# Patient Record
Sex: FEMALE | Race: WHITE | NOT HISPANIC OR LATINO | Employment: OTHER
[De-identification: names, ages, dates, MRNs, and addresses within clinical notes are randomized per-mention and may not be internally consistent; named-entity substitution may affect disease eponyms.]

---

## 2021-04-21 ENCOUNTER — TRANSCRIBE ORDERS (OUTPATIENT)
Dept: OTHER | Age: 36
End: 2021-04-21

## 2021-04-21 DIAGNOSIS — D61.9: Primary | ICD-10-CM

## 2021-04-23 ENCOUNTER — PATIENT OUTREACH (OUTPATIENT)
Dept: ONCOLOGY | Facility: CLINIC | Age: 36
End: 2021-04-23

## 2021-04-23 NOTE — PROGRESS NOTES
OUTGOING CALL to pt, no answer.   LVM introducing my role as nurse navigator with [facility] and that we have recd the referral to hematology for 2nd opinion from Dr Banegas and explained to pt that the referral is under dept review and that she will receive a call from our scheduling intake team next week to schedule the appt, provided our call-back number below if needed.  Ricarda Monae, RN, BSN, OCN  Hematology/Oncology Nurse Navigator  [facility]  [phone]

## 2021-05-03 NOTE — TELEPHONE ENCOUNTER
RECORDS STATUS - ALL OTHER DIAGNOSIS      RECORDS RECEIVED FROM: [facility]   DATE RECEIVED:    NOTES STATUS DETAILS   OFFICE NOTE from referring provider HERNÁN Banegas   OFFICE NOTE from medical oncologist HERNÁN  Mohamud Banegas: 21   DISCHARGE SUMMARY from hospital     DISCHARGE REPORT from the ER [facility] - [facility] 21   OPERATIVE REPORT CE - [facility] 19, 19: Exploratormy Laparotomy   MEDICATION LIST CE [facility]   CLINICAL TRIAL TREATMENTS TO DATE     LABS     PATHOLOGY REPORTS [facility], Report in CE, Slides requested 5/3    [employer] 3/29/21   ANYTHING RELATED TO DIAGNOSIS Epic/CE 5/3/21   GENONOMIC TESTING     TYPE:     IMAGING (NEED IMAGES & REPORT)     XR PACS [facility]   XR Swallow Study PACS [facility]   CT SCANS PACS [facility]   MRI PACS [facility]   MAMMO     ULTRASOUND     PET

## 2021-05-07 PROCEDURE — 88321 CONSLTJ&REPRT SLD PREP ELSWR: CPT | Mod: 26 | Performed by: PATHOLOGY

## 2021-05-07 PROCEDURE — 999N001031 HC STATISTIC REV BONE MARROW OUTSIDE SLIDES TC 88321: Performed by: INTERNAL MEDICINE

## 2021-05-11 LAB — COPATH REPORT: NORMAL

## 2021-05-29 ENCOUNTER — HEALTH MAINTENANCE LETTER (OUTPATIENT)
Age: 36
End: 2021-05-29

## 2021-08-24 ENCOUNTER — PRE VISIT (OUTPATIENT)
Dept: ONCOLOGY | Facility: CLINIC | Age: 36
End: 2021-08-24

## 2021-08-24 ENCOUNTER — VIRTUAL VISIT (OUTPATIENT)
Dept: ONCOLOGY | Facility: CLINIC | Age: 36
End: 2021-08-24
Attending: INTERNAL MEDICINE
Payer: COMMERCIAL

## 2021-08-24 DIAGNOSIS — D61.9 APLASTIC ANEMIA (H): Primary | ICD-10-CM

## 2021-08-24 PROCEDURE — 99205 OFFICE O/P NEW HI 60 MIN: CPT | Mod: 95 | Performed by: INTERNAL MEDICINE

## 2021-08-24 RX ORDER — PREGABALIN 100 MG/1
150 CAPSULE ORAL 2 TIMES DAILY
COMMUNITY

## 2021-08-24 RX ORDER — CLONAZEPAM 0.5 MG/1
0.25 TABLET ORAL EVERY MORNING
COMMUNITY

## 2021-08-24 RX ORDER — HYDROCODONE BITARTRATE AND ACETAMINOPHEN 10; 325 MG/1; MG/1
1 TABLET ORAL EVERY 4 HOURS PRN
COMMUNITY
End: 2024-03-25

## 2021-08-24 RX ORDER — CYCLOBENZAPRINE HCL 10 MG
10 TABLET ORAL 3 TIMES DAILY PRN
COMMUNITY
End: 2024-04-29

## 2021-08-24 RX ORDER — MULTIPLE VITAMINS W/ MINERALS TAB 9MG-400MCG
1 TAB ORAL EVERY MORNING
COMMUNITY

## 2021-08-24 RX ORDER — SUCRALFATE 1 G/1
1 TABLET ORAL 2 TIMES DAILY PRN
COMMUNITY
Start: 2019-12-18

## 2021-08-24 RX ORDER — CYANOCOBALAMIN 1000 UG/ML
INJECTION, SOLUTION INTRAMUSCULAR; SUBCUTANEOUS
COMMUNITY
Start: 2020-08-31 | End: 2024-03-25

## 2021-08-24 RX ORDER — SENNOSIDES A AND B 8.6 MG/1
1 TABLET, FILM COATED ORAL EVERY EVENING
Status: ON HOLD | COMMUNITY
End: 2024-05-08

## 2021-08-24 RX ORDER — PANTOPRAZOLE SODIUM 40 MG/1
TABLET, DELAYED RELEASE ORAL
COMMUNITY

## 2021-08-24 NOTE — LETTER
"    8/24/2021         RE: Mimi Su  [address] [id number]        Dear Colleague,    Thank you for referring your patient, Mimi Su, to the [facility]. Please see a copy of my visit note below.    Mimi is a 36 year old who is being evaluated via a billable video visit.      How would you like to obtain your AVS? MyChart     If the video visit is dropped, the invitation should be resent by: Text to cell phone: [phone]     Will anyone else be joining your video visit? No          Vitals - Patient Reported  Weight (Patient Reported): 64.4 kg (142 lb)  Height (Patient Reported): 170.2 cm (5' 7\")  BMI (Based on Pt Reported Ht/Wt): 22.24  Pain Score: Moderate Pain (5)  Pain Loc:  (EVERYWHERE)    Nilesh Law LPN    CHIEF COMPLAINT: Macrocytic anemia. 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  The patient reports generalized symptoms of fatigue, brain fog, dizziness in the setting of chronic pain syndrome.  Independent interpretation of recent bone marrow biopsy reveals hypocellular bone marrow with 20% cellularity, as well as diminished trilineage hematopoiesis.  No evidence of dysplasia or increase in blasts.  The patient does not have any clear risk factors or history of viral illness, hepatitis, family history of hematologic disease, or genetic predisposition.  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    Differential diagnosis for etiology of aplastic anemia includes viral illness (hepatitis B/C, HIV, parvovirus), toxins such as ethanol use, autoimmune " diseases, paroxysmal nocturnal hemoglobinuria, and genetic conditions such as dyskeratosis congenita. Patient does not appear to have strong family history of conditions associated with discontinue and partial lab workup does not suggest hemolytic anemia. However given uncertainty in etiology of her AA, we recommend additional laboratory workup.    - Recommend checking Hepatitis B serologies, anti-Hep C Ab, HIV  - Recommend additional workup for hemolytic anemia with haptoglobin, LDH, reticulocyte count, and direct Eligio test  - Recommend repeat flow cytometry/FLAER testing to rule out PNH  - Advised patient to abstain from EtOH for at least 3 months to assess if alcohol could be playing a role  - Further workup with telomere length analysis and molecular sequencing can be deferred given low likelihood of dyskeratosis congenita and overall mild presentation of disease  - At this time, therapy initiation is not indicated given lack of persistent cytopenias. Recommend continued close monitoring  - Patient requests to follow with her hematologist Dr. Banegas for monitoring and laboratory workup. May consider transfer of care to the [facility] if she plans to pursue treatment    The patient was seen and discussed with attending Dr. Asad Vela MD  PGY-5 Medicine-Dermatology  Pager [phone]    Physician Attestation   I saw this patient with the resident and agree with the resident s findings and plan of care as documented in the resident s note.      Briefly, Mimi is a 36 year old woman with a long history of mild but chronic macrocytic anemia refractory to vitamin B12 supplementation.  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  We explained to Mimi that toxins (namely alcohol in her case), viral infections, inherited marrow failure syndromes  such as Fanconi anemia or Dyskeratosis Congenita, or idiopathic (immune) non-severe AA.    At this point, I told her we need to exclude alcohol as the cause with absolute abstinence for the next 3 months.    Further AA workup includes excluded HCV, HBV, and HIV, checking for PNH (I don't suspect classical PNH but is associated with idiopathic AA), and excluding hemolysis with CBC and retic count, LDH, Haptoglobin, ANGELINA given that she is experiencing some cold-induced symptoms (acrocyanosis) more typical of cold agglutinin disease which also causes macrocytic anemia due to chronic hemolysis.    I'll have these tests done locally and then I can see her back here in 3 months    80 minutes spent on the date of the encounter doing chart review, review of outside records, review of test results, interpretation of tests, patient visit and documentation     Herson Kamara MD   of Medicine  [employer]       Video Start Time: 9:00 AM    Video-Visit Details    Type of service:  Video Visit    Video End Time:9:40AM    Originating Location (pt. Location): Home    Distant Location (provider location):  [facility]     Platform used for Video Visit: Falguni       ----------------------------------------------------------------------------------------    HPI: The patient is a 36 year old  female referred by her outside oncologist Dr. Banegas for further evaluation of hypocellular bone marrow of unclear etiology. She has an extensive past medical and surgical history including fibromyalgia, tobacco use, pelvic floor dysfunction, depression, cervicalgia, IBS, and multiple GI/GYN surgeries including partial colectomy with permanent colostomy.    She's had macrocytosis and anemia since her 20s. She was initially seen in 2014 for macrocytosis and was diagnosed with vitamin B12 deficiency. She continues to take B12 injections. She has had ongoing symptoms of  intermittent dizziness, lightheadedness, and chronic pain all over her body, diagnosed in her early 20s. She also has symptoms of brain fog often, has word finding difficulty and slurred speech. She has been seeing a hematologist since 2019 for this macrocytosis.     She denies any history of severe viral illness. 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 She has a history of active tobacco use, used to smoke 1/2 ppd until 2019, then switched to e-cigarettes with 3% Juul pods daily. Reports occasional alcohol use 1-2 times a week, usually margaritas. Denies daily use of alcohol. She has no history of autoimmune disease. No history of blood clots.    No family history of hematologic conditions or malignancy.    She needs neck surgery, but her surgeon wants to know how she would recover given her anemia/macrocytosis issues.         History taken from chart:  ONCOLOGIC HISTORY:   1.  2014: Patient was seen on account of macrocytosis which had been present for over 6 years. At that time she also had constitutional symptoms of tiredness, fatigue and night sweats and was also drinking a fair amount of alcoholic beverages. The etiology of the macrocytosis was felt to be nutritional as there was also associated vitamin B12 deficiency in the setting of malabsorption.   2. January 25, 2019:  Ms Su was on an initial visit in the presence of her partner. She was recently started on Strattera 10 mg daily.  3. January 29, 2019: CT chest, abdomen and pelvis with IV contrast showed no adenopathy, mass or acute findings in the chest, abdomen, or pelvis.  No definite cause for unexplained weight loss identified on CT.  4. March 29, 2021: Bone marrow examination indicated hypocellular bone marrow for age (30%) showing normal trilineage hematopoiesis, Adequate numbers of megakaryocytes, and no increase in blasts (1%). Mild macrocytosis. Storage iron present. Chromosome analysis with  no clonal abnormality apparent and macrocytosis present. Flow cytometry with normal immunophenotyping results.  No monotypic B-cell population or increase in blasts identified.       ROS: 14 point ROS neg other than the symptoms noted above in the HPI.    Social history as above in HPI and notable for prior history of heavier alcohol use but she currently reports intermittent alcohol use    17 drug allergies are reviewed in EPIC    Current Outpatient Medications:      clonazePAM (KLONOPIN) 0.5 MG tablet, Take 0.25 mg by mouth 2 times daily as needed for anxiety, Disp: , Rfl:      cyanocobalamin (CYANOCOBALAMIN) 1000 MCG/ML injection, INJECT AS DIRECTED ONCE MONTHLY., Disp: , Rfl:      cyclobenzaprine (FLEXERIL) 10 MG tablet, Take 10 mg by mouth 3 times daily as needed for muscle spasms, Disp: , Rfl:      FLUoxetine (PROZAC) 20 MG capsule, Take 20 mg by mouth daily, Disp: , Rfl:      HYDROcodone-acetaminophen (NORCO)  MG per tablet, Take 1 tablet by mouth every 4 hours as needed for severe pain, Disp: , Rfl:      linaclotide (LINZESS) 145 MCG capsule, Take by mouth 2 times daily, Disp: , Rfl:      melatonin 3 MG CAPS, , Disp: , Rfl:      multivitamin w/minerals (MULTI-VITAMIN) tablet, Take 1 tablet by mouth daily, Disp: , Rfl:      pantoprazole (PROTONIX) 20 MG EC tablet, Take 40 mg by mouth 2 times daily TWO TABS IN MORNING ONE AT NIGHT, Disp: , Rfl:      pregabalin (LYRICA) 100 MG capsule, Take 100 mg by mouth 2 times daily, Disp: , Rfl:      senna (SENOKOT) 8.6 MG tablet, Take 1 tablet by mouth daily, Disp: , Rfl:      sucralfate (CARAFATE) 1 GM tablet, TAKE 1 TABLET BY MOUTH FOUR TIMES DAILY, Disp: , Rfl:       Physical Examination:   Exam:  There is no height or weight on file to calculate BMI.   Constitutional: Appears essentially well, no distress.  No evidence of early graying.    Oral: no evidence of oral hairy leukoplakia  Eyes: no discharge, injection or icterus  Respiratory: no cough or labored  breathing  Skin: no rashes or petechiae  Neurological: no deficits appreciated, speech is fluent  Psych: affect is normal    LABORATORY AND IMAGING DATA     Bone Marrow Biopsy 5/7/21 reviewed by [facility]  Bone marrow, posterior iliac crest, decalcified trephine biopsy, aspirate   clot, touch imprints, aspirate   smears, and peripheral blood ([id number], 03/29/2021):        Hypocellular marrow (cellularity estimated at 20%) with diminished   trilineage hematopoiesis, no overt   dysplasia. No increase in blasts         No morphologic evidence for hematolymphoid neoplasm (see comment)        Peripheral blood showing slight mildly macrocytic anemia (HgB 11.9   g/dl, MCV 99.9 fl), , slight   leukopenia (WBC 3.5 K/ul), with a normal differential, slight   thrombocytopenia (Plt 148 K/ul)     COMMENT:   We agree with the original diagnosis, please see the report scanned in   EPIC. There is no evidence for   leukemia, lymphoma or other neoplasm. In the differential of the etiology   of marrow hypoplasia consider toxic   effects and potential medication effects.   By report flow cytometry was performed at a reference laboratory ([facility])   and showed no abnormal findings, the   specific flow cytometry report was not received.   By report cytogenetic studies were sent to [facility] laboratories, the   results s are not included in the   report.     Chromosome analysis negative    3/19/21 labs:  - Ig Free Light Chain: within normal limits   - SPEP within normal limits   - paraneoplastic ab panel negative    9/23/20 labs:  - TALA negative  - anti-CCP negative    Flow cytometry 3/29/21:    Normal immunophenotyping results.  No monotypic B-cell   population or increase in blasts identified.     Results:   Blasts:  Not increased by CD45/side scatter and CD34.     B-cells:  No monotypic; normal expression pattern of CD19,   CD10, surface kappa and lambda.     T-cells/NK-cells:  No aberrant phenotype by CD3 and CD16.      Quality assessment: Specimen received within validated   Guidelines.        Imaging Studies  None          I spent 60 minutes in consultation and over 50% of the time was spent on  counseling and coordinating care.      Again, thank you for allowing me to participate in the care of your patient.        Sincerely,        Herson Kamara MD

## 2021-08-24 NOTE — PROGRESS NOTES
"Mimi is a 36 year old who is being evaluated via a billable video visit.      How would you like to obtain your AVS? MyChart     If the video visit is dropped, the invitation should be resent by: Text to cell phone: [phone]     Will anyone else be joining your video visit? No          Vitals - Patient Reported  Weight (Patient Reported): 64.4 kg (142 lb)  Height (Patient Reported): 170.2 cm (5' 7\")  BMI (Based on Pt Reported Ht/Wt): 22.24  Pain Score: Moderate Pain (5)  Pain Loc:  (EVERYWHERE)    Nilesh Law LPN    CHIEF COMPLAINT: Macrocytic anemia. 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  The patient reports generalized symptoms of fatigue, brain fog, dizziness in the setting of chronic pain syndrome.  Independent interpretation of recent bone marrow biopsy reveals hypocellular bone marrow with 20% cellularity, as well as diminished trilineage hematopoiesis.  No evidence of dysplasia or increase in blasts.  The patient does not have any clear risk factors or history of viral illness, hepatitis, family history of hematologic disease, or genetic predisposition.  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    Differential diagnosis for etiology of aplastic anemia includes viral illness (hepatitis B/C, HIV, parvovirus), toxins such as ethanol use, autoimmune diseases, paroxysmal nocturnal hemoglobinuria, and genetic conditions such as dyskeratosis congenita. Patient does not appear to have strong family history of conditions associated with discontinue and partial lab workup does not suggest hemolytic anemia. However " given uncertainty in etiology of her AA, we recommend additional laboratory workup.    - Recommend checking Hepatitis B serologies, anti-Hep C Ab, HIV  - Recommend additional workup for hemolytic anemia with haptoglobin, LDH, reticulocyte count, and direct Eligio test  - Recommend repeat flow cytometry/FLAER testing to rule out PNH  - Advised patient to abstain from EtOH for at least 3 months to assess if alcohol could be playing a role  - Further workup with telomere length analysis and molecular sequencing can be deferred given low likelihood of dyskeratosis congenita and overall mild presentation of disease  - At this time, therapy initiation is not indicated given lack of persistent cytopenias. Recommend continued close monitoring  - Patient requests to follow with her hematologist Dr. Banegas for monitoring and laboratory workup. May consider transfer of care to the [facility] if she plans to pursue treatment    The patient was seen and discussed with attending Dr. Asad Vela MD  PGY-5 Medicine-Dermatology  Pager [phone]    Physician Attestation   I saw this patient with the resident and agree with the resident s findings and plan of care as documented in the resident s note.      Briefly, Mimi is a 36 year old woman with a long history of mild but chronic macrocytic anemia refractory to vitamin B12 supplementation.  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  We explained to Mimi that toxins (namely alcohol in her case), viral infections, inherited marrow failure syndromes such as Fanconi anemia or Dyskeratosis Congenita, or idiopathic (immune) non-severe AA.    At this point, I told her we need to exclude alcohol as the cause with absolute abstinence for the next 3 months.    Further AA workup includes excluded HCV, HBV, and HIV,  checking for PNH (I don't suspect classical PNH but is associated with idiopathic AA), and excluding hemolysis with CBC and retic count, LDH, Haptoglobin, ANGELINA given that she is experiencing some cold-induced symptoms (acrocyanosis) more typical of cold agglutinin disease which also causes macrocytic anemia due to chronic hemolysis.    I'll have these tests done locally and then I can see her back here in 3 months    80 minutes spent on the date of the encounter doing chart review, review of outside records, review of test results, interpretation of tests, patient visit and documentation     Herson Kamara MD   of Medicine  [employer]       Video Start Time: 9:00 AM    Video-Visit Details    Type of service:  Video Visit    Video End Time:9:40AM    Originating Location (pt. Location): Home    Distant Location (provider location):  [facility] CANCER [facility]     Platform used for Video Visit: AmWell       ----------------------------------------------------------------------------------------    HPI: The patient is a 36 year old  female referred by her outside oncologist Dr. Banegas for further evaluation of hypocellular bone marrow of unclear etiology. She has an extensive past medical and surgical history including fibromyalgia, tobacco use, pelvic floor dysfunction, depression, cervicalgia, IBS, and multiple GI/GYN surgeries including partial colectomy with permanent colostomy.    She's had macrocytosis and anemia since her 20s. She was initially seen in 2014 for macrocytosis and was diagnosed with vitamin B12 deficiency. She continues to take B12 injections. She has had ongoing symptoms of intermittent dizziness, lightheadedness, and chronic pain all over her body, diagnosed in her early 20s. She also has symptoms of brain fog often, has word finding difficulty and slurred speech. She has been seeing a hematologist since 2019 for this macrocytosis.      She denies any history of severe viral illness. 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 She has a history of active tobacco use, used to smoke 1/2 ppd until 2019, then switched to e-cigarettes with 3% Juul pods daily. Reports occasional alcohol use 1-2 times a week, usually margaritas. Denies daily use of alcohol. She has no history of autoimmune disease. No history of blood clots.    No family history of hematologic conditions or malignancy.    She needs neck surgery, but her surgeon wants to know how she would recover given her anemia/macrocytosis issues.         History taken from chart:  ONCOLOGIC HISTORY:   1.  2014: Patient was seen on account of macrocytosis which had been present for over 6 years. At that time she also had constitutional symptoms of tiredness, fatigue and night sweats and was also drinking a fair amount of alcoholic beverages. The etiology of the macrocytosis was felt to be nutritional as there was also associated vitamin B12 deficiency in the setting of malabsorption.   2. January 25, 2019:  Ms Su was on an initial visit in the presence of her partner. She was recently started on Strattera 10 mg daily.  3. January 29, 2019: CT chest, abdomen and pelvis with IV contrast showed no adenopathy, mass or acute findings in the chest, abdomen, or pelvis.  No definite cause for unexplained weight loss identified on CT.  4. March 29, 2021: Bone marrow examination indicated hypocellular bone marrow for age (30%) showing normal trilineage hematopoiesis, Adequate numbers of megakaryocytes, and no increase in blasts (1%). Mild macrocytosis. Storage iron present. Chromosome analysis with no clonal abnormality apparent and macrocytosis present. Flow cytometry with normal immunophenotyping results.  No monotypic B-cell population or increase in blasts identified.       ROS: 14 point ROS neg other than the symptoms noted above in the HPI.    Social  history as above in HPI and notable for prior history of heavier alcohol use but she currently reports intermittent alcohol use    17 drug allergies are reviewed in EPIC    Current Outpatient Medications:      clonazePAM (KLONOPIN) 0.5 MG tablet, Take 0.25 mg by mouth 2 times daily as needed for anxiety, Disp: , Rfl:      cyanocobalamin (CYANOCOBALAMIN) 1000 MCG/ML injection, INJECT AS DIRECTED ONCE MONTHLY., Disp: , Rfl:      cyclobenzaprine (FLEXERIL) 10 MG tablet, Take 10 mg by mouth 3 times daily as needed for muscle spasms, Disp: , Rfl:      FLUoxetine (PROZAC) 20 MG capsule, Take 20 mg by mouth daily, Disp: , Rfl:      HYDROcodone-acetaminophen (NORCO)  MG per tablet, Take 1 tablet by mouth every 4 hours as needed for severe pain, Disp: , Rfl:      linaclotide (LINZESS) 145 MCG capsule, Take by mouth 2 times daily, Disp: , Rfl:      melatonin 3 MG CAPS, , Disp: , Rfl:      multivitamin w/minerals (MULTI-VITAMIN) tablet, Take 1 tablet by mouth daily, Disp: , Rfl:      pantoprazole (PROTONIX) 20 MG EC tablet, Take 40 mg by mouth 2 times daily TWO TABS IN MORNING ONE AT NIGHT, Disp: , Rfl:      pregabalin (LYRICA) 100 MG capsule, Take 100 mg by mouth 2 times daily, Disp: , Rfl:      senna (SENOKOT) 8.6 MG tablet, Take 1 tablet by mouth daily, Disp: , Rfl:      sucralfate (CARAFATE) 1 GM tablet, TAKE 1 TABLET BY MOUTH FOUR TIMES DAILY, Disp: , Rfl:       Physical Examination:   Exam:  There is no height or weight on file to calculate BMI.   Constitutional: Appears essentially well, no distress.  No evidence of early graying.    Oral: no evidence of oral hairy leukoplakia  Eyes: no discharge, injection or icterus  Respiratory: no cough or labored breathing  Skin: no rashes or petechiae  Neurological: no deficits appreciated, speech is fluent  Psych: affect is normal    LABORATORY AND IMAGING DATA     Bone Marrow Biopsy 5/7/21 reviewed by [facility]emat[facility]  Bone marrow, posterior iliac crest, decalcified  trephine biopsy, aspirate   clot, touch imprints, aspirate   smears, and peripheral blood ([id number], 03/29/2021):        Hypocellular marrow (cellularity estimated at 20%) with diminished   trilineage hematopoiesis, no overt   dysplasia. No increase in blasts         No morphologic evidence for hematolymphoid neoplasm (see comment)        Peripheral blood showing slight mildly macrocytic anemia (HgB 11.9   g/dl, MCV 99.9 fl), , slight   leukopenia (WBC 3.5 K/ul), with a normal differential, slight   thrombocytopenia (Plt 148 K/ul)     COMMENT:   We agree with the original diagnosis, please see the report scanned in   EPIC. There is no evidence for   leukemia, lymphoma or other neoplasm. In the differential of the etiology   of marrow hypoplasia consider toxic   effects and potential medication effects.   By report flow cytometry was performed at a reference laboratory ([place])   and showed no abnormal findings, the   specific flow cytometry report was not received.   By report cytogenetic studies were sent to [facility] laboratories, the   results s are not included in the   report.     Chromosome analysis negative    3/19/21 labs:  - Ig Free Light Chain: within normal limits   - SPEP within normal limits   - paraneoplastic ab panel negative    9/23/20 labs:  - TALA negative  - anti-CCP negative    Flow cytometry 3/29/21:    Normal immunophenotyping results.  No monotypic B-cell   population or increase in blasts identified.     Results:   Blasts:  Not increased by CD45/side scatter and CD34.     B-cells:  No monotypic; normal expression pattern of CD19,   CD10, surface kappa and lambda.     T-cells/NK-cells:  No aberrant phenotype by CD3 and CD16.     Quality assessment: Specimen received within validated   Guidelines.        Imaging Studies  None          I spent 60 minutes in consultation and over 50% of the time was spent on  counseling and coordinating care.

## 2021-08-30 ENCOUNTER — DOCUMENTATION ONLY (OUTPATIENT)
Dept: ONCOLOGY | Facility: CLINIC | Age: 36
End: 2021-08-30

## 2021-08-30 NOTE — PROGRESS NOTES
"Message received from CC:    \"Please fax all lab orders by Dr. Kamara on 8/25 to pt's local [facility], I found this number in her last set of labs in Care Everywhere:     [facility]    [address] [place] [address]    Phone: [phone]    Fax: [phone] \"    Orders faxed.    Minnie Ervin CMA on 8/30/2021 at 10:04 AM    "

## 2021-09-16 ENCOUNTER — TELEPHONE (OUTPATIENT)
Dept: ONCOLOGY | Facility: CLINIC | Age: 36
End: 2021-09-16

## 2021-09-16 NOTE — TELEPHONE ENCOUNTER
"[facility] Telephone Triage Note    Assessment:   Mimi (pt) reporting the following symptoms:  Gotten worse since last Thursday 9/9, episodes/symptoms occurring daily now:  \"Light headed, weak, sometimes blacks out, difficulty formulating sentences, low grade fever, and pain all over body in every jt, muscle.   Then lays down and \"feels done for the day\".  Episodes lasting about  1 to 2hrs, sometimes feels better after a nap/rest, but still not all the time.     Does take Hydrocodone-Acet which reduces pain to 3/10 or less.     Plan was for pt to stop drinking alcohol before starting txt.   Pt reports has been 4weeks w/o alcohol consumption.     [facility] and [facility] test which was negative, did blood panels and shown continued anemic and that \"pt is dying\".   Given IV Fluids and IBUprofen and recommended that the follow up with Dr. Kamara.     Denies acute symptoms at the time of the call, wondering about txt plan and prefers.     Recommendations:  This writer discussed if episode occurs again option to go to Emergency Room with [facility] if safe. Closest one would be [facility] in [place]. Advised pt to have a caregiver/family member with her to assist in determining where to see more help.     Instructed patient to return to local ER if worsening symptoms, including: fever, chest pain, shortness of breath, uncontrolled bleeding.     Follow-Up:  Routed to Dr. Kamara.   "

## 2021-09-18 ENCOUNTER — HEALTH MAINTENANCE LETTER (OUTPATIENT)
Age: 36
End: 2021-09-18

## 2021-09-20 ENCOUNTER — NURSE TRIAGE (OUTPATIENT)
Dept: ONCOLOGY | Facility: CLINIC | Age: 36
End: 2021-09-20

## 2021-09-20 NOTE — TELEPHONE ENCOUNTER
[facility] Triage    Incoming Call:  Unsure of fever  Lightheaded - dizzy  Pain above the left eye  Headache  Pain all over  Weak and tired  Has not blacked out since before 9/16  Not confused, not having issues formulating sentences,      Copied from Dr. Kamara 8/24/21:  The patient reports generalized symptoms of fatigue, brain fog, dizziness in the setting of chronic pain syndrome    At this point, I told her we need to exclude alcohol as the cause with absolute abstinence for the next 3 months.  5 weeks since alcohol use.    Advised ED - closest ED - [place] - she  has decided to go to [facility] ED.  Her mom's driving her.    Routing to Dr. Kamara, Tiffany Obregon  Report to Leigh at [facility]

## 2021-11-23 ENCOUNTER — VIRTUAL VISIT (OUTPATIENT)
Dept: ONCOLOGY | Facility: CLINIC | Age: 36
End: 2021-11-23
Attending: INTERNAL MEDICINE
Payer: COMMERCIAL

## 2021-11-23 DIAGNOSIS — D61.9 APLASTIC ANEMIA (H): Primary | ICD-10-CM

## 2021-11-23 PROCEDURE — 999N001193 HC VIDEO/TELEPHONE VISIT; NO CHARGE

## 2021-11-23 PROCEDURE — G0463 HOSPITAL OUTPT CLINIC VISIT: HCPCS | Mod: PN,RTG | Performed by: INTERNAL MEDICINE

## 2021-11-23 PROCEDURE — 99214 OFFICE O/P EST MOD 30 MIN: CPT | Mod: 95 | Performed by: INTERNAL MEDICINE

## 2021-11-23 NOTE — LETTER
2021         RE: Mimi Su  [address] [address]        Dear Colleague,    Thank you for referring your patient, Mimi Su, to the [facility]. Please see a copy of my visit note below.        [facility] Hematology Follow Up Visit    Outpatient Visit Note:    Patient: Mimi Su  MRN: [id number]  : 1985  GENEVA: 2021    Mimi Su is a 36 year old woman with a history of hypoplastic bone marrow, intermittent pancytopenia and persistent macrocytosis with and without anemia times who returns for routine follow up.      Assessment:  In summary, Mimi Su is a 36 year old woman with mild pancytopenia and hypoplastic bone marrow, which appears currently resolved.  At this point will continue observation and turn attention to continued other complaints that are not related to the bone marrow.    Recommendations:  1. I counseled the patient about my assessment of her current marrow disorder, which appears in remission.  We discussed treatment of bone marrow disorders with immunosuppression today and that the risks clearly outweigh benefits at this time  2. Repeat CBC and diff about every 3 months.  I'll see her back in 6 months for observation.    35 minutes spent on the date of the encounter doing chart review, review of test results, interpretation of tests, patient visit and documentation       Herson Kamara MD   of Medicine, Division of Hematology, Oncology and Transplantation  [employer]     --------------------------------------------------------  Forward History:  She's had macrocytosis and anemia since her 20s. She was initially seen in 2014 for macrocytosis and was diagnosed with vitamin B12 deficiency. She continues to take B12 injections. She has had ongoing symptoms of intermittent dizziness, lightheadedness, and chronic pain all over her body,  diagnosed in her early 20s. She also has symptoms of brain fog often, has word finding difficulty and slurred speech. She has been seeing a hematologist since 2019 for this macrocytosis.     She denies any history of severe viral illness. 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 She has a history of active tobacco use, used to smoke 1/2 ppd until 2019, then switched to e-cigarettes with 3% Juul pods daily. Reports occasional alcohol use 1-2 times a week, usually margaritas. Denies daily use of alcohol. She has no history of autoimmune disease. No history of blood clots.    March 29, 2021: Bone marrow examination indicated hypocellular bone marrow for age (30%) showing normal trilineage hematopoiesis, Adequate numbers of megakaryocytes, and no increase in blasts (1%). Mild macrocytosis. Storage iron present. Chromosome analysis with no clonal abnormality apparent and macrocytosis present. Flow cytometry with normal immunophenotyping results.  No monotypic B-cell population or increase in blasts identified.     Initial consult Aug 2021: recommended viral hepatitis testing, hemolysis workup, exclude PNH, consider telomere testing.  HBV, HCV, HIV all negative 11/9/21  Haptoglobin normal, Retic 1% with normal Hb    History: Mimi Su is a 36 year old woman with a long list of somatic complaints, many of which have no clear cause at this point.  She was diagnosed with fibromyalgia and is treated with selective serotonin reuptake inhibitor therapy.  However, she reports she has pain all over her body, primary in bones and muscles more than chest/abdomen.  No infections or bleeding but she reports easy bruising which is unchanged.    Objective:  Exam:  There is no height or weight on file to calculate BMI.   Constitutional: Appears well, no distress  Eyes: no discharge, injection or icterus  Respiratory: no cough or labored breathing  Skin: no rashes or  petechiae  Neurological: no deficits appreciated, speech is fluent  Psych: affect is normal    Labs:  CBC 11/9/21 shows:  WBC 4.0, ANC2.3  Hb: 12.4, , 1% reticulocytes      Imaging:  none      Herson Kamara MD

## 2021-11-23 NOTE — PROGRESS NOTES
Mimi is a 36 year old who is being evaluated via a billable video visit.      How would you like to obtain your AVS? MyChart  If the video visit is dropped, the invitation should be resent by: Send to e-mail at: [email or web address]  Will anyone else be joining your video visit? No          [facility] Hematology Follow Up Visit    Outpatient Visit Note:    Patient: Mimi Su  MRN: [id number]  : 1985  GENEVA: 2021    Mimi Su is a 36 year old woman with a history of hypoplastic bone marrow, intermittent pancytopenia and persistent macrocytosis with and without anemia times who returns for routine follow up.      Assessment:  In summary, Mimi Su is a 36 year old woman with mild pancytopenia and hypoplastic bone marrow, which appears currently resolved.  At this point will continue observation and turn attention to continued other complaints that are not related to the bone marrow.    Recommendations:  1. I counseled the patient about my assessment of her current marrow disorder, which appears in remission.  We discussed treatment of bone marrow disorders with immunosuppression today and that the risks clearly outweigh benefits at this time  2. Repeat CBC and diff about every 3 months.  I'll see her back in 6 months for observation.    35 minutes spent on the date of the encounter doing chart review, review of test results, interpretation of tests, patient visit and documentation       Herson Kamara MD   of Medicine, Division of Hematology, Oncology and Transplantation  [employer]     --------------------------------------------------------  Forward History:  She's had macrocytosis and anemia since her 20s. She was initially seen in 2014 for macrocytosis and was diagnosed with vitamin B12 deficiency. She continues to take B12 injections. She has had ongoing symptoms of intermittent dizziness, lightheadedness, and chronic  pain all over her body, diagnosed in her early 20s. She also has symptoms of brain fog often, has word finding difficulty and slurred speech. She has been seeing a hematologist since 2019 for this macrocytosis.     She denies any history of severe viral illness. 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 She has a history of active tobacco use, used to smoke 1/2 ppd until 2019, then switched to e-cigarettes with 3% Juul pods daily. Reports occasional alcohol use 1-2 times a week, usually margaritas. Denies daily use of alcohol. She has no history of autoimmune disease. No history of blood clots.    March 29, 2021: Bone marrow examination indicated hypocellular bone marrow for age (30%) showing normal trilineage hematopoiesis, Adequate numbers of megakaryocytes, and no increase in blasts (1%). Mild macrocytosis. Storage iron present. Chromosome analysis with no clonal abnormality apparent and macrocytosis present. Flow cytometry with normal immunophenotyping results.  No monotypic B-cell population or increase in blasts identified.     Initial consult Aug 2021: recommended viral hepatitis testing, hemolysis workup, exclude PNH, consider telomere testing.  HBV, HCV, HIV all negative 11/9/21  Haptoglobin normal, Retic 1% with normal Hb    History: Mimi Su is a 36 year old woman with a long list of somatic complaints, many of which have no clear cause at this point.  She was diagnosed with fibromyalgia and is treated with selective serotonin reuptake inhibitor therapy.  However, she reports she has pain all over her body, primary in bones and muscles more than chest/abdomen.  No infections or bleeding but she reports easy bruising which is unchanged.    Objective:  Exam:  There is no height or weight on file to calculate BMI.   Constitutional: Appears well, no distress  Eyes: no discharge, injection or icterus  Respiratory: no cough or labored breathing  Skin: no  rashes or petechiae  Neurological: no deficits appreciated, speech is fluent  Psych: affect is normal    Labs:  CBC 11/9/21 shows:  WBC 4.0, ANC2.3  Hb: 12.4, , 1% reticulocytes      Imaging:  none      Video-Visit Details    Type of service:  Video Visit  Video Start Time: 852 AM  Video End Time:908 AM    Originating Location (pt. Location): Home    Distant Location (provider location):  [facility] CANCER [facility]     Platform used for Video Visit: [employer]Claims

## 2022-01-08 ENCOUNTER — HEALTH MAINTENANCE LETTER (OUTPATIENT)
Age: 37
End: 2022-01-08

## 2022-03-14 ENCOUNTER — MYC MEDICAL ADVICE (OUTPATIENT)
Dept: ONCOLOGY | Facility: CLINIC | Age: 37
End: 2022-03-14
Payer: COMMERCIAL

## 2022-03-23 ENCOUNTER — PATIENT OUTREACH (OUTPATIENT)
Dept: ONCOLOGY | Facility: CLINIC | Age: 37
End: 2022-03-23
Payer: COMMERCIAL
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
## 2022-03-30 NOTE — PROGRESS NOTES
[facility] Hematology Follow Up Visit    Outpatient Visit Note:    Patient: Mimi Su  MRN: [id number]  : 1985  GENEVA: 2022    Mimi Su is a 36 year old woman with a history of hypoplastic bone marrow, intermittent pancytopenia and persistent macrocytosis with and without anemia times who returns for routine follow up.      Assessment:  In summary, Mimi Su is a 36 year old woman with mild pancytopenia and hypoplastic bone marrow, which appears currently resolved.  We will continue with observation every 6 months.    Recommendations:  1. I assured Mimi that her marrow disorder appears to remain in remission.  I did not expect significant fluctuation in her CBC although she requested to check this again based on her worsening symptoms in the past 2 weeks. CBC showing stability and continued resolution.  2. Repeat CBC and diff about every 3 months.  She will see Dr. Kamara again in 6 months for observation.  3. Rheumatology referral placed for evaluation of possible Raynaud's. She has seen a rheumatologist in the past but this was about 5 years ago.  She does endorse a family history of RA in her father as well as lupus in a member of her extended family.    40 minutes spent on the date of the encounter doing chart review, review of outside records, review of test results, interpretation of tests, patient visit and documentation     Judy Nelson CNP  [facility]  [address] [place] [id number]  [phone]    --------------------------------------------------------  Forward History:  She's had macrocytosis and anemia since her 20s. She was initially seen in  for macrocytosis and was diagnosed with vitamin B12 deficiency. She continues to take B12 injections. She has had ongoing symptoms of intermittent dizziness, lightheadedness, and chronic pain all over her body, diagnosed in her early 20s. She also has symptoms of brain fog often, has  word finding difficulty and slurred speech. She has been seeing a hematologist since 2019 for this macrocytosis.     She denies any history of severe viral illness. 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 She has a history of active tobacco use, used to smoke 1/2 ppd until 2019, then switched to e-cigarettes with 3% Juul pods daily. Reports occasional alcohol use 1-2 times a week, usually margaritas. Denies daily use of alcohol. She has no history of autoimmune disease. No history of blood clots.    March 29, 2021: Bone marrow examination indicated hypocellular bone marrow for age (30%) showing normal trilineage hematopoiesis, Adequate numbers of megakaryocytes, and no increase in blasts (1%). Mild macrocytosis. Storage iron present. Chromosome analysis with no clonal abnormality apparent and macrocytosis present. Flow cytometry with normal immunophenotyping results.  No monotypic B-cell population or increase in blasts identified.     Initial consult Aug 2021: recommended viral hepatitis testing, hemolysis workup, exclude PNH, consider telomere testing.  HBV, HCV, HIV all negative 11/9/21  Haptoglobin normal, Retic 1% with normal Hb     History: Mimi Su is a 36 year old woman seen today in follow-up of hypoplastic bone marrow. She is concerned about fluctuations in her blood counts, with more notable pancytopenia in January 2022 noted during a hospital admission. Her recent labs in the past month have been stable with improvement in WBC, hgb and platelets. She notes an increase in overall somatic symptoms in the past 2 weeks including generalized pain, fatigue, dyspnea on exertion, intermittent fever, night sweats, appetite loss, fluid retention and decreased urination despite adequate fluid intake. She also endorses intermittent bruising and bleeding from her gums. She has fibromyalgia but states that her medications are no longer effective. She has been  "bothered by cold and numbness in her extremities, particularly her fingers. She denies recent infectious symptoms aside from the intermittent fevers.     Objective:  Exam:  /79 (BP Location: Right arm, Patient Position: Sitting, Cuff Size: Adult Regular)   Pulse 89   Temp 97.9  F (36.6  C) (Oral)   Ht 1.696 m (5' 6.77\")   Wt 65.1 kg (143 lb 8 oz)   LMP  (LMP Unknown)   SpO2 100%   BMI 22.63 kg/m      Body mass index is 22.63 kg/m .   Constitutional: Appears well, NAD, accompanied by spoue  Eyes: no discharge, injection or icterus  ENT: oral mucosa moist without lesions or thrush  Neck: No cervical, supraclavicular or axillary lymphadenopathy  Respiratory: CTA bilaterally  Skin: no rashes, ecchymoses or petechiae on exposed skin  Neurological: no deficits appreciated, speech is fluent  Psych: affect is normal, pleasant    Labs:  CBC 3/21/22 reviewed via Care Everywhere showing:  WBC 3.9  Hgb 11.6  .1  Plt 155    Results for NETTIE GALLEGOS (MRN [id number]) as of 4/1/2022 11:43   Ref. Range 4/1/2022 11:24   WBC Latest Ref Range: 4.0 - 11.0 10e3/uL 4.0   Hemoglobin Latest Ref Range: 11.7 - 15.7 g/dL 11.9   Hematocrit Latest Ref Range: 35.0 - 47.0 % 34.2 (L)   Platelet Count Latest Ref Range: 150 - 450 10e3/uL 152   RBC Count Latest Ref Range: 3.80 - 5.20 10e6/uL 3.43 (L)   MCV Latest Ref Range: 78 - 100 fL 100   MCH Latest Ref Range: 26.5 - 33.0 pg 34.7 (H)   MCHC Latest Ref Range: 31.5 - 36.5 g/dL 34.8   RDW Latest Ref Range: 10.0 - 15.0 % 12.5   % Neutrophils Latest Units: % 55   % Lymphocytes Latest Units: % 35   % Monocytes Latest Units: % 7   % Eosinophils Latest Units: % 1   % Basophils Latest Units: % 1   Absolute Basophils Latest Ref Range: 0.0 - 0.2 10e3/uL 0.0   Absolute Eosinophils Latest Ref Range: 0.0 - 0.7 10e3/uL 0.0   Absolute Immature Granulocytes Latest Ref Range: <=0.4 10e3/uL 0.0   Absolute Lymphocytes Latest Ref Range: 0.8 - 5.3 10e3/uL 1.4   Absolute Monocytes Latest Ref Range: " 0.0 - 1.3 10e3/uL 0.3   % Immature Granulocytes Latest Units: % 1   Absolute Neutrophils Latest Ref Range: 1.6 - 8.3 10e3/uL 2.3   Absolute NRBCs Latest Units: 10e3/uL 0.0   NRBCs per 100 WBC Latest Ref Range: <1 /100 0   Labs reviewed and interpreted by me today.    Imaging:  None

## 2022-04-01 ENCOUNTER — ONCOLOGY VISIT (OUTPATIENT)
Dept: ONCOLOGY | Facility: CLINIC | Age: 37
End: 2022-04-01
Attending: REGISTERED NURSE
Payer: COMMERCIAL

## 2022-04-01 VITALS
HEART RATE: 89 BPM | SYSTOLIC BLOOD PRESSURE: 124 MMHG | WEIGHT: 143.5 LBS | BODY MASS INDEX: 22.52 KG/M2 | HEIGHT: 67 IN | DIASTOLIC BLOOD PRESSURE: 79 MMHG | OXYGEN SATURATION: 100 % | TEMPERATURE: 97.9 F

## 2022-04-01 DIAGNOSIS — R20.9 COLD EXTREMITIES: ICD-10-CM

## 2022-04-01 DIAGNOSIS — R52 GENERALIZED PAIN: ICD-10-CM

## 2022-04-01 DIAGNOSIS — D61.9 APLASTIC ANEMIA (H): Primary | ICD-10-CM

## 2022-04-01 LAB
BASOPHILS # BLD AUTO: 0 10E3/UL (ref 0–0.2)
BASOPHILS NFR BLD AUTO: 1 %
EOSINOPHIL # BLD AUTO: 0 10E3/UL (ref 0–0.7)
EOSINOPHIL NFR BLD AUTO: 1 %
ERYTHROCYTE [DISTWIDTH] IN BLOOD BY AUTOMATED COUNT: 12.5 % (ref 10–15)
HCT VFR BLD AUTO: 34.2 % (ref 35–47)
HGB BLD-MCNC: 11.9 G/DL (ref 11.7–15.7)
IMM GRANULOCYTES # BLD: 0 10E3/UL
IMM GRANULOCYTES NFR BLD: 1 %
LYMPHOCYTES # BLD AUTO: 1.4 10E3/UL (ref 0.8–5.3)
LYMPHOCYTES NFR BLD AUTO: 35 %
MCH RBC QN AUTO: 34.7 PG (ref 26.5–33)
MCHC RBC AUTO-ENTMCNC: 34.8 G/DL (ref 31.5–36.5)
MCV RBC AUTO: 100 FL (ref 78–100)
MONOCYTES # BLD AUTO: 0.3 10E3/UL (ref 0–1.3)
MONOCYTES NFR BLD AUTO: 7 %
NEUTROPHILS # BLD AUTO: 2.3 10E3/UL (ref 1.6–8.3)
NEUTROPHILS NFR BLD AUTO: 55 %
NRBC # BLD AUTO: 0 10E3/UL
NRBC BLD AUTO-RTO: 0 /100
PLATELET # BLD AUTO: 152 10E3/UL (ref 150–450)
RBC # BLD AUTO: 3.43 10E6/UL (ref 3.8–5.2)
WBC # BLD AUTO: 4 10E3/UL (ref 4–11)

## 2022-04-01 PROCEDURE — 99215 OFFICE O/P EST HI 40 MIN: CPT | Performed by: REGISTERED NURSE

## 2022-04-01 PROCEDURE — G0463 HOSPITAL OUTPT CLINIC VISIT: HCPCS

## 2022-04-01 PROCEDURE — 36415 COLL VENOUS BLD VENIPUNCTURE: CPT | Performed by: REGISTERED NURSE

## 2022-04-01 PROCEDURE — 85025 COMPLETE CBC W/AUTO DIFF WBC: CPT | Performed by: REGISTERED NURSE

## 2022-04-01 ASSESSMENT — PAIN SCALES - GENERAL: PAINLEVEL: WORST PAIN (10)

## 2022-04-01 NOTE — NURSING NOTE
"Oncology Rooming Note    April 1, 2022 10:40 AM   Mimi Su is a 36 year old female who presents for:    Chief Complaint   Patient presents with     Oncology Clinic Visit     Hypoplasia of bone marrow     Initial Vitals: /79 (BP Location: Right arm, Patient Position: Sitting, Cuff Size: Adult Regular)   Pulse 89   Temp 97.9  F (36.6  C) (Oral)   Ht 1.696 m (5' 6.77\")   Wt 65.1 kg (143 lb 8 oz)   LMP  (LMP Unknown)   SpO2 100%   BMI 22.63 kg/m   Estimated body mass index is 22.63 kg/m  as calculated from the following:    Height as of this encounter: 1.696 m (5' 6.77\").    Weight as of this encounter: 65.1 kg (143 lb 8 oz). Body surface area is 1.75 meters squared.  Worst Pain (10) Comment: Data Unavailable   No LMP recorded (lmp unknown). Patient has had a hysterectomy.  Allergies reviewed: Yes  Medications reviewed: Yes    Medications: Medication refills not needed today.  Pharmacy name entered into [employer]: [id number] - [place] - [address] AT [facility] [address]    Clinical concerns: None     Carlin Chicas            "

## 2022-04-18 ENCOUNTER — OFFICE VISIT (OUTPATIENT)
Dept: RHEUMATOLOGY | Facility: CLINIC | Age: 37
End: 2022-04-18
Payer: COMMERCIAL

## 2022-04-18 VITALS
SYSTOLIC BLOOD PRESSURE: 102 MMHG | HEIGHT: 67 IN | DIASTOLIC BLOOD PRESSURE: 64 MMHG | WEIGHT: 143 LBS | BODY MASS INDEX: 22.44 KG/M2

## 2022-04-18 DIAGNOSIS — I73.00 RAYNAUD'S DISEASE WITHOUT GANGRENE: ICD-10-CM

## 2022-04-18 DIAGNOSIS — M25.50 POLYARTHRALGIA: Primary | ICD-10-CM

## 2022-04-18 DIAGNOSIS — R20.9 COLD EXTREMITIES: ICD-10-CM

## 2022-04-18 LAB
ALBUMIN UR-MCNC: NEGATIVE MG/DL
APPEARANCE UR: CLEAR
BACTERIA #/AREA URNS HPF: ABNORMAL /HPF
BILIRUB UR QL STRIP: NEGATIVE
COLOR UR AUTO: YELLOW
GLUCOSE UR STRIP-MCNC: NEGATIVE MG/DL
HGB UR QL STRIP: NEGATIVE
KETONES UR STRIP-MCNC: NEGATIVE MG/DL
LEUKOCYTE ESTERASE UR QL STRIP: NEGATIVE
MUCOUS THREADS #/AREA URNS LPF: PRESENT /LPF
NITRATE UR QL: NEGATIVE
PH UR STRIP: 6 [PH] (ref 5–7)
RBC URINE: 0 /HPF
SP GR UR STRIP: 1.01 (ref 1–1.03)
SQUAMOUS EPITHELIAL: 2 /HPF
UROBILINOGEN UR STRIP-MCNC: NORMAL MG/DL
WBC URINE: 0 /HPF

## 2022-04-18 PROCEDURE — 86225 DNA ANTIBODY NATIVE: CPT | Performed by: PHYSICIAN ASSISTANT

## 2022-04-18 PROCEDURE — 86235 NUCLEAR ANTIGEN ANTIBODY: CPT | Performed by: PHYSICIAN ASSISTANT

## 2022-04-18 PROCEDURE — 86038 ANTINUCLEAR ANTIBODIES: CPT | Performed by: PHYSICIAN ASSISTANT

## 2022-04-18 PROCEDURE — 36415 COLL VENOUS BLD VENIPUNCTURE: CPT | Performed by: PHYSICIAN ASSISTANT

## 2022-04-18 PROCEDURE — 86160 COMPLEMENT ANTIGEN: CPT | Performed by: PHYSICIAN ASSISTANT

## 2022-04-18 PROCEDURE — 81001 URINALYSIS AUTO W/SCOPE: CPT | Performed by: PHYSICIAN ASSISTANT

## 2022-04-18 PROCEDURE — 99204 OFFICE O/P NEW MOD 45 MIN: CPT | Performed by: PHYSICIAN ASSISTANT

## 2022-04-18 NOTE — PROGRESS NOTES
Rheumatology Clinic Visit  [facility]  CARLEE Jack     Mimi Su MRN# [id number]   YOB: 1985 Age: 36 year old   Date of Visit: 04/18/2022  Primary care provider: Mat Fernandez          Assessment and Plan:     1.  Polyarthralgia  2.  Raynaud's disease without gangrene  3.  Cold extremities    This is a pleasant 36-year-old female with a history of hypoplastic Bunger, intermittent pancytopenia and persistent macrocytosis with and without anemia, migraines, neck pain, back pain, and fibromyalgia.  She presents today with concerns of polyarthralgia, hair loss, occasional rashes, dizziness, and cold with her extremities.  Physical examination was remarkable for evidence of Raynaud's in her toes.  No synovitis, dactylitis, tenosynovitis, or enthesitis.  She had pain to palpation in the right second MCP and left third PIP.  Range of motion was normal.  Laboratory examination from 4/1/2022, 3/24/2022, 9/23/2022, and 6/9/2015 were reviewed.    There does not appear to be any evidence of inflammatory arthritis.  Her symptoms certainly do not correlate with a diagnosis of such.  Given the evidence of Raynaud's as well as some of her other symptoms including hair loss, dry eyes, and dry mouth with occasional rashes we will investigate further a connective tissue disorder such as lupus.  Patient does have evidence of central sensitization to pain/fibromyalgia.  She reports that she has been diagnosed with this in the past and is currently taking Lyrica for.  This is managed by her neurologist.  Discussed that if her lab results are unrevealing the likelihood of an autoimmune disorder causing her symptoms is remote.  Would encourage her to continue close follow-up with her primary care provider.  She also reports having an appointment with pain management.    Patient did request that I sign the travel documentation for her insurance.  This was signed, a copy was made and scanned into her  "chart.    Plan:     1. Schedule follow-up with Emily Fung PA-C as needed.   2. Labs: TALA, ELLIOT panel, complement levels, urine and dsDNA, Scl-70    Emily Fung, PAC  Rheumatology         History of Present Illness:   Mimi Su presents for evaluation of joint pain, possible Raynaud's    She is a 36-year-old woman with a history of hypoplastic bone marrow, intermittent pancytopenia and persistent macrocytosis with and without anemia, migraines, neck and back pain and fibromyalgia.    She has been experiencing \"extreme hair loss\" and night sweats. She reports having a low grade temp \"most of time\". She has a rash on her chest and occasionally on her face. She states she gets the \"butterfly rash\" as well as a current outbreak on her chin. She has exhaustion and states that she gets dizzy and blurred vision. She has pain in her \"entire body\". She states that every part of her fingers to her elbows and toes. She states that sometimes her pinky will feel like an ice cubes, or the tips of her fingers. The tip of her nose will be ice cold. She has had a runny nose that is \"constant\". She states that it will get white, bluish or purple. Sometimes it will be all of her fingers. No ulcers to the tips of her fingers. She reports swelling and water retention. She states that even her soft clothes will leave a francine. She feels she has swelling in her entire water. She drinks a lot of water but does not feel she is urinating that much. She will gain 3-4 pounds and will lose it quickly. Her significant other reports that her fingers will get swollen, as do her feet and ankles. She reports getting painful mouth sores. She states that her gums will occasionally bleed. She states that sometimes her chest will get tight and her heart will beat fast, this started about 2 months ago. She feels this is getting worse.  She does also report having problems with dry eyes and dry mouth.    No seizures or miscarriages. No history " of blood clots. No personal history of psoriasis, ulcerative colitis or crohn's. Her father has RA and she has an aunt with lupus.          Review of Systems:     Constitutional: As above  Skin: As above  Eyes: negative  Ears/Nose/Throat: negative  Respiratory: No shortness of breath, dyspnea on exertion, cough, or hemoptysis  Cardiovascular: negative  Gastrointestinal: negative  Genitourinary: negative  Musculoskeletal: As above  Neurologic: As above  Psychiatric: negative  Hematologic/Lymphatic/Immunologic: negative  Endocrine: negative         Active Problem List:   There are no problems to display for this patient.           Past Medical History:   No past medical history on file.  No past surgical history on file.         Social History:     Social History     Socioeconomic History     Marital status: Single     Spouse name: Not on file     Number of children: Not on file     Years of education: Not on file     Highest education level: Not on file   Occupational History     Not on file   Tobacco Use     Smoking status: Former Smoker     Quit date: 2020     Years since quittin.2     Smokeless tobacco: Never Used   Substance and Sexual Activity     Alcohol use: Not Currently     Drug use: Never     Sexual activity: Not on file   Other Topics Concern     Not on file   Social History Narrative     Not on file     Social Determinants of Health     Financial Resource Strain: Not on file   Food Insecurity: Not on file   Transportation Needs: Not on file   Physical Activity: Not on file   Stress: Not on file   Social Connections: Not on file   Intimate Partner Violence: Not At Risk     Fear of Current or Ex-Partner: No     Emotionally Abused: No     Physically Abused: No     Sexually Abused: No   Housing Stability: Not on file          Family History:   No family history on file.         Allergies:     Allergies   Allergen Reactions     Metoclopramide Anaphylaxis and Nausea and Vomiting     start of  anaphylaxis, was treated quickl       Mirtazapine Hives     Morphine Hives, Itching and Rash     Penicillins Hives     Prochlorperazine Anaphylaxis     Other reaction(s): Other (see comments)  sweating       Promethazine Anaphylaxis     Other reaction(s): Angioedema  angioedema       Sumatriptan Anaphylaxis     Tongue and throat sensation of swelling and chest tightness.        Desvenlafaxine      Other reaction(s): Intolerance     Lisinopril      Other reaction(s): Unknown Reaction     Milnacipran      Other reaction(s): Intolerance     Labetalol Rash     Other reaction(s): Tachycardia     Levofloxacin      Other reaction(s): Mental Status Change, Other (see comments)  Anxiety (severe), euphoria, facial numbness  Anxiety (severe), euphoria, facial numbness       Metronidazole Nausea and Vomiting     Quetiapine Rash     Sulfa Drugs Rash     Sulfamethoxazole W/Trimethoprim      Other reaction(s): Unknown Reaction     Trazodone Hives and Rash            Medications:     Current Outpatient Medications   Medication Sig Dispense Refill     clonazePAM (KLONOPIN) 0.5 MG tablet Take 0.25 mg by mouth 2 times daily as needed for anxiety       cyanocobalamin (CYANOCOBALAMIN) 1000 MCG/ML injection INJECT AS DIRECTED ONCE MONTHLY.       cyclobenzaprine (FLEXERIL) 10 MG tablet Take 10 mg by mouth 3 times daily as needed for muscle spasms       FLUoxetine (PROZAC) 20 MG capsule Take 20 mg by mouth daily       HYDROcodone-acetaminophen (NORCO)  MG per tablet Take 1 tablet by mouth every 4 hours as needed for severe pain       linaclotide (LINZESS) 145 MCG capsule Take by mouth 2 times daily       melatonin 3 MG CAPS        multivitamin w/minerals (MULTI-VITAMIN) tablet Take 1 tablet by mouth daily       pantoprazole (PROTONIX) 20 MG EC tablet Take 40 mg by mouth 2 times daily TWO TABS IN MORNING ONE AT NIGHT       pregabalin (LYRICA) 100 MG capsule Take 100 mg by mouth 2 times daily       senna (SENOKOT) 8.6 MG tablet Take 1  tablet by mouth daily       sucralfate (CARAFATE) 1 GM tablet TAKE 1 TABLET BY MOUTH FOUR TIMES DAILY              Physical Exam:   There were no vitals taken for this visit.  Wt Readings from Last 6 Encounters:   04/01/22 65.1 kg (143 lb 8 oz)     Constitutional: well-developed, appearing stated age; cooperative  Eyes: nl PERRLA, vision, conjunctiva, sclera  ENT: nl external ears, nose, hearing, lips, teeth, gums, throat. No mucositis.   No mucous membrane lesions, normal saliva pool  Neck: no mass or thyroid enlargement  Resp: lungs clear to auscultation, nl to palpation  CV: RRR, no murmurs, rubs or gallops, no edema  GI: no ABD mass, no HSM, diffuse tenderness throughout the abdomen to palpation  Lymph: no cervical, supraclavicular or epitrochlear nodes  MS: The shoulder, elbow, wrist, MCP/PIP/DIP, spine, hip, knee, ankle, and foot MTP/IP joints were examined and found normal. No active synovitis or altered joint anatomy. Full joint ROM. Normal  strength. No dactylitis,  tenosynovitis, enthesopathy.  She does have tenderness throughout the spine at the midline as well as along the paraspinal muscles.  Skin: no nail pitting, alopecia, rash, nodules or lesions.  On examination her toes did start to have a bluish tint  Neuro: nl cranial nerves.   Psych: nl judgement, orientation, memory, affect.           Data:   Imaging:  N/A    Laboratory:  4/1/2022  Blood cell count is 4.0, hemoglobin 11.9, hematocrit 34.2, platelet count 152,000, normal differential    3/24/2022  CRP < 0.04    9/23/2020  TALA negative  CCP 0.9    6/9/2015  TALA negative, SSA negative, SSB negative, Smith antibody negative, RNP antibodies negative SCL 70 negative

## 2022-04-18 NOTE — PATIENT INSTRUCTIONS
After Visit Instructions:     Thank you for coming to [facility] for your care. It is my goal to partner with you to help you reach your optimal state of health.       Plan:     Schedule follow-up with Emily Fung PA-C as needed.   Labs: TALA, ELLIOT panel, complement levels, urine and dsDNA      Emily Fung PA-C  [facility]  [facility]    Contact information: [facility]  Clinic Number:  [phone]  Please call or send a [employer] (Closed) message with any questions about your care

## 2022-04-19 LAB
C3 SERPL-MCNC: 95 MG/DL (ref 81–157)
C4 SERPL-MCNC: 22 MG/DL (ref 13–39)
DSDNA AB SER-ACNC: 8.4 IU/ML
ENA SCL70 IGG SER IA-ACNC: <0.6 U/ML
ENA SCL70 IGG SER IA-ACNC: NEGATIVE
ENA SM IGG SER IA-ACNC: 1.8 U/ML
ENA SM IGG SER IA-ACNC: NEGATIVE
ENA SS-A AB SER IA-ACNC: <0.5 U/ML
ENA SS-A AB SER IA-ACNC: NEGATIVE
ENA SS-B IGG SER IA-ACNC: <0.6 U/ML
ENA SS-B IGG SER IA-ACNC: NEGATIVE
U1 SNRNP IGG SER IA-ACNC: <1.1 U/ML
U1 SNRNP IGG SER IA-ACNC: NEGATIVE

## 2022-04-22 LAB — ANA SER QL IF: NEGATIVE

## 2022-06-25 ENCOUNTER — HEALTH MAINTENANCE LETTER (OUTPATIENT)
Age: 37
End: 2022-06-25

## 2022-09-29 DIAGNOSIS — D61.9 APLASTIC ANEMIA (H): Primary | ICD-10-CM

## 2022-10-04 ENCOUNTER — ONCOLOGY VISIT (OUTPATIENT)
Dept: ONCOLOGY | Facility: CLINIC | Age: 37
End: 2022-10-04
Attending: INTERNAL MEDICINE
Payer: COMMERCIAL

## 2022-10-04 ENCOUNTER — LAB (OUTPATIENT)
Dept: LAB | Facility: CLINIC | Age: 37
End: 2022-10-04
Attending: INTERNAL MEDICINE
Payer: COMMERCIAL

## 2022-10-04 VITALS
WEIGHT: 153.9 LBS | BODY MASS INDEX: 24.32 KG/M2 | DIASTOLIC BLOOD PRESSURE: 78 MMHG | OXYGEN SATURATION: 100 % | HEART RATE: 87 BPM | TEMPERATURE: 98.3 F | SYSTOLIC BLOOD PRESSURE: 112 MMHG

## 2022-10-04 DIAGNOSIS — I95.1 ORTHOSTATIC HYPOTENSION: ICD-10-CM

## 2022-10-04 DIAGNOSIS — D75.89 MACROCYTOSIS: Primary | ICD-10-CM

## 2022-10-04 DIAGNOSIS — D61.9 APLASTIC ANEMIA (H): ICD-10-CM

## 2022-10-04 LAB
BASOPHILS # BLD AUTO: 0 10E3/UL (ref 0–0.2)
BASOPHILS NFR BLD AUTO: 1 %
EOSINOPHIL # BLD AUTO: 0 10E3/UL (ref 0–0.7)
EOSINOPHIL NFR BLD AUTO: 1 %
ERYTHROCYTE [DISTWIDTH] IN BLOOD BY AUTOMATED COUNT: 12.8 % (ref 10–15)
HCT VFR BLD AUTO: 34.4 % (ref 35–47)
HGB BLD-MCNC: 12.1 G/DL (ref 11.7–15.7)
IMM GRANULOCYTES # BLD: 0 10E3/UL
IMM GRANULOCYTES NFR BLD: 1 %
LYMPHOCYTES # BLD AUTO: 1.1 10E3/UL (ref 0.8–5.3)
LYMPHOCYTES NFR BLD AUTO: 32 %
MCH RBC QN AUTO: 34.9 PG (ref 26.5–33)
MCHC RBC AUTO-ENTMCNC: 35.2 G/DL (ref 31.5–36.5)
MCV RBC AUTO: 99 FL (ref 78–100)
MONOCYTES # BLD AUTO: 0.3 10E3/UL (ref 0–1.3)
MONOCYTES NFR BLD AUTO: 9 %
NEUTROPHILS # BLD AUTO: 1.9 10E3/UL (ref 1.6–8.3)
NEUTROPHILS NFR BLD AUTO: 56 %
NRBC # BLD AUTO: 0 10E3/UL
NRBC BLD AUTO-RTO: 0 /100
PLATELET # BLD AUTO: 147 10E3/UL (ref 150–450)
RBC # BLD AUTO: 3.47 10E6/UL (ref 3.8–5.2)
WBC # BLD AUTO: 3.3 10E3/UL (ref 4–11)

## 2022-10-04 PROCEDURE — 85025 COMPLETE CBC W/AUTO DIFF WBC: CPT | Performed by: PATHOLOGY

## 2022-10-04 PROCEDURE — 99214 OFFICE O/P EST MOD 30 MIN: CPT | Performed by: INTERNAL MEDICINE

## 2022-10-04 PROCEDURE — G0463 HOSPITAL OUTPT CLINIC VISIT: HCPCS

## 2022-10-04 PROCEDURE — 36415 COLL VENOUS BLD VENIPUNCTURE: CPT | Performed by: PATHOLOGY

## 2022-10-04 ASSESSMENT — PAIN SCALES - GENERAL: PAINLEVEL: SEVERE PAIN (6)

## 2022-10-04 NOTE — PROGRESS NOTES
[facility] Hematology Follow Up Visit    Outpatient Visit Note:    Patient: Mimi Su  MRN: [id number]  : 1985  GENEVA: Oct 4, 2022     Mimi Su is a 37 year old woman with a history of hypoplastic bone marrow, intermittent pancytopenia and persistent macrocytosis with and without anemia times who returns for routine follow up.       Assessment:  In summary, Mimi Su is a 37 year old woman with mild pancytopenia and hypoplastic bone marrow, which appears currently resolved.  At this point will continue observation with twice yearly CBC/diff and will call with questions or concerns    Recommendations:  1. I counseled the patient about my assessment of her current marrow disorder, which appears in remission.  It is possible that this was primarily nutritional in nature.  If she has an immune component, it is mild and does not need treatment. We again discussed that treatment of bone marrow disorders with immunosuppression is very risky and that the risks clearly outweigh benefits at this time  2. Discussed new symptoms of orthostatic hypotension, encouraged follow up with PCP, could consider formal testing with Cardiology.  3. Repeat CBC and diff about every 6 months.  I'll see her back in 1 year    30 minutes spent on the date of the encounter doing chart review, review of test results, interpretation of tests, patient visit and documentation       Herson Kamara MD   of Medicine, Division of Hematology, Oncology and Transplantation  [employer]     --------------------------------------------------------  Forward History:  She's had macrocytosis and anemia since her 20s. She was initially seen in  for macrocytosis and was diagnosed with vitamin B12 deficiency. She continues to take B12 injections. She has had ongoing symptoms of intermittent dizziness, lightheadedness, and chronic pain all over her body, diagnosed in her early  20s. She also has symptoms of brain fog often, has word finding difficulty and slurred speech. She has been seeing a hematologist since 2019 for this macrocytosis.     She denies any history of severe viral illness. 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 She has a history of active tobacco use, used to smoke 1/2 ppd until 2019, then switched to e-cigarettes with 3% Juul pods daily. Reports occasional alcohol use 1-2 times a week, usually margaritas. Denies daily use of alcohol. She has no history of autoimmune disease. No history of blood clots.    March 29, 2021: Bone marrow examination indicated hypocellular bone marrow for age (30%) showing normal trilineage hematopoiesis, Adequate numbers of megakaryocytes, and no increase in blasts (1%). Mild macrocytosis. Storage iron present. Chromosome analysis with no clonal abnormality apparent and macrocytosis present. Flow cytometry with normal immunophenotyping results.  No monotypic B-cell population or increase in blasts identified.     Initial consult Aug 2021: recommended viral hepatitis testing, hemolysis workup, exclude PNH, consider telomere testing.  HBV, HCV, HIV all negative 11/9/21  Haptoglobin normal, Retic 1% with normal Hb    History: Mimi Su is a 37 year old woman with a history of pancytopenia and hypoplastic marrow, which has resolved over time (despite persistent macrocytosis).  Unfortunately she has struggled with intermittent but frequent bowel obstructions that ultimately required placement of a G-J tube for tube feeds and venting.  She has gained weight successfully and this has been reflected in improved blood counts.  She still struggles with daily pain (worsened when she stopped Lyrica) likely related to fibromyalgia.  Despite this she is very active.  New today are complaints of orthostasis.    Objective:  Exam:  Body mass index is 24.32 kg/m .   Constitutional: Appears well, no  distress  Eyes: no discharge, injection or icterus  Skin: no rashes or petechiae  Neurological: no deficits appreciated, speech is fluent  Psych: affect is normal    Labs:   Latest Reference Range & Units 04/01/22 11:24 10/04/22 10:10   WBC 4.0 - 11.0 10e3/uL 4.0 3.3 (L)   Hemoglobin 11.7 - 15.7 g/dL 11.9 12.1   Hematocrit 35.0 - 47.0 % 34.2 (L) 34.4 (L)   Platelet Count 150 - 450 10e3/uL 152 147 (L)   (L): Data is abnormally low    Differential is normal    Imaging:  none

## 2022-10-04 NOTE — LETTER
10/4/2022         RE: Mimi Su  [address] [facility] [address]        Dear Colleague,    Thank you for referring your patient, Mimi Su, to the [facility]. Please see a copy of my visit note below.        [facility] Hematology Follow Up Visit    Outpatient Visit Note:    Patient: Mimi uS  MRN: [id number]  : 1985  GENEVA: Oct 4, 2022     Mimi Su is a 37 year old woman with a history of hypoplastic bone marrow, intermittent pancytopenia and persistent macrocytosis with and without anemia times who returns for routine follow up.       Assessment:  In summary, Mimi Su is a 37 year old woman with mild pancytopenia and hypoplastic bone marrow, which appears currently resolved.  At this point will continue observation with twice yearly CBC/diff and will call with questions or concerns    Recommendations:  1. I counseled the patient about my assessment of her current marrow disorder, which appears in remission.  It is possible that this was primarily nutritional in nature.  If she has an immune component, it is mild and does not need treatment. We again discussed that treatment of bone marrow disorders with immunosuppression is very risky and that the risks clearly outweigh benefits at this time  2. Discussed new symptoms of orthostatic hypotension, encouraged follow up with PCP, could consider formal testing with Cardiology.  3. Repeat CBC and diff about every 6 months.  I'll see her back in 1 year    30 minutes spent on the date of the encounter doing chart review, review of test results, interpretation of tests, patient visit and documentation       Herson Kamara MD   of Medicine, Division of Hematology, Oncology and Transplantation  [employer]     --------------------------------------------------------  Forward History:  She's had macrocytosis and anemia since her 20s. She  was initially seen in 2014 for macrocytosis and was diagnosed with vitamin B12 deficiency. She continues to take B12 injections. She has had ongoing symptoms of intermittent dizziness, lightheadedness, and chronic pain all over her body, diagnosed in her early 20s. She also has symptoms of brain fog often, has word finding difficulty and slurred speech. She has been seeing a hematologist since 2019 for this macrocytosis.     She denies any history of severe viral illness. 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 She has a history of active tobacco use, used to smoke 1/2 ppd until 2019, then switched to e-cigarettes with 3% Juul pods daily. Reports occasional alcohol use 1-2 times a week, usually margaritas. Denies daily use of alcohol. She has no history of autoimmune disease. No history of blood clots.    March 29, 2021: Bone marrow examination indicated hypocellular bone marrow for age (30%) showing normal trilineage hematopoiesis, Adequate numbers of megakaryocytes, and no increase in blasts (1%). Mild macrocytosis. Storage iron present. Chromosome analysis with no clonal abnormality apparent and macrocytosis present. Flow cytometry with normal immunophenotyping results.  No monotypic B-cell population or increase in blasts identified.     Initial consult Aug 2021: recommended viral hepatitis testing, hemolysis workup, exclude PNH, consider telomere testing.  HBV, HCV, HIV all negative 11/9/21  Haptoglobin normal, Retic 1% with normal Hb    History: Mimi Su is a 37 year old woman with a history of pancytopenia and hypoplastic marrow, which has resolved over time (despite persistent macrocytosis).  Unfortunately she has struggled with intermittent but frequent bowel obstructions that ultimately required placement of a G-J tube for tube feeds and venting.  She has gained weight successfully and this has been reflected in improved blood counts.  She still  struggles with daily pain (worsened when she stopped Lyrica) likely related to fibromyalgia.  Despite this she is very active.  New today are complaints of orthostasis.    Objective:  Exam:  Body mass index is 24.32 kg/m .   Constitutional: Appears well, no distress  Eyes: no discharge, injection or icterus  Skin: no rashes or petechiae  Neurological: no deficits appreciated, speech is fluent  Psych: affect is normal    Labs:   Latest Reference Range & Units 04/01/22 11:24 10/04/22 10:10   WBC 4.0 - 11.0 10e3/uL 4.0 3.3 (L)   Hemoglobin 11.7 - 15.7 g/dL 11.9 12.1   Hematocrit 35.0 - 47.0 % 34.2 (L) 34.4 (L)   Platelet Count 150 - 450 10e3/uL 152 147 (L)   (L): Data is abnormally low    Differential is normal    Imaging:  none        Again, thank you for allowing me to participate in the care of your patient.      Sincerely,    Herson Kamara MD

## 2022-10-04 NOTE — NURSING NOTE
"Oncology Rooming Note    October 4, 2022 10:36 AM   Mimi Su is a 37 year old female who presents for:    Chief Complaint   Patient presents with     Oncology Clinic Visit     Aplastic anemia     Initial Vitals: /78   Pulse 87   Temp 98.3  F (36.8  C) (Oral)   Wt 69.8 kg (153 lb 14.4 oz)   LMP  (LMP Unknown)   SpO2 100%   BMI 24.32 kg/m   Estimated body mass index is 24.32 kg/m  as calculated from the following:    Height as of 4/18/22: 1.694 m (5' 6.7\").    Weight as of this encounter: 69.8 kg (153 lb 14.4 oz). Body surface area is 1.81 meters squared.  Severe Pain (6) Comment: Data Unavailable   No LMP recorded (lmp unknown). Patient has had a hysterectomy.  Allergies reviewed: Yes  Medications reviewed: Yes    Medications: Medication refills not needed today.  Pharmacy name entered into [employer]: [id number] - [place] -  [address] AT [facility] &     Clinical concerns: none       Elli Andres            "

## 2022-11-20 ENCOUNTER — HEALTH MAINTENANCE LETTER (OUTPATIENT)
Age: 37
End: 2022-11-20

## 2022-11-29 ENCOUNTER — VIRTUAL VISIT (OUTPATIENT)
Dept: ONCOLOGY | Facility: CLINIC | Age: 37
End: 2022-11-29
Attending: INTERNAL MEDICINE
Payer: COMMERCIAL

## 2022-11-29 DIAGNOSIS — D75.89 MACROCYTOSIS: Primary | ICD-10-CM

## 2022-11-29 PROCEDURE — G0463 HOSPITAL OUTPT CLINIC VISIT: HCPCS | Mod: PN,GT | Performed by: INTERNAL MEDICINE

## 2022-11-29 PROCEDURE — 99214 OFFICE O/P EST MOD 30 MIN: CPT | Mod: 95 | Performed by: INTERNAL MEDICINE

## 2022-11-29 NOTE — PROGRESS NOTES
Mimi is a 37 year old who is being evaluated via a billable video visit.      Pt states in [place] for visit.     How would you like to obtain your AVS? MyChart  If the video visit is dropped, the invitation should be resent by: Text to cell phone: [phone]  Will anyone else be joining your video visit?  Possibly pts SO, link sent    JUNIE Bell/AN        [facility] Hematology Follow Up Visit    Outpatient Visit Note:    Patient: Mimi Su  MRN: [id number]  : 1985  GENEVA:  2022     Mimi Su is a 37 year old woman with a history of hypoplastic bone marrow, intermittent pancytopenia and persistent macrocytosis with and without anemia times who returns for routine follow up.       Assessment:  In summary, Mimi Su is a 37 year old woman with mild pancytopenia and hypoplastic bone marrow, now with chronic macrocytosis, with an acute viral illness associated with Bell's palsy recurrence.  While her counts are mildly decreased, I suspect this is to be expected with an acute severe viral illness.  I recommended supportive care and closer monitoring until resolution of these suppressed counts.    Recommendations:  1. Suggested she touch base with PCP about workup for Bellls palsy (HHV1,2 in particular)  2. Recheck B12, folate, copper with above labs give persistent macrocytosis  3. Monitoring with monthly CBC/diff to ensure she has rebound from the acute marrow suppression from viral illness    25 minutes spent on the date of the encounter doing chart review, review of test results, interpretation of tests, patient visit and documentation       Herson Kamara MD   of Medicine, Division of Hematology, Oncology and Transplantation  [employer]     --------------------------------------------------------  Forward History:  She's had macrocytosis and anemia since her 20s. She was initially seen in  for macrocytosis and was  diagnosed with vitamin B12 deficiency. She continues to take B12 injections. She has had ongoing symptoms of intermittent dizziness, lightheadedness, and chronic pain all over her body, diagnosed in her early 20s. She also has symptoms of brain fog often, has word finding difficulty and slurred speech. She has been seeing a hematologist since 2019 for this macrocytosis.     She denies any history of severe viral illness. 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 She has a history of active tobacco use, used to smoke 1/2 ppd until 2019, then switched to e-cigarettes with 3% Juul pods daily. Reports occasional alcohol use 1-2 times a week, usually margaritas. Denies daily use of alcohol. She has no history of autoimmune disease. No history of blood clots.    March 29, 2021: Bone marrow examination indicated hypocellular bone marrow for age (30%) showing normal trilineage hematopoiesis, Adequate numbers of megakaryocytes, and no increase in blasts (1%). Mild macrocytosis. Storage iron present. Chromosome analysis with no clonal abnormality apparent and macrocytosis present. Flow cytometry with normal immunophenotyping results.  No monotypic B-cell population or increase in blasts identified.     Initial consult Aug 2021: recommended viral hepatitis testing, hemolysis workup, exclude PNH, consider telomere testing.  HBV, HCV, HIV all negative 11/9/21  Haptoglobin normal, Retic 1% with normal Hb    History: Mimi Su is a 37 year old woman with a history of pancytopenia and hypoplastic marrow, which has resolved over time (despite persistent macrocytosis).  Unfortunately she has struggled with intermittent but frequent bowel obstructions that ultimately required placement of a G-J tube for tube feeds and venting.  She has gained weight successfully and this has been reflected in improved blood counts.  She still struggles with daily pain (worsened when she stopped  Lyrica) likely related to fibromyalgia.  Despite this she is very active.      Today she presents subacutely for followup for recent ER visit.  She reports over the weekend she awoke with facial paralysis.  No other focal neuro symptoms.  She has chest pain, severe fatigue and diarrhea as well (typically has constipation).  Flu and COVID tested neg      Objective:  Exam:  There is no height or weight on file to calculate BMI.   Constitutional: Appears fatigued today but in no acute distress  Eyes: no discharge, injection or icterus  Skin: no rashes or petechiae  Neurological: no deficits appreciated, speech is fluent  Psych: affect is normal    Labs:      Imaging:  none      Video-Visit Details    Video Start Time: 11:19 AM    Type of service:  Video Visit    Video End Time:11:33 AM    Originating Location (pt. Location): Home        Distant Location (provider location):  On-site    Platform used for Video Visit: Falguni

## 2022-11-29 NOTE — LETTER
2022         RE: Mimi Su  [address] [address]        Dear Colleague,    Thank you for referring your patient, Mimi Su, to the [facility]. Please see a copy of my visit note below.    Mimi is a 37 year old who is being evaluated via a billable video visit.      Pt states in [place] for visit.     How would you like to obtain your AVS? MyChart  If the video visit is dropped, the invitation should be resent by: Text to cell phone: [phone]  Will anyone else be joining your video visit?  Possibly pts SO, link sent    JUNIE Bell/AN        [facility] Hematology Follow Up Visit    Outpatient Visit Note:    Patient: Mimi Su  MRN: [id number]  : 1985  GENEVA:  2022     Mimi Su is a 37 year old woman with a history of hypoplastic bone marrow, intermittent pancytopenia and persistent macrocytosis with and without anemia times who returns for routine follow up.       Assessment:  In summary, Mimi Su is a 37 year old woman with mild pancytopenia and hypoplastic bone marrow, now with chronic macrocytosis, with an acute viral illness associated with Bell's palsy recurrence.  While her counts are mildly decreased, I suspect this is to be expected with an acute severe viral illness.  I recommended supportive care and closer monitoring until resolution of these suppressed counts.    Recommendations:  1. Suggested she touch base with PCP about workup for Bellls palsy (HHV1,2 in particular)  2. Recheck B12, folate, copper with above labs give persistent macrocytosis  3. Monitoring with monthly CBC/diff to ensure she has rebound from the acute marrow suppression from viral illness    25 minutes spent on the date of the encounter doing chart review, review of test results, interpretation of tests, patient visit and documentation       Herson Kamara MD   of Medicine, Division of  Detail Level: Detailed Hematology, Oncology and Transplantation  [employer] [employer]     --------------------------------------------------------  Forward History:  She's had macrocytosis and anemia since her 20s. She was initially seen in 2014 for macrocytosis and was diagnosed with vitamin B12 deficiency. She continues to take B12 injections. She has had ongoing symptoms of intermittent dizziness, lightheadedness, and chronic pain all over her body, diagnosed in her early 20s. She also has symptoms of brain fog often, has word finding difficulty and slurred speech. She has been seeing a hematologist since 2019 for this macrocytosis.     She denies any history of severe viral illness. 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 She has a history of active tobacco use, used to smoke 1/2 ppd until 2019, then switched to e-cigarettes with 3% Juul pods daily. Reports occasional alcohol use 1-2 times a week, usually margaritas. Denies daily use of alcohol. She has no history of autoimmune disease. No history of blood clots.    March 29, 2021: Bone marrow examination indicated hypocellular bone marrow for age (30%) showing normal trilineage hematopoiesis, Adequate numbers of megakaryocytes, and no increase in blasts (1%). Mild macrocytosis. Storage iron present. Chromosome analysis with no clonal abnormality apparent and macrocytosis present. Flow cytometry with normal immunophenotyping results.  No monotypic B-cell population or increase in blasts identified.     Initial consult Aug 2021: recommended viral hepatitis testing, hemolysis workup, exclude PNH, consider telomere testing.  HBV, HCV, HIV all negative 11/9/21  Haptoglobin normal, Retic 1% with normal Hb    History: Mimi Su is a 37 year old woman with a history of pancytopenia and hypoplastic marrow, which has resolved over time (despite persistent macrocytosis).  Unfortunately she has struggled with intermittent  Quality 110: Preventive Care And Screening: Influenza Immunization: Influenza Immunization Administered during Influenza season but frequent bowel obstructions that ultimately required placement of a G-J tube for tube feeds and venting.  She has gained weight successfully and this has been reflected in improved blood counts.  She still struggles with daily pain (worsened when she stopped Lyrica) likely related to fibromyalgia.  Despite this she is very active.      Today she presents subacutely for followup for recent ER visit.  She reports over the weekend she awoke with facial paralysis.  No other focal neuro symptoms.  She has chest pain, severe fatigue and diarrhea as well (typically has constipation).  Flu and COVID tested neg      Objective:  Exam:  There is no height or weight on file to calculate BMI.   Constitutional: Appears fatigued today but in no acute distress  Eyes: no discharge, injection or icterus  Skin: no rashes or petechiae  Neurological: no deficits appreciated, speech is fluent  Psych: affect is normal    Labs:      Imaging:  none          Again, thank you for allowing me to participate in the care of your patient.      Sincerely,    Herson Kamara MD

## 2022-12-05 ENCOUNTER — TELEPHONE (OUTPATIENT)
Dept: ONCOLOGY | Facility: CLINIC | Age: 37
End: 2022-12-05

## 2022-12-05 NOTE — TELEPHONE ENCOUNTER
Pt calls in to report that she is:    In the ED now in [place], [place]  Has been sick x 1 week  Saw Dr. Kamara one week ago.  Today she is still feeling just as sick.  ED annalise labs--they are not better  Prior to going to the ED, pt was at a store w/friend, vision became blurry, blacked out, R side of face and R arm went numb. Could not stand on her own.  Friend took her to the ED. Did labs and given something for pain. ED provider did not think they would do any further testing/imaging.  Pt states she doesn't feel right, is not getting any better. Has continued numbness on R face and arm.     Advised pt to discuss ongoing sx with ED provider, and to inform provider that sx are not getting any better.  ED provider can call to request provider consult with Dr. Kamara if he feels a consultation would be helpful.    Pt verbalized understanding of the above information.    In reviewing Dr. Kamara's note from 11/29 after call was completed, pt had a viral illness associated with Bell's palsy recurrence.     Recommendations:  1. Suggested she touch base with PCP about workup for Bellls palsy (HHV1,2 in particular)  2. Recheck B12, folate, copper with above labs give persistent macrocytosis  3. Monitoring with monthly CBC/diff to ensure she has rebound from the acute marrow suppression from viral illness      Routed to Dr. Kamara and Estefania Juan, JAMESCC

## 2023-02-12 ENCOUNTER — MYC MEDICAL ADVICE (OUTPATIENT)
Dept: ONCOLOGY | Facility: CLINIC | Age: 38
End: 2023-02-12
Payer: COMMERCIAL

## 2023-02-13 NOTE — PROGRESS NOTES
Video-Visit Details    Type of service:  Video Visit    Video Start Time (time video started): 5:03 PM    Video End Time (time video stopped): 5:14 PM    Originating Location (pt. Location): Home        Distant Location (provider location):  On-site    Mode of Communication:  Video Conference via [facility] Hematology Follow Up Visit    Outpatient Visit Note:    Patient: Mimi Su  MRN: [id number]  : 1985  GENEVA:  2022     Mimi Su is a 37 year old woman with a history of hypoplastic bone marrow, intermittent pancytopenia and persistent macrocytosis with and without anemia times who is seen today for hospital follow-up.    Assessment:  In summary, Mimi Su is a 37 year old woman with mild pancytopenia and hypoplastic bone marrow, now with chronic macrocytosis, with concern for prolonged bleeding with mildly thrombocytopenia. I have low concern that thrombocytopenia is contributing to the slow clotting she experienced with her laceration yesterday. Advised she continue to monitor for bleeding and recheck platelets during planned hospital follow-up 23.    Recommendations:  1. Recheck CBC with diff with PCP 23 during planned hospital  2. Follow-up with Dr. Kamara in 6 months     30 minutes spent on the date of the encounter doing chart review, review of outside records, review of test results, interpretation of tests, patient visit and documentation     Judy Nelson CNP  [facility]  [address] [place] [id number]  [phone]      --------------------------------------------------------  Forward History:  She's had macrocytosis and anemia since her 20s. She was initially seen in 2014 for macrocytosis and was diagnosed with vitamin B12 deficiency. She continues to take B12 injections. She has had ongoing symptoms of intermittent dizziness, lightheadedness, and chronic pain all over her body, diagnosed in her early  20s. She also has symptoms of brain fog often, has word finding difficulty and slurred speech. She has been seeing a hematologist since 2019 for this macrocytosis.     She denies any history of severe viral illness. 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 She has a history of active tobacco use, used to smoke 1/2 ppd until 2019, then switched to e-cigarettes with 3% Juul pods daily. Reports occasional alcohol use 1-2 times a week, usually margaritas. Denies daily use of alcohol. She has no history of autoimmune disease. No history of blood clots.    March 29, 2021: Bone marrow examination indicated hypocellular bone marrow for age (30%) showing normal trilineage hematopoiesis, Adequate numbers of megakaryocytes, and no increase in blasts (1%). Mild macrocytosis. Storage iron present. Chromosome analysis with no clonal abnormality apparent and macrocytosis present. Flow cytometry with normal immunophenotyping results.  No monotypic B-cell population or increase in blasts identified.     Initial consult Aug 2021: recommended viral hepatitis testing, hemolysis workup, exclude PNH, consider telomere testing.  HBV, HCV, HIV all negative 11/9/21  Haptoglobin normal, Retic 1% with normal Hb    History: Mimi Su is a 37 year old woman with a history of pancytopenia and hypoplastic marrow, which has resolved over time (despite persistent macrocytosis).  She was hospitalized in [place] 2/8-2/13/23 due to acute on chronic abdominal pain. CT was negative for acute findings. Nasogastric tube was placed and she completed Gastrografin challenge with normal colonic contrast progression. Following discharge her abdomen remains distended. She is hungry but can only eat small amounts of soft foods. Additional symptoms that she has recently experienced including rapid heart rate, vomiting, extreme fatigue and headache.     Her platelet count was as low as 100 while  hospitalized. Yesterday she sustained a small laceration to her leg in the shower which took 45 minutes to stop bleeding. She denies any other bleeding concern such as epistaxis or unusual bruising.     Objective:  There is no height or weight on file to calculate BMI.     Video physical exam  General: Patient appears well in no acute distress.   Skin: No visualized rash or lesions on visualized skin  Eyes: EOMI, no erythema, sclera icterus or discharge noted  Resp: Appears to be breathing comfortably without accessory muscle usage, speaking in full sentences, no cough  MSK: Appears to have normal range of motion based on visualized movements  Neurologic: No apparent tremors, facial movements symmetric  Psych: affect pleasant, alert and oriented    Labs:      Imaging:  None

## 2023-02-13 NOTE — TELEPHONE ENCOUNTER
Nurse Triage SBAR    Situation: Low blood counts    Background:    DX: hypoplastic bone marrow, intermittent pancytopenia and persistent macrocytosis with and without anemia times   Provider: Estefania Sharif, DIVYA  Most recent appointment: 11/29/22  Upcoming appointments: None scheduled  Most recent treatment: NA  Recent hospitalizations [facility] on 2/8/23-2/13/23    Assessment:   Pt sent in MyC message: Pt was discharged from the [facility] this a.m. Providers wanted her to follow up with her hematologist.    Last labs, 2/12, were: Hgb: 10.2  Plts: 118    Spoke with pt.  Discussed bleeding concerns. Pt states that they took out IV's--had some prolonged bleeding afterwards, but none since.  Cut leg yesterday while shaving with a razor in the hospital. Has had 4x4's on there ever since. Still dripping blood from cut now. Pt has 4-4x4's that she has bled through. Advised pt to apply pressure to cut, and not to keep adding 4x4's. Pt is applying pressure now.  If bleeding continues after 30 minutes of applying pressure, pt advised to return to the ED.  Pt states she does have gum bleeding, a bloody nose after NG removed. She does not have active bleeding or any additional bruising that she has noticed at this time.  Gets botox every three months for her headaches. Headaches are rare for her since getting botox. Has had a headache on and off x past 3 days and has felt a little lightheaded. Drinking enough fluids. Kidney counts low but kidney function is good. She is just starting to advance her diet. Still having some nausea and vomiting, but having good ostomy output.  HR is a little elevated, and pt is feeling lethargic.    Recommendation:   Discussed with RNCC. Dr. Kamara's first apt is March 7, could get in with RAQUEL sooner.     Follow Up:  Spoke with Mimi. Leg stopped bleeding after 40 minutes of applying pressure.  Reviewed sx that she should report to ED to be evaluated for.  Informed  pt that scheduling will be calling her to offer apt with Dr. Kamara or his RAQUEL.  Pt verbalized understanding of the above information and is in agreement with plan of care.    Routed to Dr. Kamara and DIVYA Martin

## 2023-02-14 ENCOUNTER — VIRTUAL VISIT (OUTPATIENT)
Dept: ONCOLOGY | Facility: CLINIC | Age: 38
End: 2023-02-14
Attending: REGISTERED NURSE
Payer: COMMERCIAL

## 2023-02-14 DIAGNOSIS — D75.89 MACROCYTOSIS: Primary | ICD-10-CM

## 2023-02-14 PROCEDURE — 99214 OFFICE O/P EST MOD 30 MIN: CPT | Mod: VID | Performed by: REGISTERED NURSE

## 2023-02-14 PROCEDURE — G0463 HOSPITAL OUTPT CLINIC VISIT: HCPCS | Mod: PN,GT | Performed by: REGISTERED NURSE

## 2023-02-14 NOTE — NURSING NOTE
Is the patient currently in the state of [place]? YES    Visit mode:VIDEO    If the visit is dropped, the patient can be reconnected by: VIDEO VISIT: Text to cell phone: [phone]    Will anyone else be joining the visit? YES: How would they like to receive their invitation?       How would you like to obtain your AVS? MyChart    Are changes needed to the allergy or medication list? NO    Comments or concerns regarding today's visit: None    Linnette ZAMAN

## 2023-03-03 ENCOUNTER — MEDICAL CORRESPONDENCE (OUTPATIENT)
Dept: HEALTH INFORMATION MANAGEMENT | Facility: CLINIC | Age: 38
End: 2023-03-03
Payer: COMMERCIAL

## 2023-03-06 ENCOUNTER — TRANSCRIBE ORDERS (OUTPATIENT)
Dept: OTHER | Age: 38
End: 2023-03-06

## 2023-03-06 DIAGNOSIS — K56.609 LARGE BOWEL OBSTRUCTION (H): ICD-10-CM

## 2023-03-06 DIAGNOSIS — Z93.3 COLOSTOMY IN PLACE (H): ICD-10-CM

## 2023-03-06 DIAGNOSIS — R11.2 NAUSEA AND VOMITING, UNSPECIFIED VOMITING TYPE: ICD-10-CM

## 2023-03-06 DIAGNOSIS — R11.15 INTRACTABLE CYCLICAL VOMITING WITH NAUSEA: ICD-10-CM

## 2023-03-06 DIAGNOSIS — E44.1 MILD PROTEIN-CALORIE MALNUTRITION (H): Primary | ICD-10-CM

## 2023-04-15 ENCOUNTER — HEALTH MAINTENANCE LETTER (OUTPATIENT)
Age: 38
End: 2023-04-15

## 2023-06-27 ENCOUNTER — TRANSFERRED RECORDS (OUTPATIENT)
Dept: HEALTH INFORMATION MANAGEMENT | Facility: CLINIC | Age: 38
End: 2023-06-27

## 2023-12-05 ENCOUNTER — VIRTUAL VISIT (OUTPATIENT)
Dept: ONCOLOGY | Facility: CLINIC | Age: 38
End: 2023-12-05
Attending: INTERNAL MEDICINE
Payer: COMMERCIAL

## 2023-12-05 VITALS — BODY MASS INDEX: 24.33 KG/M2 | WEIGHT: 155 LBS | HEIGHT: 67 IN

## 2023-12-05 DIAGNOSIS — D75.89 MACROCYTOSIS: Primary | ICD-10-CM

## 2023-12-05 PROCEDURE — 99214 OFFICE O/P EST MOD 30 MIN: CPT | Mod: 95 | Performed by: INTERNAL MEDICINE

## 2023-12-05 ASSESSMENT — PAIN SCALES - GENERAL: PAINLEVEL: SEVERE PAIN (6)
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
## 2023-12-05 NOTE — NURSING NOTE
Is the patient currently in the state of [place]? YES    Visit mode:VIDEO    If the visit is dropped, the patient can be reconnected by: VIDEO VISIT: Send to e-mail at: [email or web address]    Will anyone else be joining the visit? NO  (If patient encounters technical issues they should call [phone])    How would you like to obtain your AVS? MyChart    Are changes needed to the allergy or medication list? No    Reason for visit: JOANNE CHRISTIANSEN

## 2024-01-24 ENCOUNTER — PATIENT OUTREACH (OUTPATIENT)
Dept: ONCOLOGY | Facility: CLINIC | Age: 39
End: 2024-01-24
Payer: COMMERCIAL

## 2024-01-24 NOTE — PROGRESS NOTES
[facility]: Cancer Care                                                                                          Completed chart audit to update Oncology Care Coordination enrollment status.  Reviewed POC and pt has appropriate check out note entered from 12/5/23 appt with Dr Kamara.  Remind me set to make sure appt is scheduled.        Signature:  Estefania Juan RN

## 2024-02-13 ENCOUNTER — TRANSFERRED RECORDS (OUTPATIENT)
Dept: HEALTH INFORMATION MANAGEMENT | Facility: CLINIC | Age: 39
End: 2024-02-13
Payer: COMMERCIAL

## 2024-02-15 NOTE — NURSING NOTE
Patient declined individual allergy and medication review by support staff because patient denies any changes since echeck-in completion and states all information entered during echeck-in remains accurate.    Alice Maher, JUNIE/CMA     normal performance

## 2024-02-23 ENCOUNTER — MEDICAL CORRESPONDENCE (OUTPATIENT)
Dept: HEALTH INFORMATION MANAGEMENT | Facility: CLINIC | Age: 39
End: 2024-02-23
Payer: COMMERCIAL

## 2024-02-26 ENCOUNTER — TRANSCRIBE ORDERS (OUTPATIENT)
Dept: OTHER | Age: 39
End: 2024-02-26

## 2024-02-26 DIAGNOSIS — R10.2 PELVIC PAIN: Primary | ICD-10-CM

## 2024-02-26 DIAGNOSIS — Z93.3 COLOSTOMY IN PLACE (H): Primary | ICD-10-CM

## 2024-02-27 ENCOUNTER — TRANSCRIBE ORDERS (OUTPATIENT)
Dept: OTHER | Age: 39
End: 2024-02-27

## 2024-02-27 DIAGNOSIS — R10.2 PELVIC PAIN IN FEMALE: Primary | ICD-10-CM

## 2024-02-27 DIAGNOSIS — Z93.3 COLOSTOMY IN PLACE (H): Primary | ICD-10-CM

## 2024-02-28 ENCOUNTER — PRE VISIT (OUTPATIENT)
Dept: SURGERY | Facility: CLINIC | Age: 39
End: 2024-02-28
Payer: COMMERCIAL

## 2024-02-28 NOTE — CONFIDENTIAL NOTE
COLON AND RECTAL SURGERY PRE-VISIT:  Diagnosis, Referred by & from: Colostomy status. Referral Dr. Cerda.   Appt date: 3/14/2024 with Dr. Ayala at [facility]   NOTES STATUS DETAILS   OFFICE NOTE from referring provider Care Everywhere [facility]:  2/21-2/23/2024- Dr. Cerda   OFFICE NOTE from other specialist Received. [facility]:  2/15/2024- Dr. Uriostegui    [facility]:  4/10/2023: Dr. Sandoval   DISCHARGE SUMMARY from hospital Care Everywhere [facility]:  1/31-2/3/2024- Dr. Marcus [Obstipation]  2/8-2/13/2023- Dr. Ramos [Abd Px]  8/7-8/15/2023- Dr. Navarro [Abd Px]  5/7-5/21/2022- Dr. Dubon [SBO]  4/30-5/3/2022- Dr. Tamez [?SBO]  1/22-2/1/2022-  Dr. Knott [Abd Px]  1/17-1/19/2022- Dr. Escoto   DISCHARGE REPORT from the ER Care Everywhere [facility]:  3/4/2024- Dr. Cruz [Abdominal Pain]   OPERATIVE REPORT                        PATHOLOGY            PATHOLOGY Care Everywhere & In process [facility]:  9/5/2019- Dr. Cheema [Exploratory Laparotomy, Extensive Lysis Adhesions X 120 Minutes, Small Bowel Resection]  4/24/2019- Dr. Cheema [Exploratory Laparotomy, Lysis Adhesions, Appendectomy]    [facility]:  12/13/2013- Dr. Haywood   [Vaginal hysterectomy. 2. Roberts culdoplasty. 3. Bilateral salpingectomy.]  In report.  01/31/2012- Dr. Driver  [Incision and drainage wound.]  01/13/2012- Dr. Driver   [1. Abdominal exploration. 2. Revision of loop colostomy to end colostomy.]  In report.  01/09/2012- Dr. Driver [Laparoscopic-assisted loop colostomy.]   MEDICATION LIST N/A    LABS N/A    DIAGNOSTIC PROCEDURES     PFC TESTING (from the Pelvic floor center includes Manometry, PDNL, EMG, etc.) N/A    COLONOSCOPY N/A    UPPER ENDOSCOPY (EGD)        PATHOLOGY Received [facility]:  01/29/2009- [Esophagogastroduodenoscopy with biopsy and aspirate.]  In report.   FLEX SIGMOIDOSCOPY N/A    ERCP N/A    IMAGING (DISC & REPORT)      CT Internal [facility]:  CT AP- 3/4/2024, 1/28/2024,  6/26/2023, 6/6/2023, 2/26/2023, 2/8/2023, 9/5/2022, 8/7/2022, 7/8/2022, 5/23/2022   MRI Internal [facility]:  6/23/2024- MRE   5/13/2022- MRE   XRAY Internal [facility]:  XR Abdomen- 2/22/2024, 2/21/2024, 2/12/2024, 2/1/2024, 3/1/2023, 2/28/2023,   XR Gastrografin- 5/16/2022  XR Small Bowel- 5/10/2022   ULTRASOUND  (ENDOANAL/ENDORECTAL) Internal [facility]:  3/4/2024- US Pelvic  1/28/2024- US Pelvic     Records Requested   02/28/24 at 10:30 am:  Send fax to [facility] for actual operative reports.  Call [facility] for images pushed to  PACS.  [x] Send patient MyChart to verify reason for appointment.    2/29/2024 at 3:00pm:  [x] Called [facility] ([phone]) for images pushed to  PACS.   [x] Called [facility] ([phone]) for images pushed to  PACS.  [x] Called [facility] and had reports from [facility] sent.    3/4/2024 at 4:35 pm:  [x] JOHNNY for [facility] Records collected from patient.    3/5/2024 at 2:45 pm:  According to patient:  Treated at [facility]- Need patient JOHNNY  Treated at [facility] in [place]  [x] In [place]  Need consent to [facility] in [place]  [x] Sent patient MyC    [x] Images received from outside sources and in  PACS.  [x] Called and requested images from most recent admission on 3/4/2024 at [facility].    3/6/2024:  [x] 3/4/2024 images resolved in [facility] PACS.    3/7/2024:   [x] Called [facility] to check status of records. They were faxed on 3/4/2024.    3/12/2024:  [x] Received verbal consent for [facility].  [x] Received 3/4/2024 images in [facility] PACS.  [x] Patient filled on [facility] JOHNNY. Called [facility] and requested records sent.   [x] Called [facility] again for status and to fax records again.  [x] Receival of [facility] records. (Called x3 total) Received- Need scanning into HIM for Epic. Emal fax sent to in-clinic team.  [x] Receival of [facility] records. (Called x2) Scanned into HIM for Epic.    No other records requested at this time. PRE-VISIT COMPLETE.    All relevant records are  bookmarked under Nini Austin, EMT.      Nini Brenner, NREMT  Colon and Rectal Surgery

## 2024-03-12 NOTE — PROGRESS NOTES
Colon and Rectal Surgery Consult Clinic Note       Referring provider:  Kassi Cerda MD  [address] [place] [address]     RE: Mimi Su  : 1985  GENEVA: 3/14/2024      Reason for visit: colostomy in place. Colonic dysmotility with severe constipation  wanting a total colectomy    HPI:  Mimi Su is a very pleasant 38 year old female with a  history of chronic pain syndrome on chronic opioid therapy, borderline personality disorder, chemical dependecy, s/p cholecystectomy (2003). S/p colostomy (2012) done at [facility] and then a revision done 12. Hysterectomy (2013). Patient has had two exploratory laparotomies (19) and (19). Referred by Dr. Cerda.     History of numerous bowel obstructions due to adhesions.     2024: Patient was hospitalized - at [facility] for progressive abdominal bloating, fecal impaction and nausea/vomiting  She has followed with GI at [facility], transferred GI care to [place] and was recently referred to the [facility] for consideration of total colectomy and bilateral nephrectomy due to chronic constipation and recurrent ovarian cysts.   Radiographs showed nonspecific bowel gas pattern with moderate stool burden, no evidence of obstruction. She had a Gastrografin challenge which showed no evidence of obstruction. She had liquid stool output and still felt bloated. [facility] nurse was consulted with irrigation through the colostomy and manual dilation of the ostomy. Patient tolerated this without difficulty. She past formed stool and felt stable for discharge. Her bloating improved. Nausea and vomiting resolved.   She will follow-up with [facility] for colostomy irrigation 1-2 times weekly. Historically, her mother was performing colostomy irrigation which seemed to prevent constipation and stool impaction. Her mother's multiple sclerosis has progressed and Mimi has difficulty performing irrigation  independently.     Abdominal surgical history:     9/5/2019: exploratory lapartomy, extensive lysis of adhesions x 120 minutes, small bowel resection done by Dr. Cheema at [facility].  Patient is a 34-year-old female with a history of small bowel obstructions. Patient has had 2 recurrent small bowel obstructions within the last 2 months. She also has a history of colostomy for pelvic dysmotility. Patient was found to have a bowel obstruction with a transition point in her pelvis. She explained the benefits and risks of exploratory laparotomy. Her questions were sought and answered. Consent was signed placed in chart.  The patient is about the OR suite. Placed supine position. General anesthesia was induced. Tian catheter was inserted. Her abdomen was prepped and draped in sterile fashion. An incision was made through her previous incision site. Electrocautery was used to dissect the fascia. Sharp dissection was then used to get into the intra-abdominal cavity. Blunt dissections of omental adhesions to the abdominal wall were then removed. The abdominal incision was then extended. Omental adhesions were then taken down. The small bowel was then eviscerated. We then performed lysis of adhesion for approximately 2 hours to free of the bowel to the ligament of Treitz. We also freed up the small bowel from the. Patient's colostomy. In the pelvis when we were lysing some adhesions there were serosal abrasions. There appeared to be a transition point in her pelvis where the small bowel obstruction was. Small bowel was then freed up from the ligament of Treitz all the way to ileocecal valve. The area of transition point we decided to resect that area small bowel. Mesenteric windows were made proximal and distal to this area. The bowel was then stapled across with a DAVID 80 mm blue load. The mesentery was then removed with a LigaSure device. The 2 ends of bowel were then brought together. A common channel was then  created with electrocautery. A common channel was then created with a DAVID 80 mm blue load. The common channel was then closed with a 3.0 VLock suture. A 3.0 VLock running Lembert suture was placed over the common channel closure. The mesenteric window was closed with silk suture. Post creation our anastomosis was pink and patent. Hemostasis was obtained. The patient's abdomen was then irrigated with copious amounts of irrigation. The bowel was then placed in anatomical position. I inspected the patient's colon which was intact. The patient did have a considerable length of her cecum dipping into her pelvis. The patient's NG tube placement was confirmed. A 19 Moldovan Denzel drain was then placed in her right lower quadrant into her pelvis. Bowel Protocol was then followed. The patient's abdomen was then closed in standard fashion with 0 looped Maxon suture. The subcutaneous tissue was then irrigated with copious amounts of irrigation. Incision was then closed with staples. A Mepilex dressing was then applied. Our procedure was discontinued at the time. All needle sponge counts were correct. The patient tolerated procedure well. She went to the PACU in satisfactory condition     4/24/2019: Exploratory laparotomy, lysis adhesions, appendectomy done for small bowel obstruction by Dr. Cheema at [facility].   Findings: ovarian adhesions causing closed loop obstruction with transition point   Patient was moved back to the OR suite. Placed in supine position. General anesthesia was induced. A Tian catheter was inserted. Her abdomen was prepped and draped in sterile fashion. A timeout was performed. A standard midline incision was then made. Use electrocautery to dissect through fascia. We sharply entered the intra-abdominal cavity. Some omental adhesions were then removed with blunt and electrocautery dissection. We then eviscerated the patient's small bowel. During the evisceration she did have adhesions in her  lower pelvis. We did identify the area of small bowel obstruction in which her ovary had adhesion to the pelvic sidewall causing an internal hernia of the small bowel and a closed loop obstruction. The adhesion was then lysed. The bowel was pink and viable. We then did some more lysis of adhesions of small bowel adhesions. Patient's ileocecal valve was identified and was patent. The appendix was identified, I decided to remove the appendix at that time. The mesenteric window was then made at the base of the appendix. The appendix base was then stapled across with a DAVID 60 mm blue load. The mesoappendix was then clamped and tied with a silk suture. The appendix was then sent the specimen. The stump of the appendix was then had silk liver sutures placed over it. The small bowel was then ran from the ileocecal valve delivered to Treitz there is no more adhesions. The bowel was pink and viable. The bowel was then placed in anatomical position of the greater omentum on top. The abdomen was then closed in standard fashion with 0 looped Maxon suture. The skin was then stapled. Our procedure was discontinued at the time. Patient tolerated procedure well. Was awakened from anesthesia and taken to the PACU in satisfactory condition. On his blood counts were correct     1/13/2012: Colostomy revision     1/9/2012: laparoscopic loop colostomy done at [facility]     GI:  4/10/2023: Patient was seen by Dr. Sandoval from [facility]. Patient was previously being followed by Dr. Quintana at [facility] but wanted to change providers.   Current GI medications include Linzess, 145 mcg twice daily. She takes mineral oil or lactulose as needed if she does not have at least 3/4 of an ostomy bag of output per night.   Presently getting colostomy irrigated to deal with her constipation.  She states Dr. Sandoval endorses total abdominal colectomy;  note unavailable today    Imaging/colonoscopy:   3/4/2024: CT abdomen pelvis w  contrast  IMPRESSION:   1.  Stable postoperative changes of partial colectomy, ileocolonic   anastomosis, colonic stump and left lower quadrant diverting   colostomy.   2. Stable mild dilatation of small bowel loops near the ileocolonic   junction with gradual transition to normal caliber and likely chronic   postoperative dilatation. No high-grade transition point to suggest   obstruction.   3. Right adnexal hypodensity. Follow-up pelvic sonogram in 6 weeks   recommended to assess for stability.     Colonoscopy documented as being done 7/7/2003 but cannot find report     Medical history:  No past medical history on file.    Surgical history:  Past Surgical History:   Procedure Laterality Date    IR GASTRO JEJUNOSTOMY TUBE PLACEMENT  5/18/2022    IR NG TUBE PLACEMENT REQ RAD & FLUORO  5/18/2022       Problem list:  There are no problems to display for this patient.      Medications:  Current Outpatient Medications   Medication Sig Dispense Refill    clonazePAM (KLONOPIN) 0.5 MG tablet Take 0.25 mg by mouth 2 times daily as needed for anxiety      cyanocobalamin (CYANOCOBALAMIN) 1000 MCG/ML injection INJECT AS DIRECTED ONCE MONTHLY. (Patient not taking: Reported on 10/4/2022)      cyclobenzaprine (FLEXERIL) 10 MG tablet Take 10 mg by mouth 3 times daily as needed for muscle spasms      FLUoxetine (PROZAC) 20 MG capsule Take 20 mg by mouth daily      HYDROcodone-acetaminophen (NORCO)  MG per tablet Take 1 tablet by mouth every 4 hours as needed for severe pain (Patient not taking: Reported on 10/4/2022)      linaclotide (LINZESS) 145 MCG capsule Take by mouth 2 times daily      melatonin 3 MG CAPS       multivitamin w/minerals (THERA-VIT-M) tablet Take 1 tablet by mouth daily      pantoprazole (PROTONIX) 20 MG EC tablet Take 40 mg by mouth 2 times daily TWO TABS IN MORNING ONE AT NIGHT      pregabalin (LYRICA) 100 MG capsule Take 100 mg by mouth 2 times daily      senna (SENOKOT) 8.6 MG tablet Take 1 tablet by  mouth daily      sucralfate (CARAFATE) 1 GM tablet TAKE 1 TABLET BY MOUTH FOUR TIMES DAILY         Allergies:  Allergies   Allergen Reactions    Metoclopramide Anaphylaxis and Nausea and Vomiting     start of anaphylaxis, was treated quickl      Mirtazapine Hives    Morphine Hives, Itching and Rash    Penicillins Hives    Prochlorperazine Anaphylaxis     Other reaction(s): Other (see comments)  sweating      Promethazine Anaphylaxis     Other reaction(s): Angioedema  angioedema      Sumatriptan Anaphylaxis     Tongue and throat sensation of swelling and chest tightness.       Desvenlafaxine      Other reaction(s): Intolerance    Lisinopril      Other reaction(s): Unknown Reaction    Milnacipran      Other reaction(s): Intolerance    Labetalol Rash     Other reaction(s): Tachycardia    Levofloxacin      Other reaction(s): Mental Status Change, Other (see comments)  Anxiety (severe), euphoria, facial numbness  Anxiety (severe), euphoria, facial numbness      Metronidazole Nausea and Vomiting    Quetiapine Rash    Sulfa Antibiotics Rash    Sulfamethoxazole-Trimethoprim      Other reaction(s): Unknown Reaction    Trazodone Hives and Rash       Family history:  No family history on file.    Social history:  Social History     Tobacco Use    Smoking status: Former     Types: Cigarettes     Quit date: 2020     Years since quittin.1    Smokeless tobacco: Never   Substance Use Topics    Alcohol use: Not Currently    Marital status: single.  Occupation: NA.    There are no exam notes on file for this visit.     Physical Examination:  LMP  (LMP Unknown)   General: Well appearing, no acute distress  Abdomen: soft, nondistended.  Large midline incision without hernia, colostomy      Assessment/Plan: 38 year old female with a significant past medical history of constipation and adhesive bowel disease with a recent diagnosis of  slow transit component.  Her initial work up of ODS seemed equivocal to me, although she  endorses good function for several years after colostomy.  Unclear why she has had significant adhesions after colostomy.  Could consider transit study, but given she is committed to stoma, low risk of dehydration, and her reports of requiring colostomy irrigation for treatment, it is reasonable to consider total abdominal colectomy. Advised against proctectomy at this time given it will add to adhesive disease and possible recurrent SBOs.  She agrees with this.     Given previous SBOs, plan ex lap, total abdominal colectomy, end ileostomy.  Risks and benefits discussed with patient and she agrees.    60 minutes spent on the date of encounter performing chart review, history and exam, documentation and further activities as noted above.     Mali Ayala MD    Division of Colon & Rectal Surgery  [facility]         This note was created using speech recognition software and may contain unintended word substitutions.

## 2024-03-14 ENCOUNTER — OFFICE VISIT (OUTPATIENT)
Dept: SURGERY | Facility: CLINIC | Age: 39
End: 2024-03-14
Payer: COMMERCIAL

## 2024-03-14 ENCOUNTER — TELEPHONE (OUTPATIENT)
Dept: OBGYN | Facility: CLINIC | Age: 39
End: 2024-03-14

## 2024-03-14 VITALS
SYSTOLIC BLOOD PRESSURE: 122 MMHG | RESPIRATION RATE: 16 BRPM | DIASTOLIC BLOOD PRESSURE: 85 MMHG | WEIGHT: 168 LBS | BODY MASS INDEX: 26.31 KG/M2 | HEART RATE: 82 BPM | OXYGEN SATURATION: 98 %

## 2024-03-14 DIAGNOSIS — Z93.3 COLOSTOMY IN PLACE (H): ICD-10-CM

## 2024-03-14 DIAGNOSIS — K59.01 SLOW TRANSIT CONSTIPATION: Primary | ICD-10-CM

## 2024-03-14 PROCEDURE — 99205 OFFICE O/P NEW HI 60 MIN: CPT | Performed by: COLON & RECTAL SURGERY

## 2024-03-14 NOTE — NURSING NOTE
Chief Complaint   Patient presents with    New Patient     Colostomy in place       Vitals:    03/14/24 1133   BP: 122/85   BP Location: Right arm   Patient Position: Sitting   Cuff Size: Adult Regular   Pulse: 82   Resp: 16   SpO2: 98%   Weight: 76.2 kg (168 lb)       Body mass index is 26.31 kg/m .

## 2024-03-14 NOTE — TELEPHONE ENCOUNTER
Attempted to call Emily back with colorectal team, no answer, [facility]  Yolis Muñiz, RN on 3/14/2024 at 2:30 PM

## 2024-03-14 NOTE — Clinical Note
3/14/2024         RE: Mimi Su  [address]  [facility] [address]        Dear Colleague,    Thank you for referring your patient, Mimi Su, to the [facility] [place]. Please see a copy of my visit note below.    Colon and Rectal Surgery Consult Clinic Note       Referring provider:  Kassi Cerda MD  [address] [place] [address]     RE: Mimi Su  : 1985  GENEVA: 3/14/2024      Reason for visit: colostomy in place. Colonic dysmotility with severe constipation *** wanting a total colectomy?    HPI:  Mimi Su is a very pleasant 38 year old female with a  history of chronic pain syndrome on chronic opioid therapy, borderline personality disorder, chemical dependecy, s/p cholecystectomy (2003). S/p colostomy (2012) done at [facility] and then a revision done 12. Hysterectomy (2013). Patient has had two exploratory laparotomies (19) and (19). Referred by Dr. Cerda.     History of numerous bowel obstructions due to adhesions.     2024: Patient was hospitalized - at [facility] for progressive abdominal bloating, fecal impaction and nausea/vomiting  She has followed with GI at [facility], transferred GI care to [place] and was recently referred to the [facility] for consideration of total colectomy and bilateral nephrectomy due to chronic constipation and recurrent ovarian cysts.   Radiographs showed nonspecific bowel gas pattern with moderate stool burden, no evidence of obstruction. She had a Gastrografin challenge which showed no evidence of obstruction. She had liquid stool output and still felt bloated. [facility] nurse was consulted with irrigation through the colostomy and manual dilation of the ostomy. Patient tolerated this without difficulty. She past formed stool and felt stable for discharge. Her bloating improved. Nausea and vomiting resolved.   She will follow-up with [facility] for  colostomy irrigation 1-2 times weekly. Historically, her mother was performing colostomy irrigation which seemed to prevent constipation and stool impaction. Her mother's multiple sclerosis has progressed and Mimi has difficulty performing irrigation independently.     Abdominal surgical history:     9/5/2019: exploratory lapartomy, extensive lysis of adhesions x 120 minutes, small bowel resection done by Dr. Cheema at [facility].  Patient is a 34-year-old female with a history of small bowel obstructions. Patient has had 2 recurrent small bowel obstructions within the last 2 months. She also has a history of colostomy for pelvic dysmotility. Patient was found to have a bowel obstruction with a transition point in her pelvis. She explained the benefits and risks of exploratory laparotomy. Her questions were sought and answered. Consent was signed placed in chart.  The patient is about the OR suite. Placed supine position. General anesthesia was induced. Tian catheter was inserted. Her abdomen was prepped and draped in sterile fashion. An incision was made through her previous incision site. Electrocautery was used to dissect the fascia. Sharp dissection was then used to get into the intra-abdominal cavity. Blunt dissections of omental adhesions to the abdominal wall were then removed. The abdominal incision was then extended. Omental adhesions were then taken down. The small bowel was then eviscerated. We then performed lysis of adhesion for approximately 2 hours to free of the bowel to the ligament of Treitz. We also freed up the small bowel from the. Patient's colostomy. In the pelvis when we were lysing some adhesions there were serosal abrasions. There appeared to be a transition point in her pelvis where the small bowel obstruction was. Small bowel was then freed up from the ligament of Treitz all the way to ileocecal valve. The area of transition point we decided to resect that area small bowel.  Mesenteric windows were made proximal and distal to this area. The bowel was then stapled across with a DAVID 80 mm blue load. The mesentery was then removed with a LigaSure device. The 2 ends of bowel were then brought together. A common channel was then created with electrocautery. A common channel was then created with a DAVID 80 mm blue load. The common channel was then closed with a 3.0 VLock suture. A 3.0 VLock running Lembert suture was placed over the common channel closure. The mesenteric window was closed with silk suture. Post creation our anastomosis was pink and patent. Hemostasis was obtained. The patient's abdomen was then irrigated with copious amounts of irrigation. The bowel was then placed in anatomical position. I inspected the patient's colon which was intact. The patient did have a considerable length of her cecum dipping into her pelvis. The patient's NG tube placement was confirmed. A 19 Khmer Denzel drain was then placed in her right lower quadrant into her pelvis. Bowel Protocol was then followed. The patient's abdomen was then closed in standard fashion with 0 looped Maxon suture. The subcutaneous tissue was then irrigated with copious amounts of irrigation. Incision was then closed with staples. A Mepilex dressing was then applied. Our procedure was discontinued at the time. All needle sponge counts were correct. The patient tolerated procedure well. She went to the PACU in satisfactory condition     4/24/2019: Exploratory laparotomy, lysis adhesions, appendectomy done for small bowel obstruction by Dr. Cheema at [facility].   Findings: ovarian adhesions causing closed loop obstruction with transition point   Patient was moved back to the OR suite. Placed in supine position. General anesthesia was induced. A Tian catheter was inserted. Her abdomen was prepped and draped in sterile fashion. A timeout was performed. A standard midline incision was then made. Use electrocautery to  dissect through fascia. We sharply entered the intra-abdominal cavity. Some omental adhesions were then removed with blunt and electrocautery dissection. We then eviscerated the patient's small bowel. During the evisceration she did have adhesions in her lower pelvis. We did identify the area of small bowel obstruction in which her ovary had adhesion to the pelvic sidewall causing an internal hernia of the small bowel and a closed loop obstruction. The adhesion was then lysed. The bowel was pink and viable. We then did some more lysis of adhesions of small bowel adhesions. Patient's ileocecal valve was identified and was patent. The appendix was identified, I decided to remove the appendix at that time. The mesenteric window was then made at the base of the appendix. The appendix base was then stapled across with a DAVID 60 mm blue load. The mesoappendix was then clamped and tied with a silk suture. The appendix was then sent the specimen. The stump of the appendix was then had silk liver sutures placed over it. The small bowel was then ran from the ileocecal valve delivered to Treitz there is no more adhesions. The bowel was pink and viable. The bowel was then placed in anatomical position of the greater omentum on top. The abdomen was then closed in standard fashion with 0 looped Maxon suture. The skin was then stapled. Our procedure was discontinued at the time. Patient tolerated procedure well. Was awakened from anesthesia and taken to the PACU in satisfactory condition. On his blood counts were correct     1/13/2012: Colostomy revision     1/9/2012: laparoscopic loop colostomy done at [facility]     GI:  4/10/2023: Patient was seen by Dr. Sandoval from [facility]. Patient was previously being followed by Dr. Quintana at [facility] but wanted to change providers.   Current GI medications include Linzess, 145 mcg twice daily. She takes mineral oil or lactulose as needed if she does not have at least 3/4 of an ostomy bag  of output per night.     Imaging/colonoscopy:   3/4/2024: CT abdomen pelvis w contrast  IMPRESSION:   1.  Stable postoperative changes of partial colectomy, ileocolonic   anastomosis, colonic stump and left lower quadrant diverting   colostomy.   2. Stable mild dilatation of small bowel loops near the ileocolonic   junction with gradual transition to normal caliber and likely chronic   postoperative dilatation. No high-grade transition point to suggest   obstruction.   3. Right adnexal hypodensity. Follow-up pelvic sonogram in 6 weeks   recommended to assess for stability.     Colonoscopy documented as being done 7/7/2003 but cannot find report     Medical history:  No past medical history on file.    Surgical history:  Past Surgical History:   Procedure Laterality Date    IR GASTRO JEJUNOSTOMY TUBE PLACEMENT  5/18/2022    IR NG TUBE PLACEMENT REQ RAD & FLUORO  5/18/2022       Problem list:  There are no problems to display for this patient.      Medications:  Current Outpatient Medications   Medication Sig Dispense Refill    clonazePAM (KLONOPIN) 0.5 MG tablet Take 0.25 mg by mouth 2 times daily as needed for anxiety      cyanocobalamin (CYANOCOBALAMIN) 1000 MCG/ML injection INJECT AS DIRECTED ONCE MONTHLY. (Patient not taking: Reported on 10/4/2022)      cyclobenzaprine (FLEXERIL) 10 MG tablet Take 10 mg by mouth 3 times daily as needed for muscle spasms      FLUoxetine (PROZAC) 20 MG capsule Take 20 mg by mouth daily      HYDROcodone-acetaminophen (NORCO)  MG per tablet Take 1 tablet by mouth every 4 hours as needed for severe pain (Patient not taking: Reported on 10/4/2022)      linaclotide (LINZESS) 145 MCG capsule Take by mouth 2 times daily      melatonin 3 MG CAPS       multivitamin w/minerals (THERA-VIT-M) tablet Take 1 tablet by mouth daily      pantoprazole (PROTONIX) 20 MG EC tablet Take 40 mg by mouth 2 times daily TWO TABS IN MORNING ONE AT NIGHT      pregabalin (LYRICA) 100 MG capsule Take 100  "mg by mouth 2 times daily      senna (SENOKOT) 8.6 MG tablet Take 1 tablet by mouth daily      sucralfate (CARAFATE) 1 GM tablet TAKE 1 TABLET BY MOUTH FOUR TIMES DAILY         Allergies:  Allergies   Allergen Reactions    Metoclopramide Anaphylaxis and Nausea and Vomiting     start of anaphylaxis, was treated quickl      Mirtazapine Hives    Morphine Hives, Itching and Rash    Penicillins Hives    Prochlorperazine Anaphylaxis     Other reaction(s): Other (see comments)  sweating      Promethazine Anaphylaxis     Other reaction(s): Angioedema  angioedema      Sumatriptan Anaphylaxis     Tongue and throat sensation of swelling and chest tightness.       Desvenlafaxine      Other reaction(s): Intolerance    Lisinopril      Other reaction(s): Unknown Reaction    Milnacipran      Other reaction(s): Intolerance    Labetalol Rash     Other reaction(s): Tachycardia    Levofloxacin      Other reaction(s): Mental Status Change, Other (see comments)  Anxiety (severe), euphoria, facial numbness  Anxiety (severe), euphoria, facial numbness      Metronidazole Nausea and Vomiting    Quetiapine Rash    Sulfa Antibiotics Rash    Sulfamethoxazole-Trimethoprim      Other reaction(s): Unknown Reaction    Trazodone Hives and Rash       Family history:  No family history on file.    Social history:  Social History     Tobacco Use    Smoking status: Former     Types: Cigarettes     Quit date: 2020     Years since quittin.1    Smokeless tobacco: Never   Substance Use Topics    Alcohol use: Not Currently    Marital status: single.  Occupation: ***.    There are no exam notes on file for this visit.     Physical Examination:  LMP  (LMP Unknown)   General: ***  HEENT: ***  Abdomen: ***  Extremities: ***    Perianal external examination: Exam was chaperoned by ***  Perianal skin: {INTACT / ABNORMAL:361153::\"Intact with no excoriation or lichenification\"}.  Lesions: {YES/NO  Comments:054531::\"No evidence of an external lesion, " "nodularity, or induration in the perianal region\"}.  Eversion of buttocks: There {WAS/WAS NOT (default):181217::\"was not\"} evidence of an anal fissure. Details: {Not Applicable or free text:840180::\"N/A\"}.  Skin tags or external hemorrhoids: {YES/NO  Comments:786200::\"None\"}.  Digital rectal examination: {Was performed/was not performed/etc:549148}    Anoscopy: {Was performed/was not performed/etc:536719282}    Procedures:  ***  Assessment/Plan: 38 year old female { :701334} with a recent diagnosis of  ***.   ***    *** minutes spent on the date of encounter performing chart review, history and exam, documentation and further activities as noted above with an additional *** for ***.     Mali Ayala MD  Division of Colon & Rectal Surgery  [facility]         This note was created using speech recognition software and may contain unintended word substitutions.      Again, thank you for allowing me to participate in the care of your patient.        Sincerely,        Mali Ayala MD  "

## 2024-03-14 NOTE — TELEPHONE ENCOUNTER
Health Call Center    Phone Message    May a detailed message be left on voicemail: yes     Reason for Call: Other: Emily is calling from [facility]. Emily is wanting to gather some information. Emily is wanting to know if Dr. Hodgson is able to do any surgeries at [place]. Pt is suppose to have surgery on the 25th, and is needing a gyn surgeon there as well. Writer reached out via secure chat, but no answer. Please call Emily to discuss     Action Taken: Other: RD Ob    Travel Screening: Not Applicable

## 2024-03-25 ENCOUNTER — OFFICE VISIT (OUTPATIENT)
Dept: OBGYN | Facility: CLINIC | Age: 39
End: 2024-03-25
Attending: FAMILY MEDICINE
Payer: COMMERCIAL

## 2024-03-25 ENCOUNTER — PATIENT OUTREACH (OUTPATIENT)
Dept: ONCOLOGY | Facility: CLINIC | Age: 39
End: 2024-03-25

## 2024-03-25 VITALS
TEMPERATURE: 97.8 F | HEIGHT: 67 IN | HEART RATE: 107 BPM | WEIGHT: 166.1 LBS | OXYGEN SATURATION: 100 % | SYSTOLIC BLOOD PRESSURE: 132 MMHG | DIASTOLIC BLOOD PRESSURE: 84 MMHG | BODY MASS INDEX: 26.07 KG/M2

## 2024-03-25 DIAGNOSIS — R10.2 PELVIC PAIN: ICD-10-CM

## 2024-03-25 DIAGNOSIS — N83.202 BILATERAL OVARIAN CYSTS: Primary | ICD-10-CM

## 2024-03-25 DIAGNOSIS — R10.2 PELVIC PAIN IN FEMALE: ICD-10-CM

## 2024-03-25 DIAGNOSIS — N73.6 PELVIC ADHESIVE DISEASE: ICD-10-CM

## 2024-03-25 DIAGNOSIS — N83.201 BILATERAL OVARIAN CYSTS: Primary | ICD-10-CM

## 2024-03-25 PROCEDURE — 99204 OFFICE O/P NEW MOD 45 MIN: CPT | Performed by: OBSTETRICS & GYNECOLOGY

## 2024-03-25 ASSESSMENT — PATIENT HEALTH QUESTIONNAIRE - PHQ9
SUM OF ALL RESPONSES TO PHQ QUESTIONS 1-9: 17
SUM OF ALL RESPONSES TO PHQ QUESTIONS 1-9: 17
10. IF YOU CHECKED OFF ANY PROBLEMS, HOW DIFFICULT HAVE THESE PROBLEMS MADE IT FOR YOU TO DO YOUR WORK, TAKE CARE OF THINGS AT HOME, OR GET ALONG WITH OTHER PEOPLE: VERY DIFFICULT

## 2024-03-25 NOTE — PROGRESS NOTES
S; Mimi Su is a 38 year old  who was referred by Dr. Sims in [place] for surgical intervention.  She had a long history of chronic constipation, colonic dysmotility and currently has colostomy.  She has had multiple laparotomies for this, most recently in 2019 for bowel obstruction.  At the time it required 120hours of lysis of adhesions due to her extension pelvic adhesive dz. Since then she has continued to struggle with constipation and significant pain.  She has met with colo-rectal surgery at [facility] and plan is to move forward with full colectomy. She has functional ovarian cysts on and off and is requesting bilateral oophorectomy at the time of colon surgery. That surgery has not been scheduled yet, awaiting ob/gyn input.    Previous notes from Dr. Sims as well as colo-rectal surgery and ostomy care, ultrasound and operative notes were reviewed on day of visit.    Past Medical History:   Diagnosis Date    Bipolar disorder (H)     Colonic dysmotility     Colostomy in place (H)      Past Surgical History:   Procedure Laterality Date    HYSTERECTOMY       after chidlbirth    IR GASTRO JEJUNOSTOMY TUBE PLACEMENT  2022    IR NG TUBE PLACEMENT REQ RAD & FLUORO  2022    LAPAROTOMY EXPLORATORY      multiple     OB History    Para Term  AB Living   1 1 1 0 0 1   SAB IAB Ectopic Multiple Live Births   0 0 0 0 1      # Outcome Date GA Lbr Sourav/2nd Weight Sex Delivery Anes PTL Lv   1 Term 08/10/07 40w0d  2.92 kg (6 lb 7 oz) F Vag-Spont  N MAYNOR      Name: TARYN        Allergies   Allergen Reactions    Metoclopramide Anaphylaxis and Nausea and Vomiting     start of anaphylaxis, was treated quickl      Mirtazapine Hives    Morphine Hives, Itching and Rash    Penicillins Hives    Prochlorperazine Anaphylaxis     Other reaction(s): Other (see comments)  sweating      Promethazine Anaphylaxis     Other reaction(s): Angioedema  angioedema      Sumatriptan Anaphylaxis     Tongue  and throat sensation of swelling and chest tightness.       Desvenlafaxine      Other reaction(s): Intolerance    Lisinopril      Other reaction(s): Unknown Reaction    Milnacipran      Other reaction(s): Intolerance    Labetalol Rash     Other reaction(s): Tachycardia    Levofloxacin      Other reaction(s): Mental Status Change, Other (see comments)  Anxiety (severe), euphoria, facial numbness  Anxiety (severe), euphoria, facial numbness      Metronidazole Nausea and Vomiting    Quetiapine Rash    Sulfa Antibiotics Rash    Sulfamethoxazole-Trimethoprim      Other reaction(s): Unknown Reaction    Trazodone Hives and Rash       Current Outpatient Medications:     clonazePAM (KLONOPIN) 0.5 MG tablet, Take 0.25 mg by mouth 2 times daily as needed for anxiety, Disp: , Rfl:     cyclobenzaprine (FLEXERIL) 10 MG tablet, Take 10 mg by mouth 3 times daily as needed for muscle spasms, Disp: , Rfl:     FLUoxetine (PROZAC) 20 MG capsule, Take 20 mg by mouth daily, Disp: , Rfl:     linaclotide (LINZESS) 145 MCG capsule, Take by mouth 2 times daily, Disp: , Rfl:     melatonin 3 MG CAPS, , Disp: , Rfl:     multivitamin w/minerals (THERA-VIT-M) tablet, Take 1 tablet by mouth daily, Disp: , Rfl:     pantoprazole (PROTONIX) 20 MG EC tablet, Take 40 mg by mouth 2 times daily TWO TABS IN MORNING ONE AT NIGHT, Disp: , Rfl:     pregabalin (LYRICA) 100 MG capsule, Take 100 mg by mouth 2 times daily, Disp: , Rfl:     senna (SENOKOT) 8.6 MG tablet, Take 1 tablet by mouth daily, Disp: , Rfl:     sucralfate (CARAFATE) 1 GM tablet, TAKE 1 TABLET BY MOUTH FOUR TIMES DAILY, Disp: , Rfl:     Social History     Socioeconomic History    Marital status: Single     Spouse name: Not on file    Number of children: Not on file    Years of education: Not on file    Highest education level: Not on file   Occupational History    Not on file   Tobacco Use    Smoking status: Former     Types: Cigarettes     Quit date: 2020     Years since quittin.2  "   Smokeless tobacco: Never   Vaping Use    Vaping Use: Every day   Substance and Sexual Activity    Alcohol use: Not Currently    Drug use: Never    Sexual activity: Not on file   Other Topics Concern    Not on file   Social History Narrative    Not on file     Social Determinants of Health     Financial Resource Strain: Not on file   Food Insecurity: Not on file   Transportation Needs: Not on file   Physical Activity: Not on file   Stress: Not on file   Social Connections: Not on file   Interpersonal Safety: Not At Risk (10/4/2022)    Humiliation, Afraid, Rape, and Kick questionnaire     Fear of Current or Ex-Partner: No     Emotionally Abused: No     Physically Abused: No     Sexually Abused: No   Housing Stability: Not on file     No family history on file.    Past medical, surgical, social and family history were reviewed and updated in EPIC.    PE: /84   Pulse 107   Temp 97.8  F (36.6  C)   Ht 1.702 m (5' 7\")   Wt 75.3 kg (166 lb 1.6 oz)   LMP  (LMP Unknown)   SpO2 100%   BMI 26.01 kg/m      Gen: NAD    REASON FOR EXAM: Bilateral ovarian cysts   LMP: Status post hysterectomy     FINDINGS:     Transabdominal and Transvaginal scanning was performed.     UTERUS: Surgically absent     OVARY, RIGHT: Measures 3.1 x 1.7 x 3.1.  There is an anechoic cyst   measuring 1.3 x 1.1 x 1.3 cm.  There is a hypoechoic area in the right   adnexa which appears tubular measuring 2.9 x 1.5 x 1.8 cm     OVARY, LEFT: Measures 1.8 x 0.9 x 1.2 cm.  Hypoechoic area noted measuring   1.8 x 0.9 x 1.5 cm     Of note, the ovaries are adjacent to each other.     CULDESAC: No fluid.     No other significant findings are noted.       IMPRESSION:   1. Surgically absent uterus     2.  Ovaries appear adjacent to each other.  The right ovary contains an   anechoic cyst measuring 2.9 x 1.5 x 1.8.  There is also a hypoechoic area   in the right adnexa which is tubular in appearance measuring 2.9 x 1.5 x   1.8 cm.  Left ovary has a 1.8 x " 0.9 x 1.5 cm hypoechoic area noted.     A/P: pelvic adhesive disease, ovarian cysts   We reviewed her surgical history including most recent surgery that required 2 hours of lysis of adhesions and it was noted that ovaries were densely adherent to pelvic sidewalls.  I explained that given the high complexity of her surgery, I would recommend consultation with gyn/oncology as the safest option for combined surgery with colo-rectal.  She is understanding and agreeable.  Referral placed.  Questions answered    ISIDORO RIOJAS MD

## 2024-03-26 NOTE — TELEPHONE ENCOUNTER
RECORDS STATUS - ALL OTHER DIAGNOSIS      Action March 26, 2024 9:35 AM    Action Taken - Req is faxed to [facility] and [facility] for images.      Imaging Received  March 27, 2024 8:56 AM    Action: Images from [facility] received and resolved to PACS.     RECORDS RECEIVED FROM: [facility]   DATE RECEIVED:    NOTES STATUS DETAILS   OFFICE NOTE from referring provider Epic 3/25/24: Dr. Violette Hodgson   OFFICE NOTE from other specialist [facility]- [facility] 3/13/24: Dr. Mateo Sims   MEDICATION LIST [facility]    LABS     ANYTHING RELATED TO DIAGNOSIS [facility] Most recent 3/19/24   IMAGING (NEED IMAGES & REPORT)     CT SCANS (Img req from [facility]) [facility]:  3/19/24: CT Abd Pel    PACS:  3/4/24-1/29/19: CT Abd Pel   ULTRASOUND (Img req from [facility]) [facility]:  3/13/24: US Pel    PACS:  3/4/24-2/8/19: US Pel

## 2024-04-02 ENCOUNTER — ONCOLOGY VISIT (OUTPATIENT)
Dept: ONCOLOGY | Facility: CLINIC | Age: 39
End: 2024-04-02
Attending: OBSTETRICS & GYNECOLOGY
Payer: COMMERCIAL

## 2024-04-02 ENCOUNTER — PRE VISIT (OUTPATIENT)
Dept: ONCOLOGY | Facility: CLINIC | Age: 39
End: 2024-04-02
Payer: COMMERCIAL

## 2024-04-02 ENCOUNTER — PATIENT OUTREACH (OUTPATIENT)
Dept: ONCOLOGY | Facility: CLINIC | Age: 39
End: 2024-04-02

## 2024-04-02 ENCOUNTER — TELEPHONE (OUTPATIENT)
Dept: SURGERY | Facility: CLINIC | Age: 39
End: 2024-04-02

## 2024-04-02 VITALS
RESPIRATION RATE: 16 BRPM | HEART RATE: 69 BPM | TEMPERATURE: 97.4 F | OXYGEN SATURATION: 100 % | DIASTOLIC BLOOD PRESSURE: 77 MMHG | SYSTOLIC BLOOD PRESSURE: 115 MMHG

## 2024-04-02 DIAGNOSIS — N83.201 BILATERAL OVARIAN CYSTS: ICD-10-CM

## 2024-04-02 DIAGNOSIS — N73.6 PELVIC ADHESIVE DISEASE: ICD-10-CM

## 2024-04-02 DIAGNOSIS — N83.202 BILATERAL OVARIAN CYSTS: ICD-10-CM

## 2024-04-02 PROCEDURE — 99203 OFFICE O/P NEW LOW 30 MIN: CPT | Performed by: OBSTETRICS & GYNECOLOGY

## 2024-04-02 PROCEDURE — G0463 HOSPITAL OUTPT CLINIC VISIT: HCPCS | Performed by: OBSTETRICS & GYNECOLOGY

## 2024-04-02 RX ORDER — PHENAZOPYRIDINE HYDROCHLORIDE 200 MG/1
200 TABLET, FILM COATED ORAL ONCE
Status: CANCELLED | OUTPATIENT
Start: 2024-04-02 | End: 2024-04-02

## 2024-04-02 RX ORDER — HEPARIN SODIUM 5000 [USP'U]/.5ML
5000 INJECTION, SOLUTION INTRAVENOUS; SUBCUTANEOUS
Status: CANCELLED | OUTPATIENT
Start: 2024-04-02

## 2024-04-02 ASSESSMENT — PAIN SCALES - GENERAL: PAINLEVEL: SEVERE PAIN (7)

## 2024-04-02 NOTE — LETTER
4/2/2024         RE: Mimi Su  [address]  [facility] [address]        Dear Colleague,    Thank you for referring your patient, Mimi Su, to the [facility]. Please see a copy of my visit note below.      GYNECOLOGIC ONCOLOGY  [facility]  [address]  [facility] [address]  Phone: [phone]  Fax: [phone]     Patient:  Mimi Su  YOB: 1985  Date of Visit: 04/01/2024    Referring Provider  Violette Hodgson MD  [address]  [place] [address]    Reason for visit: ovarian cysts    Assessment   Mimi Su is a 38 year old pre-menopausal patient with an extensive surgical history including partial colectomy with end colostomy planned for total colectomy/end ileostomy with colorectal surgery desiring bilateral salpingo-oophorectomy during that surgery.    Plan  - Ovarian cysts: desires removal of bilateral ovaries during colorectal surgery procedure. I wholeheartedly agree with this plan to prevent need for future surgery secondary to gynecologic organs in this surgically complex patient.   - Appear to be simple/functional cysts vs hemorrhagic cysts. No findings concerning for malignancy.  - Generally speaking risks are not increased with addition of this procedure to the procedure already planned, although there is always a small possibility that due to adhesive disease the ovaries may not be identified/removal may not be possible. Ureteral injury is slightly increased due to location near ovarian vessels.   - Will plan to arrange co-case with Dr. Ayala. If scheduling is at all an issue, I can likely find a partner to do the procedure.    Premature surgical menopause: recommend hormone replacement therapy. She has this arranged with local provider. Discussed various options, transdermal vs oral vs transvaginal. Decreases risks of cardiovascular, bone, and other morbidity and  decreased all cause mortality for patients <45yrs. Recommend continuing at least until age 45 ideally until natural age of menopause ~51yrs old. Plan for local provider to prescribe, doesn't need Rx at discharge.       Josias Pan MD   Gynecologic Oncology     ~~~~~~~~~~~~~~~~~~~~~~~~~~~~~~~~~~~~~~~~~~~~~~~~~~~~~~~~~~~~~~~~~~~~~~~    History of Present Illness  Per chart review: extensive surgical history including bowel obstructions and partial colectomy. Is planning for total colectomy and wants bilateral salpingo-oophorectomy at the same time. History of recurrent functional cysts.     Per patient report: Had eclampsia with her pregnancy. Had hysterectomy at a separate surgery after delivery to prevent future pregnancy.     OB/Gynecologic History:  Deliveries: Yes  Vaginal, history of post-partum eclampsia with cardiopulmonary arrest per patient report.   S/p total hysterectomy  History of HRT: No  Cervical cancer screening: No history of high grade dysplasia per patient report, prior to hysterectomy.     Past Medical History:   Diagnosis Date     Bipolar disorder (H)      Colonic dysmotility      Colostomy in place (H)      Past Surgical History:   Procedure Laterality Date     HYSTERECTOMY       after chidlbirth     IR GASTRO JEJUNOSTOMY TUBE PLACEMENT  2022     IR NG TUBE PLACEMENT REQ RAD & FLUORO  2022     LAPAROTOMY EXPLORATORY      multiple     No family history on file.  Social History     Tobacco Use     Smoking status: Former     Types: Cigarettes     Quit date: 2020     Years since quittin.2     Smokeless tobacco: Never   Vaping Use     Vaping Use: Every day   Substance Use Topics     Alcohol use: Not Currently     Drug use: Never       Physical Exam  LMP  (LMP Unknown)   Gen: no acute distress      Data  Laboratory data and imaging listed below was reviewed prior to this encounter  Labs/Pathology: n/a    Imaging: US PELVIC COMPLETE NON OB     HISTORY:     38 Y female    REASON FOR EXAM: Bilateral ovarian cysts   LMP: Status post hysterectomy     FINDINGS:     Transabdominal and Transvaginal scanning was performed.     UTERUS: Surgically absent     OVARY, RIGHT: Measures 3.1 x 1.7 x 3.1.  There is an anechoic cyst   measuring 1.3 x 1.1 x 1.3 cm.  There is a hypoechoic area in the right   adnexa which appears tubular measuring 2.9 x 1.5 x 1.8 cm     OVARY, LEFT: Measures 1.8 x 0.9 x 1.2 cm.  Hypoechoic area noted measuring   1.8 x 0.9 x 1.5 cm     Of note, the ovaries are adjacent to each other.     CULDESAC: No fluid.     No other significant findings are noted.       IMPRESSION:   1. Surgically absent uterus     2.  Ovaries appear adjacent to each other.  The right ovary contains an   anechoic cyst measuring 2.9 x 1.5 x 1.8.  There is also a hypoechoic area   in the right adnexa which is tubular in appearance measuring 2.9 x 1.5 x   1.8 cm.  Left ovary has a 1.8 x 0.9 x 1.5 cm hypoechoic area noted.     I spent a total of 30 minutes on the care of Mimi Su on the day of service including chart review in preparation for the visit, face to face time, care coordination, and documentation on the day of service.            Again, thank you for allowing me to participate in the care of your patient.        Sincerely,        Josias Pan MD

## 2024-04-02 NOTE — TELEPHONE ENCOUNTER
Another  msg received from patient, she says that she is anxious to schedule her surgery with Dr. Ayala and Dr. Pan since she says she has been waiting a long time for this surgery.     Patient requests a return call as soon as possible regarding surgery scheduling.    Ricarda Everett  Norma-op Coordinator  [place]  Direct Phone: [phone]

## 2024-04-02 NOTE — PROGRESS NOTES
GYNECOLOGIC ONCOLOGY  [facility]  [address] [facility] [address]  Phone: [phone]  Fax: [phone]     Patient:  Mimi Su  YOB: 1985  Date of Visit: 04/01/2024    Referring Provider  Violette Hodgson MD  [address] [address]    Reason for visit: ovarian cysts    Assessment    Mimi Su is a 38 year old pre-menopausal patient with an extensive surgical history including partial colectomy with end colostomy planned for total colectomy/end ileostomy with colorectal surgery desiring bilateral salpingo-oophorectomy during that surgery.    Plan   - Ovarian cysts: desires removal of bilateral ovaries during colorectal surgery procedure. I wholeheartedly agree with this plan to prevent need for future surgery secondary to gynecologic organs in this surgically complex patient.   - Appear to be simple/functional cysts vs hemorrhagic cysts. No findings concerning for malignancy.  - Generally speaking risks are not increased with addition of this procedure to the procedure already planned, although there is always a small possibility that due to adhesive disease the ovaries may not be identified/removal may not be possible. Ureteral injury is slightly increased due to location near ovarian vessels.   - Will plan to arrange co-case with Dr. Ayala. If scheduling is at all an issue, I can likely find a partner to do the procedure.    Premature surgical menopause: recommend hormone replacement therapy. She has this arranged with local provider. Discussed various options, transdermal vs oral vs transvaginal. Decreases risks of cardiovascular, bone, and other morbidity and decreased all cause mortality for patients <45yrs. Recommend continuing at least until age 44 ideally until natural age of menopause ~51yrs old. Plan for local provider to prescribe, doesn't need Rx at discharge.       Josias Pan MD   Gynecologic  Oncology     ~~~~~~~~~~~~~~~~~~~~~~~~~~~~~~~~~~~~~~~~~~~~~~~~~~~~~~~~~~~~~~~~~~~~~~~    History of Present Illness   Per chart review: extensive surgical history including bowel obstructions and partial colectomy. Is planning for total colectomy and wants bilateral salpingo-oophorectomy at the same time. History of recurrent functional cysts.     Per patient report: Had eclampsia with her pregnancy. Had hysterectomy at a separate surgery after delivery to prevent future pregnancy.     OB/Gynecologic History:  Deliveries: Yes  Vaginal, history of post-partum eclampsia with cardiopulmonary arrest per patient report.   S/p total hysterectomy  History of HRT: No  Cervical cancer screening: No history of high grade dysplasia per patient report, prior to hysterectomy.     Past Medical History:   Diagnosis Date    Bipolar disorder (H)     Colonic dysmotility     Colostomy in place (H)      Past Surgical History:   Procedure Laterality Date    HYSTERECTOMY       after chidlbirth    IR GASTRO JEJUNOSTOMY TUBE PLACEMENT  2022    IR NG TUBE PLACEMENT REQ RAD & FLUORO  2022    LAPAROTOMY EXPLORATORY      multiple     No family history on file.  Social History     Tobacco Use    Smoking status: Former     Types: Cigarettes     Quit date: 2020     Years since quittin.2    Smokeless tobacco: Never   Vaping Use    Vaping Use: Every day   Substance Use Topics    Alcohol use: Not Currently    Drug use: Never       Physical Exam   LMP  (LMP Unknown)   Gen: no acute distress      Data   Laboratory data and imaging listed below was reviewed prior to this encounter  Labs/Pathology: n/a    Imaging: US PELVIC COMPLETE NON OB     HISTORY:     38 Y female   REASON FOR EXAM: Bilateral ovarian cysts   LMP: Status post hysterectomy     FINDINGS:     Transabdominal and Transvaginal scanning was performed.     UTERUS: Surgically absent     OVARY, RIGHT: Measures 3.1 x 1.7 x 3.1.  There is an anechoic cyst   measuring 1.3 x  1.1 x 1.3 cm.  There is a hypoechoic area in the right   adnexa which appears tubular measuring 2.9 x 1.5 x 1.8 cm     OVARY, LEFT: Measures 1.8 x 0.9 x 1.2 cm.  Hypoechoic area noted measuring   1.8 x 0.9 x 1.5 cm     Of note, the ovaries are adjacent to each other.     CULDESAC: No fluid.     No other significant findings are noted.       IMPRESSION:   1. Surgically absent uterus     2.  Ovaries appear adjacent to each other.  The right ovary contains an   anechoic cyst measuring 2.9 x 1.5 x 1.8.  There is also a hypoechoic area   in the right adnexa which is tubular in appearance measuring 2.9 x 1.5 x   1.8 cm.  Left ovary has a 1.8 x 0.9 x 1.5 cm hypoechoic area noted.     I spent a total of 30 minutes on the care of Mimi Su on the day of service including chart review in preparation for the visit, face to face time, care coordination, and documentation on the day of service.

## 2024-04-02 NOTE — TELEPHONE ENCOUNTER
msg received from patient requesting a return call to schedule her multi-surgeon combo case with Dr. Ayala and Dr. Pan.     Patient met with Dr. Pan in clinic today.    Forwarded message to Dr. Ayala's schedulers.    Ricarda Everett  Norma-op Coordinator  [place]  Direct Phone: [phone]

## 2024-04-02 NOTE — NURSING NOTE
"Oncology Rooming Note    April 2, 2024 9:57 AM   Mimi Su is a 38 year old female who presents for:    Chief Complaint   Patient presents with    Oncology Clinic Visit     Bilateral ovarian cysts; Pelvic adhesive disease     Initial Vitals: /77 (BP Location: Right arm, Patient Position: Sitting, Cuff Size: Adult Small)   Pulse 69   Temp 97.4  F (36.3  C) (Oral)   Resp 16   LMP  (LMP Unknown)   SpO2 100%  Estimated body mass index is 26.01 kg/m  as calculated from the following:    Height as of 3/25/24: 1.702 m (5' 7\").    Weight as of 3/25/24: 75.3 kg (166 lb 1.6 oz). There is no height or weight on file to calculate BSA.  Severe Pain (7) Comment: Data Unavailable   No LMP recorded (lmp unknown). Patient has had a hysterectomy.  Allergies reviewed: Yes  Medications reviewed: Yes    Medications: Medication refills not needed today.  Pharmacy name entered into [employer] Private:    [id number] - [place] - [address] AT [facility] [address]  [id number] - [place], [place] - [address] AT [facility] [address] & [place]    Frailty Screening:   Is the patient here for a new oncology consult visit in cancer care? 2. No      Clinical concerns: none       Kitty Cohn              "

## 2024-04-03 ENCOUNTER — PREP FOR PROCEDURE (OUTPATIENT)
Dept: ONCOLOGY | Facility: CLINIC | Age: 39
End: 2024-04-03
Payer: COMMERCIAL

## 2024-04-03 NOTE — NURSING NOTE
Pre Op Nurse Teaching Template    Relevant Diagnosis: ovarian cyst    Teaching Topic: Open bilateral salpingo-oophorectomy - Bilateral     Person(s) involved in teaching :  Patient    Motivation Level:  Asks Questions:    Yes      Eager to Learn:     Yes     Cooperative:          Yes    Receptive (willing. Able to accept information):    Yes      Patient and those who are listed above demonstrates understanding of the following:   Reason for the appointment, diagnosis and treatment plan:   Yes   Knowledge of proper use of medications and conditions for which they are ordered (with special attention to potential side effects or drug interactions): Yes   Which situations necessitate calling provider and whom to contact: Yes         Nutritional needs and diet plan:  Yes      Pain management techniques:     Yes, Pain Scale   Diet:   Yes    Teaching Concerns addressed: Yes    Infection Prevention:  Patient and those who are listed above demonstrate understanding of the following:  Pre-Op CHG Bathing Instructions: Yes  Surgical procedure site care taught:   Yes   Signs and symptoms of infection taught: Yes       Instructional Materials Used/Given:  The Before Your Surgery Booklet  Showering before Surgery instructions & CHG Product  Pain Assessment Tool   Home Care after Surgery  Eating after surgery  Map  Accommodations Brochure  Phone numbers for clinic and on call doctor  Copy of Surgical Consent    Done Today:  Lab work, EKG, Chest Xray,   Preop Visit   not needed  done per olegario 4/2/24  Tests Ordered:  Post Op Visit Scheduled:TBD  Comments:    Surgery date/time:TBD        Consent signed via IPAD and on file in media  Hysterectomy consent signed and sent to scanning  .

## 2024-04-03 NOTE — TELEPHONE ENCOUNTER
Called patient to schedule surgery with Dr. Ayala and Dr. Pan     Date of Surgery: 5/9/2024     Approximate arrival time given:  No    Location of surgery: [facility] OR    Pre-Op H&P: PAC appointment scheduled 4/29/2024 at 100pm with labs and ostomy nurse appointment scheduled right after. Patient is aware.     Post-Op Appt Date:  5/22/2024 100pm with RAQUEL   6/13/2024 at 100pm with Dr. Ayala    Bowel Prep:  Yes - Needs extended prep.   RN to send bowel prep     Patient aware that PAC RN will provide arrival time and instructions for surgery : Yes    Packet sent out: Yes -- 04/03/24, patient would like packet on [employer]     Additional Comments: All patients questions were answered and was instructed to review surgical packet and call back with any questions or concerns.       Elli Simpson on 4/3/2024 at 12:16 PM

## 2024-04-03 NOTE — PATIENT INSTRUCTIONS
It was a pleasure seeing you in clinic today-please reach out if there are any further questions that arise following today's visit.  During business hours, you may reach me at [phone].  For urgent/emergent questions after business hours, you may reach the on-call Gynecologic Oncology Resident through the [facility]  at [phone].    All normal test results are usually communicated via letter or [employer] message.  Abnormal results (those that require a change in the previously discussed plan of care) are usually communicated via a phone call.    I recommend signing up for [employer] access if you have not already done so.  This allows you online access to your lab results and also helps you communicate efficiently with your clinic should any questions arise in your care.        You will be/have been scheduled for surgery     Diagnosis:  Ovarian cyst     The surgical procedure is: bilateral Salpingo-oophorectomy   Surgery Location: [address] [place] [id number]                                    Anticipated length of surgery: 1 hour  You will be in the recovery room for approximately 2-4 hrs after surgery.      You will be going home the same day  At discharge you will need a someone to drive you home.  You will need someone to stay with you for the first 24 hours you are home.    Preparation for Surgery:    To Schedule   no need for preoperative clearance for       surgery             *  No solid foods or milk products 10 hours before your      surgery time, you can  have clear liquids up until 4        hours before your surgery time.              *  No aspirin, ibuprofen, motrin, aleve, over the counter                   medications for 7 days prior to surgery date. These                  drugs can increase bleeding.  Tylenol is ok to use.    Postoperative Restrictions:   No heavy lifting >10-15 lbs or strenuous activity                       Nothing in the vagina (no tampons, no  intercourse, no douching) for eight weeks.     Postoperative plan  Eating/Drinking  Decreased appetite is normal, plan to eat 6-8 small meal day, drink at least 6-8 glasses of water per day, there are no dietary restrictions.   Bowels  Take stool softener daily as directed for at least 1-2 weeks unless you develop diarrhea.  If you are constipated you may use milk of magnesia 30 ml twice a day, metamucil daily, dulcolax oral/suppositories, miralax daily, and prunes/prune juice.  Activity  Activity as tolerated, reminder to balance rest with activity as you will tire easily while recovering. Using stairs is ok. Walk as much as tolerated, increasing your activity every day.  No heavy lifting: not greater than 10 pounds for 6 weeks after surgery then increase as tolerated.  Shower  You may shower the day after surgery, your incision site can get wet/soapy, pat dry.  No soaking in the tub, swimming, hot tubs for 4 weeks after surgery.  Pain Management  You will be given ibuprofen and tylenol, take on schedule every 6 hours.   Take consistently for a week then you can decrease/stop as tolerated.  You will also be given a small dose of narcotics, you may take this in addition to the tylenol/ibuprofen as needed if the pain is not manageable.  do not take this on a schedule.    Do not set an alarm to wake yourself up to take the pain medications.  Driving  You may resume driving a week or so after surgery after discontinue of narcotics.  Your reaction time is slower due to discomfort so give yourself extra space between cars  Wound care you may cover your incision sites if there is drainage or clothing is causing irritation otherwise leave open to the air.  No need to put an creams/ointments on incision site.  If you have steri strips do not remove they will fall off on their own. If you have staples they will be removed about 10 days after surgery. Wound will be mildly pink and might have a firm ridge underneath this is  normal and will resolve in 4-6 weeks  Swelling  Swellings of legs/buttocks is normal for 4-6 weeks.  It will decrease gradually.  Please call if swelling is greater in 1 leg  than the other  Hysterectomy  If hysterectomy is performed you may have some vaginal spotting as the top of the vagina heals, this is normal.  Nothing in the vagina for 8 weeks after surgery.     Call clinic if you have fever greater than 100.5, pain not controled by medication, no urine within 8 hours after surgery, heavy vaginal bleeding, large gushes of fluid, persistent nausea/vomiting, increasing redness, pain, swelling at incision site, drainage with bad odor or other concerns.      You should be feeling better every day.    Postoperative visit:  Return to clinic 1-2 weeks after surgery for post operative visit.  Your pathology results and any needed follow up plan will be discussed at that visit.      Please contact the clinic with any questions or concerns.      Dr Rosa East RN  Phone:  [phone]  Fax:  [phone]

## 2024-04-03 NOTE — PROGRESS NOTES
[facility]: Cancer Care Initial Note                                    Discussion with Patient:                                                      surgery             Assessment:                                                      Initial  Current living arrangement:: I live in a private home  Informal Support system:: Family  Equipment Currently Used at Home: none  Bed or wheelchair confined:: No  Mobility Status: Independent  Transportation means:: Regular car  Referrals Placed: None    Plan of Care Education Review:   Assessment completed with:: Patient    Plan of Care Education   Diagnosis:: ovarian cysts  Does patient understand diagnosis?: Yes  Tx plan/regimen:: surgery  Does patient understand treatment plan/regimen?: Yes  Plan of Care:: MD follow-up appointment  When to call provider:: Bleeding;Uncontrolled diarrhea/constipation;Increased shortness of breath;New/worsening pain;Shaking chills;Temperature >100.4F;Uncontrolled nausea/vomiting    Evaluation of Learning  Patient Education Provided: Yes  Readiness:: Eager  Method:: Booklet/Handout  Response:: Verbalizes understanding         Pre/Post Procedure:   Surgery/Procedure plan reviewed with patient  Planned Surgery or Procedure: Open bilateral salpingo-oophorectomy - Bilateral  Anesthesia Type: general anesthesia  Relevant Diagnosis: ovarian cysts  Pre-Op Physical to be completed by (e.g.: PCP, Pre-Assessment Center, etc.):: Surgeon     Education  Person Taught: patient  Learning Readiness and Ability: no barriers identified  Pre-op Care Instructions: proper use of medications, when to take, and when to hold;bowel management;sexual activity restriction;physical activity restriction;bladder management;when to call provider;pain management;diet;nausea management;blood clot prevention;preventing pneumonia/incentive spirometer use;incision care/wound management;travel restrictions  Pre-op Infection Prevention Reviewed: pre-op CHG bathing  "instructions;bathing care after procedure  Pre-op Planning Reviewed:  arrangements, if indicated;how to get to procedure location;post-op support plan (\"who will help care for you after your surgery/procedure?\")  Pre-op Education/Instructions provided: how to prepare for surgery;what to expect on surgery day;visitor policies;showering before surgery;surgery location specifics (map, parking, phone number);eating before and after surgery  Post-op Care Instructions: proper use of medications, when to take, and when to hold;when to call provider;home care/follow-up care;pain management;incision care/wound management;blood clot prevention;nausea management;diet;bowel management;bladder management;preventing pneumonia/incentive spirometer use;physical activity restriction;sexual activity restriction;travel restrictions  Education Outcome Evaluation: eager to learn            Pre-op Checklist Reviewed  Labs: n/a  Chest X-Ray: n/a  COVID-19 Testing: n/a  EKG: n/a  Urinalysis: n/a  Anticoagulation plan: n/a  Bowel Prep: n/a  Rehab: n/a  Other (see comment): n/a                                                                 Intervention/Education provided during outreach:                                                       surgery    Follow up call in 1-2 weeks    Signature:  Meli Arias RN    "

## 2024-04-03 NOTE — TELEPHONE ENCOUNTER
FUTURE VISIT INFORMATION      SURGERY INFORMATION:  Date: 24- oncology  Consult: ov 24    RECORDS REQUESTED FROM:       Primary Care Provider: Mat Fernandez MD  - [facility]    Most recent EKG+ Tracin23- [facility]    Most recent ECHO: 23- [facility]

## 2024-04-09 DIAGNOSIS — K59.01 SLOW TRANSIT CONSTIPATION: Primary | ICD-10-CM

## 2024-04-09 RX ORDER — NEOMYCIN SULFATE 500 MG/1
1000 TABLET ORAL EVERY 6 HOURS
Qty: 6 TABLET | Refills: 0 | Status: ON HOLD | OUTPATIENT
Start: 2024-04-09 | End: 2024-05-08

## 2024-04-09 RX ORDER — ONDANSETRON 4 MG/1
4 TABLET, FILM COATED ORAL EVERY 6 HOURS
Qty: 3 TABLET | Refills: 0 | Status: ON HOLD | OUTPATIENT
Start: 2024-04-09 | End: 2024-05-08

## 2024-04-09 RX ORDER — METRONIDAZOLE 500 MG/1
500 TABLET ORAL EVERY 6 HOURS
Qty: 3 TABLET | Refills: 0 | Status: ON HOLD | OUTPATIENT
Start: 2024-04-09 | End: 2024-05-08

## 2024-04-26 ENCOUNTER — TEAM CONFERENCE (OUTPATIENT)
Dept: SURGERY | Facility: CLINIC | Age: 39
End: 2024-04-26
Payer: COMMERCIAL

## 2024-04-26 NOTE — PROGRESS NOTES
COLON AND RECTAL SURGERY HUDDLE:    Patient was reviewed in preparation for their surgery the following was reviewed and has been completed:    Surgeon: Dr. Mali Ayala     Surgery & Date: 5/6    OPEN total abdominal colectomy, end ileostomy placement       Last MD Note: reviewed    Anesthesia Type: General    Other Providers: Yes Open bilateral Salpingo-oophorectomy     PAC: Yes    WOC: Yes    Labs: Yes    Bowel Prep: Yes MiraLAX / Gatorade , Antibiotic, and Magnesium Citrate-- two day prep and reviewed with pt     Packet: Yes    Imaging: N/A    Post-Op Appointments: Yes    Pre op soap: Yes Questions about shower: no    Is patient on TPN?: N/A   If yes, I contacted the TPN pharmacist by paging the  pharmacy at [phone] or calling [phone]. I also contacted Kita ZACARIAS with inpatient colon and rectal team.     Pre op call complete: Yes via [employer]

## 2024-04-28 LAB
ABO/RH(D): NORMAL
ANTIBODY SCREEN: NEGATIVE
SPECIMEN EXPIRATION DATE: NORMAL

## 2024-04-29 ENCOUNTER — PRE VISIT (OUTPATIENT)
Dept: SURGERY | Facility: CLINIC | Age: 39
End: 2024-04-29

## 2024-04-29 ENCOUNTER — OFFICE VISIT (OUTPATIENT)
Dept: SURGERY | Facility: CLINIC | Age: 39
End: 2024-04-29
Payer: COMMERCIAL

## 2024-04-29 ENCOUNTER — ANESTHESIA EVENT (OUTPATIENT)
Dept: SURGERY | Facility: CLINIC | Age: 39
DRG: 330 | End: 2024-04-29
Payer: COMMERCIAL

## 2024-04-29 ENCOUNTER — LAB (OUTPATIENT)
Dept: LAB | Facility: CLINIC | Age: 39
End: 2024-04-29
Payer: COMMERCIAL

## 2024-04-29 ENCOUNTER — OFFICE VISIT (OUTPATIENT)
Dept: WOUND CARE | Facility: CLINIC | Age: 39
End: 2024-04-29
Payer: COMMERCIAL

## 2024-04-29 VITALS
WEIGHT: 171 LBS | BODY MASS INDEX: 26.84 KG/M2 | OXYGEN SATURATION: 100 % | HEIGHT: 67 IN | SYSTOLIC BLOOD PRESSURE: 147 MMHG | TEMPERATURE: 98.2 F | DIASTOLIC BLOOD PRESSURE: 84 MMHG | HEART RATE: 68 BPM

## 2024-04-29 DIAGNOSIS — K59.01 SLOW TRANSIT CONSTIPATION: ICD-10-CM

## 2024-04-29 DIAGNOSIS — K59.01 SLOW TRANSIT CONSTIPATION: Primary | ICD-10-CM

## 2024-04-29 DIAGNOSIS — Z01.818 PRE-OP EVALUATION: ICD-10-CM

## 2024-04-29 DIAGNOSIS — Z01.818 PRE-OP EVALUATION: Primary | ICD-10-CM

## 2024-04-29 LAB
ALBUMIN SERPL BCG-MCNC: 4.3 G/DL (ref 3.5–5.2)
ALP SERPL-CCNC: 47 U/L (ref 40–150)
ALT SERPL W P-5'-P-CCNC: 21 U/L (ref 0–50)
ANION GAP SERPL CALCULATED.3IONS-SCNC: 12 MMOL/L (ref 7–15)
AST SERPL W P-5'-P-CCNC: 24 U/L (ref 0–45)
BASOPHILS # BLD AUTO: 0 10E3/UL (ref 0–0.2)
BASOPHILS NFR BLD AUTO: 0 %
BILIRUB SERPL-MCNC: 0.5 MG/DL
BUN SERPL-MCNC: 18.5 MG/DL (ref 6–20)
CALCIUM SERPL-MCNC: 9.1 MG/DL (ref 8.6–10)
CHLORIDE SERPL-SCNC: 107 MMOL/L (ref 98–107)
CREAT SERPL-MCNC: 0.75 MG/DL (ref 0.51–0.95)
DEPRECATED HCO3 PLAS-SCNC: 22 MMOL/L (ref 22–29)
EGFRCR SERPLBLD CKD-EPI 2021: >90 ML/MIN/1.73M2
EOSINOPHIL # BLD AUTO: 0 10E3/UL (ref 0–0.7)
EOSINOPHIL NFR BLD AUTO: 0 %
ERYTHROCYTE [DISTWIDTH] IN BLOOD BY AUTOMATED COUNT: 13 % (ref 10–15)
GLUCOSE SERPL-MCNC: 90 MG/DL (ref 70–99)
HCT VFR BLD AUTO: 31.5 % (ref 35–47)
HGB BLD-MCNC: 11.2 G/DL (ref 11.7–15.7)
IMM GRANULOCYTES # BLD: 0 10E3/UL
IMM GRANULOCYTES NFR BLD: 0 %
LYMPHOCYTES # BLD AUTO: 1.4 10E3/UL (ref 0.8–5.3)
LYMPHOCYTES NFR BLD AUTO: 26 %
MCH RBC QN AUTO: 34.7 PG (ref 26.5–33)
MCHC RBC AUTO-ENTMCNC: 35.6 G/DL (ref 31.5–36.5)
MCV RBC AUTO: 98 FL (ref 78–100)
MONOCYTES # BLD AUTO: 0.3 10E3/UL (ref 0–1.3)
MONOCYTES NFR BLD AUTO: 5 %
NEUTROPHILS # BLD AUTO: 3.6 10E3/UL (ref 1.6–8.3)
NEUTROPHILS NFR BLD AUTO: 69 %
NRBC # BLD AUTO: 0 10E3/UL
NRBC BLD AUTO-RTO: 0 /100
PLATELET # BLD AUTO: 143 10E3/UL (ref 150–450)
POTASSIUM SERPL-SCNC: 3.4 MMOL/L (ref 3.4–5.3)
PREALB SERPL-MCNC: 30.4 MG/DL (ref 20–40)
PROT SERPL-MCNC: 6.9 G/DL (ref 6.4–8.3)
RBC # BLD AUTO: 3.23 10E6/UL (ref 3.8–5.2)
SODIUM SERPL-SCNC: 141 MMOL/L (ref 135–145)
WBC # BLD AUTO: 5.3 10E3/UL (ref 4–11)

## 2024-04-29 PROCEDURE — 84134 ASSAY OF PREALBUMIN: CPT | Performed by: COLON & RECTAL SURGERY

## 2024-04-29 PROCEDURE — 86900 BLOOD TYPING SEROLOGIC ABO: CPT | Performed by: NURSE PRACTITIONER

## 2024-04-29 PROCEDURE — 85025 COMPLETE CBC W/AUTO DIFF WBC: CPT | Performed by: PATHOLOGY

## 2024-04-29 PROCEDURE — 99211 OFF/OP EST MAY X REQ PHY/QHP: CPT

## 2024-04-29 PROCEDURE — 99204 OFFICE O/P NEW MOD 45 MIN: CPT | Performed by: NURSE PRACTITIONER

## 2024-04-29 PROCEDURE — 80053 COMPREHEN METABOLIC PANEL: CPT | Performed by: PATHOLOGY

## 2024-04-29 PROCEDURE — 99000 SPECIMEN HANDLING OFFICE-LAB: CPT | Performed by: PATHOLOGY

## 2024-04-29 PROCEDURE — 36415 COLL VENOUS BLD VENIPUNCTURE: CPT | Performed by: PATHOLOGY

## 2024-04-29 RX ORDER — IBUPROFEN 200 MG
200 TABLET ORAL EVERY 4 HOURS PRN
Status: ON HOLD | COMMUNITY
End: 2024-05-09

## 2024-04-29 RX ORDER — ACETAMINOPHEN 325 MG/1
325-650 TABLET ORAL EVERY 6 HOURS PRN
Status: ON HOLD | COMMUNITY
End: 2024-05-08

## 2024-04-29 RX ORDER — ONDANSETRON 4 MG/1
4 TABLET, ORALLY DISINTEGRATING ORAL EVERY 8 HOURS PRN
Status: ON HOLD | COMMUNITY
Start: 2023-08-23 | End: 2024-05-08

## 2024-04-29 RX ORDER — TIZANIDINE 2 MG/1
1 TABLET ORAL AT BEDTIME
COMMUNITY
Start: 2024-04-24 | End: 2024-08-08

## 2024-04-29 ASSESSMENT — LIFESTYLE VARIABLES: TOBACCO_USE: 1

## 2024-04-29 ASSESSMENT — PAIN SCALES - GENERAL: PAINLEVEL: SEVERE PAIN (6)

## 2024-04-29 ASSESSMENT — ENCOUNTER SYMPTOMS: ORTHOPNEA: 0

## 2024-04-29 NOTE — PROGRESS NOTES
WOC Preoperative Ostomy Consult    Referral from Dr. Ayala  Consult attended by patient and spouse  Diagnosis: Slow transit constipation Date of Surgery: 05/06/2024   Type of Surgery: OPEN total abdominal colectomy, end ileostomy placement   Emotional readiness for surgery: no acute distress  Physical Limitations: Without limitations  Abdomen assessed for site by: Patient's ability to visiualize area, avoidance of skin creases and scars, palpating for rectus abdominus muscle, and clothing fit  Teaching: role of WOC/postop followup explained.Pt had colostomy for over 15 years  Stoma site marking:  Marking pen and tegaderm   Location chosen: RLQ  [employer]'s Ostomy packet given to patient  Allie Harris NP was available for supervision of care if needed or if questions should arise and regarding plan of care.  Karina Al RN CWON

## 2024-04-29 NOTE — PATIENT INSTRUCTIONS
Preparing for Your Surgery      Name:  Mimi Su   MRN:  [id number]   :  1985   Today's Date:  2024       Arriving for surgery:  Surgery date:  24  Arrival time:  05:30 am        Please come to:      [facility]    [address] [place]  [address]     The [facility] Patient/Visitor Ramp is at [address]. Patients and visitors who self-park will receive the reduced hospital parking rate. If the Patient /Visitor Ramp is full, please follow the signs to the [employer] car park located at the main hospital entrance.       parking is available (24 hours/ 7 days a week)      Discounted parking pass options are available for patients and visitors. They can be purchased at the [employer] desk at the main hospital entrance.     -    Stop at the security desk and they will direct surgery patients to the Surgery Check in and Family Lounge. [phone]        - If you need directions, a wheelchair or an escort please stop at the Information/security desk in the lobby.     What can I eat or drink?  -  You may eat per bowel prep instructions from the surgeon.  -  You may have clear liquids until 2 hours prior to arrival time.    Examples of clear liquids:  Water  Clear broth  Juices (apple, white grape, white cranberry  and cider) without pulp  Noncarbonated, powder based beverages  (lemonade and Moisés-Aid)  Sodas (Sprite, 7-Up, ginger ale and seltzer)  Coffee or tea (without milk or cream)  Gatorade    -  No Alcohol or cannabis products for at least 24 hours before surgery.     Which medicines can I take?    Hold Aspirin for 7 days before surgery.   Hold Multivitamins for 7 days before surgery.  Hold Supplements for 7 days before surgery.  Hold Ibuprofen (Advil, Motrin) for 1 day(s) before surgery--unless otherwise directed by surgeon.  Hold Naproxen (Aleve) for 4 days before  surgery.    -  DO NOT take these medications the day of surgery:  Carafate, benadryl.    -  PLEASE TAKE these medications the day of surgery:  Tylenol if needed; take prozac, linzess, protonix, lyrica, senna.    How do I prepare myself?  - Please take 2 showers (one the night prior to surgery and one the morning of surgery) using Scrubcare or Hibiclens soap.    Use this soap only from the neck to your toes.     Leave the soap on your skin for one minute--then rinse thoroughly.      You may use your own shampoo and conditioner. No other hair products.   - Please remove all jewelry and body piercings.  - No lotions, deodorants or fragrance.  - No makeup or fingernail polish.   - Bring your ID and insurance card.    -If you use a CPAP machine, please bring the CPAP machine, tubing, and mask to hospital.    -If you have a Deep Brain Stimulator, Spinal Cord Stimulator, or any Neuro Stimulator device---you must bring the remote control to the hospital.      ALL PATIENTS GOING HOME THE SAME DAY OF SURGERY ARE REQUIRED TO HAVE A RESPONSIBLE ADULT TO DRIVE AND BE IN ATTENDANCE WITH THEM FOR 24 HOURS FOLLOWING SURGERY.    Covid testing policy as of 12/06/2022  Your surgeon will notify and schedule you for a COVID test if one is needed before surgery--please direct any questions or COVID symptoms to your surgeon      Questions or Concerns:    - For any questions regarding the day of surgery or your hospital stay, please contact the Pre Admission Nursing Office at [phone].       - If you have health changes between today and your surgery, please call your surgeon.       - For questions after surgery, please call your surgeons office.           Current Visitor Guidelines    You may have 2 visitors in the pre op area.    Visiting hours: 8 a.m. to 8:30 p.m.    Patients confirmed or suspected to have symptoms of COVID 19 or flu:     No visitors allowed for adult patients.   Children (under age 18) can have 1 named visitor.      People who are sick or showing symptoms of COVID 19 or flu:    Are not allowed to visit patients--we can only make exceptions in special situations.       Please follow these guidelines for your visit:          Please maintain social distance          Masking is optional--however at times you may be asked to wear a mask for the safety of yourself and others     Clean your hands with alcohol hand . Do this when you arrive at and leave the building and patient room,    And again after you touch your mask or anything in the room.     Go directly to and from the room you are visiting.     Stay in the patient s room during your visit. Limit going to other places in the hospital as much as possible     Leave bags and jackets at home or in the car.     For everyone s health, please don t come and go during your visit. That includes for smoking   during your visit.
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
## 2024-05-03 ASSESSMENT — LIFESTYLE VARIABLES: TOBACCO_USE: 1

## 2024-05-03 ASSESSMENT — ENCOUNTER SYMPTOMS: ORTHOPNEA: 0

## 2024-05-03 NOTE — ANESTHESIA PREPROCEDURE EVALUATION
Anesthesia Pre-Procedure Evaluation    Patient: Mimi Su   MRN: [id number] : 1985        Procedure : Procedure(s):  OPEN total abdominal colectomy, end ileostomy placement  Open bilateral Salpingo-oophorectomy          Past Medical History:   Diagnosis Date    Bipolar disorder (H)     Colonic dysmotility     Colostomy in place (H)       Past Surgical History:   Procedure Laterality Date    HYSTERECTOMY      2013 after chidlbirth    IR GASTRO JEJUNOSTOMY TUBE PLACEMENT  2022    IR NG TUBE PLACEMENT REQ RAD & FLUORO  2022    LAPAROTOMY EXPLORATORY      multiple      Allergies   Allergen Reactions    Metoclopramide Anaphylaxis and Nausea and Vomiting     start of anaphylaxis, was treated quickl      Mirtazapine Hives    Morphine Hives, Itching and Rash    Penicillins Hives    Prochlorperazine Anaphylaxis     Other reaction(s): Other (see comments)  sweating      Promethazine Anaphylaxis     Other reaction(s): Angioedema  angioedema      Sumatriptan Anaphylaxis     Tongue and throat sensation of swelling and chest tightness.       Desvenlafaxine      Other reaction(s): Intolerance    Lisinopril      Other reaction(s): Unknown Reaction    Milnacipran      Other reaction(s): Intolerance    Labetalol Rash     Other reaction(s): Tachycardia    Levofloxacin      Other reaction(s): Mental Status Change, Other (see comments)  Anxiety (severe), euphoria, facial numbness  Anxiety (severe), euphoria, facial numbness      Metronidazole Nausea and Vomiting    Quetiapine Rash    Sulfa Antibiotics Rash    Sulfamethoxazole-Trimethoprim      Other reaction(s): Unknown Reaction    Trazodone Hives and Rash      Social History     Tobacco Use    Smoking status: Former     Current packs/day: 0.00     Types: Cigarettes     Quit date: 2020     Years since quittin.3     Passive exposure: Past    Smokeless tobacco: Never   Substance Use Topics    Alcohol use: Yes     Comment: 5 drinks once a week      Wt  Readings from Last 1 Encounters:   04/29/24 77.6 kg (171 lb)        Anesthesia Evaluation   Pt has had prior anesthetic. Type: General.    History of anesthetic complications  - PONV.      ROS/MED HX  ENT/Pulmonary:     (+)                tobacco use, Past use,                       Neurologic: Comment: Has 1-2 migraines weekly has been treated with botox in the past.  fibromyalgia    (+)      migraines,                          Cardiovascular:  - neg cardiovascular ROS  (-) taking anticoagulants/antiplatelets, MULLER, orthopnea/PND and irregular heartbeat/palpitations   METS/Exercise Tolerance: >4 METS Comment: Actively exercised 6 days per week until last several months.  Now pilates and light weight lifting   Hematologic: Comments: Bone marrow hyperplasia      Musculoskeletal: Comment: Scoliosis  Back injuries- treated with radiofrequency and steroid injections      GI/Hepatic: Comment: Colonic dysmotility with Severe constipation  Slow transit constipation.   Hx of JG-tube- removed      s/p cholecystectomy (6/2003).   S/p colostomy (1/9/2012) revision done 1/13/12  two exploratory laparotomies (4/24/19) and (9/5/19).        (+) GERD, Asymptomatic on medication,      bowel prep,            Renal/Genitourinary:  - neg Renal ROS     Endo:  - neg endo ROS     Psychiatric/Substance Use:     (+) psychiatric history anxiety and depression  H/O chronic opiod use .     Infectious Disease:  - neg infectious disease ROS     Malignancy:  - neg malignancy ROS     Other: Comment:  Hysterectomy (12/2013).   Ovarian cysts.     (+)  , H/O Chronic Pain,         Physical Exam    Airway        Mallampati: II   TM distance: > 3 FB   Neck ROM: full   Mouth opening: > 3 cm    Respiratory Devices and Support         Dental       (+) Minor Abnormalities - some fillings, tiny chips      Cardiovascular          Rhythm and rate: regular     Pulmonary   pulmonary exam normal                OUTSIDE LABS:  CBC:   Lab Results   Component Value  "Date    WBC 5.3 04/29/2024    WBC 3.3 (L) 10/04/2022    HGB 11.2 (L) 04/29/2024    HGB 12.1 10/04/2022    HCT 31.5 (L) 04/29/2024    HCT 34.4 (L) 10/04/2022     (L) 04/29/2024     (L) 10/04/2022     BMP:   Lab Results   Component Value Date     04/29/2024    POTASSIUM 3.4 04/29/2024    CHLORIDE 107 04/29/2024    CO2 22 04/29/2024    BUN 18.5 04/29/2024    CR 0.75 04/29/2024    GLC 90 04/29/2024     COAGS: No results found for: \"PTT\", \"INR\", \"FIBR\"  POC: No results found for: \"BGM\", \"HCG\", \"HCGS\"  HEPATIC:   Lab Results   Component Value Date    ALBUMIN 4.3 04/29/2024    PROTTOTAL 6.9 04/29/2024    ALT 21 04/29/2024    AST 24 04/29/2024    ALKPHOS 47 04/29/2024    BILITOTAL 0.5 04/29/2024     OTHER:   Lab Results   Component Value Date    EDY 9.1 04/29/2024       Anesthesia Plan    ASA Status:  3    NPO Status:  NPO Appropriate    Anesthesia Type: General.     - Airway: ETT   Induction: Intravenous, Propofol.   Maintenance: Balanced.   Techniques and Equipment:     - Lines/Monitors: 2nd IV, BIS, Arterial Line     - Blood: T&S     - Drips/Meds: Dexmed. infusion, Ketamine, Phenylephrine     Consents    Anesthesia Plan(s) and associated risks, benefits, and realistic alternatives discussed. Questions answered and patient/representative(s) expressed understanding.     - Discussed:     - Discussed with:  Patient      - Extended Intubation/Ventilatory Support Discussed: No.      - Patient is DNR/DNI Status: No     Use of blood products discussed: Yes.     - Discussed with: Patient.     Postoperative Care    Pain management: IV analgesics, Oral pain medications, Multi-modal analgesia.   PONV prophylaxis: Ondansetron (or other 5HT-3), Dexamethasone or Solumedrol, Scopolamine patch, Aprepitant, Background Propofol Infusion     Comments:    Other Comments: Patient has persistent nausea and is on daily zofran at home. She took her pantoprazole this morning and received a bowel prep and on clears yesterday " "          Edwin Wells MD    I have reviewed the pertinent notes and labs in the chart from the past 30 days and (re)examined the patient.  Any updates or changes from those notes are reflected in this note.              # Overweight: Estimated body mass index is 26.78 kg/m  as calculated from the following:    Height as of 4/29/24: 1.702 m (5' 7\").    Weight as of 4/29/24: 77.6 kg (171 lb).      "

## 2024-05-06 ENCOUNTER — ANESTHESIA (OUTPATIENT)
Dept: SURGERY | Facility: CLINIC | Age: 39
DRG: 330 | End: 2024-05-06
Payer: COMMERCIAL

## 2024-05-06 ENCOUNTER — HOSPITAL ENCOUNTER (INPATIENT)
Facility: CLINIC | Age: 39
LOS: 7 days | Discharge: HOME | DRG: 330 | End: 2024-05-13
Attending: COLON & RECTAL SURGERY | Admitting: COLON & RECTAL SURGERY
Payer: COMMERCIAL

## 2024-05-06 ENCOUNTER — APPOINTMENT (OUTPATIENT)
Dept: GENERAL RADIOLOGY | Facility: CLINIC | Age: 39
DRG: 330 | End: 2024-05-06
Attending: COLON & RECTAL SURGERY
Payer: COMMERCIAL

## 2024-05-06 DIAGNOSIS — G89.18 ACUTE POST-OPERATIVE PAIN: Primary | ICD-10-CM

## 2024-05-06 DIAGNOSIS — Z93.2 ILEOSTOMY STATUS (H): ICD-10-CM

## 2024-05-06 LAB
GLUCOSE BLDC GLUCOMTR-MCNC: 79 MG/DL (ref 70–99)
PLATELET # BLD AUTO: 135 10E3/UL (ref 150–450)

## 2024-05-06 PROCEDURE — 88305 TISSUE EXAM BY PATHOLOGIST: CPT | Mod: 26 | Performed by: PATHOLOGY

## 2024-05-06 PROCEDURE — 250N000012 HC RX MED GY IP 250 OP 636 PS 637

## 2024-05-06 PROCEDURE — 710N000010 HC RECOVERY PHASE 1, LEVEL 2, PER MIN: Performed by: COLON & RECTAL SURGERY

## 2024-05-06 PROCEDURE — 85049 AUTOMATED PLATELET COUNT: CPT | Performed by: COLON & RECTAL SURGERY

## 2024-05-06 PROCEDURE — 0UT70ZZ RESECTION OF BILATERAL FALLOPIAN TUBES, OPEN APPROACH: ICD-10-PCS | Performed by: OBSTETRICS & GYNECOLOGY

## 2024-05-06 PROCEDURE — 258N000003 HC RX IP 258 OP 636

## 2024-05-06 PROCEDURE — 250N000011 HC RX IP 250 OP 636

## 2024-05-06 PROCEDURE — 120N000002 HC R&B MED SURG/OB UMMC

## 2024-05-06 PROCEDURE — 250N000011 HC RX IP 250 OP 636: Performed by: COLON & RECTAL SURGERY

## 2024-05-06 PROCEDURE — 360N000077 HC SURGERY LEVEL 4, PER MIN: Performed by: COLON & RECTAL SURGERY

## 2024-05-06 PROCEDURE — 250N000013 HC RX MED GY IP 250 OP 250 PS 637: Performed by: COLON & RECTAL SURGERY

## 2024-05-06 PROCEDURE — 44150 REMOVAL OF COLON: CPT | Mod: 22 | Performed by: COLON & RECTAL SURGERY

## 2024-05-06 PROCEDURE — 250N000025 HC SEVOFLURANE, PER MIN: Performed by: COLON & RECTAL SURGERY

## 2024-05-06 PROCEDURE — 72020 X-RAY EXAM OF SPINE 1 VIEW: CPT | Mod: 26 | Performed by: STUDENT IN AN ORGANIZED HEALTH CARE EDUCATION/TRAINING PROGRAM

## 2024-05-06 PROCEDURE — 44150 REMOVAL OF COLON: CPT | Performed by: ANESTHESIOLOGY

## 2024-05-06 PROCEDURE — 0DBU0ZZ EXCISION OF OMENTUM, OPEN APPROACH: ICD-10-PCS | Performed by: COLON & RECTAL SURGERY

## 2024-05-06 PROCEDURE — 0DN80ZZ RELEASE SMALL INTESTINE, OPEN APPROACH: ICD-10-PCS | Performed by: COLON & RECTAL SURGERY

## 2024-05-06 PROCEDURE — 272N000001 HC OR GENERAL SUPPLY STERILE: Performed by: COLON & RECTAL SURGERY

## 2024-05-06 PROCEDURE — 0UT20ZZ RESECTION OF BILATERAL OVARIES, OPEN APPROACH: ICD-10-PCS | Performed by: OBSTETRICS & GYNECOLOGY

## 2024-05-06 PROCEDURE — 370N000017 HC ANESTHESIA TECHNICAL FEE, PER MIN: Performed by: COLON & RECTAL SURGERY

## 2024-05-06 PROCEDURE — 3E0M05Z INTRODUCTION OF ADHESION BARRIER INTO PERITONEAL CAVITY, OPEN APPROACH: ICD-10-PCS | Performed by: COLON & RECTAL SURGERY

## 2024-05-06 PROCEDURE — 88307 TISSUE EXAM BY PATHOLOGIST: CPT | Mod: TC | Performed by: COLON & RECTAL SURGERY

## 2024-05-06 PROCEDURE — 0DNU0ZZ RELEASE OMENTUM, OPEN APPROACH: ICD-10-PCS | Performed by: COLON & RECTAL SURGERY

## 2024-05-06 PROCEDURE — 250N000009 HC RX 250

## 2024-05-06 PROCEDURE — 0D1B0Z4 BYPASS ILEUM TO CUTANEOUS, OPEN APPROACH: ICD-10-PCS | Performed by: COLON & RECTAL SURGERY

## 2024-05-06 PROCEDURE — 250N000011 HC RX IP 250 OP 636: Performed by: STUDENT IN AN ORGANIZED HEALTH CARE EDUCATION/TRAINING PROGRAM

## 2024-05-06 PROCEDURE — 999N000141 HC STATISTIC PRE-PROCEDURE NURSING ASSESSMENT: Performed by: COLON & RECTAL SURGERY

## 2024-05-06 PROCEDURE — 999N000128 HC STATISTIC PERIPHERAL IV START W/O US GUIDANCE

## 2024-05-06 PROCEDURE — 999N000065 XR THORACIC SPINE 1 VIEW

## 2024-05-06 PROCEDURE — 88307 TISSUE EXAM BY PATHOLOGIST: CPT | Mod: 26 | Performed by: PATHOLOGY

## 2024-05-06 PROCEDURE — 36415 COLL VENOUS BLD VENIPUNCTURE: CPT | Performed by: COLON & RECTAL SURGERY

## 2024-05-06 PROCEDURE — 0DTE0ZZ RESECTION OF LARGE INTESTINE, OPEN APPROACH: ICD-10-PCS | Performed by: COLON & RECTAL SURGERY

## 2024-05-06 PROCEDURE — 258N000003 HC RX IP 258 OP 636: Performed by: COLON & RECTAL SURGERY

## 2024-05-06 PROCEDURE — 258N000003 HC RX IP 258 OP 636: Performed by: STUDENT IN AN ORGANIZED HEALTH CARE EDUCATION/TRAINING PROGRAM

## 2024-05-06 RX ORDER — DEXAMETHASONE SODIUM PHOSPHATE 4 MG/ML
INJECTION, SOLUTION INTRA-ARTICULAR; INTRALESIONAL; INTRAMUSCULAR; INTRAVENOUS; SOFT TISSUE PRN
Status: DISCONTINUED | OUTPATIENT
Start: 2024-05-06 | End: 2024-05-06

## 2024-05-06 RX ORDER — SODIUM CHLORIDE, SODIUM LACTATE, POTASSIUM CHLORIDE, CALCIUM CHLORIDE 600; 310; 30; 20 MG/100ML; MG/100ML; MG/100ML; MG/100ML
INJECTION, SOLUTION INTRAVENOUS CONTINUOUS
Status: DISCONTINUED | OUTPATIENT
Start: 2024-05-06 | End: 2024-05-06 | Stop reason: HOSPADM

## 2024-05-06 RX ORDER — FENTANYL CITRATE 50 UG/ML
25-50 INJECTION, SOLUTION INTRAMUSCULAR; INTRAVENOUS
Status: DISCONTINUED | OUTPATIENT
Start: 2024-05-06 | End: 2024-05-06 | Stop reason: HOSPADM

## 2024-05-06 RX ORDER — SUCRALFATE 1 G/1
1 TABLET ORAL 4 TIMES DAILY PRN
Status: DISCONTINUED | OUTPATIENT
Start: 2024-05-06 | End: 2024-05-10

## 2024-05-06 RX ORDER — PROPOFOL 10 MG/ML
INJECTION, EMULSION INTRAVENOUS PRN
Status: DISCONTINUED | OUTPATIENT
Start: 2024-05-06 | End: 2024-05-06

## 2024-05-06 RX ORDER — NALOXONE HYDROCHLORIDE 0.4 MG/ML
0.1 INJECTION, SOLUTION INTRAMUSCULAR; INTRAVENOUS; SUBCUTANEOUS
Status: DISCONTINUED | OUTPATIENT
Start: 2024-05-06 | End: 2024-05-06

## 2024-05-06 RX ORDER — HYDROMORPHONE HCL IN WATER/PF 6 MG/30 ML
0.2 PATIENT CONTROLLED ANALGESIA SYRINGE INTRAVENOUS
Status: DISCONTINUED | OUTPATIENT
Start: 2024-05-06 | End: 2024-05-09

## 2024-05-06 RX ORDER — ONDANSETRON 2 MG/ML
4 INJECTION INTRAMUSCULAR; INTRAVENOUS EVERY 30 MIN PRN
Status: DISCONTINUED | OUTPATIENT
Start: 2024-05-06 | End: 2024-05-06

## 2024-05-06 RX ORDER — ONDANSETRON 4 MG/1
4 TABLET, ORALLY DISINTEGRATING ORAL EVERY 30 MIN PRN
Status: DISCONTINUED | OUTPATIENT
Start: 2024-05-06 | End: 2024-05-06 | Stop reason: HOSPADM

## 2024-05-06 RX ORDER — OXYCODONE HYDROCHLORIDE 10 MG/1
10 TABLET ORAL EVERY 4 HOURS PRN
Status: DISCONTINUED | OUTPATIENT
Start: 2024-05-06 | End: 2024-05-10

## 2024-05-06 RX ORDER — IBUPROFEN 600 MG/1
600 TABLET, FILM COATED ORAL EVERY 6 HOURS
Status: DISCONTINUED | OUTPATIENT
Start: 2024-05-07 | End: 2024-05-07

## 2024-05-06 RX ORDER — GABAPENTIN 300 MG/1
600 CAPSULE ORAL
Status: COMPLETED | OUTPATIENT
Start: 2024-05-06 | End: 2024-05-06

## 2024-05-06 RX ORDER — ERTAPENEM 1 G/1
1 INJECTION, POWDER, LYOPHILIZED, FOR SOLUTION INTRAMUSCULAR; INTRAVENOUS
Status: COMPLETED | OUTPATIENT
Start: 2024-05-06 | End: 2024-05-06

## 2024-05-06 RX ORDER — HEPARIN SODIUM 5000 [USP'U]/.5ML
5000 INJECTION, SOLUTION INTRAVENOUS; SUBCUTANEOUS
Status: COMPLETED | OUTPATIENT
Start: 2024-05-06 | End: 2024-05-06

## 2024-05-06 RX ORDER — ALVIMOPAN 12 MG/1
12 CAPSULE ORAL ONCE
Status: COMPLETED | OUTPATIENT
Start: 2024-05-06 | End: 2024-05-06

## 2024-05-06 RX ORDER — ONDANSETRON 2 MG/ML
INJECTION INTRAMUSCULAR; INTRAVENOUS PRN
Status: DISCONTINUED | OUTPATIENT
Start: 2024-05-06 | End: 2024-05-06

## 2024-05-06 RX ORDER — SODIUM CHLORIDE, SODIUM GLUCONATE, SODIUM ACETATE, POTASSIUM CHLORIDE AND MAGNESIUM CHLORIDE 526; 502; 368; 37; 30 MG/100ML; MG/100ML; MG/100ML; MG/100ML; MG/100ML
INJECTION, SOLUTION INTRAVENOUS CONTINUOUS PRN
Status: DISCONTINUED | OUTPATIENT
Start: 2024-05-06 | End: 2024-05-06

## 2024-05-06 RX ORDER — NALOXONE HYDROCHLORIDE 0.4 MG/ML
0.4 INJECTION, SOLUTION INTRAMUSCULAR; INTRAVENOUS; SUBCUTANEOUS
Status: DISCONTINUED | OUTPATIENT
Start: 2024-05-06 | End: 2024-05-06 | Stop reason: HOSPADM

## 2024-05-06 RX ORDER — FENTANYL CITRATE 50 UG/ML
50 INJECTION, SOLUTION INTRAMUSCULAR; INTRAVENOUS EVERY 5 MIN PRN
Status: DISCONTINUED | OUTPATIENT
Start: 2024-05-06 | End: 2024-05-06 | Stop reason: HOSPADM

## 2024-05-06 RX ORDER — DEXAMETHASONE SODIUM PHOSPHATE 4 MG/ML
4 INJECTION, SOLUTION INTRA-ARTICULAR; INTRALESIONAL; INTRAMUSCULAR; INTRAVENOUS; SOFT TISSUE
Status: DISCONTINUED | OUTPATIENT
Start: 2024-05-06 | End: 2024-05-06

## 2024-05-06 RX ORDER — ACETAMINOPHEN 325 MG/1
975 TABLET ORAL ONCE
Status: DISCONTINUED | OUTPATIENT
Start: 2024-05-06 | End: 2024-05-06 | Stop reason: HOSPADM

## 2024-05-06 RX ORDER — ALVIMOPAN 12 MG/1
12 CAPSULE ORAL 2 TIMES DAILY
Qty: 14 CAPSULE | Refills: 0 | Status: DISCONTINUED | OUTPATIENT
Start: 2024-05-06 | End: 2024-05-08

## 2024-05-06 RX ORDER — FLUMAZENIL 0.1 MG/ML
0.2 INJECTION, SOLUTION INTRAVENOUS
Status: DISCONTINUED | OUTPATIENT
Start: 2024-05-06 | End: 2024-05-06 | Stop reason: HOSPADM

## 2024-05-06 RX ORDER — LIDOCAINE 40 MG/G
CREAM TOPICAL
Status: DISCONTINUED | OUTPATIENT
Start: 2024-05-06 | End: 2024-05-06 | Stop reason: HOSPADM

## 2024-05-06 RX ORDER — PROPOFOL 10 MG/ML
INJECTION, EMULSION INTRAVENOUS CONTINUOUS PRN
Status: DISCONTINUED | OUTPATIENT
Start: 2024-05-06 | End: 2024-05-06

## 2024-05-06 RX ORDER — NALOXONE HYDROCHLORIDE 0.4 MG/ML
0.1 INJECTION, SOLUTION INTRAMUSCULAR; INTRAVENOUS; SUBCUTANEOUS
Status: DISCONTINUED | OUTPATIENT
Start: 2024-05-06 | End: 2024-05-06 | Stop reason: HOSPADM

## 2024-05-06 RX ORDER — SCOLOPAMINE TRANSDERMAL SYSTEM 1 MG/1
1 PATCH, EXTENDED RELEASE TRANSDERMAL ONCE
Status: COMPLETED | OUTPATIENT
Start: 2024-05-06 | End: 2024-05-07

## 2024-05-06 RX ORDER — NALOXONE HYDROCHLORIDE 0.4 MG/ML
0.2 INJECTION, SOLUTION INTRAMUSCULAR; INTRAVENOUS; SUBCUTANEOUS
Status: DISCONTINUED | OUTPATIENT
Start: 2024-05-06 | End: 2024-05-06 | Stop reason: HOSPADM

## 2024-05-06 RX ORDER — NALOXONE HYDROCHLORIDE 0.4 MG/ML
0.4 INJECTION, SOLUTION INTRAMUSCULAR; INTRAVENOUS; SUBCUTANEOUS
Status: DISCONTINUED | OUTPATIENT
Start: 2024-05-06 | End: 2024-05-13 | Stop reason: HOSPADM

## 2024-05-06 RX ORDER — ONDANSETRON 4 MG/1
4 TABLET, ORALLY DISINTEGRATING ORAL EVERY 6 HOURS PRN
Status: DISCONTINUED | OUTPATIENT
Start: 2024-05-06 | End: 2024-05-13 | Stop reason: HOSPADM

## 2024-05-06 RX ORDER — LIDOCAINE 40 MG/G
CREAM TOPICAL
Status: DISCONTINUED | OUTPATIENT
Start: 2024-05-06 | End: 2024-05-13 | Stop reason: HOSPADM

## 2024-05-06 RX ORDER — PREGABALIN 100 MG/1
100 CAPSULE ORAL 2 TIMES DAILY
Status: DISCONTINUED | OUTPATIENT
Start: 2024-05-06 | End: 2024-05-06

## 2024-05-06 RX ORDER — ACETAMINOPHEN 325 MG/1
975 TABLET ORAL EVERY 8 HOURS
Status: DISCONTINUED | OUTPATIENT
Start: 2024-05-06 | End: 2024-05-07

## 2024-05-06 RX ORDER — ONDANSETRON 2 MG/ML
4 INJECTION INTRAMUSCULAR; INTRAVENOUS EVERY 30 MIN PRN
Status: DISCONTINUED | OUTPATIENT
Start: 2024-05-06 | End: 2024-05-06 | Stop reason: HOSPADM

## 2024-05-06 RX ORDER — TIZANIDINE 2 MG/1
2 TABLET ORAL AT BEDTIME
Status: DISCONTINUED | OUTPATIENT
Start: 2024-05-06 | End: 2024-05-10

## 2024-05-06 RX ORDER — APREPITANT 40 MG/1
40 CAPSULE ORAL ONCE
Status: COMPLETED | OUTPATIENT
Start: 2024-05-06 | End: 2024-05-06

## 2024-05-06 RX ORDER — ONDANSETRON 4 MG/1
4 TABLET, ORALLY DISINTEGRATING ORAL EVERY 30 MIN PRN
Status: DISCONTINUED | OUTPATIENT
Start: 2024-05-06 | End: 2024-05-06

## 2024-05-06 RX ORDER — HYDROMORPHONE HCL IN WATER/PF 6 MG/30 ML
0.4 PATIENT CONTROLLED ANALGESIA SYRINGE INTRAVENOUS EVERY 5 MIN PRN
Status: DISCONTINUED | OUTPATIENT
Start: 2024-05-06 | End: 2024-05-06 | Stop reason: HOSPADM

## 2024-05-06 RX ORDER — FENTANYL CITRATE 50 UG/ML
25 INJECTION, SOLUTION INTRAMUSCULAR; INTRAVENOUS EVERY 5 MIN PRN
Status: DISCONTINUED | OUTPATIENT
Start: 2024-05-06 | End: 2024-05-06 | Stop reason: HOSPADM

## 2024-05-06 RX ORDER — OXYCODONE HYDROCHLORIDE 10 MG/1
10 TABLET ORAL
Status: DISCONTINUED | OUTPATIENT
Start: 2024-05-06 | End: 2024-05-06

## 2024-05-06 RX ORDER — LIDOCAINE HYDROCHLORIDE 20 MG/ML
INJECTION, SOLUTION INFILTRATION; PERINEURAL PRN
Status: DISCONTINUED | OUTPATIENT
Start: 2024-05-06 | End: 2024-05-06

## 2024-05-06 RX ORDER — ACETAMINOPHEN 325 MG/1
650 TABLET ORAL EVERY 4 HOURS PRN
Status: DISCONTINUED | OUTPATIENT
Start: 2024-05-09 | End: 2024-05-13

## 2024-05-06 RX ORDER — ONDANSETRON 2 MG/ML
4 INJECTION INTRAMUSCULAR; INTRAVENOUS EVERY 6 HOURS PRN
Status: DISCONTINUED | OUTPATIENT
Start: 2024-05-06 | End: 2024-05-13 | Stop reason: HOSPADM

## 2024-05-06 RX ORDER — FENTANYL CITRATE 50 UG/ML
INJECTION, SOLUTION INTRAMUSCULAR; INTRAVENOUS PRN
Status: DISCONTINUED | OUTPATIENT
Start: 2024-05-06 | End: 2024-05-06

## 2024-05-06 RX ORDER — PANTOPRAZOLE SODIUM 20 MG/1
20-40 TABLET, DELAYED RELEASE ORAL 2 TIMES DAILY
Status: DISCONTINUED | OUTPATIENT
Start: 2024-05-06 | End: 2024-05-13 | Stop reason: HOSPADM

## 2024-05-06 RX ORDER — HYDROMORPHONE HCL IN WATER/PF 6 MG/30 ML
0.4 PATIENT CONTROLLED ANALGESIA SYRINGE INTRAVENOUS
Status: DISCONTINUED | OUTPATIENT
Start: 2024-05-06 | End: 2024-05-09

## 2024-05-06 RX ORDER — CLONAZEPAM 0.5 MG/1
.25-.5 TABLET ORAL 3 TIMES DAILY PRN
Status: DISCONTINUED | OUTPATIENT
Start: 2024-05-06 | End: 2024-05-09

## 2024-05-06 RX ORDER — ACETAMINOPHEN 325 MG/1
975 TABLET ORAL ONCE
Status: COMPLETED | OUTPATIENT
Start: 2024-05-06 | End: 2024-05-06

## 2024-05-06 RX ORDER — KETOROLAC TROMETHAMINE 15 MG/ML
15 INJECTION, SOLUTION INTRAMUSCULAR; INTRAVENOUS EVERY 6 HOURS
Status: DISCONTINUED | OUTPATIENT
Start: 2024-05-06 | End: 2024-05-07

## 2024-05-06 RX ORDER — HEPARIN SODIUM 5000 [USP'U]/.5ML
5000 INJECTION, SOLUTION INTRAVENOUS; SUBCUTANEOUS EVERY 8 HOURS
Status: DISCONTINUED | OUTPATIENT
Start: 2024-05-07 | End: 2024-05-13 | Stop reason: HOSPADM

## 2024-05-06 RX ORDER — NALBUPHINE HYDROCHLORIDE 10 MG/ML
2.5-5 INJECTION, SOLUTION INTRAMUSCULAR; INTRAVENOUS; SUBCUTANEOUS EVERY 6 HOURS PRN
Status: DISCONTINUED | OUTPATIENT
Start: 2024-05-06 | End: 2024-05-07

## 2024-05-06 RX ORDER — DEXAMETHASONE SODIUM PHOSPHATE 4 MG/ML
4 INJECTION, SOLUTION INTRA-ARTICULAR; INTRALESIONAL; INTRAMUSCULAR; INTRAVENOUS; SOFT TISSUE
Status: DISCONTINUED | OUTPATIENT
Start: 2024-05-06 | End: 2024-05-06 | Stop reason: HOSPADM

## 2024-05-06 RX ORDER — NALOXONE HYDROCHLORIDE 0.4 MG/ML
0.2 INJECTION, SOLUTION INTRAMUSCULAR; INTRAVENOUS; SUBCUTANEOUS
Status: DISCONTINUED | OUTPATIENT
Start: 2024-05-06 | End: 2024-05-13 | Stop reason: HOSPADM

## 2024-05-06 RX ORDER — ONDANSETRON 2 MG/ML
4 INJECTION INTRAMUSCULAR; INTRAVENOUS ONCE
Status: DISCONTINUED | OUTPATIENT
Start: 2024-05-06 | End: 2024-05-06 | Stop reason: HOSPADM

## 2024-05-06 RX ORDER — SODIUM CHLORIDE, SODIUM LACTATE, POTASSIUM CHLORIDE, CALCIUM CHLORIDE 600; 310; 30; 20 MG/100ML; MG/100ML; MG/100ML; MG/100ML
INJECTION, SOLUTION INTRAVENOUS CONTINUOUS
Status: DISCONTINUED | OUTPATIENT
Start: 2024-05-06 | End: 2024-05-12

## 2024-05-06 RX ORDER — OXYCODONE HYDROCHLORIDE 5 MG/1
5 TABLET ORAL
Status: DISCONTINUED | OUTPATIENT
Start: 2024-05-06 | End: 2024-05-06

## 2024-05-06 RX ORDER — HYDROMORPHONE HCL IN WATER/PF 6 MG/30 ML
0.2 PATIENT CONTROLLED ANALGESIA SYRINGE INTRAVENOUS EVERY 5 MIN PRN
Status: DISCONTINUED | OUTPATIENT
Start: 2024-05-06 | End: 2024-05-06 | Stop reason: HOSPADM

## 2024-05-06 RX ORDER — SODIUM CHLORIDE, SODIUM LACTATE, POTASSIUM CHLORIDE, CALCIUM CHLORIDE 600; 310; 30; 20 MG/100ML; MG/100ML; MG/100ML; MG/100ML
INJECTION, SOLUTION INTRAVENOUS CONTINUOUS PRN
Status: DISCONTINUED | OUTPATIENT
Start: 2024-05-06 | End: 2024-05-06

## 2024-05-06 RX ADMIN — ACETAMINOPHEN 975 MG: 325 TABLET, FILM COATED ORAL at 22:18

## 2024-05-06 RX ADMIN — MIDAZOLAM 1 MG: 1 INJECTION INTRAMUSCULAR; INTRAVENOUS at 07:44

## 2024-05-06 RX ADMIN — ACETAMINOPHEN 975 MG: 325 TABLET, FILM COATED ORAL at 06:41

## 2024-05-06 RX ADMIN — ALVIMOPAN 12 MG: 12 CAPSULE ORAL at 21:00

## 2024-05-06 RX ADMIN — APREPITANT 40 MG: 40 CAPSULE ORAL at 06:42

## 2024-05-06 RX ADMIN — SODIUM CHLORIDE, SODIUM GLUCONATE, SODIUM ACETATE, POTASSIUM CHLORIDE AND MAGNESIUM CHLORIDE: 526; 502; 368; 37; 30 INJECTION, SOLUTION INTRAVENOUS at 11:27

## 2024-05-06 RX ADMIN — SODIUM CHLORIDE, POTASSIUM CHLORIDE, SODIUM LACTATE AND CALCIUM CHLORIDE: 600; 310; 30; 20 INJECTION, SOLUTION INTRAVENOUS at 17:32

## 2024-05-06 RX ADMIN — ERTAPENEM SODIUM 1 G: 1 INJECTION, POWDER, LYOPHILIZED, FOR SOLUTION INTRAMUSCULAR; INTRAVENOUS at 07:06

## 2024-05-06 RX ADMIN — OXYCODONE HYDROCHLORIDE 10 MG: 10 TABLET ORAL at 14:24

## 2024-05-06 RX ADMIN — ONDANSETRON 4 MG: 2 INJECTION INTRAMUSCULAR; INTRAVENOUS at 12:34

## 2024-05-06 RX ADMIN — Medication 10 MG: at 12:11

## 2024-05-06 RX ADMIN — SCOPALAMINE 1 PATCH: 1 PATCH, EXTENDED RELEASE TRANSDERMAL at 07:21

## 2024-05-06 RX ADMIN — HEPARIN SODIUM 5000 UNITS: 5000 INJECTION, SOLUTION INTRAVENOUS; SUBCUTANEOUS at 07:06

## 2024-05-06 RX ADMIN — HYDROMORPHONE HYDROCHLORIDE 0.2 MG: 0.2 INJECTION, SOLUTION INTRAMUSCULAR; INTRAVENOUS; SUBCUTANEOUS at 13:54

## 2024-05-06 RX ADMIN — SUGAMMADEX 200 MG: 100 INJECTION, SOLUTION INTRAVENOUS at 13:13

## 2024-05-06 RX ADMIN — Medication 20 MG: at 10:44

## 2024-05-06 RX ADMIN — PREGABALIN 150 MG: 25 CAPSULE ORAL at 20:55

## 2024-05-06 RX ADMIN — PROPOFOL 25 MCG/KG/MIN: 10 INJECTION, EMULSION INTRAVENOUS at 08:11

## 2024-05-06 RX ADMIN — HYDROMORPHONE HYDROCHLORIDE 0.4 MG: 0.2 INJECTION, SOLUTION INTRAMUSCULAR; INTRAVENOUS; SUBCUTANEOUS at 23:14

## 2024-05-06 RX ADMIN — KETOROLAC TROMETHAMINE 15 MG: 15 INJECTION, SOLUTION INTRAMUSCULAR; INTRAVENOUS at 19:49

## 2024-05-06 RX ADMIN — HYDROMORPHONE HYDROCHLORIDE 0.4 MG: 0.2 INJECTION, SOLUTION INTRAMUSCULAR; INTRAVENOUS; SUBCUTANEOUS at 17:35

## 2024-05-06 RX ADMIN — FENTANYL CITRATE 50 MCG: 50 INJECTION, SOLUTION INTRAMUSCULAR; INTRAVENOUS at 13:35

## 2024-05-06 RX ADMIN — TIZANIDINE 2 MG: 2 TABLET ORAL at 22:18

## 2024-05-06 RX ADMIN — ALVIMOPAN 12 MG: 12 CAPSULE ORAL at 07:11

## 2024-05-06 RX ADMIN — Medication 20 MG: at 11:32

## 2024-05-06 RX ADMIN — DEXAMETHASONE SODIUM PHOSPHATE 8 MG: 4 INJECTION, SOLUTION INTRA-ARTICULAR; INTRALESIONAL; INTRAMUSCULAR; INTRAVENOUS; SOFT TISSUE at 08:11

## 2024-05-06 RX ADMIN — PANTOPRAZOLE SODIUM 20 MG: 20 TABLET, DELAYED RELEASE ORAL at 20:56

## 2024-05-06 RX ADMIN — OXYCODONE HYDROCHLORIDE 10 MG: 10 TABLET ORAL at 18:59

## 2024-05-06 RX ADMIN — SODIUM CHLORIDE, POTASSIUM CHLORIDE, SODIUM LACTATE AND CALCIUM CHLORIDE: 600; 310; 30; 20 INJECTION, SOLUTION INTRAVENOUS at 07:58

## 2024-05-06 RX ADMIN — DEXMEDETOMIDINE HYDROCHLORIDE 0.5 MCG/KG/HR: 100 INJECTION, SOLUTION INTRAVENOUS at 08:11

## 2024-05-06 RX ADMIN — Medication 80 MG: at 07:58

## 2024-05-06 RX ADMIN — SODIUM CHLORIDE, SODIUM GLUCONATE, SODIUM ACETATE, POTASSIUM CHLORIDE AND MAGNESIUM CHLORIDE: 526; 502; 368; 37; 30 INJECTION, SOLUTION INTRAVENOUS at 12:51

## 2024-05-06 RX ADMIN — HYDROMORPHONE HYDROCHLORIDE 0.2 MG: 0.2 INJECTION, SOLUTION INTRAMUSCULAR; INTRAVENOUS; SUBCUTANEOUS at 14:49

## 2024-05-06 RX ADMIN — ACETAMINOPHEN 975 MG: 325 TABLET, FILM COATED ORAL at 14:45

## 2024-05-06 RX ADMIN — SODIUM CHLORIDE 10 MG/HR: 9 INJECTION, SOLUTION INTRAVENOUS at 08:11

## 2024-05-06 RX ADMIN — Medication 10 MG: at 11:13

## 2024-05-06 RX ADMIN — MIDAZOLAM 1 MG: 1 INJECTION INTRAMUSCULAR; INTRAVENOUS at 07:41

## 2024-05-06 RX ADMIN — PROPOFOL 150 MG: 10 INJECTION, EMULSION INTRAVENOUS at 07:58

## 2024-05-06 RX ADMIN — HYDROMORPHONE HYDROCHLORIDE 0.4 MG: 0.2 INJECTION, SOLUTION INTRAMUSCULAR; INTRAVENOUS; SUBCUTANEOUS at 20:42

## 2024-05-06 RX ADMIN — LIDOCAINE HYDROCHLORIDE 100 MG: 20 INJECTION, SOLUTION INFILTRATION; PERINEURAL at 07:58

## 2024-05-06 RX ADMIN — HYDROMORPHONE HYDROCHLORIDE 4 ML/HR: 1 INJECTION, SOLUTION INTRAMUSCULAR; INTRAVENOUS; SUBCUTANEOUS at 10:57

## 2024-05-06 RX ADMIN — Medication 20 MG: at 10:10

## 2024-05-06 RX ADMIN — GABAPENTIN 600 MG: 300 CAPSULE ORAL at 06:42

## 2024-05-06 RX ADMIN — ONDANSETRON 4 MG: 2 INJECTION INTRAMUSCULAR; INTRAVENOUS at 19:38

## 2024-05-06 RX ADMIN — SODIUM CHLORIDE, SODIUM GLUCONATE, SODIUM ACETATE, POTASSIUM CHLORIDE AND MAGNESIUM CHLORIDE: 526; 502; 368; 37; 30 INJECTION, SOLUTION INTRAVENOUS at 08:11

## 2024-05-06 RX ADMIN — SODIUM CHLORIDE, POTASSIUM CHLORIDE, SODIUM LACTATE AND CALCIUM CHLORIDE: 600; 310; 30; 20 INJECTION, SOLUTION INTRAVENOUS at 07:13

## 2024-05-06 RX ADMIN — Medication 20 MG: at 08:50

## 2024-05-06 RX ADMIN — Medication 20 MG: at 09:23

## 2024-05-06 RX ADMIN — FENTANYL CITRATE 100 MCG: 50 INJECTION INTRAMUSCULAR; INTRAVENOUS at 07:58

## 2024-05-06 ASSESSMENT — ACTIVITIES OF DAILY LIVING (ADL)
ADLS_ACUITY_SCORE: 18

## 2024-05-06 NOTE — ANESTHESIA POSTPROCEDURE EVALUATION
Patient: Mimi Su    Procedure: Procedure(s):  OPEN total abdominal colectomy, end ileostomy placement, lysis of adhesions  Open bilateral Salpingo-oophorectomy       Anesthesia Type:  General    Note:  Disposition: Admission   Postop Pain Control: Uneventful            Sign Out: Well controlled pain   PONV: No   Neuro/Psych: Uneventful            Sign Out: Acceptable/Baseline neuro status   Airway/Respiratory: Uneventful            Sign Out: Acceptable/Baseline resp. status   CV/Hemodynamics: Uneventful            Sign Out: Acceptable CV status; No obvious hypovolemia; No obvious fluid overload   Other NRE: NONE   DID A NON-ROUTINE EVENT OCCUR? No           Last vitals:  Vitals Value Taken Time   BP 98/64 05/06/24 1445   Temp 36.6  C (97.9  F) 05/06/24 1415   Pulse 66 05/06/24 1455   Resp 11 05/06/24 1455   SpO2 99 % 05/06/24 1455   Vitals shown include unfiled device data.    Electronically Signed By: Jd Llanes MD  May 6, 2024  2:56 PM

## 2024-05-06 NOTE — ANESTHESIA PROCEDURE NOTES
Airway       Patient location during procedure: OR       Procedure Start/Stop Times: 5/6/2024 7:59 AM  Staff -        Anesthesiologist:  Haja Martinez MD       Resident/Fellow: Edwin Wells MD       Performed By: resident and with residents       Procedure performed by resident/fellow/CRNA in presence of a teaching physician.    Consent for Airway        Urgency: elective  Indications and Patient Condition       Indications for airway management: tamra-procedural and airway protection       Induction type:RSI       Mask difficulty assessment: 0 - not attempted    Final Airway Details       Final airway type: endotracheal airway       Successful airway: ETT - single  Endotracheal Airway Details        ETT size (mm): 7.0       Cuffed: yes       Successful intubation technique: direct laryngoscopy       Grade View of Cords: 1       Adjucts: stylet       Position: Right       Measured from: gums/teeth       Secured at (cm): 22       Bite block used: None    Post intubation assessment        Placement verified by: capnometry, equal breath sounds and chest rise        Number of attempts at approach: 1       Number of other approaches attempted: 0       Secured with: pink tape       Ease of procedure: easy       Dentition: Unchanged and Intact    Medication(s) Administered   Medication Administration Time: 5/6/2024 7:59 AM

## 2024-05-06 NOTE — BRIEF OP NOTE
[facility]    Brief Operative Note    Pre-operative diagnosis: Slow transit constipation [K59.01]  Post-operative diagnosis Same as pre-operative diagnosis    Procedure: OPEN total abdominal colectomy, end ileostomy placement, lysis of adhesions, N/A - Abdomen  Open bilateral Salpingo-oophorectomy, N/A - Abdomen    Surgeon: Surgeons and Role:  Panel 1:     * Mali Ayala MD - Primary     * Gordon Venegas MD - Resident - Assisting     * Marcelino Carmona MD - Fellow - Assisting  Panel 2:     * Josias Pan MD - Primary     * Anupam Echols MD - Resident - Assisting     * Josias Pan MD  Anesthesia: General   Estimated Blood Loss: 50cc    Drains: None  Specimens:   ID Type Source Tests Collected by Time Destination   1 : Total Abdominal Colectomy Tissue Large Intestine, Colon SURGICAL PATHOLOGY EXAM Mali Ayala MD 5/6/2024 10:07 AM    2 : RIGHT FALLOPIAN TUBE AND OVARY Tissue Ovary and Fallopian Tube, Right SURGICAL PATHOLOGY EXAM (Canceled) Josias Pan MD 5/6/2024 11:24 AM    3 : Left Ovary and Fallopian Tube Tissue Ovary and Fallopian Tube, Left SURGICAL PATHOLOGY EXAM Mali Ayala MD 5/6/2024 11:54 AM      Findings:   Moderate intraabdominal adhesions. Prior end colostomy taken down and end ileostomy brought up in same RLQ trephine.  Complications: None.  Implants: * No implants in log *    Marcelino Carmona MD, MS  Fellow, Colon & Rectal Surgery  [facility]  05/06/2024  1:22 PM

## 2024-05-06 NOTE — OP NOTE
[facility] Colorectal Surgery Operative Report  May 6, 2024    PREOPERATIVE DIAGNOSIS:  1. Slow transit constipation  2. History of abdominal adhesions  3. Ovarian cysts      POSTOPERATIVE DIAGNOSIS:   1. Slow transit constipation  2. Extensive abdominal adhesions  3. Ovarian cysts       PROCEDURE:  1. Exploratory laparotomy  2. Extended lysis of adhesions (80 minutes)  3. Total abdominal colectomy, end ileostomy  4. Bilateral salpingoophrectomy ([place])    ANESTHESIA: General endotracheal anesthesia plus epidural    SURGEON: Mali Ayala MD    ASSISTANT(S): MD Gordon Perdue MD    INDICATIONS FOR PROCEDURE  Mimi Su is a 38 year old female who has battled with constipation, and has had a previous sigmoid colostomy placed for obstructive defecation.  She has had multiple ex laps and adhesiolysis and small bowel resections for adhesions since then.  She has significant constipation, and is now receiving frequent colonic enemas to manage this.  She desires completion colectomy. I thoroughly discussed the risks, benefits, and alternatives of operative treatment with the patient and she agreed to proceed.    General risks related to abdominal surgery were reviewed with the patient. These include, but are not limited to, death, myocardial infarction, pneumonia, urinary tract infection, deep venous thrombosis with or without pulmonary embolus, abdominal infection from bowel injury or abscess, fistula, anastomotic leak that may require reoperation and a stoma, ureteral injury, bowel obstruction, wound infection, and bleeding.    OPERATIVE PROCEDURE: After obtaining informed consent, the patient was brought to the operating room and placed in the lithotomy position. Appropriate preoperative mechanical and chemical deep venous thrombosis prophylaxis, as well as preoperative prophylactic parenteral antibiotics were given. General endotracheal anesthesia was gently induced. Bilateral lower extremity  "pneumatic compression devices were applied and all pressure points were cushioned. A Tian catheter was inserted without difficulty. The abdomen was then prepped and draped in the standard sterile fashion.     After a \"time-out\" was performed, a midline laparotomy incision was made through her previous laparotomy site.  The abdomen was entered with care.  An 80 minute adhesiolysis was performed, taking the omentum off the abdominal wall, lysing pertinent intraloop small bowel adhesions, and taking the small bowel out of the pelvis and off the mesocolon.  Once completed, exposure to the abdomen was maintained with an Bradly wound protector and Buchwalter retractor.    We began by mobilizing the right colon laterally to medially, entering the lesser sac to complete the hepatic flexure mobilization.  The right ureter and duodenum were easily seen.  The terminal ileum was cleared off and divided just proximal to the ileocecal valve with a firing of the 55 mm blue load DAVID stapler.  The colonic mesentary wsa then sequentially divided fairly distally with the Ligasure, with proximal 2-0 vicryl ties on the ileocolic and middle colic pedicles.  We continued dividing the transverse mesocolon and taking down the gastrocolic ligament with the Ligasure.  We then mobilized the left colon laterally to medially, and then completed splenic flexure mobilization rather easily.  The remaining mesocolon was divided up to the level of the colostomy with the ligasure.    The occlusive dressing was removed from the colostomy, and the mucocutaenous junction was taken down with the bovie.  We continued to free up the colostomy circumferentially from the subcutaneous fat and fascia until this was completely freed, and the specimen was then sent for pathology.    We partially closed the medial aspect of the colostomy fascia with a 0 vicryl suture, given the terminal ileum easily crossed the midline, so we planned to keep the ileostomy here to " prevent further damage to the abdominal wall.    We packed the small bowel in the upper abdomen, and mobilized the left adnexa off the anterior wall of the rectum.  Dr. Pan and her team then scrubbed in to proceed with the bilateral salpingoophrectomy.    After this was complete, we scrubbed back in with clean gloves and gowns.  The small bowel was run, and a serosal tear was noted on one of the pelvic loops.  This was repaired with 3-0 vicryl sutures.  Copious irrigation was performed, and no bleeding was noted.  The ileostomy was then brought out the former colostomy site on the left, with the mesentery facing cephalad.    A multipack of Seprafilm was then placed underneath the incision to prevent significant anterior abdominal wall adhesions.  Closure of the midline was then performed using a running #1 PDS suture to close the fascia.  The subcutaneous tissues were irrigated with sterile water.  The skin was then closed with a running 4-0 monocryl subcuticular.  This was then dressed with Dermabond.    After the Dermabond was dry, the staple line was removed from the ileostomy;  this was then matured in the usual Aaliyah manner with interrupted 3-0 vicryl sutures.  The ileostomy was then dressed with the appropriate appliance.      The patient tolerated the procedure well, without complication, and went to recovery in stable condition.  Instrument, sponge, and needle counts were all correct, as reported to me, at this time.    COMPLICATIONS: none.    ESTIMATED BLOOD LOSS: 50 mL    SPECIMEN(S): total abdominal colon and partial omentectomy.    OPERATIVE COUNT: Complete.    OPERATIVE FINDINGS:   1. Extensive intraabdominal adhesions  2. Previous small bowel anastomosis  3. Redundant colon  4. Right ovarian cyst    Mali Ayala MD    Division of Colon & Rectal Surgery  [facility]  [phone] (p)  [phone] (cell)

## 2024-05-06 NOTE — PROGRESS NOTES
"  Colorectal Surgery Progress Note  Surgery Cross-Cover  Post Op Check    05/06/2024    Mimi Su is a 38 year old female POD#0 s/p Procedure(s):  OPEN total abdominal colectomy, end ileostomy placement, lysis of adhesions  Open bilateral Salpingo-oophorectomy for Pre-Op Diagnosis Codes:     * Slow transit constipation [K59.01]    Pt reports their pain is moderately controlled with current regimen. Denies nausea, SOB, chest pain, or dizziness. Patient is not passing flatus or having bowel movements and Is voiding via urinary catheter.     /72 (BP Location: Right arm, Cuff Size: Adult Regular)   Pulse 68   Temp 97.5  F (36.4  C) (Oral)   Resp 16   Ht 1.702 m (5' 7\")   Wt 79.2 kg (174 lb 9.7 oz)   LMP  (LMP Unknown)   SpO2 100%   BMI 27.35 kg/m      Gen: A&O x4, NAD   Chest: breathing non-labored on room air  Abdomen: soft, appropriately tender to palpation, non-distended, incisions clean dry and intact, ileostomy pink, viable, with gaseous and green succuss output   Extremities: warm and well perfused    A/P: No acute post-op issues. Continue plan of care per primary team. Please call with any questions.    Gordon Venegas MD  PGY-1 Surgery    "

## 2024-05-06 NOTE — LETTER
Transition Communication Hand-off for Care Transitions to Next Level of Care Provider    Name: Mimi Su  : 1985  MRN #: [id number]  Primary Care Provider: Mat Fernandez     Primary Clinic: [address] [place] [address] [address]  [facility] [id number]     Reason for Hospitalization:  Slow transit constipation [K59.01]  Admit Date/Time: 2024  5:19 AM  Discharge Date: 24  Payor Source: Payor: [facility] / Plan: [facility] DUAL / Product Type: HMO /          Reason for Communication Hand-off Referral: continuity of cares    Discharge Plan: Home w/ assist from mom       Concern for non-adherence with plan of care:   No  Discharge Needs Assessment:  Needs      Flowsheet Row Most Recent Value   Equipment Currently Used at Home none            Follow-up plan:    Future Appointments   Date Time Provider Department Center   2024  1:00 PM Allie West APRN [facility]   2024  1:00 PM Mali Ayala MD [facility]   12/3/2024 12:00 PM Herson Kamara MD [facility]       Any outstanding tests or procedures:              Key Recommendations:      Siddhartha Lopez RN    AVS/Discharge Summary is the source of truth; this is a helpful guide for improved communication of patient story

## 2024-05-06 NOTE — ANESTHESIA CARE TRANSFER NOTE
Patient: Mimi Su    Procedure: Procedure(s):  OPEN total abdominal colectomy, end ileostomy placement, lysis of adhesions  Open bilateral Salpingo-oophorectomy       Diagnosis: Slow transit constipation [K59.01]  Diagnosis Additional Information: No value filed.    Anesthesia Type:   General     Note:    Oropharynx: oropharynx clear of all foreign objects and spontaneously breathing  Level of Consciousness: awake  Oxygen Supplementation: face mask  Level of Supplemental Oxygen (L/min / FiO2): 6  Independent Airway: airway patency satisfactory and stable  Dentition: dentition unchanged  Vital Signs Stable: post-procedure vital signs reviewed and stable  Report to RN Given: handoff report given  Patient transferred to: PACU    Handoff Report: Identifed the Patient, Identified the Reponsible Provider, Reviewed the pertinent medical history, Discussed the surgical course, Reviewed Intra-OP anesthesia mangement and issues during anesthesia, Set expectations for post-procedure period and Allowed opportunity for questions and acknowledgement of understanding      Vitals:  Vitals Value Taken Time   BP     Temp     Pulse 75 05/06/24 1329   Resp 13 05/06/24 1329   SpO2 100 % 05/06/24 1329   Vitals shown include unfiled device data.    Electronically Signed By: Edwin Wells MD  May 6, 2024  1:30 PM

## 2024-05-06 NOTE — PROGRESS NOTES
"Paged pain team resident, Angeli: \"[id number] Su- Pt has cont epidural ordered & came from PACU with it infusing but it was last charted that the infusion was stopped at 1232. Can you confirm that you want it running as ordered? Thanks!  Lara/Earline. [phone]\"      Provider response: keep it running, yes it is supposed to be continuous  "

## 2024-05-06 NOTE — BRIEF OP NOTE
[facility]    Brief Operative Note    Pre-operative diagnosis: Ovarian cysts  Post-operative diagnosis Same as pre-operative diagnosis    Procedure:   Open bilateral Salpingo-oophorectomy     Surgeon: Surgeons and Role:     * Josias Pan MD - Primary     * Anupam Echols MD - Resident - Assisting  Anesthesia: General   Estimated Blood Loss: to be determined at the conclusion of the case    Drains: None  Specimens:   ID Type Source Tests Collected by Time Destination   1 : Total Abdominal Colectomy Tissue Large Intestine, Colon SURGICAL PATHOLOGY EXAM Mali Ayala MD 5/6/2024 10:07 AM    2 : RIGHT FALLOPIAN TUBE AND OVARY Tissue Ovary and Fallopian Tube, Right SURGICAL PATHOLOGY EXAM Josias Pan MD 5/6/2024 11:24 AM    3 : Left Ovary and Fallopian Tube Tissue Ovary and Fallopian Tube, Left SURGICAL PATHOLOGY EXAM Mali Ayala MD 5/6/2024 11:54 AM      Findings:   - Right fallopian tube and ovary adhesed to the right side-wall, intact and distinct from the surrounding structures.  - Two 1.5 cm cysts with serous-appearing fluid present on the ovary.   - Left fallopian tube and ovary densely adhesed to the vaginal cuff and to the rectovaginal space.  - 1 cm cystic-like structure with bloody-appearing fluid attached to the left ovary  - Vermiculation of both ureters noted throughout and at the conclusion of the gyn-onc portion of the  case   - Both fallopian tubes and ovaries surgically absent at the conclusion of the gyn-onc portion of the case   .  Complications: None.  Implants: * No implants in log *    Dr. Pan was present and scrubbed for the entirety of the procedure    Anupam Echols MD  Obstetrics and Gynecology, PGY-1  05/06/24 12:24 PM

## 2024-05-06 NOTE — ANESTHESIA PROCEDURE NOTES
"Epidural catheter Procedure Note    Pre-Procedure   Staff -        Anesthesiologist:  Kareem Mendez MD       Resident/Fellow: Harpreet Stokes MD       Performed By: resident       Location: pre-op       Procedure Start/Stop Times: 5/6/2024 6:50 AM and 5/6/2024 7:12 AM       Pre-Anesthestic Checklist: patient identified, IV checked, risks and benefits discussed, informed consent, monitors and equipment checked, pre-op evaluation, at physician/surgeon's request and post-op pain management  Timeout:       Correct Patient: Yes        Correct Procedure: Yes        Correct Site: Yes        Correct Position: Yes   Procedure Documentation  Procedure: epidural catheter       Patient Position: sitting       Patient Prep/Sterile Barriers: sterile gloves, mask, patient draped       Skin prep: Chloraprep       Local skin infiltrated with 3 mL of 1% lidocaine.        Insertion Site: L3-4, T8-9. (midline approach).       Technique: LORT saline        DORCAS at 7 cm.       Needle Type: Touhy needle       Needle Gauge: 17.        Needle Length (Inches): 3.5        Catheter: 19 G.          Catheter threaded easily.         5 cm epidural space.         Threaded 12 cm at skin.         # of attempts: 1 and  # of redirects:     Assessment/Narrative         Test dose of 5 mL lidocaine 1.5% w/ 1:200,000 epinephrine at.         Test dose negative, 3 minutes after injection, for signs of intravascular, subdural, or intrathecal injection.       Insertion/Infusion Method: LORT saline       Aspiration negative for Heme or CSF via Epidural Catheter.    Medication(s) Administered   Medication Administration Time: 5/6/2024 6:50 AM      FOR [facility] ([facility] [place] [place]) ONLY:   Pain Team Contact information: please page the Pain Team Via [employer]. Search \"Pain\". During daytime hours, please page the attending first. At night please page the resident first.      "

## 2024-05-06 NOTE — PHARMACY-ADMISSION MEDICATION HISTORY
Pharmacist Admission Medication History    Admission medication history is complete. The information provided in this note is only as accurate as the sources available at the time of the update.    Information Source(s): Reviewed medication dispense history ([employer]); Reviewed med list from [facility]; [place]  Search; Last times of administration per pre-op nursing     Changes made to PTA medication list:  Added: None  Deleted: None  Changed: Add frequency to Benadryl; Updated pantoprazole from 20-40 mg BID to 40-80 mg BID per fill history and med list from [facility] (has consistent fill history of 40 mg tablets)     Pertinent Information:   Has extensive and [facility] [place], [place]  Results of [place]  Search from 5/6:  Clonazepam 0.5 mg #60 tablets for 30 day supply last picked up 5/4/24  Pregabalin 150 mg #60 capsules for 30 day supply last picked up 4/10/24  Oxycodone-acetaminophen  mg #120 for 30 day supply last picked up 3/30/24    Prior to Admission medications    Medication Sig Last Dose Taking? Auth Provider Long Term End Date   acetaminophen (TYLENOL) 325 MG tablet Take 325-650 mg by mouth every 6 hours as needed for mild pain 5/5/2024 at 0800 Yes Reported, Patient     clonazePAM (KLONOPIN) 0.5 MG tablet Take 0.25-0.5 mg by mouth 3 times daily as needed for anxiety 5/6/2024 at 0300 Yes Reported, Patient Yes    diphenhydrAMINE HCl (BENADRYL ALLERGY PO) Take 1 tablet by mouth nightly as needed (Sleep) 5/5/2024 at 2000 Yes Reported, Patient     FLUoxetine (PROZAC) 20 MG capsule Take 20 mg by mouth every morning 5/6/2024 at 0300 Yes Reported, Patient Yes    ibuprofen (ADVIL/MOTRIN) 200 MG tablet Take 200 mg by mouth every 4 hours as needed for pain 5/5/2024 at 2000 Yes Reported, Patient     linaclotide (LINZESS) 145 MCG capsule Take 145 mcg by mouth 2 times daily 5/6/2024 at 0300 Yes Reported, Patient     melatonin 3 MG CAPS Take by mouth nightly as  needed 5/5/2024 at 2000 Yes Reported, Patient     metroNIDAZOLE (FLAGYL) 500 MG tablet Take 1 tablet (500 mg) by mouth every 6 hours At 8:00 am, 2:00 pm, 8:00 pm the day prior to your surgery with neomycin and zofran. 5/5/2024 Yes Mali Ayala MD     multivitamin w/minerals (THERA-VIT-M) tablet Take 1 tablet by mouth every morning Past Week Yes Reported, Patient     neomycin (MYCIFRADIN) 500 MG tablet Take 2 tablets (1,000 mg) by mouth every 6 hours At 8:00 am, 2:00 pm, 8:00 pm the day prior to your surgery with flagyl and zofran. 5/5/2024 at 0800 Yes Mali Ayala MD     ondansetron (ZOFRAN ODT) 4 MG ODT tab Take 4 mg by mouth every 8 hours as needed 5/5/2024 at 0800 Yes Reported, Patient  8/22/24   ondansetron (ZOFRAN) 4 MG tablet Take 1 tablet (4 mg) by mouth every 6 hours At 8:00 am, 2:00 pm, 8:00 pm the day prior to your surgery with neomycin and flagyl. 5/5/2024 at 0800 Yes Mali Ayala MD     pantoprazole (PROTONIX) 40 MG EC tablet Take 2 tablets by mouth in the morning and 1 tablet by mouth in the evening. 5/6/2024 at 0300 Yes Reported, Patient     pregabalin (LYRICA) 100 MG capsule Take 150 mg by mouth 2 times daily 5/6/2024 at 0300 Yes Reported, Patient Yes    senna (SENOKOT) 8.6 MG tablet Take 1 tablet by mouth every evening 5/5/2024 at 2000 Yes Reported, Patient     sucralfate (CARAFATE) 1 GM tablet Take 1 g by mouth 4 times daily as needed 5/5/2024 Yes Reported, Patient     tiZANidine (ZANAFLEX) 2 MG tablet Take 1 tablet by mouth at bedtime 5/5/2024 Yes Reported, Patient  4/24/25     Medication History Completed By: Ryan Benitez [facility] 5/6/2024 5:01 PM

## 2024-05-06 NOTE — PROGRESS NOTES
Epidural block performed without complications.  VSS.  Pt tolerated well.  Will continue to monitor.

## 2024-05-06 NOTE — PROGRESS NOTES
Nursing Focus: Admission    D: Patient admitted/transferred from PACU via bed  for OPEN total abdominal colectomy, end ileostomy placement, lysis of adhesions. Open bilateral Salpingo-oophorectomy.      I: Upon arrival to the unit patient was oriented to room, unit, and call light. Patient s height, weight, and vital signs were obtained. Allergies reviewed and allergy band applied. MD notified of patient s arrival on the unit. Adult AVS completed. Head to toe assessment completed. Education assessment completed. Care plan initiated.     A: Vital signs stable upon admission. Patient rates pain at 10. Two RN skin assessment completed Yes/No. Second RN was Moody. I. Significant Skin Findings include  pink/tan buttock/IT (pt said that she was in the sun and had a tan recently), incisions; illeostomy,  and dry flaky soles of feet.     P: Continue to monitor patient s pain, o2  and intervene as needed. Continue with plan of care. Notify MD with any concerns or changes in patient status.

## 2024-05-07 LAB
ANION GAP SERPL CALCULATED.3IONS-SCNC: 7 MMOL/L (ref 7–15)
BUN SERPL-MCNC: 10.8 MG/DL (ref 6–20)
CALCIUM SERPL-MCNC: 7.5 MG/DL (ref 8.6–10)
CHLORIDE SERPL-SCNC: 106 MMOL/L (ref 98–107)
CREAT SERPL-MCNC: 0.81 MG/DL (ref 0.51–0.95)
DEPRECATED HCO3 PLAS-SCNC: 24 MMOL/L (ref 22–29)
EGFRCR SERPLBLD CKD-EPI 2021: >90 ML/MIN/1.73M2
GLUCOSE SERPL-MCNC: 105 MG/DL (ref 70–99)
HGB BLD-MCNC: 8.1 G/DL (ref 11.7–15.7)
POTASSIUM SERPL-SCNC: 3.9 MMOL/L (ref 3.4–5.3)
SODIUM SERPL-SCNC: 137 MMOL/L (ref 135–145)

## 2024-05-07 PROCEDURE — 250N000011 HC RX IP 250 OP 636: Performed by: STUDENT IN AN ORGANIZED HEALTH CARE EDUCATION/TRAINING PROGRAM

## 2024-05-07 PROCEDURE — 120N000002 HC R&B MED SURG/OB UMMC

## 2024-05-07 PROCEDURE — 250N000013 HC RX MED GY IP 250 OP 250 PS 637: Performed by: STUDENT IN AN ORGANIZED HEALTH CARE EDUCATION/TRAINING PROGRAM

## 2024-05-07 PROCEDURE — 258N000003 HC RX IP 258 OP 636

## 2024-05-07 PROCEDURE — G0463 HOSPITAL OUTPT CLINIC VISIT: HCPCS

## 2024-05-07 PROCEDURE — 250N000013 HC RX MED GY IP 250 OP 250 PS 637: Performed by: PHYSICIAN ASSISTANT

## 2024-05-07 PROCEDURE — 85018 HEMOGLOBIN: CPT

## 2024-05-07 PROCEDURE — 01996 DLY HOSP MGMT EDRL RX ADMIN: CPT | Mod: GC | Performed by: ANESTHESIOLOGY

## 2024-05-07 PROCEDURE — 250N000011 HC RX IP 250 OP 636

## 2024-05-07 PROCEDURE — 82565 ASSAY OF CREATININE: CPT

## 2024-05-07 PROCEDURE — 250N000011 HC RX IP 250 OP 636: Performed by: COLON & RECTAL SURGERY

## 2024-05-07 PROCEDURE — 36415 COLL VENOUS BLD VENIPUNCTURE: CPT

## 2024-05-07 PROCEDURE — 250N000013 HC RX MED GY IP 250 OP 250 PS 637: Performed by: COLON & RECTAL SURGERY

## 2024-05-07 PROCEDURE — 258N000003 HC RX IP 258 OP 636: Performed by: COLON & RECTAL SURGERY

## 2024-05-07 PROCEDURE — 250N000013 HC RX MED GY IP 250 OP 250 PS 637

## 2024-05-07 RX ORDER — HYDROXYZINE HYDROCHLORIDE 25 MG/1
25 TABLET, FILM COATED ORAL EVERY 6 HOURS PRN
Status: DISCONTINUED | OUTPATIENT
Start: 2024-05-07 | End: 2024-05-08

## 2024-05-07 RX ORDER — NALBUPHINE HYDROCHLORIDE 20 MG/ML
2.5-5 INJECTION, SOLUTION INTRAMUSCULAR; INTRAVENOUS; SUBCUTANEOUS EVERY 6 HOURS PRN
Status: DISCONTINUED | OUTPATIENT
Start: 2024-05-07 | End: 2024-05-13 | Stop reason: HOSPADM

## 2024-05-07 RX ORDER — HYDROXYZINE HYDROCHLORIDE 50 MG/1
50 TABLET, FILM COATED ORAL EVERY 6 HOURS PRN
Status: DISCONTINUED | OUTPATIENT
Start: 2024-05-07 | End: 2024-05-08

## 2024-05-07 RX ORDER — DIPHENHYDRAMINE HYDROCHLORIDE 50 MG/ML
25 INJECTION INTRAMUSCULAR; INTRAVENOUS EVERY 6 HOURS PRN
Status: DISCONTINUED | OUTPATIENT
Start: 2024-05-07 | End: 2024-05-08

## 2024-05-07 RX ORDER — SIMETHICONE 80 MG
80-160 TABLET,CHEWABLE ORAL EVERY 6 HOURS PRN
Status: DISCONTINUED | OUTPATIENT
Start: 2024-05-07 | End: 2024-05-13 | Stop reason: HOSPADM

## 2024-05-07 RX ORDER — LANOLIN ALCOHOL/MO/W.PET/CERES
3 CREAM (GRAM) TOPICAL
Status: DISCONTINUED | OUTPATIENT
Start: 2024-05-07 | End: 2024-05-13 | Stop reason: HOSPADM

## 2024-05-07 RX ORDER — ACETAMINOPHEN 325 MG/1
975 TABLET ORAL EVERY 6 HOURS
Status: DISCONTINUED | OUTPATIENT
Start: 2024-05-07 | End: 2024-05-11

## 2024-05-07 RX ORDER — KETOROLAC TROMETHAMINE 15 MG/ML
15 INJECTION, SOLUTION INTRAMUSCULAR; INTRAVENOUS EVERY 6 HOURS
Status: COMPLETED | OUTPATIENT
Start: 2024-05-07 | End: 2024-05-10

## 2024-05-07 RX ORDER — NALBUPHINE HYDROCHLORIDE 10 MG/ML
2.5-5 INJECTION, SOLUTION INTRAMUSCULAR; INTRAVENOUS; SUBCUTANEOUS EVERY 6 HOURS PRN
Status: DISCONTINUED | OUTPATIENT
Start: 2024-05-07 | End: 2024-05-07

## 2024-05-07 RX ORDER — IBUPROFEN 600 MG/1
600 TABLET, FILM COATED ORAL EVERY 6 HOURS
Status: DISCONTINUED | OUTPATIENT
Start: 2024-05-10 | End: 2024-05-13 | Stop reason: HOSPADM

## 2024-05-07 RX ORDER — DIPHENHYDRAMINE HCL 25 MG
25 CAPSULE ORAL EVERY 6 HOURS PRN
Status: DISCONTINUED | OUTPATIENT
Start: 2024-05-07 | End: 2024-05-13 | Stop reason: HOSPADM

## 2024-05-07 RX ADMIN — HYDROMORPHONE HYDROCHLORIDE 0.2 MG: 0.2 INJECTION, SOLUTION INTRAMUSCULAR; INTRAVENOUS; SUBCUTANEOUS at 04:10

## 2024-05-07 RX ADMIN — SODIUM CHLORIDE, POTASSIUM CHLORIDE, SODIUM LACTATE AND CALCIUM CHLORIDE: 600; 310; 30; 20 INJECTION, SOLUTION INTRAVENOUS at 17:08

## 2024-05-07 RX ADMIN — HYDROMORPHONE HYDROCHLORIDE 0.4 MG: 0.2 INJECTION, SOLUTION INTRAMUSCULAR; INTRAVENOUS; SUBCUTANEOUS at 12:21

## 2024-05-07 RX ADMIN — HYDROMORPHONE HYDROCHLORIDE 0.4 MG: 0.2 INJECTION, SOLUTION INTRAMUSCULAR; INTRAVENOUS; SUBCUTANEOUS at 18:14

## 2024-05-07 RX ADMIN — HYDROMORPHONE HYDROCHLORIDE 0.4 MG: 0.2 INJECTION, SOLUTION INTRAMUSCULAR; INTRAVENOUS; SUBCUTANEOUS at 20:21

## 2024-05-07 RX ADMIN — OXYCODONE HYDROCHLORIDE 10 MG: 10 TABLET ORAL at 10:30

## 2024-05-07 RX ADMIN — TIZANIDINE 2 MG: 2 TABLET ORAL at 21:33

## 2024-05-07 RX ADMIN — SODIUM CHLORIDE, POTASSIUM CHLORIDE, SODIUM LACTATE AND CALCIUM CHLORIDE: 600; 310; 30; 20 INJECTION, SOLUTION INTRAVENOUS at 07:21

## 2024-05-07 RX ADMIN — KETOROLAC TROMETHAMINE 15 MG: 15 INJECTION, SOLUTION INTRAMUSCULAR; INTRAVENOUS at 19:45

## 2024-05-07 RX ADMIN — ACETAMINOPHEN 975 MG: 325 TABLET, FILM COATED ORAL at 11:52

## 2024-05-07 RX ADMIN — HEPARIN SODIUM 5000 UNITS: 5000 INJECTION, SOLUTION INTRAVENOUS; SUBCUTANEOUS at 08:00

## 2024-05-07 RX ADMIN — OXYCODONE HYDROCHLORIDE 15 MG: 5 TABLET ORAL at 18:41

## 2024-05-07 RX ADMIN — ACETAMINOPHEN 975 MG: 325 TABLET, FILM COATED ORAL at 06:28

## 2024-05-07 RX ADMIN — KETOROLAC TROMETHAMINE 15 MG: 15 INJECTION, SOLUTION INTRAMUSCULAR; INTRAVENOUS at 07:59

## 2024-05-07 RX ADMIN — PANTOPRAZOLE SODIUM 40 MG: 20 TABLET, DELAYED RELEASE ORAL at 19:45

## 2024-05-07 RX ADMIN — NALBUPHINE HYDROCHLORIDE 5 MG: 20 INJECTION, SOLUTION INTRAMUSCULAR; INTRAVENOUS; SUBCUTANEOUS at 17:50

## 2024-05-07 RX ADMIN — HYDROMORPHONE HYDROCHLORIDE 0.4 MG: 0.2 INJECTION, SOLUTION INTRAMUSCULAR; INTRAVENOUS; SUBCUTANEOUS at 22:39

## 2024-05-07 RX ADMIN — ALVIMOPAN 12 MG: 12 CAPSULE ORAL at 11:06

## 2024-05-07 RX ADMIN — PANTOPRAZOLE SODIUM 40 MG: 20 TABLET, DELAYED RELEASE ORAL at 08:00

## 2024-05-07 RX ADMIN — SODIUM CHLORIDE, POTASSIUM CHLORIDE, SODIUM LACTATE AND CALCIUM CHLORIDE: 600; 310; 30; 20 INJECTION, SOLUTION INTRAVENOUS at 01:05

## 2024-05-07 RX ADMIN — PREGABALIN 150 MG: 25 CAPSULE ORAL at 19:45

## 2024-05-07 RX ADMIN — HYDROMORPHONE HYDROCHLORIDE 0.4 MG: 0.2 INJECTION, SOLUTION INTRAMUSCULAR; INTRAVENOUS; SUBCUTANEOUS at 16:14

## 2024-05-07 RX ADMIN — SODIUM CHLORIDE, POTASSIUM CHLORIDE, SODIUM LACTATE AND CALCIUM CHLORIDE 500 ML: 600; 310; 30; 20 INJECTION, SOLUTION INTRAVENOUS at 02:29

## 2024-05-07 RX ADMIN — KETOROLAC TROMETHAMINE 15 MG: 15 INJECTION, SOLUTION INTRAMUSCULAR; INTRAVENOUS at 14:02

## 2024-05-07 RX ADMIN — HEPARIN SODIUM 5000 UNITS: 5000 INJECTION, SOLUTION INTRAVENOUS; SUBCUTANEOUS at 23:29

## 2024-05-07 RX ADMIN — ONDANSETRON 4 MG: 2 INJECTION INTRAMUSCULAR; INTRAVENOUS at 17:14

## 2024-05-07 RX ADMIN — ACETAMINOPHEN 975 MG: 325 TABLET, FILM COATED ORAL at 23:30

## 2024-05-07 RX ADMIN — KETOROLAC TROMETHAMINE 15 MG: 15 INJECTION, SOLUTION INTRAMUSCULAR; INTRAVENOUS at 01:43

## 2024-05-07 RX ADMIN — HEPARIN SODIUM 5000 UNITS: 5000 INJECTION, SOLUTION INTRAVENOUS; SUBCUTANEOUS at 15:55

## 2024-05-07 RX ADMIN — ONDANSETRON 4 MG: 2 INJECTION INTRAMUSCULAR; INTRAVENOUS at 08:12

## 2024-05-07 RX ADMIN — HYDROMORPHONE HYDROCHLORIDE 0.4 MG: 0.2 INJECTION, SOLUTION INTRAMUSCULAR; INTRAVENOUS; SUBCUTANEOUS at 08:13

## 2024-05-07 RX ADMIN — HYDROXYZINE HYDROCHLORIDE 25 MG: 25 TABLET, FILM COATED ORAL at 05:24

## 2024-05-07 RX ADMIN — FLUOXETINE HYDROCHLORIDE 20 MG: 20 CAPSULE ORAL at 08:00

## 2024-05-07 RX ADMIN — PREGABALIN 150 MG: 25 CAPSULE ORAL at 08:00

## 2024-05-07 RX ADMIN — HYDROMORPHONE HYDROCHLORIDE 0.2 MG: 0.2 INJECTION, SOLUTION INTRAMUSCULAR; INTRAVENOUS; SUBCUTANEOUS at 05:34

## 2024-05-07 RX ADMIN — DIPHENHYDRAMINE HYDROCHLORIDE 25 MG: 50 INJECTION, SOLUTION INTRAMUSCULAR; INTRAVENOUS at 08:12

## 2024-05-07 RX ADMIN — ACETAMINOPHEN 975 MG: 325 TABLET, FILM COATED ORAL at 17:51

## 2024-05-07 RX ADMIN — ALVIMOPAN 12 MG: 12 CAPSULE ORAL at 19:46

## 2024-05-07 RX ADMIN — HYDROMORPHONE HYDROCHLORIDE: 1 INJECTION, SOLUTION INTRAMUSCULAR; INTRAVENOUS; SUBCUTANEOUS at 11:52

## 2024-05-07 RX ADMIN — SIMETHICONE 80 MG: 80 TABLET, CHEWABLE ORAL at 11:52

## 2024-05-07 RX ADMIN — OXYCODONE HYDROCHLORIDE 15 MG: 5 TABLET ORAL at 14:42

## 2024-05-07 ASSESSMENT — ACTIVITIES OF DAILY LIVING (ADL)
ADLS_ACUITY_SCORE: 18
ADLS_ACUITY_SCORE: 18
ADLS_ACUITY_SCORE: 25
ADLS_ACUITY_SCORE: 18
DEPENDENT_IADLS:: INDEPENDENT
ADLS_ACUITY_SCORE: 25
ADLS_ACUITY_SCORE: 18
ADLS_ACUITY_SCORE: 25
ADLS_ACUITY_SCORE: 25
ADLS_ACUITY_SCORE: 18
ADLS_ACUITY_SCORE: 18
ADLS_ACUITY_SCORE: 25
ADLS_ACUITY_SCORE: 18
ADLS_ACUITY_SCORE: 25
ADLS_ACUITY_SCORE: 18
ADLS_ACUITY_SCORE: 25
ADLS_ACUITY_SCORE: 18

## 2024-05-07 NOTE — PLAN OF CARE
Goal Outcome Evaluation:      Plan of Care Reviewed With: patient, spouse  Overall Patient Progress: no change    Neuro: A&O x4.   Cardiac: AVSS. BP stable.  Respiratory: RA. Low RR when sleeping.   GI/: Ileostomy putting out moderate dark green output. (+) flatus. Dsd change by WOC.  Diet/appetite: CLD tolerating well.  Activity: not OOB today.  Pain: Pain team managing epidural hydromorphone. Bolus x2. 10/10. Continue with PRN and scheduled pain meds. Accupressure, aromatherapy, cold, and heat for pain relief.   Skin: Intact.  Lines:  R PIV infusing LR @ 100. Epidural dressing CDI.  Drains: Tian removed @ 1500.  Shift update:  Goals of care surrounded around pain management. C/o itching, nubain ordered. Continue POC.

## 2024-05-07 NOTE — PROVIDER NOTIFICATION
Provider MYLENE AGUIRRE notified via [facility] 1823.     Pt in 10/10 pain after using bathroom, says she strained moving and feels like her insides ripped apart. IV Dilaudid given w/ no relief. Can you order additional one time?     Ulysses Morris

## 2024-05-07 NOTE — PLAN OF CARE
Goal Outcome Evaluation:      Plan of Care Reviewed With: patient    Overall Patient Progress: no changeOverall Patient Progress: no change    Pt arrived around 3:50pm      Pt Aox4. VSS. Took 2x1hr vitals. Next VS in 4hrs @11pm.     C/o pain 8-10/10 somewhat relieved by a back ice pack,  prn dilaudid, oxy and epidural running at 8ml/hr. Pt refused toradol as 'it doesn't work for me'. On continuous LR 100ml/hr. Pt denies nausea, SOB, chest pain, or dizziness. Abd binder on.   Drains: cummings and illeostomy      Plan: Continue to assess incisions, epidural  and pain. Initiate activity per orders. IS use.

## 2024-05-07 NOTE — PLAN OF CARE
Goal Outcome Evaluation:    Plan of Care Reviewed With: patient    Overall Patient Progress: no change    Outcome Evaluation: Pt's goal is to discharge back to her apartment, but it's on the third floor and there's no elevator

## 2024-05-07 NOTE — PROGRESS NOTES
Colorectal Surgery Progress Note  [facility]  POD#1      Subjective: Persistent pain overnight that is uncontrolled with epidural anesthesia, mainly in lower abdomen. Nausea + vomiting x4. Feels itchy all over since epidural.     Vitals:  Vitals:    05/07/24 0451 05/07/24 0750 05/07/24 0759 05/07/24 0822   BP: (!) 88/51 (!) 87/56 (!) 87/56 97/61   BP Location:  Left arm     Pulse:  63     Resp:  16 12    Temp:  97.7  F (36.5  C)     TempSrc:  Oral     SpO2:  100% 100% 99%   Weight:  81.4 kg (179 lb 7.3 oz)     Height:         I/O:  I/O last 3 completed shifts:  In: 3120 [P.O.:120; I.V.:3000]  Out: 1475 [Urine:1225; Stool:200; Blood:50]    Physical Exam:  Gen: AAOx3, uncomfortable but in no acute distress.   Pulm: Non-labored breathing  Abd: Soft, non-distended, tender in lower abdomen   Incision C/D/I   Stoma pink and viable with watery ileostomy output and gas in bag  Ext:  Warm and well-perfused    BMP  Recent Labs   Lab 05/07/24  0500 05/06/24  0628     --    POTASSIUM 3.9  --    CHLORIDE 106  --    CO2 24  --    BUN 10.8  --    CR 0.81  --    * 79     CBC  Recent Labs   Lab 05/07/24  0500 05/06/24  1729   HGB 8.1*  --    PLT  --  135*         ASSESSMENT: This is a 38 year old female PMHx of history of chronic pain syndrome on chronic opioid therapy, borderline personality disorder, and multiple GI surgeries ( s/p cholecystectomy (6/2003). S/p colostomy (1/9/2012) done at [facility] and then a revision done 1/13/12. Hysterectomy (12/2013), two exploratory laparotomies (4/24/19) and (9/5/19) and history of numerous bowel obstructions due to adhesions) and is now s/p open total colectomy with end ileostomy and BSO on 5/6 with Dr. Ayala. POD1 having uncontrollable pain with epidural analgesia and persistent hypotension.     - Spoke with anesthesia regarding decreasing/stopping epidural anesthesia given uncontrolled pain and diffuse itchiness.    - ordered 0.2mg IV  dilaudid q2H for better pain control; if pain still not controlled, stop epidural and start dilaudid PCA  - continue IVF, given 500cc bolus of fluids for soft pressures  - wound ostomy teaching  - remove cummnigs today       Elli Hu  MS4       Addendum:  Pt was seen and examined independent of the medical student.     S: pain poorly controlled.  Right flank numb but not rest of abdomen.  Emesis x3 small, but feels like emesis due to poorly controlled pain.     O:  Vitals, I&Os reviewed  NAD  Norm resp effort  abd mildly distended.    Ileostomy - pink and viable with Large amount of watery brown liquid in ostomy bag.     A/P:  37 y/o F with slow transit constipation, multiple prior abdominal surgeries now POD# 1 Ex Lap, extended BRYAN, TAC with end ileostomy; BSO with gyn on 5/6.  - appreciate pain team recs  - CLD  - IVF @ 50  - remove cummings  - WOCN for new ileo  Ppx:  continue on heparin  Dispo: pending ileostomy output trends, WOCN teaching, pain control    Meli Borges PA-C  Colon and Rectal Surgery     Seen and discussed with Dr. Carmona    The above plan of care was performed and communicated to me by Dr. Ayala    I spent 20 minute face-to-face or coordinating care of Mimi Su. Over 50% of our time on the unit was spent counseling the patient and/or coordinating care as documented in the assessment and plan.     [id number] post op hospital visit

## 2024-05-07 NOTE — PLAN OF CARE
Goal Outcome Evaluation:      Plan of Care Reviewed With: patient    [id number]    Neuro: A&Ox4   Behavior: calm and cooperative with cares  Activity: up with assistance of 1.  Able to use commode  Pain:  abdominal pain managed with IV dilaudid, Oxycodone, IV Toradol and tylenol.  Still report 10/10 pain. Paged MD about pain and MD said GIVE oxycodone before early.   Vital: Temp: 98  F (36.7  C) Temp src: Oral BP: 103/60 Pulse: 78   Resp: 16 SpO2: 98 % O2 Device: Nasal cannula Oxygen Delivery: 2 LPM    GI/: voids via purewick with adequate urine using commode. Dark greenish loose BM via ileostomy and passing gas via ileostomy. Stoma is pink  with new ileostomy bag. Nausea noted and IV Zofran given. Bowel sounds are audible.  Skin: Epidural needle in place with intact dressing. Midline incision with prima port dressing, PIV x1 in place.   LDAs: PIV x1 infusing LR 75ml/hr. Epidural cath infusing with epidural and dilaudid.   Diet: Clear liquid with fair appetite.   Capno is on.  Nalbuphine given for itch.  Tian removed in AM shift. Voided fine. Pain management staff came and increased Epidural rate 8 ML/hr.        Continue with POC
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
## 2024-05-07 NOTE — CONSULTS
[facility] Nurse Inpatient Assessment     Consulted for: new ileostomy    Patient History (according to provider note(s):      38 year old female with a history of chronic pain syndrome on chronic opioid therapy, borderline personality disorder, chemical dependecy, s/p cholecystectomy (6/2003). S/p colostomy (1/9/2012) done at [facility] and then a revision done 1/13/12. Hysterectomy (12/2013). Patient has had two exploratory laparotomies (4/24/19) and (9/5/19). History of numerous bowel obstructions due to adhesions.      Assessment of new end Ileostomy:     Diagnosis Pertinent to Stoma:   Slow transit constipation       Surgery Date: 5/7/24  Surgeon:Mali Ayala       [facility]  Pouching system in place on assessment today: Geary two piece, cut to fit, and flat   Pouch barrier status: Leaking at 9 o clock where barrier is over the midline incision dressing  Pouch last changed/wear time: in OR  Reason for pouch change today: ostomy education, leakage, and initial post-op assessment  Effectiveness of current pouching/ supply plan: Attempting new system, will re-evaluate next assessment  Change made with ostomy management today: Yes  Pouching system placed today: Roseanne two piece, cut to fit, flat, and barrier ring, Cavilon   Home system:  Roseanne CeraPlus flat 2 piece 57 mm with tape border, opaque pouch.  No barrier ring, no sting barrier film.    Supplies: gathered, supplies stored on unit, and discussed with patient     Stoma location: LLQ at site of previous colostomy  Stoma size: 1 1/4 inches,   Stoma appearance: red, round, moist, good turgor, edematous, and protruberant  Mucocutaneous junction:  intact  Peristomal complication(s): sutured incisions at 3 and 9 o'clock   Output: liquid and brown  Output volume emptied during visit: 10ml  Abdominal assessment: Firm  Surgical site(s):  dressing contaminated with fecal output.  replaced .  Incision  "well approximated with skin glue  NG still in place? No  Pain: Fullness and Stabbing  Is patient still on a PCA? No    Ostomy education assessment:  Participant of teaching session today: patient  and parent  Education completed today: Initial fitting, Stoma assessment, Pouching system assessment , and Pouch change demonstration  Educational materials/methods: Verbal and Demonstration  Education still needed: Pouch change return demonstration, Intake and output recording, Fluid and electrolyte balance , Low fiber diet , and Hernia prevention  Learning Comprehension:   Psychosocial assessment: experienced with pouching her previous colostomy  Patient readiness for education today: attentive and in significant pain  Following today's visit: patient  is able to demonstrate;         1. How to empty their pouch? Yes-experienced        2. How to change their pouch? Yes- needs practice with new stoma        3. How to read and record intake and output correctly? No  Preparation for discharge completed: No discharge preparation started yet  Preparation for discharge still needed: Placed prescription recommendations in discharge navigator for MD to sign, Discussed how and when to make an outpatient WO nurse appointment after discharge, and Discuss signs/symptoms of when to seek medical attention  Pt support system on discharge: siblings, mother  WO recommend home care?  TBA  Face to face time: 35 minutes    Treatment Plan:     LLQ Ileostomy pouching plan:   Pouching system: ostomy supplies pouches: Roseanne 57 FECAL ([id number]) ostomy supplies barrier: Roseanne 57mm FLAT ([id number])  Accessories used: [facility] ostomy accessories: 2\" Cera Barrier Ring ([id number])   Frequency of pouch changes: Three times a week  WO follow up plan: Three times a week  Bedside RN interventions: Change pouch PRN if leaking using the supplies above, Empty pouch when 1/3 to 1/2 full, ensure to clean pouch outlet after emptying to prevent odor, Notify WO " for ongoing pouch leakage, and Document stoma appearance and output volume, color, and consistency every shift       DATA:     Current support surface: Standard  Standard Isoflex gel  Containment of urine/stool: Continent of bladder and Ileostomy pouch  BMI: Body mass index is 28.11 kg/m .   Active diet order: Orders Placed This Encounter      Clear Liquid Diet     Output: I/O last 3 completed shifts:  In: 3120 [P.O.:120; I.V.:3000]  Out: 1475 [Urine:1225; Stool:200; Blood:50]     Labs:   Recent Labs   Lab 05/07/24  0500   HGB 8.1*     Pressure injury risk assessment:   Sensory Perception: 4-->no impairment  Moisture: 4-->rarely moist  Activity: 1-->bedfast  Mobility: 3-->slightly limited  Nutrition: 3-->adequate  Friction and Shear: 3-->no apparent problem  Trell Score: 18    Pager no longer is use, please contact through Beryl Cordoba group: [facility] Nurse [place]  Dept. Office Number: [phone]

## 2024-05-07 NOTE — PROGRESS NOTES
"Pain Service Progress Note  [facility]  Date: 05/07/2024       Patient Name: Mimi Su  MRN: [id number]  Age: 38 year old  Sex: female      Assessment:  Mimi Su is a 38 year old female with a history of chronic pain syndrome on chronic opioid therapy, borderline personality disorder, chemical dependecy, s/p cholecystectomy (6/2003), S/p colostomy (1/9/2012) done at [facility] and then a revision done 1/13/12, s/p Hysterectomy (12/2013), s/p two exploratory laparotomies (4/24/19) and (9/5/19) c/b numerous bowel obstructions due to adhesions.    Procedure:  Open total abdominal colectomy, end ileostomy placement, lysis of adhesions, and open bilateral Salpingo-oophorectomy    Date of Surgery: 5/6/2024    Date of Catheter Placement: 5/6/2024    Plan/Recommendations:  1. Regional Anesthesia/Analgesia  -Continuous Catheter Type/Site:  epidural T8-9  Infusate: 0.125% bupivacaine hydromorphone 6 mcg/mL @ 8mL/hr   @ 1200 hours Switched to: 0.0625% bupivacaine hydromorphone 6 mcg/mL @ 6mL/hr  Plan to maintain catheter, max of 7 days    Infusion decreased overnight due to hypotension from 8mL/hr to 6mL/hr and then paused around 0800 hours due to continued hypotension. Switched to 0.0625% concentration during rounds this morning.     Bolus administered @ 1205  MEDICATION: PF 0.0625% bupivacaine off the pump. total bolus 6 mL via epidural catheter in divided doses.   PROCEDURE: Clinician bolus administered via nerve block catheter, without complication, negative aspirate before administration. No symptoms of local anesthetic systemic toxicity (LAST). Remained with and assessed patient for 20 min post-injection. BP, P and MAP stable  POST-PROCEDURE: Bedside RN aware of need to continue BP, P and MAP monitoring Q 10 min for an additional 20 min. Contact Pain service (Page the Pain Team Via Amcom: \"Pain management acute inpatient/[facility]\") if any of the following: patient " "experiencing any untoward effects, SBP< 90, P < 50 or > 120, MAP < 60                    2. Anticoagulation  -Please contact Inpatient Pain Service before ordering or making any anticoagulation changes       3. Multimodal Analgesia  - per primary team     Pain Service will continue to follow.    Discussed with attending anesthesiologist    Isabel Alva DO, PGY-2/CA-1  Inpatient Pain Service   05/07/2024     Overnight Events: Patient was hypotensive and infusion decreased overnight from 8mL/hr to 6mL/hr.     Tubes/Drains: Yes  - Ileostomy LLQ  - Tian  - Epidural catheter T8-9   - PIV     Subjective:  In a lot of pain   Nausea: Yes  Vomiting: No - dry heaving   Pruritus: No - improved with benadryl overnight   Symptoms of LAST: No    Pain Location:  Abdomen     Pain Intensity:    Pain at Rest: 9/10   Pain with Activity: 10/10  Comfort Goal: 4/10   Baseline Pain: 5-6/10   Satisfied with your level of pain control: No    Diet: Clear Liquid Diet    Relevant Labs:  Recent Labs   Lab Test 05/07/24  0500 05/06/24  1729   PLT  --  135*   BUN 10.8  --        Physical Exam:  Vitals: /68   Pulse 63   Temp 98  F (36.7  C) (Oral)   Resp 12   Ht 1.702 m (5' 7\")   Wt 81.4 kg (179 lb 7.3 oz)   LMP  (LMP Unknown)   SpO2 98%   BMI 28.11 kg/m      Physical Exam:   Orientation:  Alert, oriented, and in no acute distress: Yes  Sedation: No    Motor Examination:  Able to move bilateral lower extremities to gravity    Catheter Site:   Catheter entry site is clean/dry/intact: Yes    Tender: No      Relevant Medications:  Current Pain Medications:  Medications related to Pain Management (From now, onward)      Start     Dose/Rate Route Frequency Ordered Stop    05/10/24 0730  ibuprofen (ADVIL/MOTRIN) tablet 600 mg        Placed in \"Followed by\" Linked Group    600 mg Oral EVERY 6 HOURS 05/07/24 0715      05/09/24 1200  acetaminophen (TYLENOL) tablet 650 mg         650 mg Oral EVERY 4 HOURS PRN 05/06/24 1622      05/07/24 " "1230  acetaminophen (TYLENOL) tablet 975 mg         975 mg Oral EVERY 6 HOURS 05/07/24 0715      05/07/24 1125  simethicone (MYLICON) chewable tablet  mg          mg Oral EVERY 6 HOURS PRN 05/07/24 1126      05/07/24 1100  HYDROmorphone (DILAUDID) 6 mcg/mL, BUPivacaine (MARCAINE) 0.0625 % in sodium chloride 0.9 % 250 mL EPIDURAL Infusion         6 mL/hr  EPIDURAL CONTINUOUS 05/07/24 1031      05/07/24 0730  ketorolac (TORADOL) injection 15 mg        Placed in \"Followed by\" Linked Group    15 mg Intravenous EVERY 6 HOURS 05/07/24 0715 05/10/24 0729    05/07/24 0447  hydrOXYzine HCl (ATARAX) tablet 25 mg        Placed in \"Or\" Linked Group    25 mg Oral EVERY 6 HOURS PRN 05/07/24 0447      05/07/24 0447  hydrOXYzine HCl (ATARAX) tablet 50 mg        Placed in \"Or\" Linked Group    50 mg Oral EVERY 6 HOURS PRN 05/07/24 0447      05/07/24 0000  diphenhydrAMINE (BENADRYL) capsule 25 mg        Placed in \"Or\" Linked Group    25 mg Oral EVERY 6 HOURS PRN 05/07/24 0000      05/07/24 0000  diphenhydrAMINE (BENADRYL) injection 25 mg        Placed in \"Or\" Linked Group    25 mg Intravenous EVERY 6 HOURS PRN 05/07/24 0000      05/06/24 2200  tiZANidine (ZANAFLEX) tablet 2 mg        Note to Pharmacy: PTA Sig:Take 1 tablet by mouth at bedtime      2 mg Oral AT BEDTIME 05/06/24 1622      05/06/24 2000  alvimopan (ENTEREG) capsule 12 mg         12 mg Oral 2 TIMES DAILY 05/06/24 1622 05/13/24 1959 05/06/24 2000  pregabalin (LYRICA) capsule 150 mg        Note to Pharmacy: PTA Sig:Take 100 mg by mouth 2 times daily      150 mg Oral 2 TIMES DAILY 05/06/24 1632      05/06/24 1622  HYDROmorphone (DILAUDID) injection 0.2 mg        Placed in \"Or\" Linked Group    0.2 mg Intravenous EVERY 2 HOURS PRN 05/06/24 1622      05/06/24 1622  HYDROmorphone (DILAUDID) injection 0.4 mg        Placed in \"Or\" Linked Group    0.4 mg Intravenous EVERY 2 HOURS PRN 05/06/24 1622      05/06/24 1622  clonazePAM (klonoPIN) half-tab 0.25-0.5 mg      " "  Note to Pharmacy: PTA Sig:Take 0.25-0.5 mg by mouth 3 times daily as needed for anxiety      0.25-0.5 mg Oral 3 TIMES DAILY PRN 05/06/24 1622      05/06/24 1622  lidocaine 1 % 0.1-1 mL         0.1-1 mL Other EVERY 1 HOUR PRN 05/06/24 1622      05/06/24 1622  lidocaine (LMX4) cream          Topical EVERY 1 HOUR PRN 05/06/24 1622      05/06/24 1411  oxyCODONE IR (ROXICODONE) tablet 10 mg        Placed in \"Or\" Linked Group    10 mg Oral EVERY 4 HOURS PRN 05/06/24 1411      05/06/24 1411  oxyCODONE (ROXICODONE) tablet 15 mg        Placed in \"Or\" Linked Group    15 mg Oral EVERY 4 HOURS PRN 05/06/24 1411      05/06/24 0755  nalbuphine (NUBAIN) injection 2.5-5 mg         2.5-5 mg Intravenous EVERY 6 HOURS PRN 05/06/24 0758              Primary Service Contacted with Recommendations? Yes      Acute Inpatient Pain Service [facility]  Hours of pain coverage 24/7   Page via Amcom- Please Page the Pain Team Via Amcom: \"PAIN MANAGEMENT ACUTE INPATIENT/ [facility]\"             "

## 2024-05-07 NOTE — OP NOTE
[facility] - Operative Note    Pre-operative diagnosis:   Ovarian cysts    Post-operative diagnosis: Same    Procedure(s):  Abdominal bilateral salpingo-oophorectomy in conjunction with total colectomy by colorectal surgery    Surgeons and Role:  Panel 2:     * Josias Pan MD - Primary     * Anupam Echols MD - Resident - Assisting     * Josias Pan MD    Anesthesia:   General     EBL: <5ml from this portion    Specimen(s):  ID Type Source Tests Collected by Time Destination   1 : Total Abdominal Colectomy Tissue Large Intestine, Colon SURGICAL PATHOLOGY EXAM Mali Ayala MD 5/6/2024 10:07 AM    2 : RIGHT FALLOPIAN TUBE AND OVARY Tissue Ovary and Fallopian Tube, Right SURGICAL PATHOLOGY EXAM (Canceled) Josias Pan MD 5/6/2024 11:24 AM    3 : Left Ovary and Fallopian Tube Tissue Ovary and Fallopian Tube, Left SURGICAL PATHOLOGY EXAM Mali Ayala MD 5/6/2024 11:54 AM        Findings: Uterus and cervix surgically absent. Bladder adherent to vaginal cuff. Right ovary with simple appearing cysts.  Left adnexa not readily apparent, adherent to the vaginal cuff and posteriorly to the rectum.    Complications: None apparent    Implants: None    Indication: Mimi Su is a 38 year old with history of total hysterectomy bilateral salpingectomy and extensive colorectal surgical history undergoing open abdominal surgery with colorectal surgery for a completion colectomy who was found to have ovarian cysts and desired concurrent removal of the ovaries in conjunction with colorectal surgery procedure.    Description of procedure:   Upon arrival to the operating room the patient was in dorsal lithotomy position under general anesthesia. The abdomen was open with Bradly retractor and Bookwalter retractors in place. The bowel had been packed cephalad by the colorectal surgery team who had completed their colectomy. Findings within the pelvis were as above. The  right retroperitoneum had been opened. This was further opened and the ureter was identified. A defect was made in the peritoneum beneath the ovarian vessels. The ovarian vessels were clamped, cut, and tied. They were  from the pelvic peritoneum using cautery. The ovary was also adherent to the vaginal cuff and was  with cautery. The ovary was sent for final pathology. The left retroperitoneum was opened near the bifurcation. The ureter was identified. The ovarian vessels were identified and isolated. They were clamped, cut, and tied. The ovary on this side was not readily identifiable as it was adherent to the vaginal cuff, bladder, and posterior to the posterior vagina and rectum. The bladder was dissected off the upper portion of the cuff. The ureter was identified far from the field and then using a combination of blunt dissection and cautery the adnexa was removed from the posterior vagina, rectum, and vaginal cuff. The specimen was sent for permanent pathology. We again visualized both ureters which were confirmed to be far from the areas of dissection. All areas of dissection were hemostatic. The case was then returned to the colorectal surgery team.     Josias Pan MD   Gynecologic Oncology

## 2024-05-07 NOTE — PLAN OF CARE
Pt A&Ox4, afebrile. Pain unmanaged with epidural hydromorphone. 10/10 pain and gave PRN oxycodone and dilaudid spaced out. Unrelieved and paged pain team. Epidural bolus given, pain better managed. RR's decreased as pt started to sleep, paged team and anesthesia. Held PRN pain meds. Anesthesia evaluated pt and epidural. Pt had unrelieved pain. Team aware. BP's hypotensive 86/48 and pt asymptomatic. 500ml LR bolus administered. BP still hypotensive, team aware. Pt started to itching and atarax given. LR continuously infuses @100 ml. Pt not OOB due to pain.

## 2024-05-08 ENCOUNTER — APPOINTMENT (OUTPATIENT)
Dept: PHYSICAL THERAPY | Facility: CLINIC | Age: 39
DRG: 330 | End: 2024-05-08
Attending: COLON & RECTAL SURGERY
Payer: COMMERCIAL

## 2024-05-08 LAB
GLUCOSE BLDC GLUCOMTR-MCNC: 189 MG/DL (ref 70–99)
GLUCOSE BLDC GLUCOMTR-MCNC: 219 MG/DL (ref 70–99)
GLUCOSE BLDC GLUCOMTR-MCNC: 66 MG/DL (ref 70–99)
GLUCOSE BLDC GLUCOMTR-MCNC: 68 MG/DL (ref 70–99)
HGB BLD-MCNC: 7.8 G/DL (ref 11.7–15.7)
MAGNESIUM SERPL-MCNC: 1.5 MG/DL (ref 1.7–2.3)

## 2024-05-08 PROCEDURE — 99231 SBSQ HOSP IP/OBS SF/LOW 25: CPT

## 2024-05-08 PROCEDURE — 258N000003 HC RX IP 258 OP 636

## 2024-05-08 PROCEDURE — 250N000013 HC RX MED GY IP 250 OP 250 PS 637: Performed by: STUDENT IN AN ORGANIZED HEALTH CARE EDUCATION/TRAINING PROGRAM

## 2024-05-08 PROCEDURE — 250N000011 HC RX IP 250 OP 636

## 2024-05-08 PROCEDURE — 97161 PT EVAL LOW COMPLEX 20 MIN: CPT | Mod: GP

## 2024-05-08 PROCEDURE — 258N000001 HC RX 258

## 2024-05-08 PROCEDURE — 97530 THERAPEUTIC ACTIVITIES: CPT | Mod: GP

## 2024-05-08 PROCEDURE — 83735 ASSAY OF MAGNESIUM: CPT | Performed by: PHYSICIAN ASSISTANT

## 2024-05-08 PROCEDURE — 250N000013 HC RX MED GY IP 250 OP 250 PS 637: Performed by: PHYSICIAN ASSISTANT

## 2024-05-08 PROCEDURE — 36415 COLL VENOUS BLD VENIPUNCTURE: CPT | Performed by: PHYSICIAN ASSISTANT

## 2024-05-08 PROCEDURE — 250N000011 HC RX IP 250 OP 636: Performed by: STUDENT IN AN ORGANIZED HEALTH CARE EDUCATION/TRAINING PROGRAM

## 2024-05-08 PROCEDURE — 258N000003 HC RX IP 258 OP 636: Performed by: STUDENT IN AN ORGANIZED HEALTH CARE EDUCATION/TRAINING PROGRAM

## 2024-05-08 PROCEDURE — 250N000013 HC RX MED GY IP 250 OP 250 PS 637: Performed by: COLON & RECTAL SURGERY

## 2024-05-08 PROCEDURE — 250N000011 HC RX IP 250 OP 636: Performed by: COLON & RECTAL SURGERY

## 2024-05-08 PROCEDURE — 85018 HEMOGLOBIN: CPT | Performed by: PHYSICIAN ASSISTANT

## 2024-05-08 PROCEDURE — 120N000002 HC R&B MED SURG/OB UMMC

## 2024-05-08 RX ORDER — HYDROXYZINE HYDROCHLORIDE 25 MG/1
25-50 TABLET, FILM COATED ORAL EVERY 4 HOURS PRN
Status: DISCONTINUED | OUTPATIENT
Start: 2024-05-08 | End: 2024-05-09

## 2024-05-08 RX ORDER — MAGNESIUM SULFATE HEPTAHYDRATE 40 MG/ML
2 INJECTION, SOLUTION INTRAVENOUS ONCE
Status: COMPLETED | OUTPATIENT
Start: 2024-05-08 | End: 2024-05-08

## 2024-05-08 RX ORDER — NICOTINE POLACRILEX 4 MG
15-30 LOZENGE BUCCAL
Status: DISCONTINUED | OUTPATIENT
Start: 2024-05-08 | End: 2024-05-13 | Stop reason: HOSPADM

## 2024-05-08 RX ORDER — HYDROMORPHONE HCL IN WATER/PF 6 MG/30 ML
0.4 PATIENT CONTROLLED ANALGESIA SYRINGE INTRAVENOUS ONCE
Status: COMPLETED | OUTPATIENT
Start: 2024-05-08 | End: 2024-05-08

## 2024-05-08 RX ORDER — DEXTROSE MONOHYDRATE 25 G/50ML
25-50 INJECTION, SOLUTION INTRAVENOUS
Status: DISCONTINUED | OUTPATIENT
Start: 2024-05-08 | End: 2024-05-13 | Stop reason: HOSPADM

## 2024-05-08 RX ADMIN — TIZANIDINE 2 MG: 2 TABLET ORAL at 21:21

## 2024-05-08 RX ADMIN — PANTOPRAZOLE SODIUM 40 MG: 20 TABLET, DELAYED RELEASE ORAL at 19:53

## 2024-05-08 RX ADMIN — ONDANSETRON 4 MG: 4 TABLET, ORALLY DISINTEGRATING ORAL at 18:45

## 2024-05-08 RX ADMIN — ACETAMINOPHEN 975 MG: 325 TABLET, FILM COATED ORAL at 06:00

## 2024-05-08 RX ADMIN — SODIUM CHLORIDE, POTASSIUM CHLORIDE, SODIUM LACTATE AND CALCIUM CHLORIDE 500 ML: 600; 310; 30; 20 INJECTION, SOLUTION INTRAVENOUS at 21:59

## 2024-05-08 RX ADMIN — OXYCODONE HYDROCHLORIDE 15 MG: 5 TABLET ORAL at 18:07

## 2024-05-08 RX ADMIN — ACETAMINOPHEN 975 MG: 325 TABLET, FILM COATED ORAL at 23:47

## 2024-05-08 RX ADMIN — KETOROLAC TROMETHAMINE 15 MG: 15 INJECTION, SOLUTION INTRAMUSCULAR; INTRAVENOUS at 09:00

## 2024-05-08 RX ADMIN — HYDROMORPHONE HYDROCHLORIDE 0.2 MG: 0.2 INJECTION, SOLUTION INTRAMUSCULAR; INTRAVENOUS; SUBCUTANEOUS at 05:10

## 2024-05-08 RX ADMIN — PREGABALIN 150 MG: 25 CAPSULE ORAL at 19:53

## 2024-05-08 RX ADMIN — MAGNESIUM SULFATE HEPTAHYDRATE 2 G: 2 INJECTION, SOLUTION INTRAVENOUS at 09:00

## 2024-05-08 RX ADMIN — FLUOXETINE HYDROCHLORIDE 20 MG: 20 CAPSULE ORAL at 08:59

## 2024-05-08 RX ADMIN — HEPARIN SODIUM 5000 UNITS: 5000 INJECTION, SOLUTION INTRAVENOUS; SUBCUTANEOUS at 09:00

## 2024-05-08 RX ADMIN — DEXTROSE MONOHYDRATE 50 ML: 25 INJECTION, SOLUTION INTRAVENOUS at 06:30

## 2024-05-08 RX ADMIN — HYDROMORPHONE HYDROCHLORIDE 0.4 MG: 0.2 INJECTION, SOLUTION INTRAMUSCULAR; INTRAVENOUS; SUBCUTANEOUS at 16:25

## 2024-05-08 RX ADMIN — KETOROLAC TROMETHAMINE 15 MG: 15 INJECTION, SOLUTION INTRAMUSCULAR; INTRAVENOUS at 19:53

## 2024-05-08 RX ADMIN — HYDROMORPHONE HYDROCHLORIDE 0.4 MG: 0.2 INJECTION, SOLUTION INTRAMUSCULAR; INTRAVENOUS; SUBCUTANEOUS at 21:59

## 2024-05-08 RX ADMIN — KETOROLAC TROMETHAMINE 15 MG: 15 INJECTION, SOLUTION INTRAMUSCULAR; INTRAVENOUS at 01:06

## 2024-05-08 RX ADMIN — ACETAMINOPHEN 975 MG: 325 TABLET, FILM COATED ORAL at 18:07

## 2024-05-08 RX ADMIN — HEPARIN SODIUM 5000 UNITS: 5000 INJECTION, SOLUTION INTRAVENOUS; SUBCUTANEOUS at 16:45

## 2024-05-08 RX ADMIN — HEPARIN SODIUM 5000 UNITS: 5000 INJECTION, SOLUTION INTRAVENOUS; SUBCUTANEOUS at 23:13

## 2024-05-08 RX ADMIN — OXYCODONE HYDROCHLORIDE 15 MG: 5 TABLET ORAL at 13:15

## 2024-05-08 RX ADMIN — HYDROMORPHONE HYDROCHLORIDE 0.4 MG: 0.2 INJECTION, SOLUTION INTRAMUSCULAR; INTRAVENOUS; SUBCUTANEOUS at 20:18

## 2024-05-08 RX ADMIN — PREGABALIN 150 MG: 25 CAPSULE ORAL at 08:59

## 2024-05-08 RX ADMIN — PANTOPRAZOLE SODIUM 40 MG: 20 TABLET, DELAYED RELEASE ORAL at 09:00

## 2024-05-08 RX ADMIN — SODIUM CHLORIDE, POTASSIUM CHLORIDE, SODIUM LACTATE AND CALCIUM CHLORIDE: 600; 310; 30; 20 INJECTION, SOLUTION INTRAVENOUS at 05:05

## 2024-05-08 RX ADMIN — KETOROLAC TROMETHAMINE 15 MG: 15 INJECTION, SOLUTION INTRAMUSCULAR; INTRAVENOUS at 14:04

## 2024-05-08 RX ADMIN — HYDROMORPHONE HYDROCHLORIDE 0.2 MG: 0.2 INJECTION, SOLUTION INTRAMUSCULAR; INTRAVENOUS; SUBCUTANEOUS at 05:19

## 2024-05-08 RX ADMIN — SIMETHICONE 80 MG: 80 TABLET, CHEWABLE ORAL at 18:45

## 2024-05-08 RX ADMIN — ALVIMOPAN 12 MG: 12 CAPSULE ORAL at 09:28

## 2024-05-08 RX ADMIN — SODIUM CHLORIDE, POTASSIUM CHLORIDE, SODIUM LACTATE AND CALCIUM CHLORIDE: 600; 310; 30; 20 INJECTION, SOLUTION INTRAVENOUS at 21:59

## 2024-05-08 RX ADMIN — OXYCODONE HYDROCHLORIDE 15 MG: 5 TABLET ORAL at 09:02

## 2024-05-08 RX ADMIN — OXYCODONE HYDROCHLORIDE 15 MG: 5 TABLET ORAL at 23:13

## 2024-05-08 RX ADMIN — ACETAMINOPHEN 975 MG: 325 TABLET, FILM COATED ORAL at 12:59

## 2024-05-08 ASSESSMENT — ACTIVITIES OF DAILY LIVING (ADL)
ADLS_ACUITY_SCORE: 26
ADLS_ACUITY_SCORE: 30
ADLS_ACUITY_SCORE: 30
ADLS_ACUITY_SCORE: 26
ADLS_ACUITY_SCORE: 28
ADLS_ACUITY_SCORE: 30
ADLS_ACUITY_SCORE: 26
ADLS_ACUITY_SCORE: 30
ADLS_ACUITY_SCORE: 28
ADLS_ACUITY_SCORE: 26
ADLS_ACUITY_SCORE: 30
ADLS_ACUITY_SCORE: 30
ADLS_ACUITY_SCORE: 26
ADLS_ACUITY_SCORE: 30
ADLS_ACUITY_SCORE: 28
ADLS_ACUITY_SCORE: 28
ADLS_ACUITY_SCORE: 26
ADLS_ACUITY_SCORE: 26

## 2024-05-08 NOTE — PROGRESS NOTES
[id number] VSS. A&Ox4. C/O moderate pain, managed w/ epidural and scheduled tylenol/toradol. Morning CBG 68, D50 given. Sugar now 189. Denies nausea. Pt currently resting comfortably, no other complaints at this time. Will continue w/ POC.

## 2024-05-08 NOTE — PROGRESS NOTES
Pain Service Progress Note  [facility]  Date: 05/08/2024       Patient Name: Mimi Su  MRN: [id number]  Age: 38 year old  Sex: female      Assessment:  Mimi Su is a 38 year old female with a history of chronic pain syndrome on chronic opioid therapy, borderline personality disorder, chemical dependecy, s/p cholecystectomy (6/2003), S/p colostomy (1/9/2012) done at [facility] and then a revision done 1/13/12, s/p Hysterectomy (12/2013), s/p two exploratory laparotomies (4/24/19) and (9/5/19) c/b numerous bowel obstructions due to adhesions.    Procedure:  Open total abdominal colectomy, end ileostomy placement, lysis of adhesions, and open bilateral Salpingo-oophorectomy    Date of Surgery: 5/6/2024    Date of Catheter Placement: 5/6/2024 - removed 5/8/2024 @ 1300 hours.  Tip intact    Plan/Recommendations:  1. Regional Anesthesia/Analgesia  -Continuous Catheter Type/Site:  epidural T8-9  -Patient has hypotension.  This may be secondary to the tizanidine.  May consider a different muscle relaxant if the patient is agreeable.  Infusate: 0.0625% bupivacaine hydromorphone 6 mcg/mL @ 8mL/hr --> Stopped infusion around 0900 hours today due to hypotension.     Removed catheter at 1300 hours with tip intact. Can resume SQH as scheduled (at least 1 hour after removal).         2. Anticoagulation  -Please contact Inpatient Pain Service before ordering or making any anticoagulation changes       3. Multimodal Analgesia  - per primary team     Pain Service will continue to follow.    Discussed with attending anesthesiologist    Isabel Alva DO, PGY-2/CA-1  Anesthesia Resident   Inpatient Pain Service   Contact via elmenus   05/08/2024     Overnight Events: Patient had increased pain overnight after moving to use commode and sustaining a fall, per patient. Pain team paged and infusion was increased overnight from 6mL/hr to 8mL/hr following a 3cc bolus with hemodynamic stability, she  "remained normotensive.      Tubes/Drains: Yes  - Ileostomy LLQ  - Tian  - Epidural catheter T8-9 - removed with tip intact @ 1300 hours  - PIV     Subjective:  still in pain  explaining she had a bad night after a fall while trying to use the commode and was not receiving the help she wanted. She reports better pain control today compared to yesterday but feels its the oxy that is actually helping.   Nausea: Yes  Vomiting: No   Pruritus: No   Symptoms of LAST: No    Pain Location:  Abdomen     Pain Intensity:    Pain at Rest: 7/10   Pain with Activity: 8-9/10  Comfort Goal: 5-6/10   Baseline Pain: 5-6/10   Satisfied with your level of pain control: No    Diet: Clear Liquid Diet    Relevant Labs:  Recent Labs   Lab Test 05/07/24  0500 05/06/24  1729   PLT  --  135*   BUN 10.8  --          Physical Exam:  Vitals: /64 (BP Location: Right arm)   Pulse 78   Temp 98.4  F (36.9  C) (Oral)   Resp 16   Ht 1.702 m (5' 7\")   Wt 81.4 kg (179 lb 7.3 oz)   LMP  (LMP Unknown)   SpO2 100%   BMI 28.11 kg/m      Physical Exam:   Orientation:  Alert, oriented, and in no acute distress: Yes  Sedation: No    Motor Examination:  Able to move bilateral lower extremities to gravity    Catheter Site:   Catheter entry site is clean/dry/intact: Yes - Removed at 1300 hours    Tender: No      Relevant Medications:  Current Pain Medications:  Medications related to Pain Management (From now, onward)      Start     Dose/Rate Route Frequency Ordered Stop    05/10/24 0800  ibuprofen (ADVIL/MOTRIN) tablet 600 mg        Placed in \"Followed by\" Linked Group    600 mg Oral EVERY 6 HOURS 05/07/24 0715      05/09/24 1200  acetaminophen (TYLENOL) tablet 650 mg         650 mg Oral EVERY 4 HOURS PRN 05/06/24 1622      05/07/24 1512  nalbuphine (NUBAIN) injection 2.6-5 mg         2.5-5 mg Intravenous EVERY 6 HOURS PRN 05/07/24 1515      05/07/24 1230  acetaminophen (TYLENOL) tablet 975 mg         975 mg Oral EVERY 6 HOURS 05/07/24 0715      " "05/07/24 1125  simethicone (MYLICON) chewable tablet  mg          mg Oral EVERY 6 HOURS PRN 05/07/24 1126      05/07/24 1100  HYDROmorphone (DILAUDID) 6 mcg/mL, BUPivacaine (MARCAINE) 0.0625 % in sodium chloride 0.9 % 250 mL EPIDURAL Infusion         8 mL/hr  EPIDURAL CONTINUOUS 05/07/24 1031      05/07/24 0730  ketorolac (TORADOL) injection 15 mg        Placed in \"Followed by\" Linked Group    15 mg Intravenous EVERY 6 HOURS 05/07/24 0715 05/10/24 0759    05/07/24 0447  hydrOXYzine HCl (ATARAX) tablet 25 mg        Placed in \"Or\" Linked Group    25 mg Oral EVERY 6 HOURS PRN 05/07/24 0447      05/07/24 0000  diphenhydrAMINE (BENADRYL) capsule 25 mg        Placed in \"Or\" Linked Group    25 mg Oral EVERY 6 HOURS PRN 05/07/24 0000      05/06/24 2200  tiZANidine (ZANAFLEX) tablet 2 mg        Note to Pharmacy: PTA Sig:Take 1 tablet by mouth at bedtime      2 mg Oral AT BEDTIME 05/06/24 1622      05/06/24 2000  pregabalin (LYRICA) capsule 150 mg        Note to Pharmacy: PTA Sig:Take 100 mg by mouth 2 times daily      150 mg Oral 2 TIMES DAILY 05/06/24 1632      05/06/24 1622  HYDROmorphone (DILAUDID) injection 0.2 mg        Placed in \"Or\" Linked Group    0.2 mg Intravenous EVERY 2 HOURS PRN 05/06/24 1622      05/06/24 1622  HYDROmorphone (DILAUDID) injection 0.4 mg        Placed in \"Or\" Linked Group    0.4 mg Intravenous EVERY 2 HOURS PRN 05/06/24 1622      05/06/24 1622  clonazePAM (klonoPIN) half-tab 0.25-0.5 mg        Note to Pharmacy: PTA Sig:Take 0.25-0.5 mg by mouth 3 times daily as needed for anxiety      0.25-0.5 mg Oral 3 TIMES DAILY PRN 05/06/24 1622      05/06/24 1622  lidocaine 1 % 0.1-1 mL         0.1-1 mL Other EVERY 1 HOUR PRN 05/06/24 1622      05/06/24 1622  lidocaine (LMX4) cream          Topical EVERY 1 HOUR PRN 05/06/24 1622      05/06/24 1411  oxyCODONE IR (ROXICODONE) tablet 10 mg        Placed in \"Or\" Linked Group    10 mg Oral EVERY 4 HOURS PRN 05/06/24 1411      05/06/24 1411  oxyCODONE " "(ROXICODONE) tablet 15 mg        Placed in \"Or\" Linked Group    15 mg Oral EVERY 4 HOURS PRN 05/06/24 1411              Primary Service Contacted with Recommendations? Yes      Acute Inpatient Pain Service [facility]  Hours of pain coverage 24/7   Page via Amcom- Please Page the Pain Team Via Amcom: \"PAIN MANAGEMENT ACUTE INPATIENT/ [facility]\"             "

## 2024-05-08 NOTE — PLAN OF CARE
Goal Outcome Evaluation:      Plan of Care Reviewed With: patient    Overall Patient Progress: no change    Neuro: A&O x4.   Cardiac: Hypotensive, MD notified, epidural stopped.   Respiratory: RA.   GI/: Ileostomy putting out moderate dark green output. (+) flatus. Voiding spontaneously, adequate UOP.   Diet/appetite: Low fiber diet tolerating well.  Activity: Ax1 general weakness & pain. Walked w/ PT & OT.  Pain: Pain 10/10 to abdomen and back w/ scheduled & PRN pain medications. Pain management team following care. Continue with accupressure, aromatherapy, cold, and heat for comfort.   Skin: Midline abdominal incision MARIANELA, CDI.   Lines:  R. PIV infusing LR @ 50. Epidural removed today at 1300.    Shift update:  Goals of care surrounded around pain management. Continue POC.

## 2024-05-08 NOTE — PLAN OF CARE
[id number]. BPs soft, OVSS on RA. Pain managed with scheduled tylenol, oxycodone and IV dilaudid x1. Intermittent nausea- PO zofran given. Simethicone x1 for abdominal cramping. Ileostomy with gas and stool. Pt emptying independently. Voiding in good urine volumes. PIV infusing LR @ 50ml/hr. Low fiber diet. Ambulating with SBA. Continue POC.                         DISCHARGE PLANNING    • Discharge to home or other facility with appropriate resources Progressing    MARYA PLANS TO D/C TO REHAB WHEN CLEARED      HEMATOLOGIC - ADULT    • Maintains hematologic stability Progressing    MARYA HAD HGB 8.6 AND LOW BP THIS EVEN

## 2024-05-08 NOTE — PROGRESS NOTES
"[facility]    Progress Note - Colorectal Service       Date of Admission:  5/6/2024    Assessment & Plan: Surgery   Ms. Su is a 38 year old female PMHx of history of chronic pain syndrome on chronic opioid therapy, borderline personality disorder, and multiple GI surgeries ( s/p cholecystectomy (6/2003). S/p colostomy (1/9/2012) done at [facility] and then a revision done 1/13/12. Hysterectomy (12/2013), two exploratory laparotomies (4/24/19) and (9/5/19) and history of numerous bowel obstructions due to adhesions) and is now s/p open total colectomy with end ileostomy and BSO on 5/6 with Dr. Ayala.     Patient now POD#2, pain remains difficult to manage, increased epidural overnight with some relief. 990 PO intake charted yesterday, patient having ileostomy output, will plan to advance diet and encourage PT/OT for mobilization.     -- Appreciate pain team recommendations    -- Multimodal therapies   -- Advance to LFD today, decrease mIVF to 50 mL/hr from 75 mL/hr    -- PRN Anti-nausea medications  -- Encourage OOB, to chair   -- PT/OT  -- Relevant PTA meds resumed  DVT PPX: SQH     Diet: Low Fiber Diet    DVT Prophylaxis: Heparin SQ  Tian Catheter: Not present  Lines: None     Drains: None     Cardiac Monitoring: None  Code Status: Full Code      Clinically Significant Risk Factors            # Hypomagnesemia: Lowest Mg = 1.5 mg/dL in last 2 days, will replace as needed              # Overweight: Estimated body mass index is 28.11 kg/m  as calculated from the following:    Height as of this encounter: 1.702 m (5' 7\").    Weight as of this encounter: 81.4 kg (179 lb 7.3 oz)., PRESENT ON ADMISSION     # Financial/Environmental Concerns: unable to afford rent/mortgage       Disposition Plan     Expected Discharge Date: 05/09/2024      Destination: home with family           The patient's care was discussed with the fellow who discussed with staff.    Gordon" MD Theo  [facility]  Text page via [facility] Paging/Directory     ______________________________________________________________________    Interval History   Afebrile, normotensive, not tachycardic. Increased epidural yesterday, patient reporting pain is not controlled, states nausea with PO intake, voiding since removal of cummings. Having gaseous and stool output in ileostomy bag.     Physical Exam   Vital Signs: Temp: 98.4  F (36.9  C) Temp src: Oral BP: 115/64 Pulse: 78   Resp: 16 SpO2: 100 % O2 Device: Nasal cannula Oxygen Delivery: 2 LPM  Weight: 179 lbs 7.27 oz  Intake/Output Summary (Last 24 hours) at 5/8/2024 0746  Last data filed at 5/8/2024 0455  Gross per 24 hour   Intake 3870.41 ml   Output 2600 ml   Net 1270.41 ml     Gen: AAOx3, uncomfortable but in no acute distress  Pulm: Non-labored breathing  Abd:Soft, non-distended, tender in RLQ, Incision C/D/I, Stoma pink and viable with watery ileostomy output and gas in bag  Ext: Warm and well-perfused    Data     I have personally reviewed the following data over the past 24 hrs:    N/A  \   7.8 (L)   / N/A     N/A N/A N/A /  189 (H)   N/A N/A N/A \       Imaging results reviewed over the past 24 hrs:   No results found for this or any previous visit (from the past 24 hour(s)).

## 2024-05-08 NOTE — DISCHARGE SUMMARY
[facility]  Discharge Summary  Colon and Rectal Surgery     Mimi Su MRN# [id number]   YOB: 1985 Age: 38 year old     Date of Admission:  5/6/2024  Date of Discharge::  5/13/2024  3:01 PM  Admitting Physician:  Mali Ayala MD  Discharge Physician:  Mali Ayala MD  Primary Care Physician:        Mat Fernandez          Admission Diagnoses:   Slow transit constipation [K59.01]  Ovarian cysts  Chronic pain syndrome on chronic opiate therapy  Borderline personality disorder  Chemical dependency  History of cholecystectomy (2003)  S/p colostomy (2012)  Hysterectomy (2013)  Prior exploratory laparotomies (2019 x2)          Discharge Diagnosis:   Slow transit constipation [K59.01]  Ovarian cysts  Chronic pain syndrome on chronic opiate therapy  Borderline personality disorder  Chemical dependency  History of cholecystectomy (2003)  S/p colostomy (2012)  Hysterectomy (2013)  Prior exploratory laparotomies (2019 x2)  Hypomagnesemia  Acute blood loss anemia - multifactorial         Procedures:   5/6/2024:   PROCEDURE:  1. Exploratory laparotomy  2. Extended lysis of adhesions (80 minutes)  3. Total abdominal colectomy, end ileostomy  4. Bilateral salpingoophrectomy ([place])     ANESTHESIA: General endotracheal anesthesia plus epidural         Consultations:   PHARMACY IP CONSULT  NURSING TO CONSULT FOR VASCULAR ACCESS CARE IP CONSULT  WOUND OSTOMY CONTINENCE NURSE  IP CONSULT  CARE MANAGEMENT / SOCIAL WORK IP CONSULT  PHYSICAL THERAPY ADULT IP CONSULT  OCCUPATIONAL THERAPY ADULT IP CONSULT         Imaging Studies:     Results for orders placed or performed during the hospital encounter of 05/06/24   XR Thoracic Spine 1 View    Narrative    Exam: XR THORACIC SPINE 1 VIEW, 5/6/2024 2:35 PM     History: epidurogram    Comparison: None    Findings:  Single AP view of the thoracic spine. Epidural analgesia catheter tip  overlying the approximate  T10 vertebral level with administered  contrast outlining the expected epidural space. No cardiomegaly.  Visualized lungs are clear. S-shaped thoracic curvature.      Impression    Impression:  Epidural analgesia catheter tip overlying the approximate T10  vertebral level with administered contrast outlining the expected  epidural space.    CHRALEE CARDENAS MD         SYSTEM ID:  [id number]            Medications Prior to Admission:     Medications Prior to Admission   Medication Sig Dispense Refill Last Dose    clonazePAM (KLONOPIN) 0.5 MG tablet Take 0.25-0.5 mg by mouth 3 times daily as needed for anxiety   5/6/2024 at 0300    diphenhydrAMINE HCl (BENADRYL ALLERGY PO) Take 1 tablet by mouth nightly as needed (Sleep)   5/5/2024 at 2000    FLUoxetine (PROZAC) 20 MG capsule Take 20 mg by mouth every morning   5/6/2024 at 0300    ibuprofen (ADVIL/MOTRIN) 200 MG tablet Take 200 mg by mouth every 4 hours as needed for pain   5/5/2024 at 2000    linaclotide (LINZESS) 145 MCG capsule Take 145 mcg by mouth 2 times daily   5/6/2024 at 0300    melatonin 3 MG CAPS Take by mouth nightly as needed   5/5/2024 at 2000    multivitamin w/minerals (THERA-VIT-M) tablet Take 1 tablet by mouth every morning   Past Week    pantoprazole (PROTONIX) 40 MG EC tablet Take 2 tablets by mouth in the morning and 1 tablet by mouth in the evening.   5/6/2024 at 0300    pregabalin (LYRICA) 100 MG capsule Take 150 mg by mouth 2 times daily   5/6/2024 at 0300    sucralfate (CARAFATE) 1 GM tablet Take 1 g by mouth 4 times daily as needed   5/5/2024    tiZANidine (ZANAFLEX) 2 MG tablet Take 1 tablet by mouth at bedtime   5/5/2024    [DISCONTINUED] acetaminophen (TYLENOL) 325 MG tablet Take 325-650 mg by mouth every 6 hours as needed for mild pain   5/5/2024 at 0800    [DISCONTINUED] metroNIDAZOLE (FLAGYL) 500 MG tablet Take 1 tablet (500 mg) by mouth every 6 hours At 8:00 am, 2:00 pm, 8:00 pm the day prior to your surgery with neomycin and zofran. 3  tablet 0 5/5/2024    [DISCONTINUED] neomycin (MYCIFRADIN) 500 MG tablet Take 2 tablets (1,000 mg) by mouth every 6 hours At 8:00 am, 2:00 pm, 8:00 pm the day prior to your surgery with flagyl and zofran. 6 tablet 0 5/5/2024 at 0800    [DISCONTINUED] ondansetron (ZOFRAN ODT) 4 MG ODT tab Take 4 mg by mouth every 8 hours as needed   5/5/2024 at 0800    [DISCONTINUED] ondansetron (ZOFRAN) 4 MG tablet Take 1 tablet (4 mg) by mouth every 6 hours At 8:00 am, 2:00 pm, 8:00 pm the day prior to your surgery with neomycin and flagyl. 3 tablet 0 5/5/2024 at 0800    [DISCONTINUED] senna (SENOKOT) 8.6 MG tablet Take 1 tablet by mouth every evening   5/5/2024 at 2000              Discharge Medications:     Current Discharge Medication List        START taking these medications    Details   calcium carbonate (TUMS) 500 MG chewable tablet Take 1-2 tablets (500-1,000 mg) by mouth 3 times daily as needed for heartburn  Qty: 30 tablet, Refills: 0    Associated Diagnoses: Acute post-operative pain      famotidine (PEPCID) 10 MG tablet Take 1 tablet (10 mg) by mouth 2 times daily  Qty: 30 tablet, Refills: 0    Associated Diagnoses: Acute post-operative pain      hydrOXYzine HCl (ATARAX) 25 MG tablet Take 1 tablet (25 mg) by mouth every 8 hours as needed for other or anxiety (adjuvant pain)  Qty: 10 tablet, Refills: 0    Associated Diagnoses: Acute post-operative pain      Lidocaine (LIDOCARE) 4 % Patch Place 2 patches onto the skin every 24 hours To prevent lidocaine toxicity, patient should be patch free for 12 hrs daily.  Qty: 10 patch, Refills: 0    Associated Diagnoses: Acute post-operative pain      simethicone (MYLICON) 80 MG chewable tablet Take 1-2 tablets ( mg) by mouth every 6 hours as needed for cramping  Qty: 40 tablet, Refills: 0    Associated Diagnoses: Acute post-operative pain           CONTINUE these medications which have CHANGED    Details   acetaminophen (TYLENOL) 325 MG tablet Take 3 tablets (975 mg) by mouth  every 6 hours  Qty: 75 tablet, Refills: 0    Associated Diagnoses: Acute post-operative pain      ibuprofen (ADVIL/MOTRIN) 600 MG tablet Take 1 tablet (600 mg) by mouth every 8 hours  Qty: 32 tablet, Refills: 0    Associated Diagnoses: Acute post-operative pain      !! tiZANidine (ZANAFLEX) 2 MG tablet Take 1 tablet (2 mg) by mouth 3 times daily for 7 days  Qty: 21 tablet, Refills: 0    Associated Diagnoses: Acute post-operative pain       !! - Potential duplicate medications found. Please discuss with provider.        CONTINUE these medications which have NOT CHANGED    Details   clonazePAM (KLONOPIN) 0.5 MG tablet Take 0.25-0.5 mg by mouth 3 times daily as needed for anxiety      diphenhydrAMINE HCl (BENADRYL ALLERGY PO) Take 1 tablet by mouth nightly as needed (Sleep)      FLUoxetine (PROZAC) 20 MG capsule Take 20 mg by mouth every morning      melatonin 3 MG CAPS Take by mouth nightly as needed      multivitamin w/minerals (THERA-VIT-M) tablet Take 1 tablet by mouth every morning      pantoprazole (PROTONIX) 40 MG EC tablet Take 2 tablets by mouth in the morning and 1 tablet by mouth in the evening.      pregabalin (LYRICA) 100 MG capsule Take 150 mg by mouth 2 times daily      sucralfate (CARAFATE) 1 GM tablet Take 1 g by mouth 4 times daily as needed      !! tiZANidine (ZANAFLEX) 2 MG tablet Take 1 tablet by mouth at bedtime       !! - Potential duplicate medications found. Please discuss with provider.        STOP taking these medications       linaclotide (LINZESS) 145 MCG capsule Comments:   Reason for Stopping:         metroNIDAZOLE (FLAGYL) 500 MG tablet Comments:   Reason for Stopping:         neomycin (MYCIFRADIN) 500 MG tablet Comments:   Reason for Stopping:         ondansetron (ZOFRAN ODT) 4 MG ODT tab Comments:   Reason for Stopping:         ondansetron (ZOFRAN) 4 MG tablet Comments:   Reason for Stopping:         senna (SENOKOT) 8.6 MG tablet Comments:   Reason for Stopping:                      "Brief History of Illness:   Ms. Su is a 38 year old female PMHx of history of chronic pain syndrome on chronic opioid therapy, borderline personality disorder, and multiple GI surgeries ( s/p cholecystectomy (6/2003). S/p colostomy (1/9/2012) done at [facility] and then a revision done 1/13/12. Hysterectomy (12/2013), two exploratory laparotomies (4/24/19) and (9/5/19) and history of numerous bowel obstructions due to adhesions) and is now s/p open total colectomy with end ileostomy and BSO on 5/6 with Dr. Ayala & Dr. Pan.           Hospital Course:   Post-operatively pt was gently fluid resuscitated. Tian was removed and pt was able to void. Pt had eventual return of bowel function and was able to tolerate small amounts of a low fiber diet. Imodium was started and titrated up to 2 mg three times a day to lower her output.     Pt was seen by [facility] and taught how to manage her new ileostomy. Pt was seen by PT/OT and deemed appropriate for discharge home. Post-operative pain was controlled with scheduled tylenol, ibuprofen, robaxin and prn oxycodone. She was discharged home a low fiber diet, with adequate pain control, and imodium for her ileostomy output.     Patient is to follow up in the Colon and Rectal Surgery Clinic in 2-3 week with Allie Harris NP or Tammy Farias PA-C and then with Dr. Ayala in 3-4 weeks after.          Day of Discharge Physical Exam:   Blood pressure (!) 89/52, pulse 78, temperature 98.6  F (37  C), temperature source Oral, resp. rate 14, height 1.702 m (5' 7\"), weight 81.4 kg (179 lb 7.3 oz), SpO2 100%.    Gen: AAOx3, comfortable but in no acute distress.   Pulm: Non-labored breathing on room air  Abd: Soft, non-distended, Incision C/D/I, Stoma pink and viable with moderate amounts of watery ileostomy output and some gas in bag  Ext:      Warm and well-perfused         Final Pathology Result:     Case Report   Surgical Pathology Report                         Case: " "[id number]                                   Authorizing Provider:  Mali Ayala MD    Collected:           05/06/2024 10:07 AM           Ordering Location:     [facility] MAIN OR                 Received:            05/06/2024 12:33 PM           Pathologist:           Kayla Trivedi MD                                                             Specimens:   A) - Large Intestine, Colon, Total Abdominal Colectomy                                              B) - Ovary and Fallopian Tube, Left, Left Ovary and Fallopian Tube                                  C) - Ovary and Fallopian Tube, Right, Right fallopian tube and ovary                      Final Diagnosis   A. COLON, TOTAL ABDOMINAL COLECTOMY:  - Tubular adenoma, 0.4 cm in greatest dimension, negative for high-grade dysplasia  - Colonic mucosa with no significant histopathologic changes  - Serosal fibrous adhesions  - Colonic mucosa and cutaneous anastomotic junction with nonspecific mucosa inflammation and congestion, consistent with colostomy  - Eight reactive lymph nodes examined  - Negative for malignancy     B. LEFT OVARY AND FALLOPIAN TUBE, LEFT \"SALPINGO\"-OOPHORECTOMY:  - Ovary with surface adhesions and corpus luteum  - Serous cystadenoma, 2.1 cm in greatest dimension  - No fallopian tube identified  - Negative for malignancy     C. RIGHT FALLOPIAN TUBE AND OVARY, RIGHT \"SALPINGO\"-OOPHORECTOMY:  - Ovary with surface adhesions and corpus luteum  - Serous cystadenoma, 3.0 cm in greatest dimension  - No fallopian tube identified  - Negative for malignancy          Discharge Instructions and Follow-Up:     Discharge Procedure Orders   Reason for your hospital stay   Order Comments: 5/6/2024:   PROCEDURE:  1. Exploratory laparotomy  2. Extended lysis of adhesions (80 minutes)  3. Total abdominal colectomy, end ileostomy  4. Bilateral salpingoophrectomy ([place])   "   ANESTHESIA: General endotracheal anesthesia plus epidural     Activity   Order Comments: Your activity upon discharge:   ACTIVITY  -No lifting, pushing, pulling greater than 10 lbs and no strenuous exercise for 6 weeks   -No driving while on narcotic analgesics (i.e. Percocet, oxycodone, Vicodin)  -No driving until you are able to fully twist to both sides or slam on brakes quickly and without any pain  -We encourage walking at least 4-5 times per day     Order Specific Question Answer Comments   Is discharge order? Yes      Adult [facility] Follow-up and recommended labs and tests   Order Comments: WOUND CARE  -Inspect your wounds daily for signs of infection (increased redness, drainage, pain)  -Keep your wound clean and dry  -You may shower, but do not soak in tub or pool    NOTIFY  Please contact Emily Fontenot RN or Minnie Delcid RN at [phone] for problems after discharge such as:  -Temperature > 101F, chills, rigors, dizziness  -Redness around or purulent drainage from wound  -Inability to tolerate diet, nausea or vomiting  -You stop passing gas (or your stoma stops functioning), develop significant bloating, abdominal pain  -You have dark and low volume urine  -Have blood in stools/vomit  -Have severe diarrhea/constipation  -Any other questions or concerns.  - At nights (after 4:30pm), on weekends, or if urgent, call [phone] and ask the  to speak with the on-call Colorectal Surgery resident or fellow    -Measure your total daily ileostomy output and record.  If you have LESS than 500mL or GREATER than 1000mL of ileostomy output within a 24 hour period, please notify the Colorectal Nurse.  If you have greater than 1000-1200mL in a 24 hour period or greater than 500cc for three consecutive 8 hour shifts, take Imodium 2mg once, stay hydrated especially with Pedialyte and call the Colorectal Clinic to further discuss.      Medication Instructions  Some of your medications may have changed. Please  take only prescribed and resumed medications     FOLLOW-UP  1.  You will need to follow-up with Allie Harris NP or Tammy Farias PA-C in the Colon and Rectal Surgery clinic in 2-3 week(s) and then with Dr. Ayala in 4-5 weeks after.  Please contact our Nurses (phone # [phone]) if you have not heard from our clinic in 3 business days afer discharge to schedule a follow-up appointment.  Please bring your log of daily ileostomy outputs to your follow up clinic appointment.   2.  Follow up with your primary care provider in 1-2 weeks after discharge from the hospital to review this hospitalization.         ORAL REHYDRATION RECIPE for Home Use  With your new ileostomy, you are at increased risk for dehydration,  Especially if you have high ileostomy outputs, or low appetites, you can help prevent it by drinking this mixture as directed.  Some examples of commercially prepared Oral Rehydration Solutions are: Pedialyte (solution, powder packet, Freeze Pops), or Drip Drop.     Electrolyte Water   -4 cups of water (equal to 1 quart or 32 ounces)   -  teaspoon salt   -6 teaspoons sugar   -Crystal Light if desired (or other choice Nutrasweet or Splenda flavoring - SUGAR-FREE) to taste (recommend lemonade or orange-pineapple flavors, but any flavor will work)    Add two cups of tap water to a large container. With measuring spoons (not table silverware), add the dry ingredients and stir or shake well. Add two additional cups of water. This will equal one quart of Electrolyte Water. Add sugar-free flavoring if desired. Best if chilled. Sip solution throughout the day. Discard after 24 hours.        Appointments on [place] and/or [facility] (with [facility] or [facility] provider or service). Call [phone] if you haven't heard regarding these appointments within 7 days of discharge.     Diet   Order Comments: Follow this diet upon discharge:   DIET  -Low Residue Diet for at least 4-6 weeks unless cleared by  Colorectal surgery.  No raw vegetables, fruit skins, fibrous foods that require a lot of chewing, nuts, seeds, corn, popcorn.   -We recommend eating slowly, chewing thoroughly, eating small frequent meals throughout the day  -Stay well hydrated.    -Follow the food recommendations given by the Nutritionist and the Wound Ostomy Nurse.     Order Specific Question Answer Comments   Is discharge order? Yes             Home Health Care:     Not needed           Discharge Disposition:     Discharged to home      Condition at discharge: Stable    Gordon Venegas MD  PGY-1 Surgery

## 2024-05-09 ENCOUNTER — APPOINTMENT (OUTPATIENT)
Dept: PHYSICAL THERAPY | Facility: CLINIC | Age: 39
DRG: 330 | End: 2024-05-09
Attending: COLON & RECTAL SURGERY
Payer: COMMERCIAL

## 2024-05-09 LAB
HOLD SPECIMEN: NORMAL
PLATELET # BLD AUTO: 115 10E3/UL (ref 150–450)

## 2024-05-09 PROCEDURE — 258N000003 HC RX IP 258 OP 636

## 2024-05-09 PROCEDURE — 999N000199 HC STATISTIC WOC PT EDUCATION, 31-45 MIN

## 2024-05-09 PROCEDURE — 250N000011 HC RX IP 250 OP 636: Performed by: COLON & RECTAL SURGERY

## 2024-05-09 PROCEDURE — 250N000013 HC RX MED GY IP 250 OP 250 PS 637: Performed by: PHYSICIAN ASSISTANT

## 2024-05-09 PROCEDURE — 250N000011 HC RX IP 250 OP 636

## 2024-05-09 PROCEDURE — 97116 GAIT TRAINING THERAPY: CPT | Mod: GP

## 2024-05-09 PROCEDURE — 36415 COLL VENOUS BLD VENIPUNCTURE: CPT | Performed by: COLON & RECTAL SURGERY

## 2024-05-09 PROCEDURE — 250N000011 HC RX IP 250 OP 636: Performed by: STUDENT IN AN ORGANIZED HEALTH CARE EDUCATION/TRAINING PROGRAM

## 2024-05-09 PROCEDURE — 999N000111 HC STATISTIC OT IP EVAL DEFER: Performed by: OCCUPATIONAL THERAPIST

## 2024-05-09 PROCEDURE — 85049 AUTOMATED PLATELET COUNT: CPT | Performed by: COLON & RECTAL SURGERY

## 2024-05-09 PROCEDURE — 250N000013 HC RX MED GY IP 250 OP 250 PS 637: Performed by: COLON & RECTAL SURGERY

## 2024-05-09 PROCEDURE — 250N000013 HC RX MED GY IP 250 OP 250 PS 637: Performed by: STUDENT IN AN ORGANIZED HEALTH CARE EDUCATION/TRAINING PROGRAM

## 2024-05-09 PROCEDURE — 120N000002 HC R&B MED SURG/OB UMMC

## 2024-05-09 PROCEDURE — 250N000013 HC RX MED GY IP 250 OP 250 PS 637

## 2024-05-09 PROCEDURE — 250N000011 HC RX IP 250 OP 636: Performed by: PHYSICIAN ASSISTANT

## 2024-05-09 RX ORDER — HYDROXYZINE HYDROCHLORIDE 25 MG/1
25 TABLET, FILM COATED ORAL EVERY 8 HOURS PRN
Qty: 10 TABLET | Refills: 0 | Status: SHIPPED | OUTPATIENT
Start: 2024-05-09 | End: 2024-05-13

## 2024-05-09 RX ORDER — CLONAZEPAM 0.5 MG/1
.25-.5 TABLET ORAL 2 TIMES DAILY PRN
Status: DISCONTINUED | OUTPATIENT
Start: 2024-05-09 | End: 2024-05-13 | Stop reason: HOSPADM

## 2024-05-09 RX ORDER — METHOCARBAMOL 500 MG/1
500 TABLET, FILM COATED ORAL 2 TIMES DAILY
Status: DISCONTINUED | OUTPATIENT
Start: 2024-05-09 | End: 2024-05-09

## 2024-05-09 RX ORDER — HYDROXYZINE HYDROCHLORIDE 50 MG/1
50 TABLET, FILM COATED ORAL EVERY 4 HOURS PRN
Status: DISCONTINUED | OUTPATIENT
Start: 2024-05-09 | End: 2024-05-13 | Stop reason: HOSPADM

## 2024-05-09 RX ORDER — LIDOCAINE 4 G/G
2 PATCH TOPICAL EVERY 24 HOURS
Qty: 10 PATCH | Refills: 0 | Status: SHIPPED | OUTPATIENT
Start: 2024-05-09 | End: 2024-08-08

## 2024-05-09 RX ORDER — SIMETHICONE 80 MG
80-160 TABLET,CHEWABLE ORAL EVERY 6 HOURS PRN
Qty: 40 TABLET | Refills: 0 | Status: SHIPPED | OUTPATIENT
Start: 2024-05-09

## 2024-05-09 RX ORDER — METHOCARBAMOL 750 MG/1
750 TABLET, FILM COATED ORAL 3 TIMES DAILY
Status: DISCONTINUED | OUTPATIENT
Start: 2024-05-09 | End: 2024-05-13 | Stop reason: HOSPADM

## 2024-05-09 RX ORDER — IBUPROFEN 600 MG/1
600 TABLET, FILM COATED ORAL EVERY 6 HOURS
Qty: 32 TABLET | Refills: 0 | Status: SHIPPED | OUTPATIENT
Start: 2024-05-10 | End: 2024-05-09

## 2024-05-09 RX ORDER — HYDROMORPHONE HCL IN WATER/PF 6 MG/30 ML
0.2 PATIENT CONTROLLED ANALGESIA SYRINGE INTRAVENOUS
Status: DISCONTINUED | OUTPATIENT
Start: 2024-05-09 | End: 2024-05-13 | Stop reason: HOSPADM

## 2024-05-09 RX ORDER — IBUPROFEN 600 MG/1
600 TABLET, FILM COATED ORAL EVERY 8 HOURS
Qty: 32 TABLET | Refills: 0 | Status: SHIPPED | OUTPATIENT
Start: 2024-05-09

## 2024-05-09 RX ORDER — HYDROMORPHONE HCL IN WATER/PF 6 MG/30 ML
0.2 PATIENT CONTROLLED ANALGESIA SYRINGE INTRAVENOUS
Status: DISCONTINUED | OUTPATIENT
Start: 2024-05-09 | End: 2024-05-09

## 2024-05-09 RX ORDER — HYDROMORPHONE HCL IN WATER/PF 6 MG/30 ML
0.4 PATIENT CONTROLLED ANALGESIA SYRINGE INTRAVENOUS EVERY 4 HOURS PRN
Status: DISCONTINUED | OUTPATIENT
Start: 2024-05-09 | End: 2024-05-09

## 2024-05-09 RX ORDER — HYDROMORPHONE HCL IN WATER/PF 6 MG/30 ML
0.2 PATIENT CONTROLLED ANALGESIA SYRINGE INTRAVENOUS EVERY 4 HOURS PRN
Status: DISCONTINUED | OUTPATIENT
Start: 2024-05-09 | End: 2024-05-09

## 2024-05-09 RX ORDER — CALCIUM CARBONATE 500 MG/1
500-1000 TABLET, CHEWABLE ORAL 3 TIMES DAILY PRN
Status: DISCONTINUED | OUTPATIENT
Start: 2024-05-09 | End: 2024-05-13 | Stop reason: HOSPADM

## 2024-05-09 RX ORDER — HYDROMORPHONE HCL IN WATER/PF 6 MG/30 ML
0.4 PATIENT CONTROLLED ANALGESIA SYRINGE INTRAVENOUS EVERY 4 HOURS PRN
Status: DISCONTINUED | OUTPATIENT
Start: 2024-05-09 | End: 2024-05-13 | Stop reason: HOSPADM

## 2024-05-09 RX ORDER — ACETAMINOPHEN 325 MG/1
975 TABLET ORAL EVERY 6 HOURS
Qty: 75 TABLET | Refills: 0 | Status: SHIPPED | OUTPATIENT
Start: 2024-05-09 | End: 2024-08-08

## 2024-05-09 RX ORDER — LIDOCAINE 4 G/G
2 PATCH TOPICAL
Status: DISCONTINUED | OUTPATIENT
Start: 2024-05-09 | End: 2024-05-13 | Stop reason: HOSPADM

## 2024-05-09 RX ORDER — FAMOTIDINE 10 MG
10 TABLET ORAL 2 TIMES DAILY
Status: DISCONTINUED | OUTPATIENT
Start: 2024-05-09 | End: 2024-05-13 | Stop reason: HOSPADM

## 2024-05-09 RX ORDER — CLONAZEPAM 0.5 MG/1
.25-.5 TABLET ORAL AT BEDTIME
Status: DISCONTINUED | OUTPATIENT
Start: 2024-05-09 | End: 2024-05-10

## 2024-05-09 RX ADMIN — FAMOTIDINE 10 MG: 10 TABLET ORAL at 20:12

## 2024-05-09 RX ADMIN — PANTOPRAZOLE SODIUM 40 MG: 20 TABLET, DELAYED RELEASE ORAL at 08:14

## 2024-05-09 RX ADMIN — TIZANIDINE 2 MG: 2 TABLET ORAL at 21:01

## 2024-05-09 RX ADMIN — HYDROMORPHONE HYDROCHLORIDE 0.2 MG: 0.2 INJECTION, SOLUTION INTRAMUSCULAR; INTRAVENOUS; SUBCUTANEOUS at 09:18

## 2024-05-09 RX ADMIN — HYDROMORPHONE HYDROCHLORIDE 0.2 MG: 0.2 INJECTION, SOLUTION INTRAMUSCULAR; INTRAVENOUS; SUBCUTANEOUS at 21:01

## 2024-05-09 RX ADMIN — HYDROXYZINE HYDROCHLORIDE 50 MG: 50 TABLET ORAL at 20:12

## 2024-05-09 RX ADMIN — ACETAMINOPHEN 975 MG: 325 TABLET, FILM COATED ORAL at 13:04

## 2024-05-09 RX ADMIN — HYDROMORPHONE HYDROCHLORIDE 0.4 MG: 0.2 INJECTION, SOLUTION INTRAMUSCULAR; INTRAVENOUS; SUBCUTANEOUS at 17:32

## 2024-05-09 RX ADMIN — PREGABALIN 150 MG: 25 CAPSULE ORAL at 19:29

## 2024-05-09 RX ADMIN — HYDROMORPHONE HYDROCHLORIDE 0.4 MG: 0.2 INJECTION, SOLUTION INTRAMUSCULAR; INTRAVENOUS; SUBCUTANEOUS at 05:22

## 2024-05-09 RX ADMIN — LIDOCAINE 4% 2 PATCH: 40 PATCH TOPICAL at 08:15

## 2024-05-09 RX ADMIN — ACETAMINOPHEN 975 MG: 325 TABLET, FILM COATED ORAL at 08:14

## 2024-05-09 RX ADMIN — HEPARIN SODIUM 5000 UNITS: 5000 INJECTION, SOLUTION INTRAVENOUS; SUBCUTANEOUS at 15:02

## 2024-05-09 RX ADMIN — PREGABALIN 150 MG: 25 CAPSULE ORAL at 08:14

## 2024-05-09 RX ADMIN — HYDROMORPHONE HYDROCHLORIDE 0.2 MG: 0.2 INJECTION, SOLUTION INTRAMUSCULAR; INTRAVENOUS; SUBCUTANEOUS at 09:15

## 2024-05-09 RX ADMIN — KETOROLAC TROMETHAMINE 15 MG: 15 INJECTION, SOLUTION INTRAMUSCULAR; INTRAVENOUS at 19:29

## 2024-05-09 RX ADMIN — ONDANSETRON 4 MG: 2 INJECTION INTRAMUSCULAR; INTRAVENOUS at 18:33

## 2024-05-09 RX ADMIN — METHOCARBAMOL 500 MG: 500 TABLET ORAL at 13:04

## 2024-05-09 RX ADMIN — ACETAMINOPHEN 975 MG: 325 TABLET, FILM COATED ORAL at 19:29

## 2024-05-09 RX ADMIN — FLUOXETINE HYDROCHLORIDE 20 MG: 20 CAPSULE ORAL at 08:14

## 2024-05-09 RX ADMIN — HYDROMORPHONE HYDROCHLORIDE 0.2 MG: 0.2 INJECTION, SOLUTION INTRAMUSCULAR; INTRAVENOUS; SUBCUTANEOUS at 13:04

## 2024-05-09 RX ADMIN — ONDANSETRON 4 MG: 4 TABLET, ORALLY DISINTEGRATING ORAL at 11:38

## 2024-05-09 RX ADMIN — OXYCODONE HYDROCHLORIDE 15 MG: 5 TABLET ORAL at 15:02

## 2024-05-09 RX ADMIN — HYDROMORPHONE HYDROCHLORIDE 0.4 MG: 0.2 INJECTION, SOLUTION INTRAMUSCULAR; INTRAVENOUS; SUBCUTANEOUS at 01:14

## 2024-05-09 RX ADMIN — OXYCODONE HYDROCHLORIDE 15 MG: 5 TABLET ORAL at 11:23

## 2024-05-09 RX ADMIN — SODIUM CHLORIDE, POTASSIUM CHLORIDE, SODIUM LACTATE AND CALCIUM CHLORIDE: 600; 310; 30; 20 INJECTION, SOLUTION INTRAVENOUS at 19:37

## 2024-05-09 RX ADMIN — HYDROMORPHONE HYDROCHLORIDE 0.4 MG: 0.2 INJECTION, SOLUTION INTRAMUSCULAR; INTRAVENOUS; SUBCUTANEOUS at 23:43

## 2024-05-09 RX ADMIN — METHOCARBAMOL 750 MG: 750 TABLET ORAL at 18:47

## 2024-05-09 RX ADMIN — HYDROMORPHONE HYDROCHLORIDE 0.2 MG: 0.2 INJECTION, SOLUTION INTRAMUSCULAR; INTRAVENOUS; SUBCUTANEOUS at 12:21

## 2024-05-09 RX ADMIN — HEPARIN SODIUM 5000 UNITS: 5000 INJECTION, SOLUTION INTRAVENOUS; SUBCUTANEOUS at 23:43

## 2024-05-09 RX ADMIN — OXYCODONE HYDROCHLORIDE 15 MG: 5 TABLET ORAL at 06:53

## 2024-05-09 RX ADMIN — SODIUM CHLORIDE, POTASSIUM CHLORIDE, SODIUM LACTATE AND CALCIUM CHLORIDE 500 ML: 600; 310; 30; 20 INJECTION, SOLUTION INTRAVENOUS at 06:54

## 2024-05-09 RX ADMIN — PANTOPRAZOLE SODIUM 40 MG: 20 TABLET, DELAYED RELEASE ORAL at 19:29

## 2024-05-09 RX ADMIN — KETOROLAC TROMETHAMINE 15 MG: 15 INJECTION, SOLUTION INTRAMUSCULAR; INTRAVENOUS at 08:14

## 2024-05-09 RX ADMIN — HEPARIN SODIUM 5000 UNITS: 5000 INJECTION, SOLUTION INTRAVENOUS; SUBCUTANEOUS at 08:14

## 2024-05-09 RX ADMIN — SIMETHICONE 80 MG: 80 TABLET, CHEWABLE ORAL at 11:29

## 2024-05-09 RX ADMIN — CLONAZEPAM 0.5 MG: 0.5 TABLET ORAL at 21:02

## 2024-05-09 RX ADMIN — KETOROLAC TROMETHAMINE 15 MG: 15 INJECTION, SOLUTION INTRAMUSCULAR; INTRAVENOUS at 01:59

## 2024-05-09 RX ADMIN — OXYCODONE HYDROCHLORIDE 15 MG: 5 TABLET ORAL at 19:29

## 2024-05-09 ASSESSMENT — ACTIVITIES OF DAILY LIVING (ADL)
ADLS_ACUITY_SCORE: 29
ADLS_ACUITY_SCORE: 28
ADLS_ACUITY_SCORE: 29
ADLS_ACUITY_SCORE: 28
ADLS_ACUITY_SCORE: 29
ADLS_ACUITY_SCORE: 29
ADLS_ACUITY_SCORE: 28
ADLS_ACUITY_SCORE: 29

## 2024-05-09 NOTE — PROGRESS NOTES
Milady FINNEGAN in Pain team   5A 3571 A..W  Pt c/o 10/10 pain stating that current pain regiment hasn't been effective. She would like to speak at bedside and says '' I'd rather they sedate me or something until they figure this pain out'   [phone]

## 2024-05-09 NOTE — PROGRESS NOTES
[id number] VSS. A&Ox4. C/O intense pain in abdominal region, managed w/ PRN dilaudid/oxycodone and scheduled tylenol/toradol. Denies nausea. 500 ml bolus given for low BP. Ileostomy output green. No other complaints at this time, will continue w/ POC.

## 2024-05-09 NOTE — PROGRESS NOTES
Colorectal Surgery Progress Note  [facility]  POD#3    Subjective: Epidural anesthesia removed yesterday, diffuse itchiness now gone. Continued abdominal pain, moderate pain control with pain meds. Reported nausea/vomiting x2 last night due to pain. Not eating/drinking well due to nausea. Has not walked around much due to pain.      Vitals:  Vitals:    05/08/24 1400 05/08/24 1404 05/08/24 1534 05/09/24 0058   BP: 100/65  96/54 102/62   BP Location:   Right arm Right arm   Pulse:   72 89   Resp:   12 18   Temp:   97.9  F (36.6  C) 98.5  F (36.9  C)   TempSrc:   Oral Oral   SpO2: 100% 100% 100% 90%   Weight:       Height:         I/O:  I/O last 3 completed shifts:  In: 1670.83 [P.O.:420; I.V.:1250.83]  Out: 3750 [Urine:1175; Stool:2575]    Physical Exam:  Gen: AAOx3, uncomfortable but in no acute distress.   Pulm: Non-labored breathing  Abd: Soft, non-distended, tender in lower abdomen   Incision C/D/I   Stoma pink and viable with moderate volume watery ileostomy output and gas in bag  Ext:  Warm and well-perfused    BMP  Recent Labs   Lab 05/08/24  0655 05/08/24  0643 05/08/24  0616 05/08/24  0610 05/08/24  0556 05/07/24  0500   NA  --   --   --   --   --  137   POTASSIUM  --   --   --   --   --  3.9   CHLORIDE  --   --   --   --   --  106   CO2  --   --   --   --   --  24   BUN  --   --   --   --   --  10.8   CR  --   --   --   --   --  0.81   * 219* 66*  --  68* 105*   MAG  --   --   --  1.5*  --   --      CBC  Recent Labs   Lab 05/09/24  0514 05/08/24  0610 05/07/24  0500 05/06/24  1729   HGB  --  7.8* 8.1*  --    *  --   --  135*         ASSESSMENT: This is a 38 year old female PMHx of history of chronic pain syndrome on chronic opioid therapy, borderline personality disorder, and multiple GI surgeries (s/p cholecystectomy (6/2003). S/p colostomy (1/9/2012) done at [facility] and then a revision done 1/13/12. Hysterectomy (12/2013), two exploratory laparotomies  (4/24/19) and (9/5/19) and history of numerous bowel obstructions due to adhesions) and is now s/p open total colectomy with end ileostomy and BSO on 5/6 with Dr. Ayala. 5/7 having uncontrollable pain with epidural analgesia and persistent hypotension, epidural anesthesia removed on 5/8 with improved blood pressures.     - continue prn dilaudid, oxycodone and scheduled tylenol/toradol for pain control, appreciate recs from pain team  - ordered lidocaine patch for further pain management  - 2.3L watery ostomy output on 5/8 after starting low fiber diet, monitor ostomy output and consider loperamide/imodium if continued large volume output  - given 500cc bolus on 5/9 for large volume ostomy output  - continue IVF @ 50  - encourage OOB  - continue heparin ppx   - PT/OT    Diet: Low Fiber Diet  Diet    DVT Prophylaxis: Heparin SQ  Tian Catheter: Not present  Lines: None     Drains: None     Cardiac Monitoring: None  Code Status: Full Code        Elli Hu  MS4       Addendum:  Pt was seen and examined independent of the medical student.     S: pain not well controlled, continues to be on right side of abdomen.  Ate some mac and cheese but still nauseated due to pain.     O:  Vitals, I&Os reviewed.  BPs improved after epidural removed.   NAD  Norm resp effort  abd soft, non-distended.  Incisions c/d/i.   Stoma pink and viable with large volume liquid output.  No real substance to it.     A/P:  agree with plan above  - continue pain regimen  - LRD  - strict in and out  - LR 500cc bolus today  - may need to start bowel slowing regimen if continues with high ileo outputs  Ppx:  heparin TID    Meli Borges PA-C  Colon and Rectal Surgery     Seen and discussed with Dr. Carmona    The above plan of care was performed and communicated to me by Dr. Ayala    I spent 20 minute face-to-face or coordinating care of Mimi Su. Over 50% of our time on the unit was spent counseling the patient and/or coordinating care as  documented in the assessment and plan.     66801 post op hospital visit

## 2024-05-09 NOTE — PLAN OF CARE
OT: per chart review and discussion with interdisciplinary team. pt. is S/I with BADls and functional mob. amb. without AD. No acute IP OT needs identified at this time. OT orders completed. Defer to PT  for discharge recs.

## 2024-05-09 NOTE — PROGRESS NOTES
[facility] Nurse Inpatient Assessment     Consulted for: new ileostomy    Patient History (according to provider note(s):      38 year old female with a history of chronic pain syndrome on chronic opioid therapy, borderline personality disorder, chemical dependecy, s/p cholecystectomy (6/2003). S/p colostomy (1/9/2012) done at [facility] and then a revision done 1/13/12. Hysterectomy (12/2013). Patient has had two exploratory laparotomies (4/24/19) and (9/5/19). History of numerous bowel obstructions due to adhesions.      Assessment of new end Ileostomy:     Diagnosis Pertinent to Stoma:   Slow transit constipation       Surgery Date: 5/7/24  Surgeon:Mali Ayala       Hospital: [facility]  Pouching system in place on assessment today: [place] two piece, cut to fit, and flat   Pouch barrier status: intact   Pouch last changed/wear time: 2 days  Reason for pouch change today: pouch not changed today  Effectiveness of current pouching/ supply plan: Recommend continuing current plan  Change made with ostomy management today: No  Pouching system in place after visit today: [place] two piece, cut to fit, flat, and barrier ring, Cavilon   Home system:  Roseanne CeraPlus flat 2 piece 57 mm with tape border, opaque pouch.  No barrier ring, no sting barrier film.    Supplies: gathered, supplies stored on unit, and discussed with patient   Output 2575cc on 5/8    Stoma location: LLQ at site of previous colostomy  Stoma size: 1 1/4 inches,   Stoma appearance: red, round, moist, good turgor, edematous, and protruberant  Mucocutaneous junction:  intact  Peristomal complication(s): sutured incisions at 3 and 9 o'clock   Output: liquid and brown  Output volume emptied during visit: 0ml  Abdominal assessment: Firm  Surgical site(s):  open to air .  Incision well approximated with skin glue  NG still in place? No  Pain: Fullness and Stabbing.  pain not well controlled, continues to  "be on right side of abdomen   Is patient still on a PCA? No    Ostomy education assessment:  Participant of teaching session today: patient  and parent  Education completed today: Stoma assessment, Pouching system assessment , Intake and output recording, Fluid and electrolyte balance , Importance of hydration, and Low fiber diet   Educational materials/methods: Verbal, FOD [place] education hand out, and Addressed patient/caregiver questions/concerns  Education still needed: Pouch change return demonstration, Hernia prevention, and Discharge instructions  Learning Comprehension:   Psychosocial assessment: experienced with pouching her previous colostomy  Patient readiness for education today: attentive and in significant pain  Following today's visit: patient  is able to demonstrate;         1. How to empty their pouch? Yes-experienced        2. How to change their pouch? Yes- needs practice with new stoma        3. How to read and record intake and output correctly? Yes  Preparation for discharge completed: Ensured patient has extra supplies for discharge  Preparation for discharge still needed: Placed prescription recommendations in discharge navigator for MD to sign, Discussed how and when to make an outpatient [facility] nurse appointment after discharge, and Discuss signs/symptoms of when to seek medical attention  Pt support system on discharge: siblings, mother  WO recommend home care?  TBA  Face to face time: 35 minutes    Treatment Plan:     LLQ Ileostomy pouching plan:   Pouching system: ostomy supplies pouches: Roseanne 57 FECAL ([id number]) ostomy supplies barrier: Roseanne 57mm FLAT ([id number])  Accessories used: [facility] ostomy accessories: 2\" Cera Barrier Ring ([id number])   Frequency of pouch changes: Three times a week  WO follow up plan: Three times a week  Bedside RN interventions: Change pouch PRN if leaking using the supplies above, Empty pouch when 1/3 to 1/2 full, ensure to clean pouch outlet after emptying to " prevent odor, Notify WOC for ongoing pouch leakage, and Document stoma appearance and output volume, color, and consistency every shift       DATA:     Current support surface: Standard  Standard Isoflex gel  Containment of urine/stool: Continent of bladder and Ileostomy pouch  BMI: Body mass index is 28.11 kg/m .   Active diet order: Orders Placed This Encounter      Low Fiber Diet      Diet     Output: I/O last 3 completed shifts:  In: 1670.83 [P.O.:420; I.V.:1250.83]  Out: 3750 [Urine:1175; Stool:2575]     Labs:   Recent Labs   Lab 05/08/24  0610   HGB 7.8*     Pressure injury risk assessment:   Sensory Perception: 4-->no impairment  Moisture: 4-->rarely moist  Activity: 3-->walks occasionally  Mobility: 3-->slightly limited  Nutrition: 3-->adequate  Friction and Shear: 3-->no apparent problem  Trell Score: 20    Pager no longer is use, please contact through [employer] group: [facility] Nurse [place]  Dept. Office Number: [phone]

## 2024-05-09 NOTE — PROGRESS NOTES
Provider notification: AAKASH  Pt 5A [address]    Pt is would like to know if she can get the frequency of Robaxin increased. The last dose she got wa at 1pm today and it looks like the next scheduled is tomorrow at 8am.    Earline  [phone]

## 2024-05-09 NOTE — PLAN OF CARE
"Goal Outcome Evaluation:      Plan of Care Reviewed With: patient, parent    Overall Patient Progress: improvingOverall Patient Progress: improving    Outcome Evaluation: Pt ambulated around unit.    Neuro: AO x4.   Cardiac: Denies chest pain  Respiratory: RA.  GI/: Ileostomy putting out dark green output. + flatus. Voiding spontaneously, adequate UOP.   Diet/appetite: Low fiber diet. Fair tolerance. Per pt she had emesis this am, and was given zofran for nausea.  Activity: SBA.  Walked with PT and staff x3 per pt    Pain: Pain 8-10/10 in abd and back. Given scheduled & PRN pain medications. Provider encouraged weaning from IV pain meds. Pt still intermittently insists to get dilaudid. Provider made aware and came to talk to pt as she started crying this noon, c/o intense pain unmanaged by prn oxy 15mg and  0.2 IV dilaudid. Providers came to talk to pt at bedside and updated  pain mgmt schedule plan. Dry heaving again this sophia, given zofran, robaxin. Provider FYI'd and will come to speak to pt at bedside. Pt  was crying as well this evening, c/o pain and ineffective interventions, pt said '' I'd rather they sedate me or something until they figure this pain out\". Provider notified and Pain management team paged to come and see pt at bedside. Awaiting response. Provider okayed giving new robaxin dose 750mg and pt was given.Ice packs and emotional support provided and pt made aware that the provider has been paged and will come discuss pain mgmnt regiment with pt.     Skin: Midline abdominal incision MARIANELA, CDI. Cath site  (epidural was removed) covered by prima pore. Ileostomy bag.  Lines:  PIV   Shift update:  Pain management. Encourage ambulation. Continue POC.       "

## 2024-05-09 NOTE — PROGRESS NOTES
Provider notification: AUDREY MARTINEZ [address]  pt requested .4mg dilaudid prn. We had a conversation about the available dose to .2mg and new  PO options available  eg Robaxin as per orders. She's requesting to see the provider to discuss pain meds.   Earline [phone]      Update: Providers came to talk to pt at bedside and updated  pain mgmt schedule plan.

## 2024-05-09 NOTE — PROGRESS NOTES
Provider notification: CLARI MARTINEZ [address], Pt reported that she had green emesis, she still feels nauseated so I administered her prn zofran.     Earline JACKSON  [phone]    Response: To keep monitoring and ask pt to call next timeso that staff  can assess emesis.

## 2024-05-10 ENCOUNTER — APPOINTMENT (OUTPATIENT)
Dept: PHYSICAL THERAPY | Facility: CLINIC | Age: 39
DRG: 330 | End: 2024-05-10
Attending: COLON & RECTAL SURGERY
Payer: COMMERCIAL

## 2024-05-10 LAB
PATH REPORT.COMMENTS IMP SPEC: NORMAL
PATH REPORT.COMMENTS IMP SPEC: NORMAL
PATH REPORT.FINAL DX SPEC: NORMAL
PATH REPORT.GROSS SPEC: NORMAL
PATH REPORT.MICROSCOPIC SPEC OTHER STN: NORMAL
PATH REPORT.RELEVANT HX SPEC: NORMAL
PHOTO IMAGE: NORMAL

## 2024-05-10 PROCEDURE — 250N000013 HC RX MED GY IP 250 OP 250 PS 637: Performed by: PHYSICIAN ASSISTANT

## 2024-05-10 PROCEDURE — 120N000002 HC R&B MED SURG/OB UMMC

## 2024-05-10 PROCEDURE — 250N000011 HC RX IP 250 OP 636: Performed by: COLON & RECTAL SURGERY

## 2024-05-10 PROCEDURE — 97116 GAIT TRAINING THERAPY: CPT | Mod: GP

## 2024-05-10 PROCEDURE — 250N000013 HC RX MED GY IP 250 OP 250 PS 637: Performed by: COLON & RECTAL SURGERY

## 2024-05-10 PROCEDURE — 258N000003 HC RX IP 258 OP 636

## 2024-05-10 PROCEDURE — G0463 HOSPITAL OUTPT CLINIC VISIT: HCPCS

## 2024-05-10 PROCEDURE — 250N000013 HC RX MED GY IP 250 OP 250 PS 637

## 2024-05-10 PROCEDURE — 250N000013 HC RX MED GY IP 250 OP 250 PS 637: Performed by: STUDENT IN AN ORGANIZED HEALTH CARE EDUCATION/TRAINING PROGRAM

## 2024-05-10 PROCEDURE — 250N000011 HC RX IP 250 OP 636

## 2024-05-10 PROCEDURE — 250N000011 HC RX IP 250 OP 636: Performed by: STUDENT IN AN ORGANIZED HEALTH CARE EDUCATION/TRAINING PROGRAM

## 2024-05-10 RX ORDER — LOPERAMIDE HCL 2 MG
2 CAPSULE ORAL 2 TIMES DAILY
Status: DISCONTINUED | OUTPATIENT
Start: 2024-05-10 | End: 2024-05-10

## 2024-05-10 RX ORDER — TIZANIDINE 2 MG/1
2 TABLET ORAL 3 TIMES DAILY
Qty: 21 TABLET | Refills: 0 | Status: SHIPPED | OUTPATIENT
Start: 2024-05-10 | End: 2024-05-17

## 2024-05-10 RX ORDER — LOPERAMIDE HCL 2 MG
2 CAPSULE ORAL 3 TIMES DAILY
Status: DISCONTINUED | OUTPATIENT
Start: 2024-05-10 | End: 2024-05-13 | Stop reason: HOSPADM

## 2024-05-10 RX ORDER — CLONAZEPAM 0.5 MG/1
0.5 TABLET ORAL 2 TIMES DAILY
Status: DISCONTINUED | OUTPATIENT
Start: 2024-05-10 | End: 2024-05-13 | Stop reason: HOSPADM

## 2024-05-10 RX ORDER — TIZANIDINE 2 MG/1
2 TABLET ORAL 3 TIMES DAILY
Status: DISCONTINUED | OUTPATIENT
Start: 2024-05-10 | End: 2024-05-13 | Stop reason: HOSPADM

## 2024-05-10 RX ORDER — FAMOTIDINE 10 MG
10 TABLET ORAL 2 TIMES DAILY
Qty: 30 TABLET | Refills: 0 | Status: SHIPPED | OUTPATIENT
Start: 2024-05-10 | End: 2024-08-08

## 2024-05-10 RX ORDER — CALCIUM CARBONATE 500 MG/1
1-2 TABLET, CHEWABLE ORAL 3 TIMES DAILY PRN
Qty: 30 TABLET | Refills: 0 | Status: SHIPPED | OUTPATIENT
Start: 2024-05-10 | End: 2024-08-08

## 2024-05-10 RX ORDER — OXYCODONE HYDROCHLORIDE 10 MG/1
10 TABLET ORAL
Status: DISCONTINUED | OUTPATIENT
Start: 2024-05-10 | End: 2024-05-11

## 2024-05-10 RX ORDER — SUCRALFATE 1 G/1
1 TABLET ORAL
Status: DISCONTINUED | OUTPATIENT
Start: 2024-05-10 | End: 2024-05-13 | Stop reason: HOSPADM

## 2024-05-10 RX ADMIN — FLUOXETINE HYDROCHLORIDE 20 MG: 20 CAPSULE ORAL at 09:21

## 2024-05-10 RX ADMIN — PREGABALIN 150 MG: 25 CAPSULE ORAL at 21:14

## 2024-05-10 RX ADMIN — METHOCARBAMOL 750 MG: 750 TABLET ORAL at 19:22

## 2024-05-10 RX ADMIN — PANTOPRAZOLE SODIUM 20 MG: 20 TABLET, DELAYED RELEASE ORAL at 09:22

## 2024-05-10 RX ADMIN — FAMOTIDINE 10 MG: 10 TABLET ORAL at 09:22

## 2024-05-10 RX ADMIN — OXYCODONE HYDROCHLORIDE 10 MG: 10 TABLET ORAL at 21:15

## 2024-05-10 RX ADMIN — LOPERAMIDE HYDROCHLORIDE 2 MG: 2 CAPSULE ORAL at 17:23

## 2024-05-10 RX ADMIN — HYDROMORPHONE HYDROCHLORIDE 0.4 MG: 0.2 INJECTION, SOLUTION INTRAMUSCULAR; INTRAVENOUS; SUBCUTANEOUS at 11:06

## 2024-05-10 RX ADMIN — SUCRALFATE 1 G: 1 TABLET ORAL at 17:23

## 2024-05-10 RX ADMIN — HEPARIN SODIUM 5000 UNITS: 5000 INJECTION, SOLUTION INTRAVENOUS; SUBCUTANEOUS at 17:23

## 2024-05-10 RX ADMIN — FAMOTIDINE 10 MG: 10 TABLET ORAL at 19:22

## 2024-05-10 RX ADMIN — HYDROMORPHONE HYDROCHLORIDE 0.4 MG: 0.2 INJECTION, SOLUTION INTRAMUSCULAR; INTRAVENOUS; SUBCUTANEOUS at 06:43

## 2024-05-10 RX ADMIN — SODIUM CHLORIDE, POTASSIUM CHLORIDE, SODIUM LACTATE AND CALCIUM CHLORIDE: 600; 310; 30; 20 INJECTION, SOLUTION INTRAVENOUS at 11:09

## 2024-05-10 RX ADMIN — OXYCODONE HYDROCHLORIDE 15 MG: 5 TABLET ORAL at 08:28

## 2024-05-10 RX ADMIN — LOPERAMIDE HYDROCHLORIDE 2 MG: 2 CAPSULE ORAL at 09:22

## 2024-05-10 RX ADMIN — CLONAZEPAM 0.5 MG: 0.5 TABLET ORAL at 21:15

## 2024-05-10 RX ADMIN — ACETAMINOPHEN 975 MG: 325 TABLET, FILM COATED ORAL at 09:22

## 2024-05-10 RX ADMIN — HEPARIN SODIUM 5000 UNITS: 5000 INJECTION, SOLUTION INTRAVENOUS; SUBCUTANEOUS at 09:21

## 2024-05-10 RX ADMIN — TIZANIDINE 2 MG: 2 TABLET ORAL at 14:32

## 2024-05-10 RX ADMIN — PANTOPRAZOLE SODIUM 40 MG: 20 TABLET, DELAYED RELEASE ORAL at 19:22

## 2024-05-10 RX ADMIN — ONDANSETRON 4 MG: 4 TABLET, ORALLY DISINTEGRATING ORAL at 16:23

## 2024-05-10 RX ADMIN — PREGABALIN 150 MG: 25 CAPSULE ORAL at 09:22

## 2024-05-10 RX ADMIN — METHOCARBAMOL 750 MG: 750 TABLET ORAL at 09:23

## 2024-05-10 RX ADMIN — HYDROMORPHONE HYDROCHLORIDE 0.4 MG: 0.2 INJECTION, SOLUTION INTRAMUSCULAR; INTRAVENOUS; SUBCUTANEOUS at 16:15

## 2024-05-10 RX ADMIN — ONDANSETRON 4 MG: 4 TABLET, ORALLY DISINTEGRATING ORAL at 08:29

## 2024-05-10 RX ADMIN — ACETAMINOPHEN 975 MG: 325 TABLET, FILM COATED ORAL at 19:22

## 2024-05-10 RX ADMIN — LIDOCAINE 4% 2 PATCH: 40 PATCH TOPICAL at 09:23

## 2024-05-10 RX ADMIN — TIZANIDINE 2 MG: 2 TABLET ORAL at 21:14

## 2024-05-10 RX ADMIN — ACETAMINOPHEN 975 MG: 325 TABLET, FILM COATED ORAL at 01:47

## 2024-05-10 RX ADMIN — OXYCODONE HYDROCHLORIDE 10 MG: 10 TABLET ORAL at 14:33

## 2024-05-10 RX ADMIN — IBUPROFEN 600 MG: 600 TABLET, FILM COATED ORAL at 09:22

## 2024-05-10 RX ADMIN — ACETAMINOPHEN 975 MG: 325 TABLET, FILM COATED ORAL at 14:32

## 2024-05-10 RX ADMIN — KETOROLAC TROMETHAMINE 15 MG: 15 INJECTION, SOLUTION INTRAMUSCULAR; INTRAVENOUS at 01:47

## 2024-05-10 RX ADMIN — IBUPROFEN 600 MG: 600 TABLET, FILM COATED ORAL at 19:22

## 2024-05-10 RX ADMIN — HYDROMORPHONE HYDROCHLORIDE 0.4 MG: 0.2 INJECTION, SOLUTION INTRAMUSCULAR; INTRAVENOUS; SUBCUTANEOUS at 22:25

## 2024-05-10 RX ADMIN — SUCRALFATE 1 G: 1 TABLET ORAL at 09:22

## 2024-05-10 RX ADMIN — OXYCODONE HYDROCHLORIDE 10 MG: 10 TABLET ORAL at 17:23

## 2024-05-10 ASSESSMENT — ACTIVITIES OF DAILY LIVING (ADL)
ADLS_ACUITY_SCORE: 28

## 2024-05-10 NOTE — PROGRESS NOTES
Colorectal Surgery Progress Note  [facility]  POD#4    Subjective: Hasn't been able to eat/drink much due to pain, reports the only thing she ate yesterday was a piece of toast. Frustrated with pain management and we discussed with patient goals/expectations for pain control.     Vitals:  Vitals:    05/09/24 1554 05/09/24 2136 05/09/24 2251 05/10/24 0426   BP: (!) 133/93 126/86 121/82 109/81   BP Location: Right arm Right arm Right arm Right arm   Pulse: 105 91 92 86   Resp: 12 14 16 16   Temp: 99.4  F (37.4  C) 98.6  F (37  C) 98.1  F (36.7  C) 97.8  F (36.6  C)   TempSrc: Oral Oral Oral Oral   SpO2: 96% 96% 94% 96%   Weight:       Height:         I/O:  I/O last 3 completed shifts:  In: 280 [P.O.:270; I.V.:10]  Out: 2425 [Urine:1200; Stool:1225]    Physical Exam:  Gen: AAOx3, uncomfortable but in no acute distress.   Pulm: Non-labored breathing  Abd: Soft, non-distended, tender at right abdomen   Incision C/D/I   Stoma pink and viable with large amounts of watery ileostomy output and some gas in bag  Ext:  Warm and well-perfused    BMP  Recent Labs   Lab 05/08/24  0655 05/08/24  0643 05/08/24  0616 05/08/24  0610 05/08/24  0556 05/07/24  0500   NA  --   --   --   --   --  137   POTASSIUM  --   --   --   --   --  3.9   CHLORIDE  --   --   --   --   --  106   CO2  --   --   --   --   --  24   BUN  --   --   --   --   --  10.8   CR  --   --   --   --   --  0.81   * 219* 66*  --  68* 105*   MAG  --   --   --  1.5*  --   --      CBC  Recent Labs   Lab 05/09/24  0514 05/08/24  0610 05/07/24  0500 05/06/24  1729   HGB  --  7.8* 8.1*  --    *  --   --  135*         ASSESSMENT: This is a 38 year old female PMHx of history of chronic pain syndrome on chronic opioid therapy, borderline personality disorder, and multiple GI surgeries (s/p cholecystectomy (6/2003). S/p colostomy (1/9/2012) done at [facility] and then a revision done 1/13/12. Hysterectomy (12/2013), two exploratory  laparotomies (4/24/19) and (9/5/19) and history of numerous bowel obstructions due to adhesions) and is now s/p open total colectomy with end ileostomy and BSO on 5/6 with Dr. Ayala. 5/7 having uncontrollable pain with epidural analgesia and persistent hypotension, epidural anesthesia removed on 5/8 with improved blood pressures. Patient with continued pain on 5/10 despite multimodal pain management.    - continue prn dilaudid, oxycodone and scheduled ibuprofen for pain control.  Epidural removed 5/8 due to hypotension and inadequate pain relief.   - increased robaxin to 750 TID   - still having moderate volume watery ostomy output on 5/9 (1.9L) after starting low fiber diet, will start 2mg imodium BID for bowel slowing regimen.   - continue IVF @ 50  - encourage OOB  - continue heparin ppx   - PT/OT    Diet: Low Fiber Diet  Diet    DVT Prophylaxis: Heparin SQ  Tian Catheter: Not present  Lines: None     Drains: None     Cardiac Monitoring: None  Code Status: Full Code        Elli Hu  MS4       Addendum:  Pt was seen and examined independent of the medical student.     S: agree with note above.     O:  Vitals, I&Os reviewed  NAD  Norm resp effort  abd soft, minimally distended.   Stoma pink and viable with large liquid stool    A/P:  add imodium 2mg BID today.  Pt requested that pain regimen is left alone today and will try to move more.      Meli Borges PA-C  Colon and Rectal Surgery     Seen and discussed with Dr. Carmona    The above plan of care was performed and communicated to me by Dr. Ayala    I spent 30 minute face-to-face or coordinating care of Mimi Su. Over 50% of our time on the unit was spent counseling the patient and/or coordinating care as documented in the assessment and plan.     [id number] post op hospital visit

## 2024-05-10 NOTE — PROGRESS NOTES
[facility] Nurse Inpatient Assessment     Consulted for: new ileostomy    Patient History (according to provider note(s):      38 year old female with a history of chronic pain syndrome on chronic opioid therapy, borderline personality disorder, chemical dependecy, s/p cholecystectomy (6/2003). S/p colostomy (1/9/2012) done at [facility] and then a revision done 1/13/12. Hysterectomy (12/2013). Patient has had two exploratory laparotomies (4/24/19) and (9/5/19). History of numerous bowel obstructions due to adhesions.      Assessment of new end Ileostomy:     Diagnosis Pertinent to Stoma:   Slow transit constipation       Surgery Date: 5/7/24  Surgeon:Mali Ayala       Hospital: [facility]  Pouching system in place on assessment today: [place] two piece, cut to fit, and flat   Pouch barrier status: intact   Pouch last changed/wear time: 3 days  Reason for pouch change today: routine schedule  Effectiveness of current pouching/ supply plan: Recommend continuing current plan  Change made with ostomy management today: No  Pouching system in place after visit today: Roseanne two piece, cut to fit, flat, and barrier ring, Cavilon   Home system:  [place] CeraPlus flat 2 piece 57 mm with tape border, opaque pouch.  No barrier ring, no sting barrier film.    Supplies: gathered, supplies stored on unit, and discussed with patient   Output 2575cc on 5/8, 1225cc on 5/9    Stoma location: LLQ at site of previous colostomy  Stoma size: 1 1/8 x 1 3/8 inches,   Stoma appearance: red, oval, moist, good turgor, edematous, and protruberant  Mucocutaneous junction:  intact  Peristomal complication(s): sutured incisions at 3 and 9 o'clock   Output: liquid and brown  Output volume emptied during visit: 10ml  Abdominal assessment: Firm  Surgical site(s):  open to air .  Incision well approximated with skin glue  NG still in place? No  Pain: Fullness and Stabbing.  pain control  "improved  Is patient still on a PCA? No    Ostomy education assessment:  Participant of teaching session today: patient   Education completed today: Stoma assessment, Pouching system assessment , Intake and output recording, and Importance of hydration  Educational materials/methods: Verbal, Return demonstration, FOD [place] education hand out, and Addressed patient/caregiver questions/concerns  Education still needed: Pouch change return demonstration, Hernia prevention, and Discharge instructions  Learning Comprehension:   Psychosocial assessment: experienced with pouching her previous colostomy  Patient readiness for education today: attentive and in significant pain  Following today's visit: patient  is able to demonstrate;         1. How to empty their pouch? Yes-experienced        2. How to change their pouch? Yes- needs practice with new stoma        3. How to read and record intake and output correctly? Yes  Preparation for discharge completed: Ensured patient has extra supplies for discharge  Preparation for discharge still needed: Placed prescription recommendations in discharge navigator for MD to sign, Discussed how and when to make an outpatient [facility] nurse appointment after discharge, and Discuss signs/symptoms of when to seek medical attention  Pt support system on discharge: siblings, mother  WOC recommend home care?  TBA  Face to face time: 35 minutes    Treatment Plan:     LLQ Ileostomy pouching plan:   Pouching system: ostomy supplies pouches: Roseanne 57 FECAL ([id number]) ostomy supplies barrier: Roseanne 57mm FLAT ([id number])  Accessories used: [facility] ostomy accessories: 2\" Cera Barrier Ring ([id number])   Frequency of pouch changes: Three times a week  WOC follow up plan: Three times a week  Bedside RN interventions: Change pouch PRN if leaking using the supplies above, Empty pouch when 1/3 to 1/2 full, ensure to clean pouch outlet after emptying to prevent odor, Notify WOC for ongoing pouch leakage, and " Document stoma appearance and output volume, color, and consistency every shift       DATA:     Current support surface: Standard  Standard Isoflex gel  Containment of urine/stool: Continent of bladder and Ileostomy pouch  BMI: Body mass index is 28.11 kg/m .   Active diet order: Orders Placed This Encounter      Low Fiber Diet      Diet     Output: I/O last 3 completed shifts:  In: 280 [P.O.:270; I.V.:10]  Out: 2425 [Urine:1200; Stool:1225]     Labs:   Recent Labs   Lab 05/08/24  0610   HGB 7.8*     Pressure injury risk assessment:   Sensory Perception: 4-->no impairment  Moisture: 4-->rarely moist  Activity: 3-->walks occasionally  Mobility: 3-->slightly limited  Nutrition: 3-->adequate  Friction and Shear: 3-->no apparent problem  Trell Score: 20    Pager no longer is use, please contact through [employer] group: [facility] Nurse [place]  Dept. Office Number: [phone]

## 2024-05-10 NOTE — PLAN OF CARE
"[id number]  /84 (BP Location: Right arm)   Pulse 90   Temp 98.1  F (36.7  C) (Oral)   Resp 14   Ht 1.702 m (5' 7\")   Wt 81.4 kg (179 lb 7.3 oz)   LMP  (LMP Unknown)   SpO2 95%   BMI 28.11 kg/m        Afebrile, VSS.   Pain continue, all PRN and scheduled pain medication given.       NEURO: Alert and oriented x4.      RESPIRATORY: Room Air, denies dizziness, and SOB. Lungs sound, clear, equal bilateral.     CARDIO: VSS, denies dizziness, and extremity pain.      GI/: Denies N/V, BS active, void urine spontaneously without saving.       SKIN: Intact.      ACTIVITY: independent.      PAIN: Yes,     DLA: PIV, LR infusing at 50 mL/hr.     BG: No      PLAN:     - continue prn dilaudid, oxycodone and scheduled ibuprofen for pain control.     Continue monitoring.     * Italic, from provider's note.            Goal Outcome Evaluation:      Plan of Care Reviewed With: patient                 "

## 2024-05-11 PROCEDURE — 120N000002 HC R&B MED SURG/OB UMMC

## 2024-05-11 PROCEDURE — 250N000013 HC RX MED GY IP 250 OP 250 PS 637: Performed by: PHYSICIAN ASSISTANT

## 2024-05-11 PROCEDURE — 250N000011 HC RX IP 250 OP 636

## 2024-05-11 PROCEDURE — 250N000013 HC RX MED GY IP 250 OP 250 PS 637

## 2024-05-11 PROCEDURE — 250N000013 HC RX MED GY IP 250 OP 250 PS 637: Performed by: COLON & RECTAL SURGERY

## 2024-05-11 PROCEDURE — 250N000013 HC RX MED GY IP 250 OP 250 PS 637: Performed by: STUDENT IN AN ORGANIZED HEALTH CARE EDUCATION/TRAINING PROGRAM

## 2024-05-11 PROCEDURE — 250N000011 HC RX IP 250 OP 636: Performed by: COLON & RECTAL SURGERY

## 2024-05-11 PROCEDURE — 258N000003 HC RX IP 258 OP 636

## 2024-05-11 RX ORDER — HYDROCODONE BITARTRATE AND ACETAMINOPHEN 10; 325 MG/1; MG/1
1 TABLET ORAL EVERY 4 HOURS PRN
Status: DISCONTINUED | OUTPATIENT
Start: 2024-05-11 | End: 2024-05-12

## 2024-05-11 RX ADMIN — TIZANIDINE 2 MG: 2 TABLET ORAL at 14:58

## 2024-05-11 RX ADMIN — FLUOXETINE HYDROCHLORIDE 20 MG: 20 CAPSULE ORAL at 08:42

## 2024-05-11 RX ADMIN — SUCRALFATE 1 G: 1 TABLET ORAL at 08:44

## 2024-05-11 RX ADMIN — LOPERAMIDE HYDROCHLORIDE 2 MG: 2 CAPSULE ORAL at 08:43

## 2024-05-11 RX ADMIN — LOPERAMIDE HYDROCHLORIDE 2 MG: 2 CAPSULE ORAL at 14:58

## 2024-05-11 RX ADMIN — ACETAMINOPHEN 975 MG: 325 TABLET, FILM COATED ORAL at 02:20

## 2024-05-11 RX ADMIN — FAMOTIDINE 10 MG: 10 TABLET ORAL at 20:03

## 2024-05-11 RX ADMIN — IBUPROFEN 600 MG: 600 TABLET, FILM COATED ORAL at 20:03

## 2024-05-11 RX ADMIN — OXYCODONE HYDROCHLORIDE 10 MG: 10 TABLET ORAL at 00:41

## 2024-05-11 RX ADMIN — HEPARIN SODIUM 5000 UNITS: 5000 INJECTION, SOLUTION INTRAVENOUS; SUBCUTANEOUS at 08:43

## 2024-05-11 RX ADMIN — HEPARIN SODIUM 5000 UNITS: 5000 INJECTION, SOLUTION INTRAVENOUS; SUBCUTANEOUS at 00:41

## 2024-05-11 RX ADMIN — SODIUM CHLORIDE, POTASSIUM CHLORIDE, SODIUM LACTATE AND CALCIUM CHLORIDE: 600; 310; 30; 20 INJECTION, SOLUTION INTRAVENOUS at 03:27

## 2024-05-11 RX ADMIN — HYDROCODONE BITARTRATE AND ACETAMINOPHEN 1 TABLET: 10; 325 TABLET ORAL at 12:31

## 2024-05-11 RX ADMIN — METHOCARBAMOL 750 MG: 750 TABLET ORAL at 14:58

## 2024-05-11 RX ADMIN — ONDANSETRON 4 MG: 4 TABLET, ORALLY DISINTEGRATING ORAL at 15:00

## 2024-05-11 RX ADMIN — HEPARIN SODIUM 5000 UNITS: 5000 INJECTION, SOLUTION INTRAVENOUS; SUBCUTANEOUS at 17:45

## 2024-05-11 RX ADMIN — HYDROMORPHONE HYDROCHLORIDE 0.4 MG: 0.2 INJECTION, SOLUTION INTRAMUSCULAR; INTRAVENOUS; SUBCUTANEOUS at 07:16

## 2024-05-11 RX ADMIN — HYDROCODONE BITARTRATE AND ACETAMINOPHEN 1 TABLET: 10; 325 TABLET ORAL at 21:21

## 2024-05-11 RX ADMIN — OXYCODONE HYDROCHLORIDE 10 MG: 10 TABLET ORAL at 06:19

## 2024-05-11 RX ADMIN — PREGABALIN 150 MG: 25 CAPSULE ORAL at 20:04

## 2024-05-11 RX ADMIN — METHOCARBAMOL 750 MG: 750 TABLET ORAL at 21:21

## 2024-05-11 RX ADMIN — LIDOCAINE 4% 2 PATCH: 40 PATCH TOPICAL at 08:42

## 2024-05-11 RX ADMIN — PREGABALIN 150 MG: 25 CAPSULE ORAL at 08:43

## 2024-05-11 RX ADMIN — CLONAZEPAM 0.5 MG: 0.5 TABLET ORAL at 20:04

## 2024-05-11 RX ADMIN — LOPERAMIDE HYDROCHLORIDE 2 MG: 2 CAPSULE ORAL at 20:05

## 2024-05-11 RX ADMIN — METHOCARBAMOL 750 MG: 750 TABLET ORAL at 08:43

## 2024-05-11 RX ADMIN — HYDROMORPHONE HYDROCHLORIDE 0.4 MG: 0.2 INJECTION, SOLUTION INTRAMUSCULAR; INTRAVENOUS; SUBCUTANEOUS at 02:21

## 2024-05-11 RX ADMIN — CLONAZEPAM 0.5 MG: 0.5 TABLET ORAL at 08:43

## 2024-05-11 RX ADMIN — ACETAMINOPHEN 650 MG: 325 TABLET, FILM COATED ORAL at 16:59

## 2024-05-11 RX ADMIN — HEPARIN SODIUM 5000 UNITS: 5000 INJECTION, SOLUTION INTRAVENOUS; SUBCUTANEOUS at 23:54

## 2024-05-11 RX ADMIN — IBUPROFEN 600 MG: 600 TABLET, FILM COATED ORAL at 14:58

## 2024-05-11 RX ADMIN — PANTOPRAZOLE SODIUM 40 MG: 20 TABLET, DELAYED RELEASE ORAL at 20:04

## 2024-05-11 RX ADMIN — HYDROXYZINE HYDROCHLORIDE 50 MG: 50 TABLET ORAL at 06:00

## 2024-05-11 RX ADMIN — OXYCODONE HYDROCHLORIDE 10 MG: 10 TABLET ORAL at 03:29

## 2024-05-11 RX ADMIN — SODIUM CHLORIDE, POTASSIUM CHLORIDE, SODIUM LACTATE AND CALCIUM CHLORIDE: 600; 310; 30; 20 INJECTION, SOLUTION INTRAVENOUS at 23:58

## 2024-05-11 RX ADMIN — FAMOTIDINE 10 MG: 10 TABLET ORAL at 08:44

## 2024-05-11 RX ADMIN — HYDROCODONE BITARTRATE AND ACETAMINOPHEN 1 TABLET: 10; 325 TABLET ORAL at 16:59

## 2024-05-11 RX ADMIN — PANTOPRAZOLE SODIUM 40 MG: 20 TABLET, DELAYED RELEASE ORAL at 08:43

## 2024-05-11 RX ADMIN — IBUPROFEN 600 MG: 600 TABLET, FILM COATED ORAL at 08:43

## 2024-05-11 RX ADMIN — SUCRALFATE 1 G: 1 TABLET ORAL at 12:31

## 2024-05-11 RX ADMIN — TIZANIDINE 2 MG: 2 TABLET ORAL at 20:03

## 2024-05-11 RX ADMIN — IBUPROFEN 600 MG: 600 TABLET, FILM COATED ORAL at 02:20

## 2024-05-11 ASSESSMENT — ACTIVITIES OF DAILY LIVING (ADL)
ADLS_ACUITY_SCORE: 20
ADLS_ACUITY_SCORE: 22
ADLS_ACUITY_SCORE: 20

## 2024-05-11 NOTE — PLAN OF CARE
Goal Outcome Evaluation:    POD# 5 s/p Open total abdominal colectomy, end ileostomy placement, lysis of adhesions.    Patient sleeping between cares, A&O x 4. Reports good pain control with current regimens, pt would like PRN pain meds every 3-4 hours when available please. Denies nausea, PIV infusing. Ileostomy appliance intact. Pt is voiding and ambulating without difficulty, independent with cares.

## 2024-05-11 NOTE — PROVIDER NOTIFICATION
Web page, Micki Jimenez  Pt requesting to see colorectal surgery ASAP.   mainly about pain management.   Thank you   RN

## 2024-05-11 NOTE — PROGRESS NOTES
"    [facility]    Progress Note - Colorectal Service       Date of Admission:  5/6/2024    Assessment & Plan: Surgery   This is a 38 year old female PMHx of history of chronic pain syndrome on chronic opioid therapy, borderline personality disorder, and multiple GI surgeries (s/p cholecystectomy (6/2003). S/p colostomy (1/9/2012) done at [facility] and then a revision done 1/13/12. Hysterectomy (12/2013), two exploratory laparotomies (4/24/19) and (9/5/19) and history of numerous bowel obstructions due to adhesions) and is now s/p open total colectomy with end ileostomy and BSO on 5/6 with Dr. Ayala. 5/7 having uncontrollable pain with epidural analgesia and persistent hypotension, epidural anesthesia removed on 5/8 with improved blood pressures. Patient with continued pain on 5/10 despite multimodal pain management.     -Will start her on home Norco regimen with no IV Dilaudid  -Continue robaxin to 750 TID   -Increased Imodium to 3 times daily.  Will watch her stool output today.  - continue IVF @ 50  - encourage OOB  - continue heparin ppx   - PT/OT     Diet: Low Fiber Diet  Diet    DVT Prophylaxis: Heparin SQ  Tian Catheter: Not present  Lines: None     Drains: None     Cardiac Monitoring: None  Code Status: Full Code      Clinically Significant Risk Factors                         # Overweight: Estimated body mass index is 28.11 kg/m  as calculated from the following:    Height as of this encounter: 1.702 m (5' 7\").    Weight as of this encounter: 81.4 kg (179 lb 7.3 oz).      # Financial/Environmental Concerns: unable to afford rent/mortgage         Disposition Plan     Expected Discharge Date: 05/13/2024      Destination: home with family           The patient's care was discussed with the fellow who discussed with staff.    Gordon Venegas MD  [facility]  Text page via [facility] Paging/Directory "     ______________________________________________________________________    Interval History   No acute events overnight, pain regimen switched to scheduled Oxycodone 10 mg q3h. Used 0.4 mg IV Dilaudid x5 over past 24 hour period. Ileostomy with 2L + of output.     Physical Exam   Vital Signs: Temp: 98.8  F (37.1  C) Temp src: Oral BP: (!) 126/93 Pulse: 100   Resp: 16 SpO2: 93 % O2 Device: None (Room air)    Weight: 179 lbs 7.27 oz  Intake/Output Summary (Last 24 hours) at 5/11/2024 0652  Last data filed at 5/11/2024 0624  Gross per 24 hour   Intake 849.17 ml   Output 4625 ml   Net -3775.83 ml     Gen:    AAOx3, uncomfortable but in no acute distress.   Pulm:Non-labored breathing  Abd:Soft, non-distended, tender at right abdomen              Incision C/D/I              Stoma pink and viable with large amounts of watery ileostomy output and some gas in bag  Ext:      Warm and well-perfused          Data         Imaging results reviewed over the past 24 hrs:   No results found for this or any previous visit (from the past 24 hour(s)).

## 2024-05-12 LAB
HGB BLD-MCNC: 7.8 G/DL (ref 11.7–15.7)
PLATELET # BLD AUTO: 124 10E3/UL (ref 150–450)

## 2024-05-12 PROCEDURE — 250N000013 HC RX MED GY IP 250 OP 250 PS 637

## 2024-05-12 PROCEDURE — 85049 AUTOMATED PLATELET COUNT: CPT | Performed by: COLON & RECTAL SURGERY

## 2024-05-12 PROCEDURE — 85018 HEMOGLOBIN: CPT

## 2024-05-12 PROCEDURE — 120N000002 HC R&B MED SURG/OB UMMC

## 2024-05-12 PROCEDURE — 258N000003 HC RX IP 258 OP 636

## 2024-05-12 PROCEDURE — 250N000013 HC RX MED GY IP 250 OP 250 PS 637: Performed by: STUDENT IN AN ORGANIZED HEALTH CARE EDUCATION/TRAINING PROGRAM

## 2024-05-12 PROCEDURE — 250N000013 HC RX MED GY IP 250 OP 250 PS 637: Performed by: PHYSICIAN ASSISTANT

## 2024-05-12 PROCEDURE — 250N000011 HC RX IP 250 OP 636: Performed by: COLON & RECTAL SURGERY

## 2024-05-12 PROCEDURE — 36415 COLL VENOUS BLD VENIPUNCTURE: CPT

## 2024-05-12 PROCEDURE — 250N000013 HC RX MED GY IP 250 OP 250 PS 637: Performed by: COLON & RECTAL SURGERY

## 2024-05-12 PROCEDURE — 36415 COLL VENOUS BLD VENIPUNCTURE: CPT | Performed by: COLON & RECTAL SURGERY

## 2024-05-12 RX ORDER — HYDROCODONE BITARTRATE AND ACETAMINOPHEN 10; 325 MG/1; MG/1
1 TABLET ORAL EVERY 4 HOURS PRN
Qty: 25 TABLET | Refills: 0 | Status: SHIPPED | OUTPATIENT
Start: 2024-05-12 | End: 2024-05-13

## 2024-05-12 RX ORDER — LOPERAMIDE HCL 2 MG
2 CAPSULE ORAL 3 TIMES DAILY
Qty: 100 CAPSULE | Refills: 1 | Status: ON HOLD | OUTPATIENT
Start: 2024-05-12 | End: 2024-05-29

## 2024-05-12 RX ORDER — METHOCARBAMOL 750 MG/1
750 TABLET, FILM COATED ORAL 3 TIMES DAILY
Qty: 25 TABLET | Refills: 0 | Status: SHIPPED | OUTPATIENT
Start: 2024-05-12 | End: 2024-06-05

## 2024-05-12 RX ORDER — OXYCODONE HYDROCHLORIDE 10 MG/1
10 TABLET ORAL EVERY 4 HOURS PRN
Status: DISCONTINUED | OUTPATIENT
Start: 2024-05-12 | End: 2024-05-13 | Stop reason: HOSPADM

## 2024-05-12 RX ADMIN — IBUPROFEN 600 MG: 600 TABLET, FILM COATED ORAL at 19:40

## 2024-05-12 RX ADMIN — METHOCARBAMOL 750 MG: 750 TABLET ORAL at 19:40

## 2024-05-12 RX ADMIN — LOPERAMIDE HYDROCHLORIDE 2 MG: 2 CAPSULE ORAL at 19:40

## 2024-05-12 RX ADMIN — LOPERAMIDE HYDROCHLORIDE 2 MG: 2 CAPSULE ORAL at 08:29

## 2024-05-12 RX ADMIN — SODIUM CHLORIDE, POTASSIUM CHLORIDE, SODIUM LACTATE AND CALCIUM CHLORIDE 500 ML: 600; 310; 30; 20 INJECTION, SOLUTION INTRAVENOUS at 18:19

## 2024-05-12 RX ADMIN — LOPERAMIDE HYDROCHLORIDE 2 MG: 2 CAPSULE ORAL at 13:01

## 2024-05-12 RX ADMIN — ONDANSETRON 4 MG: 4 TABLET, ORALLY DISINTEGRATING ORAL at 17:33

## 2024-05-12 RX ADMIN — SUCRALFATE 1 G: 1 TABLET ORAL at 17:30

## 2024-05-12 RX ADMIN — OXYCODONE HYDROCHLORIDE 10 MG: 10 TABLET ORAL at 21:50

## 2024-05-12 RX ADMIN — FAMOTIDINE 10 MG: 10 TABLET ORAL at 19:40

## 2024-05-12 RX ADMIN — HEPARIN SODIUM 5000 UNITS: 5000 INJECTION, SOLUTION INTRAVENOUS; SUBCUTANEOUS at 17:30

## 2024-05-12 RX ADMIN — METHOCARBAMOL 750 MG: 750 TABLET ORAL at 08:30

## 2024-05-12 RX ADMIN — HYDROCODONE BITARTRATE AND ACETAMINOPHEN 1 TABLET: 10; 325 TABLET ORAL at 13:00

## 2024-05-12 RX ADMIN — PREGABALIN 150 MG: 25 CAPSULE ORAL at 08:30

## 2024-05-12 RX ADMIN — IBUPROFEN 600 MG: 600 TABLET, FILM COATED ORAL at 08:31

## 2024-05-12 RX ADMIN — PANTOPRAZOLE SODIUM 40 MG: 20 TABLET, DELAYED RELEASE ORAL at 08:30

## 2024-05-12 RX ADMIN — HYDROCODONE BITARTRATE AND ACETAMINOPHEN 1 TABLET: 10; 325 TABLET ORAL at 05:08

## 2024-05-12 RX ADMIN — ACETAMINOPHEN 650 MG: 325 TABLET, FILM COATED ORAL at 01:13

## 2024-05-12 RX ADMIN — PREGABALIN 150 MG: 25 CAPSULE ORAL at 19:40

## 2024-05-12 RX ADMIN — TIZANIDINE 2 MG: 2 TABLET ORAL at 19:39

## 2024-05-12 RX ADMIN — HEPARIN SODIUM 5000 UNITS: 5000 INJECTION, SOLUTION INTRAVENOUS; SUBCUTANEOUS at 08:29

## 2024-05-12 RX ADMIN — CLONAZEPAM 0.5 MG: 0.5 TABLET ORAL at 19:40

## 2024-05-12 RX ADMIN — HYDROCODONE BITARTRATE AND ACETAMINOPHEN 1 TABLET: 10; 325 TABLET ORAL at 08:30

## 2024-05-12 RX ADMIN — IBUPROFEN 600 MG: 600 TABLET, FILM COATED ORAL at 04:01

## 2024-05-12 RX ADMIN — TIZANIDINE 2 MG: 2 TABLET ORAL at 13:00

## 2024-05-12 RX ADMIN — TIZANIDINE 2 MG: 2 TABLET ORAL at 17:33

## 2024-05-12 RX ADMIN — PANTOPRAZOLE SODIUM 40 MG: 20 TABLET, DELAYED RELEASE ORAL at 19:40

## 2024-05-12 RX ADMIN — CLONAZEPAM 0.5 MG: 0.5 TABLET ORAL at 08:30

## 2024-05-12 RX ADMIN — HYDROXYZINE HYDROCHLORIDE 50 MG: 50 TABLET ORAL at 01:13

## 2024-05-12 RX ADMIN — HYDROCODONE BITARTRATE AND ACETAMINOPHEN 1 TABLET: 10; 325 TABLET ORAL at 01:13

## 2024-05-12 RX ADMIN — SUCRALFATE 1 G: 1 TABLET ORAL at 08:31

## 2024-05-12 RX ADMIN — OXYCODONE HYDROCHLORIDE 10 MG: 10 TABLET ORAL at 17:30

## 2024-05-12 RX ADMIN — ONDANSETRON 4 MG: 4 TABLET, ORALLY DISINTEGRATING ORAL at 05:12

## 2024-05-12 RX ADMIN — HYDROXYZINE HYDROCHLORIDE 50 MG: 50 TABLET ORAL at 05:08

## 2024-05-12 RX ADMIN — LIDOCAINE 4% 2 PATCH: 40 PATCH TOPICAL at 08:31

## 2024-05-12 RX ADMIN — FAMOTIDINE 10 MG: 10 TABLET ORAL at 08:30

## 2024-05-12 RX ADMIN — IBUPROFEN 600 MG: 600 TABLET, FILM COATED ORAL at 13:01

## 2024-05-12 RX ADMIN — FLUOXETINE HYDROCHLORIDE 20 MG: 20 CAPSULE ORAL at 08:31

## 2024-05-12 ASSESSMENT — ACTIVITIES OF DAILY LIVING (ADL)
ADLS_ACUITY_SCORE: 22

## 2024-05-12 NOTE — PLAN OF CARE
"[id number]  /72 (BP Location: Right arm)   Pulse 95   Temp 98  F (36.7  C) (Oral)   Resp 17   Ht 1.702 m (5' 7\")   Wt 81.4 kg (179 lb 7.3 oz)   LMP  (LMP Unknown)   SpO2 94%   BMI 28.11 kg/m      Afebrile, VSS.  No acute event.   No IV pain medication given today, but all PO, PRN and scheduled pain medications given.   Ileostomy output slowed since yesterday afternoon.   Walked in the hallway multiple times.         NEURO: Alert and oriented x4.      RESPIRATORY: Room Air, denies dizziness, and SOB. Lungs sound, clear, equal bilateral.     CARDIO: VSS, denies dizziness, and extremity pain.      GI/: Denies N/V, BS active, BM x 2, AUOP     SKIN: Intact.      ACTIVITY: independent.      PAIN: Denies pain.    DLA: PIV, infusing at 50 mL/hr    BG: No      PLAN:     Expected Discharge Date: 05/13/2024      Destination: home with family      Continue monitoring.     * Italic, from provider's note.      Goal Outcome Evaluation:      Plan of Care Reviewed With: patient                 "

## 2024-05-12 NOTE — PROGRESS NOTES
"[facility]    Progress Note - Colorectal Service       Date of Admission:  5/6/2024    Assessment & Plan: Surgery   This is a 38 year old female PMHx of history of chronic pain syndrome on chronic opioid therapy, borderline personality disorder, and multiple GI surgeries (s/p cholecystectomy (6/2003). S/p colostomy (1/9/2012) done at [facility] and then a revision done 1/13/12. Hysterectomy (12/2013), two exploratory laparotomies (4/24/19) and (9/5/19) and history of numerous bowel obstructions due to adhesions) and is now s/p open total colectomy with end ileostomy and BSO on 5/6 with Dr. Ayala. 5/7 having uncontrollable pain with epidural analgesia and persistent hypotension, epidural anesthesia removed on 5/8 with improved blood pressures. Patient with continued pain despite multimodal pain management.     - continue home Norco regimen with no IV Dilaudid  - Continue robaxin to 750 TID   - Continue Imodium to 3 times daily, ileostomy output slowing appropriately (1.2L output on 5/11)  - encourage OOB  - continue heparin ppx   - PT/OT     Diet: Low Fiber Diet  Diet    DVT Prophylaxis: Heparin SQ  Tian Catheter: Not present  Lines: None     Drains: None     Cardiac Monitoring: None  Code Status: Full Code      Clinically Significant Risk Factors                # Thrombocytopenia: Lowest platelets = 124 in last 2 days, will monitor for bleeding          # Overweight: Estimated body mass index is 28.11 kg/m  as calculated from the following:    Height as of this encounter: 1.702 m (5' 7\").    Weight as of this encounter: 81.4 kg (179 lb 7.3 oz).        # Financial/Environmental Concerns: unable to afford rent/mortgage         Disposition Plan     Expected Discharge Date: 05/13/2024      Destination: home with family  Discharge Comments: IV pain meds         The patient's care was discussed with the fellow who discussed with staff.    Dipti Herrera " student  [facility]  Text page via [employer] Paging/Directory         Agree with the above plan. I personally examined and evaluated the patient.     Mahi Ruth MD  Fellow, Colon and Rectal Surgery  [facility]  Pager: [phone]    ______________________________________________________________________    Interval History   No acute events overnight, pain regimen switched to home Norco. Did not use IV dilaudid yesterday (5/11). Ileostomy output slowing since starting loperamide (1.2L of output on 5/11). Patient would like to discharge today but concerned about number of stairs at home and having enough cares    Physical Exam   Vital Signs: Temp: 98.5  F (36.9  C) Temp src: Oral BP: (!) 138/90 Pulse: 88   Resp: 17 SpO2: 93 % O2 Device: None (Room air)    Weight: 179 lbs 7.27 oz  Intake/Output Summary (Last 24 hours) at 5/11/2024 0652  Last data filed at 5/11/2024 0624  Gross per 24 hour   Intake 849.17 ml   Output 4625 ml   Net -3775.83 ml     Gen:    AAOx3, uncomfortable but in no acute distress.   Pulm:Non-labored breathing  Abd:Soft, non-distended              Incision C/D/I              Stoma pink and viable with moderate amounts of watery ileostomy output and some gas in bag  Ext:      Warm and well-perfused          Data     I have personally reviewed the following data over the past 24 hrs:    N/A  \   N/A   / 124 (L)     N/A N/A N/A /  N/A   N/A N/A N/A \       Imaging results reviewed over the past 24 hrs:   No results found for this or any previous visit (from the past 24 hour(s)).

## 2024-05-12 NOTE — PROGRESS NOTES
"[facility] Cover Note:    Patient state 10 mg oxycodone works better than 10 mg hydroxycodone. Switch NOCRO to 10 mg oxycodone q4h prn.    Patient endorse nausea and vomit. Not drinking enough fluid today. One episode of dizziness when standing up from lying. Afebrile. Normal cardia. Hypotension, SBP 80-85, MAP~62. Ostomy output 1.2 L / 24 hours. UOP 2.1 L / 24 hours. Bolus  mL for 2 hours is given.    BP (!) 80/43 (BP Location: Right arm)   Pulse 69   Temp 97.9  F (36.6  C) (Oral)   Resp 16   Ht 1.702 m (5' 7\")   Wt 81.4 kg (179 lb 7.3 oz)   LMP  (LMP Unknown)   SpO2 96%   BMI 28.11 kg/m        Yony Yusuf, PGY 1  General Surgery Resident  Pager [phone]    "

## 2024-05-12 NOTE — PLAN OF CARE
Goal Outcome Evaluation:    POD# 6 s/p Open total abdominal colectomy, end ileostomy placement, lysis of adhesions.    Patient slept most the shift. A&O x 4. Reports good pain control with current regimen. Denies nausea, PIV infusing. Ileostomy appliance intact. Pt is voiding and ambulating without difficulty, independent with cares.

## 2024-05-12 NOTE — PROGRESS NOTES
Yony Yusuf     Do this Pt discharge today?    thank you         12:48    Pt want to talk to primary team about discharge.  RN [id number]      1600    Pt's BP is 80/43 and 85/51 on recheck again.   No symptoms, and no provider notify parameter order. can you put in parameters to notify providers?.   No IVF infusing now.   Thank you   RN [id number]

## 2024-05-12 NOTE — PLAN OF CARE
"[id number]  /63 (BP Location: Left arm)   Pulse 69   Temp 97.9  F (36.6  C) (Oral)   Resp 16   Ht 1.702 m (5' 7\")   Wt 81.4 kg (179 lb 7.3 oz)   LMP  (LMP Unknown)   SpO2 96%   BMI 28.11 kg/m      Afebrile, VSS.   Pain fairly controlled with all PRN and scheduled oral medications.   IV pain med available but not used.     NEURO: Alert and oriented x4.      RESPIRATORY: Room Air, denies dizziness, and SOB. Lungs sound, clear, equal bilateral.     CARDIO: VSS, denies dizziness, and extremity pain.      GI/: Denies N/V, BS active, BM x 2 (ileostomy), void urine spontaneously without saving.       SKIN: Intact.      ACTIVITY: independent.      PAIN: Denies pain.    DLA: PIV, NS tko    BG: no     PLAN:   Continue monitoring.     * Italic, from provider's note.        "

## 2024-05-13 VITALS
RESPIRATION RATE: 18 BRPM | BODY MASS INDEX: 28.17 KG/M2 | DIASTOLIC BLOOD PRESSURE: 71 MMHG | HEART RATE: 89 BPM | OXYGEN SATURATION: 94 % | SYSTOLIC BLOOD PRESSURE: 115 MMHG | TEMPERATURE: 97.7 F | HEIGHT: 67 IN | WEIGHT: 179.45 LBS

## 2024-05-13 LAB
ANION GAP SERPL CALCULATED.3IONS-SCNC: 14 MMOL/L (ref 7–15)
BUN SERPL-MCNC: 9.6 MG/DL (ref 6–20)
CALCIUM SERPL-MCNC: 9.1 MG/DL (ref 8.6–10)
CHLORIDE SERPL-SCNC: 101 MMOL/L (ref 98–107)
CREAT SERPL-MCNC: 0.65 MG/DL (ref 0.51–0.95)
DEPRECATED HCO3 PLAS-SCNC: 24 MMOL/L (ref 22–29)
EGFRCR SERPLBLD CKD-EPI 2021: >90 ML/MIN/1.73M2
GLUCOSE SERPL-MCNC: 76 MG/DL (ref 70–99)
MAGNESIUM SERPL-MCNC: 1.2 MG/DL (ref 1.7–2.3)
PHOSPHATE SERPL-MCNC: 4.7 MG/DL (ref 2.5–4.5)
POTASSIUM SERPL-SCNC: 3.3 MMOL/L (ref 3.4–5.3)
SODIUM SERPL-SCNC: 139 MMOL/L (ref 135–145)

## 2024-05-13 PROCEDURE — 250N000011 HC RX IP 250 OP 636

## 2024-05-13 PROCEDURE — 250N000013 HC RX MED GY IP 250 OP 250 PS 637: Performed by: STUDENT IN AN ORGANIZED HEALTH CARE EDUCATION/TRAINING PROGRAM

## 2024-05-13 PROCEDURE — 80048 BASIC METABOLIC PNL TOTAL CA: CPT

## 2024-05-13 PROCEDURE — 36415 COLL VENOUS BLD VENIPUNCTURE: CPT

## 2024-05-13 PROCEDURE — 84100 ASSAY OF PHOSPHORUS: CPT

## 2024-05-13 PROCEDURE — G0463 HOSPITAL OUTPT CLINIC VISIT: HCPCS

## 2024-05-13 PROCEDURE — 250N000013 HC RX MED GY IP 250 OP 250 PS 637: Performed by: PHYSICIAN ASSISTANT

## 2024-05-13 PROCEDURE — 83735 ASSAY OF MAGNESIUM: CPT

## 2024-05-13 PROCEDURE — 250N000013 HC RX MED GY IP 250 OP 250 PS 637

## 2024-05-13 PROCEDURE — 250N000013 HC RX MED GY IP 250 OP 250 PS 637: Performed by: COLON & RECTAL SURGERY

## 2024-05-13 PROCEDURE — 250N000011 HC RX IP 250 OP 636: Performed by: COLON & RECTAL SURGERY

## 2024-05-13 RX ORDER — POTASSIUM CHLORIDE 750 MG/1
40 TABLET, EXTENDED RELEASE ORAL ONCE
Qty: 4 TABLET | Refills: 0 | Status: COMPLETED | OUTPATIENT
Start: 2024-05-13 | End: 2024-05-13

## 2024-05-13 RX ORDER — MAGNESIUM SULFATE HEPTAHYDRATE 40 MG/ML
4 INJECTION, SOLUTION INTRAVENOUS ONCE
Qty: 100 ML | Refills: 0 | Status: COMPLETED | OUTPATIENT
Start: 2024-05-13 | End: 2024-05-13

## 2024-05-13 RX ORDER — ACETAMINOPHEN 325 MG/1
975 TABLET ORAL EVERY 6 HOURS
Qty: 50 TABLET | Refills: 0 | Status: ON HOLD | OUTPATIENT
Start: 2024-05-13 | End: 2024-05-23

## 2024-05-13 RX ORDER — ACETAMINOPHEN 325 MG/1
650 TABLET ORAL EVERY 6 HOURS
Status: DISCONTINUED | OUTPATIENT
Start: 2024-05-13 | End: 2024-05-13 | Stop reason: HOSPADM

## 2024-05-13 RX ORDER — DIPHENHYDRAMINE HCL 25 MG
25 CAPSULE ORAL EVERY 6 HOURS PRN
Qty: 20 CAPSULE | Refills: 0 | Status: SHIPPED | OUTPATIENT
Start: 2024-05-13

## 2024-05-13 RX ORDER — HYDROXYZINE HYDROCHLORIDE 25 MG/1
25 TABLET, FILM COATED ORAL EVERY 8 HOURS PRN
Qty: 25 TABLET | Refills: 0 | Status: SHIPPED | OUTPATIENT
Start: 2024-05-13

## 2024-05-13 RX ORDER — OXYCODONE HYDROCHLORIDE 10 MG/1
5-10 TABLET ORAL EVERY 4 HOURS PRN
Qty: 30 TABLET | Refills: 0 | Status: SHIPPED | OUTPATIENT
Start: 2024-05-13 | End: 2024-08-07

## 2024-05-13 RX ADMIN — HYDROXYZINE HYDROCHLORIDE 50 MG: 50 TABLET ORAL at 00:24

## 2024-05-13 RX ADMIN — HYDROMORPHONE HYDROCHLORIDE 0.4 MG: 0.2 INJECTION, SOLUTION INTRAMUSCULAR; INTRAVENOUS; SUBCUTANEOUS at 00:24

## 2024-05-13 RX ADMIN — SUCRALFATE 1 G: 1 TABLET ORAL at 07:55

## 2024-05-13 RX ADMIN — POTASSIUM CHLORIDE 40 MEQ: 750 TABLET, EXTENDED RELEASE ORAL at 10:57

## 2024-05-13 RX ADMIN — HEPARIN SODIUM 5000 UNITS: 5000 INJECTION, SOLUTION INTRAVENOUS; SUBCUTANEOUS at 07:55

## 2024-05-13 RX ADMIN — IBUPROFEN 600 MG: 600 TABLET, FILM COATED ORAL at 14:37

## 2024-05-13 RX ADMIN — OXYCODONE HYDROCHLORIDE 10 MG: 10 TABLET ORAL at 02:13

## 2024-05-13 RX ADMIN — FAMOTIDINE 10 MG: 10 TABLET ORAL at 07:55

## 2024-05-13 RX ADMIN — PREGABALIN 150 MG: 25 CAPSULE ORAL at 07:55

## 2024-05-13 RX ADMIN — LOPERAMIDE HYDROCHLORIDE 2 MG: 2 CAPSULE ORAL at 07:55

## 2024-05-13 RX ADMIN — FLUOXETINE HYDROCHLORIDE 20 MG: 20 CAPSULE ORAL at 07:55

## 2024-05-13 RX ADMIN — HYDROXYZINE HYDROCHLORIDE 50 MG: 50 TABLET ORAL at 06:12

## 2024-05-13 RX ADMIN — TIZANIDINE 2 MG: 2 TABLET ORAL at 14:37

## 2024-05-13 RX ADMIN — TIZANIDINE 2 MG: 2 TABLET ORAL at 07:54

## 2024-05-13 RX ADMIN — LOPERAMIDE HYDROCHLORIDE 2 MG: 2 CAPSULE ORAL at 14:37

## 2024-05-13 RX ADMIN — OXYCODONE HYDROCHLORIDE 10 MG: 10 TABLET ORAL at 10:13

## 2024-05-13 RX ADMIN — HYDROXYZINE HYDROCHLORIDE 50 MG: 50 TABLET ORAL at 10:12

## 2024-05-13 RX ADMIN — CLONAZEPAM 0.5 MG: 0.5 TABLET ORAL at 07:54

## 2024-05-13 RX ADMIN — HYDROMORPHONE HYDROCHLORIDE 0.4 MG: 0.2 INJECTION, SOLUTION INTRAMUSCULAR; INTRAVENOUS; SUBCUTANEOUS at 07:56

## 2024-05-13 RX ADMIN — HEPARIN SODIUM 5000 UNITS: 5000 INJECTION, SOLUTION INTRAVENOUS; SUBCUTANEOUS at 00:24

## 2024-05-13 RX ADMIN — METHOCARBAMOL 750 MG: 750 TABLET ORAL at 07:55

## 2024-05-13 RX ADMIN — OXYCODONE HYDROCHLORIDE 10 MG: 10 TABLET ORAL at 06:07

## 2024-05-13 RX ADMIN — ACETAMINOPHEN 650 MG: 325 TABLET, FILM COATED ORAL at 14:37

## 2024-05-13 RX ADMIN — IBUPROFEN 600 MG: 600 TABLET, FILM COATED ORAL at 02:13

## 2024-05-13 RX ADMIN — IBUPROFEN 600 MG: 600 TABLET, FILM COATED ORAL at 07:55

## 2024-05-13 RX ADMIN — METHOCARBAMOL 750 MG: 750 TABLET ORAL at 14:37

## 2024-05-13 RX ADMIN — PANTOPRAZOLE SODIUM 40 MG: 20 TABLET, DELAYED RELEASE ORAL at 07:54

## 2024-05-13 RX ADMIN — ACETAMINOPHEN 650 MG: 325 TABLET, FILM COATED ORAL at 07:56

## 2024-05-13 RX ADMIN — MAGNESIUM SULFATE IN WATER 4 G: 40 INJECTION, SOLUTION INTRAVENOUS at 10:58

## 2024-05-13 RX ADMIN — OXYCODONE HYDROCHLORIDE 10 MG: 10 TABLET ORAL at 14:37

## 2024-05-13 ASSESSMENT — ACTIVITIES OF DAILY LIVING (ADL)
ADLS_ACUITY_SCORE: 22

## 2024-05-13 NOTE — PROGRESS NOTES
"[facility] Nurse Inpatient Assessment     Consulted for: new ileostomy  Summary   Patient History (according to provider note(s):      38 year old female with a history of chronic pain syndrome on chronic opioid therapy, borderline personality disorder, chemical dependecy, s/p cholecystectomy (6/2003). S/p colostomy (1/9/2012) done at [facility] and then a revision done 1/13/12. Hysterectomy (12/2013). Patient has had two exploratory laparotomies (4/24/19) and (9/5/19). History of numerous bowel obstructions due to adhesions.      Assessment of new end Ileostomy:     Diagnosis Pertinent to Stoma:   Slow transit constipation       Surgery Date: 5/7/24  Surgeon:Mali Ayala       Hospital: [facility]  Pouching system in place on assessment today: Roseanne two piece, cut to fit, and flat   Pouch barrier status: Minimal melting   Pouch last changed/wear time: 2 hours  Reason for pouch change today: leakage  Effectiveness of current pouching/ supply plan: Attempting new system, will re-evaluate next assessment  Change made with ostomy management today: yes  Pouching system in place after visit today: Roseanne two piece, cut to fit, flat, and 4\" barrier ring under the entire barrier, antifungal powder crusted with  Cavilon   Home system:  Roseanne CeraPlus flat 2 piece 57 mm with tape border, opaque pouch.  No barrier ring, no sting barrier film.    Supplies: gathered, supplies stored on unit, and discussed with patient     Stoma location: LLQ at site of previous colostomy  Stoma size: 1 1/4 inches,   Stoma appearance: red, oval, moist, good turgor, edematous, and protruberant  Mucocutaneous junction:  intact  Peristomal complication(s): red macular/papular rash with satellite lesions.  sutured incisions at 3 and 9 o'clock   Output: pasty and brown  Output volume emptied during visit: 10ml  Abdominal assessment: Firm  Surgical site(s):  open to air .    NG still in place? " "No  Pain: Fullness and Stabbing.  pain control improved  Is patient still on a PCA? No    Ostomy education assessment:  Participant of teaching session today: patient   Education completed today: Stoma assessment, Pouching system assessment , and Discharge instructions.  Shown website and pictures of Bryan nonadhesive ileostomy system.  Educational materials/methods: Verbal, Demonstration, [facility] [place] education hand out, and Addressed patient/caregiver questions/concerns  Education still needed:  none  Learning Comprehension:   Psychosocial assessment: experienced with pouching her previous colostomy  Patient readiness for education today: attentive and in significant pain  Following today's visit: patient  is able to demonstrate;         1. How to empty their pouch? Yes-experienced        2. How to change their pouch? Yes- needs practice with new stoma        3. How to read and record intake and output correctly? Yes  Preparation for discharge completed: Ensured patient has extra supplies for discharge  Preparation for discharge still needed: Placed prescription recommendations in discharge navigator for MD to sign, Discussed how and when to make an outpatient WO nurse appointment after discharge, and Discuss signs/symptoms of when to seek medical attention  Pt support system on discharge: siblings, mother  WOC recommend home care?  TBA  Face to face time: 35 minutes    Treatment Plan:     LLQ Ileostomy pouching plan:   Pouching system: ostomy supplies pouches: Roseanne 57 FECAL ([id number]) ostomy supplies barrier: [place] 57mm FLAT ([id number])  Accessories used: [facility] ostomy accessories: 2\" Cera Barrier Ring ([id number])   Frequency of pouch changes: Three times a week  WOC follow up plan: Three times a week  Bedside RN interventions: Change pouch PRN if leaking using the supplies above, Empty pouch when 1/3 to 1/2 full, ensure to clean pouch outlet after emptying to prevent odor, Notify WO for ongoing pouch leakage, " and Document stoma appearance and output volume, color, and consistency every shift       DATA:     Current support surface: Standard  Standard Isoflex gel  Containment of urine/stool: Continent of bladder and Ileostomy pouch  BMI: Body mass index is 28.11 kg/m .   Active diet order: Orders Placed This Encounter      Low Fiber Diet      Diet     Output: I/O last 3 completed shifts:  In: 850 [P.O.:300; I.V.:50; IV Piggyback:500]  Out: 1625 [Urine:650; Stool:975]     Labs:   Recent Labs   Lab 05/12/24  1809   HGB 7.8*     Pressure injury risk assessment:   Sensory Perception: 4-->no impairment  Moisture: 4-->rarely moist  Activity: 3-->walks occasionally  Mobility: 4-->no limitation  Nutrition: 3-->adequate  Friction and Shear: 3-->no apparent problem  Trell Score: 21    Pager no longer is use, please contact through [employer]   [place] group: [facility] Nurse [place]  Dept. Office Number: [phone]

## 2024-05-13 NOTE — PROVIDER NOTIFICATION
"    [id number]:  A&O x4.  Afebrile.  OVSS on room air.  Abdominal pain managed by scheduled medications and prn Oxycodone, given prn IV Dilaudid x 1 for breakthrough pain with a decrease in pain.  Given prn PO Zofran with relief.  Denies chest pain and SOB.  Potassium 3.3 and magnesium 1.2, Colorectal Surgery Resident paged and one time doses of potassium and magnesium ordered and administered without incident.  Up ad inna.  Ileostomy \"popped\" per pt and stool leaked under seal, redness around site.  Ileostomy site assessed and new pouch applied by WOC RN (see note).  Mother at bedside, supportive of pt.    Discharge  D: Orders for discharge and outpatient medications written.    I: Home medications and return to clinic schedule reviewed with patient. Discharge instructions and parameters for calling Health Care Provider reviewed. Patient left at 1500 accompanied by her mother.     A: Patient/family verbalized understanding and was ready for discharge.  All personal belongings taken by pt.  No complaints and concerns at time of discharge.    P: Patient instructed to  medications in Pharmacy. Follow up as scheduled May Post Op 12:45 PM in the [facility] [place].    "

## 2024-05-13 NOTE — PLAN OF CARE
"Goal Outcome Evaluation:    ./75 (BP Location: Left arm)   Pulse 69   Temp 98.2  F (36.8  C) (Oral)   Resp 18   Ht 1.702 m (5' 7\")   Wt 81.4 kg (179 lb 7.3 oz)   LMP  (LMP Unknown)   SpO2 96%   BMI 28.11 kg/m      2161-8182: Pt alert and oriented. Avss on room air. Denies nausea. C/o severe pain to abdominal and back, gave prn Dilaudid 0.4mg x1. Prn Atarax 50mg x2. Prn Oxycodone 10mg x2. Good UOP. Moderate output on ileostomy. Pt independent with ileostomy. Mom at bedside during the night. Cont with poc.      Problem: Adult Inpatient Plan of Care  Goal: Plan of Care Review  Description: The Plan of Care Review/Shift note should be completed every shift.  The Outcome Evaluation is a brief statement about your assessment that the patient is improving, declining, or no change.  This information will be displayed automatically on your shift  note.  Outcome: Progressing     "

## 2024-05-13 NOTE — PROGRESS NOTES
Care Management Discharge Note    Discharge Date: 05/13/2024  Discharge Disposition: Home  Discharge Services: NA  Discharge DME: NA  Discharge Transportation: family or friend will provide  Private pay costs discussed: Not applicable  Does the patient's insurance plan have a 3 day qualifying hospital stay waiver?  No  PAS Confirmation Code:  NA  Patient/family educated on Medicare website which has current facility and service quality ratings: no  Education Provided on the Discharge Plan: Yes  Persons Notified of Discharge Plans: Patient  Patient/Family in Agreement with the Plan: yes    Handoff Referral Completed: Yes    Additional Information:  PT recommend home with assist. Pt has a colostomy at baseline and is currently independent with new ileostomy. Pt's mother plans to stay with pt for a few months post hospital discharge. Family will be providing discharge transportation. No discharge needs identified.     Siddhartha Lopez RN BSN  5A RN Care Coordinator   Ph: [phone]  Pager: [phone]

## 2024-05-15 ENCOUNTER — PATIENT OUTREACH (OUTPATIENT)
Dept: CARE COORDINATION | Facility: CLINIC | Age: 39
End: 2024-05-15
Payer: COMMERCIAL

## 2024-05-15 ENCOUNTER — PATIENT OUTREACH (OUTPATIENT)
Dept: SURGERY | Facility: CLINIC | Age: 39
End: 2024-05-15
Payer: COMMERCIAL

## 2024-05-15 NOTE — TELEPHONE ENCOUNTER
Patient is s/p open total abdominal colectomy with ileostomy creation with Dr. Mali Ayala. I called her to check on her after surgery and she did not  x3. I left her a VM with our clinic number for her to call if she has any troubles after surgery.

## 2024-05-15 NOTE — PROGRESS NOTES
[facility] Contact  [facility]/Voicemail     Clinical Data: Post-Discharge Outreach     Outreach attempted x 2.  Left message on patient's voicemail, providing [facility]'s central phone number of [phone] ([phone]) for questions/concerns and/or to schedule an appt with an [facility] provider, if they do not have a PCP.      Plan:  [facility] will do no further outreaches at this time.       KRISHNA Escobar  [phone]  [facility]

## 2024-05-22 ENCOUNTER — HOSPITAL ENCOUNTER (INPATIENT)
Facility: CLINIC | Age: 39
LOS: 7 days | Discharge: HOME | DRG: 388 | End: 2024-05-29
Attending: COLON & RECTAL SURGERY | Admitting: COLON & RECTAL SURGERY
Payer: COMMERCIAL

## 2024-05-22 DIAGNOSIS — R78.81 BACTEREMIA: ICD-10-CM

## 2024-05-22 DIAGNOSIS — Z93.2 S/P ILEOSTOMY (H): Primary | ICD-10-CM

## 2024-05-22 DIAGNOSIS — G89.18 ACUTE POST-OPERATIVE PAIN: ICD-10-CM

## 2024-05-22 PROBLEM — K56.7 ILEUS (H): Status: ACTIVE | Noted: 2024-05-22

## 2024-05-22 LAB
ALBUMIN SERPL BCG-MCNC: 3.7 G/DL (ref 3.5–5.2)
ALP SERPL-CCNC: 242 U/L (ref 40–150)
ALT SERPL W P-5'-P-CCNC: 15 U/L (ref 0–50)
ANION GAP SERPL CALCULATED.3IONS-SCNC: 16 MMOL/L (ref 7–15)
AST SERPL W P-5'-P-CCNC: 30 U/L (ref 0–45)
BILIRUB SERPL-MCNC: 0.3 MG/DL
BUN SERPL-MCNC: 21.7 MG/DL (ref 6–20)
CALCIUM SERPL-MCNC: 9.3 MG/DL (ref 8.6–10)
CHLORIDE SERPL-SCNC: 92 MMOL/L (ref 98–107)
CREAT SERPL-MCNC: 0.77 MG/DL (ref 0.51–0.95)
DEPRECATED HCO3 PLAS-SCNC: 21 MMOL/L (ref 22–29)
EGFRCR SERPLBLD CKD-EPI 2021: >90 ML/MIN/1.73M2
ERYTHROCYTE [DISTWIDTH] IN BLOOD BY AUTOMATED COUNT: 11.9 % (ref 10–15)
GLUCOSE SERPL-MCNC: 157 MG/DL (ref 70–99)
HCT VFR BLD AUTO: 29 % (ref 35–47)
HGB BLD-MCNC: 10.3 G/DL (ref 11.7–15.7)
LACTATE SERPL-SCNC: 1.3 MMOL/L (ref 0.7–2)
MAGNESIUM SERPL-MCNC: 1.4 MG/DL (ref 1.7–2.3)
MCH RBC QN AUTO: 33.9 PG (ref 26.5–33)
MCHC RBC AUTO-ENTMCNC: 35.5 G/DL (ref 31.5–36.5)
MCV RBC AUTO: 95 FL (ref 78–100)
PHOSPHATE SERPL-MCNC: 1.1 MG/DL (ref 2.5–4.5)
PLATELET # BLD AUTO: 112 10E3/UL (ref 150–450)
POTASSIUM SERPL-SCNC: 2.9 MMOL/L (ref 3.4–5.3)
PROT SERPL-MCNC: 7.3 G/DL (ref 6.4–8.3)
RBC # BLD AUTO: 3.04 10E6/UL (ref 3.8–5.2)
SODIUM SERPL-SCNC: 129 MMOL/L (ref 135–145)
WBC # BLD AUTO: 15.1 10E3/UL (ref 4–11)

## 2024-05-22 PROCEDURE — 85027 COMPLETE CBC AUTOMATED: CPT

## 2024-05-22 PROCEDURE — 83605 ASSAY OF LACTIC ACID: CPT

## 2024-05-22 PROCEDURE — 250N000013 HC RX MED GY IP 250 OP 250 PS 637

## 2024-05-22 PROCEDURE — 87040 BLOOD CULTURE FOR BACTERIA: CPT

## 2024-05-22 PROCEDURE — 83735 ASSAY OF MAGNESIUM: CPT

## 2024-05-22 PROCEDURE — 84100 ASSAY OF PHOSPHORUS: CPT

## 2024-05-22 PROCEDURE — 120N000002 HC R&B MED SURG/OB UMMC

## 2024-05-22 PROCEDURE — 36415 COLL VENOUS BLD VENIPUNCTURE: CPT

## 2024-05-22 PROCEDURE — 82040 ASSAY OF SERUM ALBUMIN: CPT

## 2024-05-22 PROCEDURE — 250N000011 HC RX IP 250 OP 636

## 2024-05-22 RX ORDER — NALOXONE HYDROCHLORIDE 0.4 MG/ML
0.2 INJECTION, SOLUTION INTRAMUSCULAR; INTRAVENOUS; SUBCUTANEOUS
Status: DISCONTINUED | OUTPATIENT
Start: 2024-05-22 | End: 2024-05-29 | Stop reason: HOSPADM

## 2024-05-22 RX ORDER — CALCIUM CARBONATE 500 MG/1
1000 TABLET, CHEWABLE ORAL 4 TIMES DAILY PRN
Status: DISCONTINUED | OUTPATIENT
Start: 2024-05-22 | End: 2024-05-29 | Stop reason: HOSPADM

## 2024-05-22 RX ORDER — NALOXONE HYDROCHLORIDE 0.4 MG/ML
0.4 INJECTION, SOLUTION INTRAMUSCULAR; INTRAVENOUS; SUBCUTANEOUS
Status: DISCONTINUED | OUTPATIENT
Start: 2024-05-22 | End: 2024-05-29 | Stop reason: HOSPADM

## 2024-05-22 RX ORDER — IBUPROFEN 600 MG/1
600 TABLET, FILM COATED ORAL EVERY 8 HOURS SCHEDULED
Status: DISCONTINUED | OUTPATIENT
Start: 2024-05-22 | End: 2024-05-27

## 2024-05-22 RX ORDER — AMOXICILLIN 250 MG
1 CAPSULE ORAL 2 TIMES DAILY PRN
Status: DISCONTINUED | OUTPATIENT
Start: 2024-05-22 | End: 2024-05-23

## 2024-05-22 RX ORDER — HYDROMORPHONE HYDROCHLORIDE 1 MG/ML
0.5 INJECTION, SOLUTION INTRAMUSCULAR; INTRAVENOUS; SUBCUTANEOUS
Status: DISCONTINUED | OUTPATIENT
Start: 2024-05-22 | End: 2024-05-23

## 2024-05-22 RX ORDER — LOPERAMIDE HCL 2 MG
2 CAPSULE ORAL 3 TIMES DAILY
Status: DISCONTINUED | OUTPATIENT
Start: 2024-05-23 | End: 2024-05-23

## 2024-05-22 RX ORDER — DIPHENHYDRAMINE HCL 25 MG
25 CAPSULE ORAL EVERY 6 HOURS PRN
Status: DISCONTINUED | OUTPATIENT
Start: 2024-05-22 | End: 2024-05-29 | Stop reason: HOSPADM

## 2024-05-22 RX ORDER — PREGABALIN 75 MG/1
150 CAPSULE ORAL 2 TIMES DAILY
Status: DISCONTINUED | OUTPATIENT
Start: 2024-05-22 | End: 2024-05-29 | Stop reason: HOSPADM

## 2024-05-22 RX ORDER — SODIUM CHLORIDE, SODIUM LACTATE, POTASSIUM CHLORIDE, CALCIUM CHLORIDE 600; 310; 30; 20 MG/100ML; MG/100ML; MG/100ML; MG/100ML
INJECTION, SOLUTION INTRAVENOUS CONTINUOUS
Status: DISCONTINUED | OUTPATIENT
Start: 2024-05-22 | End: 2024-05-22

## 2024-05-22 RX ORDER — FAMOTIDINE 10 MG
10 TABLET ORAL 2 TIMES DAILY
Status: DISCONTINUED | OUTPATIENT
Start: 2024-05-22 | End: 2024-05-29 | Stop reason: HOSPADM

## 2024-05-22 RX ORDER — SUCRALFATE 1 G/1
1 TABLET ORAL 4 TIMES DAILY PRN
Status: DISCONTINUED | OUTPATIENT
Start: 2024-05-22 | End: 2024-05-29 | Stop reason: HOSPADM

## 2024-05-22 RX ORDER — CALCIUM CARBONATE 500 MG/1
500-1000 TABLET, CHEWABLE ORAL 3 TIMES DAILY PRN
Status: DISCONTINUED | OUTPATIENT
Start: 2024-05-22 | End: 2024-05-22

## 2024-05-22 RX ORDER — LIDOCAINE 40 MG/G
CREAM TOPICAL
Status: DISCONTINUED | OUTPATIENT
Start: 2024-05-22 | End: 2024-05-29 | Stop reason: HOSPADM

## 2024-05-22 RX ORDER — ACETAMINOPHEN 325 MG/1
975 TABLET ORAL EVERY 6 HOURS
Status: DISCONTINUED | OUTPATIENT
Start: 2024-05-22 | End: 2024-05-29 | Stop reason: HOSPADM

## 2024-05-22 RX ORDER — MULTIPLE VITAMINS W/ MINERALS TAB 9MG-400MCG
1 TAB ORAL EVERY MORNING
Status: DISCONTINUED | OUTPATIENT
Start: 2024-05-23 | End: 2024-05-29 | Stop reason: HOSPADM

## 2024-05-22 RX ORDER — PANTOPRAZOLE SODIUM 40 MG/1
80 TABLET, DELAYED RELEASE ORAL
Status: DISCONTINUED | OUTPATIENT
Start: 2024-05-23 | End: 2024-05-29 | Stop reason: HOSPADM

## 2024-05-22 RX ORDER — LIDOCAINE 4 G/G
2 PATCH TOPICAL AT BEDTIME
Status: DISCONTINUED | OUTPATIENT
Start: 2024-05-22 | End: 2024-05-29 | Stop reason: HOSPADM

## 2024-05-22 RX ORDER — CLONAZEPAM 0.5 MG/1
.25-.5 TABLET ORAL 3 TIMES DAILY PRN
Status: DISCONTINUED | OUTPATIENT
Start: 2024-05-22 | End: 2024-05-29 | Stop reason: HOSPADM

## 2024-05-22 RX ORDER — TIZANIDINE 2 MG/1
2 TABLET ORAL AT BEDTIME
Status: DISCONTINUED | OUTPATIENT
Start: 2024-05-22 | End: 2024-05-29 | Stop reason: HOSPADM

## 2024-05-22 RX ORDER — MEROPENEM 1 G/1
1 INJECTION, POWDER, FOR SOLUTION INTRAVENOUS EVERY 8 HOURS
Status: DISCONTINUED | OUTPATIENT
Start: 2024-05-22 | End: 2024-05-23

## 2024-05-22 RX ORDER — AMOXICILLIN 250 MG
2 CAPSULE ORAL 2 TIMES DAILY PRN
Status: DISCONTINUED | OUTPATIENT
Start: 2024-05-22 | End: 2024-05-23

## 2024-05-22 RX ORDER — METHOCARBAMOL 750 MG/1
750 TABLET, FILM COATED ORAL 3 TIMES DAILY
Status: DISCONTINUED | OUTPATIENT
Start: 2024-05-23 | End: 2024-05-29 | Stop reason: HOSPADM

## 2024-05-22 RX ORDER — OXYCODONE HYDROCHLORIDE 5 MG/1
5-10 TABLET ORAL EVERY 4 HOURS PRN
Status: DISCONTINUED | OUTPATIENT
Start: 2024-05-22 | End: 2024-05-25

## 2024-05-22 RX ORDER — PANTOPRAZOLE SODIUM 40 MG/1
40 TABLET, DELAYED RELEASE ORAL EVERY EVENING
Status: DISCONTINUED | OUTPATIENT
Start: 2024-05-23 | End: 2024-05-29 | Stop reason: HOSPADM

## 2024-05-22 RX ORDER — ENOXAPARIN SODIUM 100 MG/ML
40 INJECTION SUBCUTANEOUS EVERY 24 HOURS
Status: DISCONTINUED | OUTPATIENT
Start: 2024-05-23 | End: 2024-05-29 | Stop reason: HOSPADM

## 2024-05-22 RX ORDER — SIMETHICONE 80 MG
80-160 TABLET,CHEWABLE ORAL EVERY 6 HOURS PRN
Status: DISCONTINUED | OUTPATIENT
Start: 2024-05-22 | End: 2024-05-29 | Stop reason: HOSPADM

## 2024-05-22 RX ORDER — HYDROXYZINE HYDROCHLORIDE 25 MG/1
25 TABLET, FILM COATED ORAL EVERY 8 HOURS PRN
Status: DISCONTINUED | OUTPATIENT
Start: 2024-05-22 | End: 2024-05-29 | Stop reason: HOSPADM

## 2024-05-22 RX ADMIN — MEROPENEM 1 G: 1 INJECTION, POWDER, FOR SOLUTION INTRAVENOUS at 23:40

## 2024-05-22 RX ADMIN — IBUPROFEN 600 MG: 600 TABLET, FILM COATED ORAL at 23:41

## 2024-05-22 RX ADMIN — TIZANIDINE 2 MG: 2 TABLET ORAL at 23:41

## 2024-05-22 RX ADMIN — FAMOTIDINE 10 MG: 10 TABLET, FILM COATED ORAL at 23:41

## 2024-05-22 RX ADMIN — PREGABALIN 150 MG: 75 CAPSULE ORAL at 23:41

## 2024-05-22 RX ADMIN — HYDROMORPHONE HYDROCHLORIDE 0.5 MG: 1 INJECTION, SOLUTION INTRAMUSCULAR; INTRAVENOUS; SUBCUTANEOUS at 23:18

## 2024-05-22 RX ADMIN — LIDOCAINE 2 PATCH: 4 PATCH TOPICAL at 23:41

## 2024-05-22 ASSESSMENT — ACTIVITIES OF DAILY LIVING (ADL)
ADLS_ACUITY_SCORE: 35
ADLS_ACUITY_SCORE: 37

## 2024-05-23 ENCOUNTER — APPOINTMENT (OUTPATIENT)
Dept: GENERAL RADIOLOGY | Facility: CLINIC | Age: 39
DRG: 388 | End: 2024-05-23
Payer: COMMERCIAL

## 2024-05-23 LAB
ALBUMIN SERPL BCG-MCNC: 3.7 G/DL (ref 3.5–5.2)
ALBUMIN UR-MCNC: 50 MG/DL
ALP SERPL-CCNC: 231 U/L (ref 40–150)
ALT SERPL W P-5'-P-CCNC: 17 U/L (ref 0–50)
ANION GAP SERPL CALCULATED.3IONS-SCNC: 15 MMOL/L (ref 7–15)
APPEARANCE UR: ABNORMAL
AST SERPL W P-5'-P-CCNC: 25 U/L (ref 0–45)
BILIRUB SERPL-MCNC: 0.2 MG/DL
BILIRUB UR QL STRIP: NEGATIVE
BUN SERPL-MCNC: 24.3 MG/DL (ref 6–20)
CALCIUM SERPL-MCNC: 9.2 MG/DL (ref 8.6–10)
CHLORIDE SERPL-SCNC: 97 MMOL/L (ref 98–107)
COLOR UR AUTO: YELLOW
CREAT SERPL-MCNC: 0.69 MG/DL (ref 0.51–0.95)
DEPRECATED HCO3 PLAS-SCNC: 21 MMOL/L (ref 22–29)
EGFRCR SERPLBLD CKD-EPI 2021: >90 ML/MIN/1.73M2
ERYTHROCYTE [DISTWIDTH] IN BLOOD BY AUTOMATED COUNT: 11.9 % (ref 10–15)
GLUCOSE BLDC GLUCOMTR-MCNC: 252 MG/DL (ref 70–99)
GLUCOSE SERPL-MCNC: 135 MG/DL (ref 70–99)
GLUCOSE UR STRIP-MCNC: NEGATIVE MG/DL
HCT VFR BLD AUTO: 26.6 % (ref 35–47)
HGB BLD-MCNC: 9.4 G/DL (ref 11.7–15.7)
HGB UR QL STRIP: ABNORMAL
HYALINE CASTS: 9 /LPF
KETONES UR STRIP-MCNC: ABNORMAL MG/DL
LEUKOCYTE ESTERASE UR QL STRIP: NEGATIVE
MAGNESIUM SERPL-MCNC: 3.1 MG/DL (ref 1.7–2.3)
MCH RBC QN AUTO: 33.9 PG (ref 26.5–33)
MCHC RBC AUTO-ENTMCNC: 35.3 G/DL (ref 31.5–36.5)
MCV RBC AUTO: 96 FL (ref 78–100)
MUCOUS THREADS #/AREA URNS LPF: PRESENT /LPF
NITRATE UR QL: NEGATIVE
PH UR STRIP: 5.5 [PH] (ref 5–7)
PHOSPHATE SERPL-MCNC: 4.5 MG/DL (ref 2.5–4.5)
PHOSPHATE SERPL-MCNC: 5.1 MG/DL (ref 2.5–4.5)
PLATELET # BLD AUTO: 141 10E3/UL (ref 150–450)
POTASSIUM SERPL-SCNC: 5.1 MMOL/L (ref 3.4–5.3)
POTASSIUM SERPL-SCNC: 5.1 MMOL/L (ref 3.4–5.3)
PROT SERPL-MCNC: 7.3 G/DL (ref 6.4–8.3)
RBC # BLD AUTO: 2.77 10E6/UL (ref 3.8–5.2)
RBC URINE: 4 /HPF
SODIUM SERPL-SCNC: 133 MMOL/L (ref 135–145)
SP GR UR STRIP: 1.02 (ref 1–1.03)
SQUAMOUS EPITHELIAL: 19 /HPF
TRANSITIONAL EPI: <1 /HPF
UROBILINOGEN UR STRIP-MCNC: NORMAL MG/DL
WBC # BLD AUTO: 14.3 10E3/UL (ref 4–11)
WBC URINE: 2 /HPF

## 2024-05-23 PROCEDURE — 85027 COMPLETE CBC AUTOMATED: CPT

## 2024-05-23 PROCEDURE — 250N000011 HC RX IP 250 OP 636: Mod: JZ | Performed by: PHYSICIAN ASSISTANT

## 2024-05-23 PROCEDURE — 83735 ASSAY OF MAGNESIUM: CPT | Performed by: COLON & RECTAL SURGERY

## 2024-05-23 PROCEDURE — 250N000013 HC RX MED GY IP 250 OP 250 PS 637: Performed by: COLON & RECTAL SURGERY

## 2024-05-23 PROCEDURE — 74177 CT ABD & PELVIS W/CONTRAST: CPT | Mod: 26 | Performed by: RADIOLOGY

## 2024-05-23 PROCEDURE — 999N000007 HC SITE CHECK

## 2024-05-23 PROCEDURE — 250N000011 HC RX IP 250 OP 636: Performed by: COLON & RECTAL SURGERY

## 2024-05-23 PROCEDURE — 81001 URINALYSIS AUTO W/SCOPE: CPT

## 2024-05-23 PROCEDURE — 999N000197 HC STATISTIC WOC PT EDUCATION, 0-15 MIN

## 2024-05-23 PROCEDURE — 84100 ASSAY OF PHOSPHORUS: CPT | Performed by: COLON & RECTAL SURGERY

## 2024-05-23 PROCEDURE — 258N000003 HC RX IP 258 OP 636: Performed by: PHYSICIAN ASSISTANT

## 2024-05-23 PROCEDURE — 84132 ASSAY OF SERUM POTASSIUM: CPT | Performed by: COLON & RECTAL SURGERY

## 2024-05-23 PROCEDURE — 87040 BLOOD CULTURE FOR BACTERIA: CPT

## 2024-05-23 PROCEDURE — 250N000009 HC RX 250

## 2024-05-23 PROCEDURE — 120N000002 HC R&B MED SURG/OB UMMC

## 2024-05-23 PROCEDURE — 250N000011 HC RX IP 250 OP 636

## 2024-05-23 PROCEDURE — 80053 COMPREHEN METABOLIC PANEL: CPT

## 2024-05-23 PROCEDURE — 999N000122 CT OUTSIDE READ

## 2024-05-23 PROCEDURE — 36415 COLL VENOUS BLD VENIPUNCTURE: CPT

## 2024-05-23 PROCEDURE — 999N000127 HC STATISTIC PERIPHERAL IV START W US GUIDANCE

## 2024-05-23 PROCEDURE — 250N000013 HC RX MED GY IP 250 OP 250 PS 637

## 2024-05-23 PROCEDURE — 99255 IP/OBS CONSLTJ NEW/EST HI 80: CPT | Mod: 24 | Performed by: INTERNAL MEDICINE

## 2024-05-23 PROCEDURE — 84100 ASSAY OF PHOSPHORUS: CPT

## 2024-05-23 PROCEDURE — 36415 COLL VENOUS BLD VENIPUNCTURE: CPT | Performed by: COLON & RECTAL SURGERY

## 2024-05-23 PROCEDURE — 258N000003 HC RX IP 258 OP 636

## 2024-05-23 PROCEDURE — 36592 COLLECT BLOOD FROM PICC: CPT

## 2024-05-23 RX ORDER — POTASSIUM CHLORIDE 750 MG/1
40 TABLET, EXTENDED RELEASE ORAL ONCE
Status: COMPLETED | OUTPATIENT
Start: 2024-05-23 | End: 2024-05-23

## 2024-05-23 RX ORDER — ONDANSETRON 4 MG/1
4 TABLET, ORALLY DISINTEGRATING ORAL EVERY 6 HOURS PRN
Status: DISCONTINUED | OUTPATIENT
Start: 2024-05-23 | End: 2024-05-29 | Stop reason: HOSPADM

## 2024-05-23 RX ORDER — CEFTRIAXONE 2 G/1
2 INJECTION, POWDER, FOR SOLUTION INTRAMUSCULAR; INTRAVENOUS EVERY 24 HOURS
Status: DISCONTINUED | OUTPATIENT
Start: 2024-05-23 | End: 2024-05-29 | Stop reason: HOSPADM

## 2024-05-23 RX ORDER — FLUOXETINE 10 MG/1
10 CAPSULE ORAL DAILY
COMMUNITY

## 2024-05-23 RX ORDER — SODIUM CHLORIDE, SODIUM LACTATE, POTASSIUM CHLORIDE, CALCIUM CHLORIDE 600; 310; 30; 20 MG/100ML; MG/100ML; MG/100ML; MG/100ML
INJECTION, SOLUTION INTRAVENOUS CONTINUOUS
Status: DISCONTINUED | OUTPATIENT
Start: 2024-05-23 | End: 2024-05-23

## 2024-05-23 RX ORDER — DIPHENHYDRAMINE HYDROCHLORIDE 50 MG/ML
25 INJECTION INTRAMUSCULAR; INTRAVENOUS EVERY 6 HOURS PRN
Status: DISCONTINUED | OUTPATIENT
Start: 2024-05-23 | End: 2024-05-28

## 2024-05-23 RX ORDER — MAGNESIUM SULFATE HEPTAHYDRATE 40 MG/ML
4 INJECTION, SOLUTION INTRAVENOUS ONCE
Status: COMPLETED | OUTPATIENT
Start: 2024-05-23 | End: 2024-05-23

## 2024-05-23 RX ORDER — POTASSIUM CHLORIDE 750 MG/1
20 TABLET, EXTENDED RELEASE ORAL ONCE
Status: COMPLETED | OUTPATIENT
Start: 2024-05-23 | End: 2024-05-23

## 2024-05-23 RX ORDER — SODIUM CHLORIDE 9 MG/ML
INJECTION, SOLUTION INTRAVENOUS CONTINUOUS
Status: DISCONTINUED | OUTPATIENT
Start: 2024-05-23 | End: 2024-05-24

## 2024-05-23 RX ORDER — METRONIDAZOLE 500 MG/100ML
500 INJECTION, SOLUTION INTRAVENOUS EVERY 8 HOURS
Status: DISCONTINUED | OUTPATIENT
Start: 2024-05-23 | End: 2024-05-25

## 2024-05-23 RX ORDER — ONDANSETRON 2 MG/ML
4 INJECTION INTRAMUSCULAR; INTRAVENOUS EVERY 6 HOURS PRN
Status: DISCONTINUED | OUTPATIENT
Start: 2024-05-23 | End: 2024-05-29 | Stop reason: HOSPADM

## 2024-05-23 RX ADMIN — POTASSIUM CHLORIDE 20 MEQ: 750 TABLET, EXTENDED RELEASE ORAL at 03:00

## 2024-05-23 RX ADMIN — PREGABALIN 150 MG: 75 CAPSULE ORAL at 09:06

## 2024-05-23 RX ADMIN — ENOXAPARIN SODIUM 40 MG: 40 INJECTION SUBCUTANEOUS at 09:07

## 2024-05-23 RX ADMIN — ACETAMINOPHEN 975 MG: 325 TABLET, FILM COATED ORAL at 17:24

## 2024-05-23 RX ADMIN — POTASSIUM PHOSPHATE, MONOBASIC AND POTASSIUM PHOSPHATE, DIBASIC 30 MMOL: 224; 236 INJECTION, SOLUTION, CONCENTRATE INTRAVENOUS at 03:26

## 2024-05-23 RX ADMIN — METHOCARBAMOL TABLETS 750 MG: 750 TABLET, COATED ORAL at 13:37

## 2024-05-23 RX ADMIN — POTASSIUM & SODIUM PHOSPHATES POWDER PACK 280-160-250 MG 2 PACKET: 280-160-250 PACK at 01:06

## 2024-05-23 RX ADMIN — METHOCARBAMOL TABLETS 750 MG: 750 TABLET, COATED ORAL at 20:41

## 2024-05-23 RX ADMIN — ONDANSETRON 4 MG: 4 TABLET, ORALLY DISINTEGRATING ORAL at 20:48

## 2024-05-23 RX ADMIN — HYDROMORPHONE HYDROCHLORIDE 1 MG: 1 INJECTION, SOLUTION INTRAMUSCULAR; INTRAVENOUS; SUBCUTANEOUS at 19:01

## 2024-05-23 RX ADMIN — CEFTRIAXONE SODIUM 2 G: 2 INJECTION, POWDER, FOR SOLUTION INTRAMUSCULAR; INTRAVENOUS at 18:49

## 2024-05-23 RX ADMIN — Medication 0.5 MG: at 22:17

## 2024-05-23 RX ADMIN — HYDROMORPHONE HYDROCHLORIDE 0.5 MG: 1 INJECTION, SOLUTION INTRAMUSCULAR; INTRAVENOUS; SUBCUTANEOUS at 09:45

## 2024-05-23 RX ADMIN — Medication 0.25 MG: at 09:45

## 2024-05-23 RX ADMIN — IBUPROFEN 600 MG: 600 TABLET, FILM COATED ORAL at 06:34

## 2024-05-23 RX ADMIN — ALTEPLASE 2 MG: 2.2 INJECTION, POWDER, LYOPHILIZED, FOR SOLUTION INTRAVENOUS at 01:47

## 2024-05-23 RX ADMIN — HYDROMORPHONE HYDROCHLORIDE 0.5 MG: 1 INJECTION, SOLUTION INTRAMUSCULAR; INTRAVENOUS; SUBCUTANEOUS at 13:02

## 2024-05-23 RX ADMIN — HYDROMORPHONE HYDROCHLORIDE 0.5 MG: 1 INJECTION, SOLUTION INTRAMUSCULAR; INTRAVENOUS; SUBCUTANEOUS at 06:34

## 2024-05-23 RX ADMIN — ONDANSETRON 4 MG: 4 TABLET, ORALLY DISINTEGRATING ORAL at 08:56

## 2024-05-23 RX ADMIN — SODIUM CHLORIDE, POTASSIUM CHLORIDE, SODIUM LACTATE AND CALCIUM CHLORIDE: 600; 310; 30; 20 INJECTION, SOLUTION INTRAVENOUS at 13:38

## 2024-05-23 RX ADMIN — Medication 1 TABLET: at 09:05

## 2024-05-23 RX ADMIN — FLUOXETINE 20 MG: 20 CAPSULE ORAL at 09:06

## 2024-05-23 RX ADMIN — SODIUM CHLORIDE: 9 INJECTION, SOLUTION INTRAVENOUS at 17:57

## 2024-05-23 RX ADMIN — ALTEPLASE 2 MG: 2.2 INJECTION, POWDER, LYOPHILIZED, FOR SOLUTION INTRAVENOUS at 03:00

## 2024-05-23 RX ADMIN — OXYCODONE HYDROCHLORIDE 10 MG: 5 TABLET ORAL at 14:41

## 2024-05-23 RX ADMIN — PREGABALIN 150 MG: 75 CAPSULE ORAL at 20:42

## 2024-05-23 RX ADMIN — CALCIUM CARBONATE (ANTACID) CHEW TAB 500 MG 1000 MG: 500 CHEW TAB at 22:17

## 2024-05-23 RX ADMIN — HYDROMORPHONE HYDROCHLORIDE 0.5 MG: 1 INJECTION, SOLUTION INTRAMUSCULAR; INTRAVENOUS; SUBCUTANEOUS at 16:07

## 2024-05-23 RX ADMIN — MAGNESIUM SULFATE IN WATER 4 G: 40 INJECTION, SOLUTION INTRAVENOUS at 02:54

## 2024-05-23 RX ADMIN — METRONIDAZOLE 500 MG: 500 INJECTION, SOLUTION INTRAVENOUS at 17:25

## 2024-05-23 RX ADMIN — OXYCODONE HYDROCHLORIDE 5 MG: 5 TABLET ORAL at 01:01

## 2024-05-23 RX ADMIN — Medication 5 MG: at 22:17

## 2024-05-23 RX ADMIN — OXYCODONE HYDROCHLORIDE 10 MG: 5 TABLET ORAL at 08:56

## 2024-05-23 RX ADMIN — TIZANIDINE 2 MG: 2 TABLET ORAL at 22:16

## 2024-05-23 RX ADMIN — MEROPENEM 1 G: 1 INJECTION, POWDER, FOR SOLUTION INTRAVENOUS at 08:44

## 2024-05-23 RX ADMIN — FAMOTIDINE 10 MG: 10 TABLET, FILM COATED ORAL at 09:05

## 2024-05-23 RX ADMIN — ACETAMINOPHEN 975 MG: 325 TABLET, FILM COATED ORAL at 11:43

## 2024-05-23 RX ADMIN — ACETAMINOPHEN 975 MG: 325 TABLET, FILM COATED ORAL at 22:16

## 2024-05-23 RX ADMIN — PANTOPRAZOLE SODIUM 40 MG: 40 TABLET, DELAYED RELEASE ORAL at 20:41

## 2024-05-23 RX ADMIN — HYDROMORPHONE HYDROCHLORIDE 1 MG: 1 INJECTION, SOLUTION INTRAMUSCULAR; INTRAVENOUS; SUBCUTANEOUS at 22:17

## 2024-05-23 RX ADMIN — IBUPROFEN 600 MG: 600 TABLET, FILM COATED ORAL at 22:16

## 2024-05-23 RX ADMIN — POTASSIUM CHLORIDE 40 MEQ: 750 TABLET, EXTENDED RELEASE ORAL at 01:06

## 2024-05-23 RX ADMIN — METHOCARBAMOL TABLETS 750 MG: 750 TABLET, COATED ORAL at 09:06

## 2024-05-23 RX ADMIN — PANTOPRAZOLE SODIUM 80 MG: 40 TABLET, DELAYED RELEASE ORAL at 09:05

## 2024-05-23 RX ADMIN — FAMOTIDINE 10 MG: 10 TABLET, FILM COATED ORAL at 20:41

## 2024-05-23 ASSESSMENT — ACTIVITIES OF DAILY LIVING (ADL)
ADLS_ACUITY_SCORE: 20
ADLS_ACUITY_SCORE: 37
ADLS_ACUITY_SCORE: 20

## 2024-05-23 NOTE — H&P
[facility]    History and Physical - Colorectal Surgery Service       Date of Admission:  5/22/2024    Assessment & Plan: Surgery   Mimi Su is a 38 year old female with history of chronic pain syndrome on chronic opioid therapy, borderline personality disorder, colonic dysmotility, and multiple GI surgeries, most recently s/p open total colectomy with end ileostomy and BSO on 5/6 with Dr. Ayala & Dr. Pan. She presents as a transfer from OSH for fever, abdominal pain, and gram negative bacteremia. She currently is not septic, with vitals hemodynamically appropriate. Abdominal exam reassuring with only mild tenderness to palpation in RLQ, and incision healing well. She has separation at the mucocutaneous junction of her ostomy, though the ostomy and surrounding skin do not appear to be infected. Possibly has an intra-abdominal infection; over-read of CT a/p pending. Will repeat UA d/t patient's reported pain and difficulty with urination. Labs revealed Mg 1.4, phos 1.1, K 2.9, all from relatively normal the day prior. She has been on and off TPN over the past week; will require aggressive repletion of electrolytes and nutrition consult for management of TPN. Will need to monitor patient for any signs/symptoms of refeeding syndrome.    - NPO, TPN  - Nutrition consult for TPN  - Replacement of Mg, phos, K  - WOCN consult for ostomy. Currently has Exufiber AG along the areas of separation, underneath her appliance.  - Blood cultures, UA, culture from PICC  - Meropenem  - Pain control  - PRN anti-emetics  - PTA medications       The patient's care was discussed with the  fellow, to be discussed with staff .    Nelida Caro MD  [facility]  All communications related to this note should be expressed to resident/RAQUEL on call for this team at the time of your  communication.  ______________________________________________________________________    Chief Complaint   Fever, abdominal pain, ostomy skin separation    History is obtained from the patient and her mom.    History of Present Illness   Mimi Su is a 38 year old female with history of chronic pain syndrome on chronic opioid therapy, borderline personality disorder, colonic dysmotility, and multiple GI surgeries (s/p cholecystectomy 6/2003, colostomy 1/9/2012 done at [facility] and then a revision done 1/13/12, Hysterectomy 12/2013, two exploratory laparotomies 4/24/19 and 9/5/19 and history of numerous bowel obstructions due to adhesions) and is now s/p open total colectomy with end ileostomy and BSO on 5/6 with Dr. Ayala & Dr. Pan. She was discharged to home on 5/13/2024. She presented to an OSH on 5/14 with abdominal pain, weakness, nausea and vomiting, and was diagnosed with ileus. While admitted, she had an NG placed which was subsequently removed as her nausea improved. She had PICC placed for TPN, as she had not eaten well since prior to surgery on 5/6. She has been awaiting transfer here since 5/20.     She developed a fever on 5/21 to 102.7F. Nausea also worsened at that time. Blood cultures positive for gram-negative bacilli - Enterobacter and Klebsiella. She was started on IV meropenem. CT a/p on 5/21 revealed resolving ileus, decreased size of intraperitoneal fluid collection. UA performed, negative nitrite and leuk esterase but likely contaminated specimen.    At the OSH, she also started having separation at the mucucutaneous junction of her ileostomy, at both the 3 o'clock and 9 o'clock positions. The patient reports that there has been purulent drainage at the more lateral side. She has had Exufiber AG placed into both areas of separation.     This evening, the patient states that she is still having abdominal pain, as well as nausea/vomiting, fevers/chills, and difficulty and pain with  urinating which she has had since Tian removal after her most recent surgery. She last vomited at the OSH earlier in the day. Is having ileostomy output, and has not been taking imodium over the last several days. Has been on and off TPN; this was running upon patient's arrival. Denies chest pain, shortness of breath.      Past Medical History    Past Medical History:   Diagnosis Date    Bipolar disorder (H)     Colonic dysmotility     Colostomy in place (H)        Past Surgical History   Past Surgical History:   Procedure Laterality Date    COLECTOMY WITHOUT COLOSTOMY N/A 5/6/2024    Procedure: OPEN total abdominal colectomy, end ileostomy placement, lysis of adhesions;  Surgeon: Mali Ayala MD;  Location: [facility] OR    HYSTERECTOMY      2013 after chidlbirth    IR GASTRO JEJUNOSTOMY TUBE PLACEMENT  05/18/2022    IR NG TUBE PLACEMENT REQ RAD & FLUORO  05/18/2022    IR PICC PLACEMENT > 5 YRS OF AGE  5/15/2024    LAPAROTOMY EXPLORATORY      multiple    SALPINGO-OOPHORECTOMY BILATERAL N/A 5/6/2024    Procedure: Open bilateral Salpingo-oophorectomy;  Surgeon: Josias Pan MD;  Location: [facility] OR       Prior to Admission Medications   Prior to Admission Medications   Prescriptions Last Dose Informant Patient Reported? Taking?   FLUoxetine (PROZAC) 20 MG capsule   Yes No   Sig: Take 20 mg by mouth every morning   Lidocaine (LIDOCARE) 4 % Patch   No No   Sig: Place 2 patches onto the skin every 24 hours To prevent lidocaine toxicity, patient should be patch free for 12 hrs daily.   acetaminophen (TYLENOL) 325 MG tablet   No No   Sig: Take 3 tablets (975 mg) by mouth every 6 hours   acetaminophen (TYLENOL) 325 MG tablet   No No   Sig: Take 3 tablets (975 mg) by mouth every 6 hours   calcium carbonate (TUMS) 500 MG chewable tablet   No No   Sig: Take 1-2 tablets (500-1,000 mg) by mouth 3 times daily as needed for heartburn   clonazePAM (KLONOPIN) 0.5 MG tablet   Yes No   Sig: Take 0.25-0.5 mg by mouth 3 times daily as  needed for anxiety   diphenhydrAMINE (BENADRYL) 25 MG capsule   No No   Sig: Take 1 capsule (25 mg) by mouth every 6 hours as needed for itching   diphenhydrAMINE HCl (BENADRYL ALLERGY PO)   Yes No   Sig: Take 1 tablet by mouth nightly as needed (Sleep)   famotidine (PEPCID) 10 MG tablet   No No   Sig: Take 1 tablet (10 mg) by mouth 2 times daily   hydrOXYzine HCl (ATARAX) 25 MG tablet   No No   Sig: Take 1 tablet (25 mg) by mouth every 8 hours as needed for other or anxiety (adjuvant pain)   ibuprofen (ADVIL/MOTRIN) 600 MG tablet   No No   Sig: Take 1 tablet (600 mg) by mouth every 8 hours   loperamide (IMODIUM) 2 MG capsule   No No   Sig: Take 1 capsule (2 mg) by mouth 3 times daily   melatonin 3 MG CAPS   Yes No   Sig: Take by mouth nightly as needed   methocarbamol (ROBAXIN) 750 MG tablet   No No   Sig: Take 1 tablet (750 mg) by mouth 3 times daily   miconazole (MICATIN) 2 % external powder   No No   Sig: Apply topically 2 times daily Apply to skin around ileostomy site   multivitamin w/minerals (THERA-VIT-M) tablet   Yes No   Sig: Take 1 tablet by mouth every morning   oxyCODONE IR (ROXICODONE) 10 MG tablet   No No   Sig: Take 0.5-1 tablets (5-10 mg) by mouth every 4 hours as needed for severe pain   pantoprazole (PROTONIX) 40 MG EC tablet   Yes No   Sig: Take 2 tablets by mouth in the morning and 1 tablet by mouth in the evening.   pregabalin (LYRICA) 100 MG capsule   Yes No   Sig: Take 150 mg by mouth 2 times daily   simethicone (MYLICON) 80 MG chewable tablet   No No   Sig: Take 1-2 tablets ( mg) by mouth every 6 hours as needed for cramping   sucralfate (CARAFATE) 1 GM tablet   Yes No   Sig: Take 1 g by mouth 4 times daily as needed   tiZANidine (ZANAFLEX) 2 MG tablet   Yes No   Sig: Take 1 tablet by mouth at bedtime      Facility-Administered Medications: None        Review of Systems    The 10 point Review of Systems is negative other than noted in the HPI or here.     Social History   I have  reviewed this patient's social history and updated it with pertinent information if needed.  Social History     Tobacco Use    Smoking status: Former     Current packs/day: 0.00     Types: Cigarettes     Quit date: 2020     Years since quittin.3     Passive exposure: Past    Smokeless tobacco: Never   Vaping Use    Vaping status: Every Day   Substance Use Topics    Alcohol use: Yes     Comment: 5 drinks once a week    Drug use: Never         Family History   I have reviewed this patient's family history and updated it with pertinent information if needed.  Family History   Problem Relation Age of Onset    Lymphoma Maternal Grandmother         Burkitt's lymphoma    Lung Cancer Paternal Uncle         Multiple uncles, all heavy smokers         Allergies   Allergies   Allergen Reactions    Metoclopramide Anaphylaxis and Nausea and Vomiting     start of anaphylaxis, was treated quickl      Mirtazapine Hives    Morphine Hives, Itching and Rash    Penicillins Hives    Prochlorperazine Anaphylaxis     Other reaction(s): Other (see comments)  sweating      Promethazine Anaphylaxis     Other reaction(s): Angioedema  angioedema      Sumatriptan Anaphylaxis     Tongue and throat sensation of swelling and chest tightness.       Desvenlafaxine      Other reaction(s): Intolerance    Lisinopril      Other reaction(s): Unknown Reaction    Milnacipran      Other reaction(s): Intolerance    Labetalol Rash     Other reaction(s): Tachycardia    Levofloxacin      Other reaction(s): Mental Status Change, Other (see comments)  Anxiety (severe), euphoria, facial numbness  Anxiety (severe), euphoria, facial numbness      Metronidazole Nausea and Vomiting    Quetiapine Rash    Sulfa Antibiotics Rash    Sulfamethoxazole-Trimethoprim      Other reaction(s): Unknown Reaction    Trazodone Hives and Rash        Physical Exam   Vital Signs: Temp: 99.6  F (37.6  C) Temp src: Oral BP: 97/71 Pulse: 108   Resp: 18 SpO2: 99 % O2 Device: None  (Room air)    Weight: 150 lbs 9.6 oz  No intake or output data in the 24 hours ending 05/22/24 2202     General: A&Ox3, in mild distress, appears uncomfortable, resting in bed  CV: regular rate, well perfused extremities  Resp: normal work of breathing on room air  Abd: soft, non-distended, tender to palpation in RLQ otherwise non-tender to palpation throughout. No guarding. Not peritonitic.  Incision: clean/dry/intact, healing well. No erythema, no dehiscence. No appreciable fluid collection.  Ostomy: Stoma pink, viable. Producing green liquid output. Mucocutaneous junction  at both 3 o'clock and 9 o'clock positions. Fascia intact at both locations. No drainage at either position.  Extremities: no peripheral edema                 Data     Labs:    5/22/24 -   CMP - Na 129 (132), K 2.9 (3.4), Cl 92 (97), CO2 21 (22), BUN 21.7 (18), Cr 0.77 (0.82), Mg 1.4 (1.6), phos 1.1 (4.9); alk phos 242 (191) otherwise LFTs normal  Lactate - 1.3 (0.6)  CBC - WBC 15.1 (6.2), Hb 10.3 (10.0), hematocrit 29 (28.3), plt 112 (167)  Blood cx x2 in process    5/21/24 -   Blood culture + for Enterobacter, Klebsiella        Imaging:     CT a/p 5/21/24 -   IMPRESSION:   1.  Decreased small bowel dilatation, with residual dilated small   bowel in the right hemiabdomen and pelvis. Fecalized small bowel   contents, suggesting delayed small bowel transit. Findings either   represent resolving ileus or partial small bowel obstruction.     2.  Decreased size of 5.0 cm intraperitoneal fluid collection   subjacent to the surgical incision, although increased wall   thickening. Although this may represent loculated postsurgical   seroma, abscess cannot be excluded by imaging.     Over-read pending.      CT a/p 5/14/24 -   IMPRESSION:   1.  Dilated loops of small bowel, which taper to nondistended ileum.   Findings may represent adynamic ileus or high-grade partial small   bowel obstruction.     2.  Small volume of pneumoperitoneum in the  anterior pelvis and right   lower quadrant, presumably secondary to recent surgery. Small volume   of nonspecific free fluid. No loculated abscess.     3.  Stable mild biliary duct dilatation, likely representing   post-cholecystectomy reservoir effect.     4.  New trace bilateral pleural effusions with overlying atelectasis.

## 2024-05-23 NOTE — CONSULTS
[facility] ID Service: Initial Consultation     Patient:  Mimi Su, Date of birth 1985, Medical record number [id number]  Date of Visit:  May 23, 2024  Consult Requested by: Mali Ayala          Assessment and Recommendations:   ID PROBLEM LIST:  Klebsiella Oxytoca bacteremia  Suspected infected intra-abdominal fluid collection  History of extensive intra-abdominal surgeries  04/2019: SBO ex lap with lysis of adhesions and appendectomy @ [facility]  09/2019: Small bowel resection @ [facility]  5/6/2024: BSO and total colectomy with end ileostomy @ [facility]  History of penicillin allergies-hives  History of allergies to levofloxacin-?AMS  History of allergies to Bactrim-rash    RECOMMENDATIONS:  Stop meropenem 1g q8h IV  Start ceftriaxone 2g daily IV and metronidazole 500mg q8h IV  Follow blood cultures @ OSH (sensitivities should result 5/24/2024) and [facility]  Obtain source control of intra-abd fluid collection (if feasible)    DISCUSSION:  Mimi is a 38-year-old female with an extensive abdominal surgery history who was admitted to [facility] on 5/14/2024 for an SBO complicated by Klebsiella Oxytoca bacteremia prompting transfer to [facility] on 5/22/2024.  Etiology of Klebsiella likely secondary to suspected infected intra-abdominal fluid collection.  Will defer to colorectal surgery to see if it is feasible to obtain source control of intra-abdominal fluid collection.  On discussion with [facility] microbiology, sensitivities of Klebsiella should return on 5/24/2024.  Until this happens, will stop meropenem and replace with ceftriaxone 2 g daily IV + metronidazole 500 mg every 8 hours IV.    Thank you for this consult. ID will continue to follow. Don't hesitate to call with questions.     Patient was discussed with ID Attending, Dr. Elena Gomez MD, and primary team (Briseida Borges PA-C) today.     Prabhakar Zuniga DO, PGY 4  Infectious Disease Fellow  [facility]  Pager:  [phone]         History of Present Illness:   Mimi is a 38-year-old female with a history of chronic pain syndrome managed with chronic opioids complicated by colonic dysmotility and numerous SBO prompting numerous surgeries including loop colostomy with revision to end colostomy in 2012, exploratory lap with lysis of adhesions and appendectomy 04/2019, small bowel resection on 9/2019 and most recently a BSO + total colectomy with end ileostomy on 5/6/2024 with [facility] colorectal surgery.  She was discharged in the afternoon on 5/13/2024 and by the next morning on 5/14/2024, she developed worsening abdominal pain in RLQ/distention, nausea and vomiting prompting an admission to [facility] on 5/14/2024.  CT abdomen pelvis W 5/14/2024 with signs of a high-grade small bowel obstruction with postoperative seroma.  On 5/21/2024, she developed fevers, chills and rigors prompting an infectious workup with blood cultures demonstrating gram-negative rods and repeat CT abdomen pelvis W 5/21/24 with decreasing size of intra-abdominal fluid collection but increased wall thickness concerning for potential infectious process.  She was initiated on meropenem 1 g every 8 hours IV followed by transfer to [facility] for higher level of care.    Since arrival to [facility], she has remained hemodynamically stable on vital signs.  Labs with leukocytosis at 15.  Repeat blood cultures drawn on 5/22/2024 and 5/23/2024.  Infectious disease then consulted for management of gram-negative bacteremia.    On my evaluation of the patient this afternoon, she is accompanied by her mother at bedside.  She reports feeling extremely unwell with abdominal pain in her right lower quadrant.  Still with feelings of warmth, chills, rigors, diaphoresis, nausea.  With some pain to her lower vertebral spine, but notes this is improved from days prior.  Also with some pain to her left lower extremity from her anterior shin to ankle.  Denies any painful range of  motion of her left foot with dorsiflexion/plantarflexion/eversion/inversion.  Both her and her mom were updated of the plans to transition antibiotics as noted in the above plan.  All questions answered by me.         Review of Systems:   Full 12 point ROS obtained, pertinent positives and negatives as above.       Past Medical History:     Past Medical History:   Diagnosis Date    Bipolar disorder (H)     Colonic dysmotility     Colostomy in place (H)      Past Surgical History:   Procedure Laterality Date    COLECTOMY WITHOUT COLOSTOMY N/A 5/6/2024    Procedure: OPEN total abdominal colectomy, end ileostomy placement, lysis of adhesions;  Surgeon: Mali Ayala MD;  Location: [facility] OR    HYSTERECTOMY      2013 after chidlbirth    IR GASTRO JEJUNOSTOMY TUBE PLACEMENT  05/18/2022    IR NG TUBE PLACEMENT REQ RAD & FLUORO  05/18/2022    IR PICC PLACEMENT > 5 YRS OF AGE  5/15/2024    LAPAROTOMY EXPLORATORY      multiple    SALPINGO-OOPHORECTOMY BILATERAL N/A 5/6/2024    Procedure: Open bilateral Salpingo-oophorectomy;  Surgeon: Josias Pan MD;  Location: [facility] OR         Allergies:      Allergies   Allergen Reactions    Metoclopramide Anaphylaxis and Nausea and Vomiting     start of anaphylaxis, was treated quickl      Mirtazapine Hives    Morphine Hives, Itching and Rash    Penicillins Hives    Prochlorperazine Anaphylaxis     Other reaction(s): Other (see comments)  sweating      Promethazine Anaphylaxis     Other reaction(s): Angioedema  angioedema      Sumatriptan Anaphylaxis     Tongue and throat sensation of swelling and chest tightness.       Desvenlafaxine      Other reaction(s): Intolerance    Lisinopril      Other reaction(s): Unknown Reaction    Milnacipran      Other reaction(s): Intolerance    Labetalol Rash     Other reaction(s): Tachycardia    Levofloxacin      Other reaction(s): Mental Status Change, Other (see comments)  Anxiety (severe), euphoria, facial numbness  Anxiety (severe), euphoria,  facial numbness      Metronidazole Nausea and Vomiting    Quetiapine Rash    Sulfa Antibiotics Rash    Sulfamethoxazole-Trimethoprim      Other reaction(s): Unknown Reaction    Trazodone Hives and Rash            Current Antimicrobials:   Ceftriaxone 2 g daily IV 2024-  Metronidazole 500 mg every 8 hours IV 2024-    Prior:  Meropenem 1 g every 8 hours IV 2024 - 2024        Family History:   Reviewed and noncontributory       Social History:     Social History     Socioeconomic History    Marital status: Single     Spouse name: Not on file    Number of children: Not on file    Years of education: Not on file    Highest education level: Not on file   Occupational History    Not on file   Tobacco Use    Smoking status: Former     Current packs/day: 0.00     Types: Cigarettes     Quit date: 2020     Years since quittin.3     Passive exposure: Past    Smokeless tobacco: Never   Vaping Use    Vaping status: Every Day   Substance and Sexual Activity    Alcohol use: Yes     Comment: 5 drinks once a week    Drug use: Never    Sexual activity: Not on file   Other Topics Concern    Not on file   Social History Narrative    Not on file     Social Determinants of Health     Financial Resource Strain: Medium Risk (2023)    [facility], [facility]    Overall Financial Resource Strain (CARDIA)     Difficulty of Paying Living Expenses: Somewhat hard   Food Insecurity: No Food Insecurity (2024)    Received from [facility]    Hunger Vital Sign     Worried About Running Out of Food in the Last Year: Never true     Ran Out of Food in the Last Year: Never true   Transportation Needs: No Transportation Needs (2024)    Received from [facility]    Transportation Needs     In the past 12 months, has lack of transportation kept you from medical appointments, meetings, work, or from getting medicines  or things needed for daily living?: No   Physical Activity: Sufficiently Active (12/28/2023)    [facility] [email or web address] [facility]    Exercise Vital Sign     Days of Exercise per Week: 5 days     Minutes of Exercise per Session: 60 min   Stress: Stress Concern Present (12/28/2023)    [facility], [facility]    [employer] - Occupational Stress Questionnaire     Feeling of Stress : Very much   Social Connections: Moderately Isolated (12/28/2023)    [facility], [facility]    Social Connection and Isolation Panel [NHANES]     Frequency of Communication with Friends and Family: More than three times a week     Frequency of Social Gatherings with Friends and Family: More than three times a week     Attends Taoism Services: More than 4 times per year     Active Member of Clubs or Organizations: No     Attends Club or Organization Meetings: Never     Marital Status: Never    Interpersonal Safety: Not At Risk (5/14/2024)    [facility]    Intimate Partner Violence     Are you in a relationship where you are physically hurt, threatened and/or made to feel afraid?: No   Housing Stability: Unknown (5/14/2024)    [facility]    Housing Stability Vital Sign     Unable to Pay for Housing in the Last Year: No     Number of Times Moved in the Last Year: Not on file     Homeless in the Last Year: Not on file   Recent Concern: Housing Stability - High Risk (5/14/2024)    [facility]    Housing Stability     In the last 12 months, was there a time when you were not able to pay the mortgage or rent on time?: No     In the last 12 months, how many places have you lived?: 3     Number of Places Lived in the Last Year (Outpatient): Not on  file     In the last 12 months, how many places have you lived? (Retired): 0 to 2     In the last 12 months, was there a time when you did not have a steady place to sleep or slept in a shelter (including now)?: No            Physical Exam:   Ranges for vital signs:  Temp:  [97.4  F (36.3  C)-99.6  F (37.6  C)] 97.4  F (36.3  C)  Pulse:  [] 78  Resp:  [16-18] 16  BP: ()/(60-71) 98/60  SpO2:  [95 %-100 %] 100 %  GENERAL: Uncomfortable and ill-appearing.  Nondiaphoretic  ENT:  Head is normocephalic, atraumatic. Oropharynx is moist without exudates or ulcers.  EYES:  Eyes have anicteric sclerae.    NECK:  Supple and full range of motion without any nuchal rigidity or tenderness  LUNGS:  Clear to auscultation.  CARDIOVASCULAR:  Regular rate and rhythm with no murmurs, gallops or rubs.  ABDOMEN:  Normal bowel sounds, soft.  Tenderness noted throughout the abdomen but slightly worse in right lower quadrant.  No guarding, rigidity or rebound tenderness noted  EXT: Extremities warm and without edema.  Tenderness noted to left anterior knee, left anterior shin/posterior calf, left bimalleolar ankle.  Good range of motion of left foot with plantarflexion/dorsiflexion/eversion/inversion.  No overlying erythema/warmth/fluctuance noted to bimalleolar ankle.  SKIN:  No acute rashes.  Vertical incision, approximately 5-6 cm in length, noted to midline of abdomen without any signs of warmth, erythema, purulence or dehiscence. R forearm PIV in place without any surrounding erythema.  NEUROLOGIC:  Grossly nonfocal.  Alert and oriented x 4.  Conversing coherently.         Laboratory Data:   Reviewed.  Pertinent for:  WBC 14    Culture data:  Blood cultures 5/21/2024: 3/3 bottles positive for Klebsiella Oxytoca  Blood cultures 5/22/2024: NGTD  Blood culture 5/23/2024: Pending         Imaging:   Reviewed.  Pertinent for:  None today

## 2024-05-23 NOTE — PROGRESS NOTES
Colorectal Surgery Progress Note  [facility]    Subjective:  no nausea, vomiting.  Pain somewhat controlled. Mother at bedside.  +ostomy output.     Vitals:  Vitals:    05/23/24 0500 05/23/24 0634 05/23/24 0812 05/23/24 0814   BP:   (!) 88/61 98/60   BP Location:   Right arm Left arm   Patient Position:       Cuff Size:       Pulse:   78    Resp:  16 16    Temp:   97.4  F (36.3  C)    TempSrc:   Oral    SpO2: 97%  100%    Weight:       Height:         I/O:  I/O last 3 completed shifts:  In: 260 [P.O.:60; I.V.:200]  Out: 275 [Urine:200; Stool:75]    Physical Exam:  Gen: AAOx3, NAD  Pulm: Non-labored breathing  Abd: Soft, non-distended, appropriately tender, no guarding/rebound   Incision C/D/I - no signs of infection   Stoma pink and viable with liquid stool and gas in bag  Ext:  Warm and well-perfused    BMP  Recent Labs   Lab 05/23/24  0857 05/23/24  0202 05/22/24  2319   *  --  129*   POTASSIUM 5.1  5.1  --  2.9*   CHLORIDE 97*  --  92*   CO2 21*  --  21*   BUN 24.3*  --  21.7*   CR 0.69  --  0.77   * 252* 157*   MAG 3.1*  --  1.4*   PHOS 5.1*  --  1.1*     CBC  Recent Labs   Lab 05/23/24  0857 05/22/24  2319   WBC 14.3* 15.1*   HGB 9.4* 10.3*   HCT 26.6* 29.0*   * 112*         ASSESSMENT: This is a 38 year old female PMH chronic pain syndrome on chronic opiates, borderline personality disorder, multiple prior GI surgeries yrn 2003, s/p colostomy 2012 at [place], colostomy revision 2012, hysterectomy 2013, Ex Lap x2 4/2019 & 9/2019, h/o recurrent bowel obstructions who has chronic colonic dysmotility with severe constipation.     Thus underwent elective Open total abdominal colectomy with end ileostomy, remaining rectal stump, and BSO on 5/6 and discharged on 5/13/24.  Readmitted to OSH on 5/14 nausea, vomiting, acute on chronic abdominal pain with ileostomy output, found to have an ileus vs SBO.  Per chart while at OSH, NGT placed on 5/15. PICC placed 5/15 & TPN  initiated.  NGT removed 5/16 due to discomfort.  TPN stopped on 5/19 but PICC line remained in place. The TPN was reinitiated on 5/21 and had febrile episode 2 hours after re-initiation of TPN on the evening of 5/21 and started on barbara due to multiple allergies.  5/22 required high capacity ileostomy bag for large liquid outputs.  Notes regarding PICC red lumen with difficulties flushing. 5/22 OSH blood cultures with Gram neg bacilli.  Due to prolonged outside hospitalization and concern for intra-abd abscess and bacteremia, pt was transferred back to [facility] on 5/22.    Neuro/Pain: tizanidine, IV dilauidid 0.5 every 3 hours, will need to begin weaning down as pt is now over 2 weeks out from surgery  CV: no acute concerns at this time, monitor.   PULM: encourage IS.  GI/FEN:   - stop TPN (started at OSH)    - start CLD  - WOCN to assess MCJ separation  - strict in and outs  - replete electrolytes  : no acute concerns at this time  Heme: Hgb 9-10  ID: OSH gram neg bacteremia.  Started on meropenem, has multiple allergies.  WBC is 14.3.  Per Care Everywhere blood culture grew Klebsiella oxytoca and Enterobacteriacae group.    - CT pushed from OSH - intra abd collection is 1 x 2 x 5cm long, likely too small to place a drain. Will also review with staff.   - f/u with OSH cultures  - repeat cultures here   - remove PICC line today  - would greatly appreciate ID consult for GN bacteremia   Endocrine:   no acute concerns at this time    Activity: as tolerated.  Ppx: lovenox ppx  Dispo: pending     Clinically Significant Risk Factors Present on Admission        # Hypokalemia: Lowest K = 2.9 mmol/L in last 2 days, will replace as needed  # Hyponatremia: Lowest Na = 129 mmol/L in last 2 days, will monitor as appropriate    # Hypomagnesemia: Lowest Mg = 1.4 mg/dL in last 2 days, will replace as needed     # Thrombocytopenia: Lowest platelets = 112 in last 2 days, will monitor for bleeding            #  Financial/Environmental Concerns:           Meli Borges PA-C ..................5/23/2024   11:49 AM  Colon and Rectal Surgery    Patient was seen and discussed with Dr. Lorenzo    The above plan of care was performed and communicated to me by Dr. Ayala    I spent 35 minute face-to-face or coordinating care of Mimi Su. Over 50% of our time on the unit was spent counseling the patient and/or coordinating care as documented in the assessment and plan.     [id number] post op hospital visit

## 2024-05-23 NOTE — PROGRESS NOTES
CLINICAL NUTRITION SERVICES - ASSESSMENT NOTE     Nutrition Prescription    RECOMMENDATIONS FOR MDs/PROVIDERS TO ORDER:  Recommend PPN.     Malnutrition Status:    Severe malnutrition in the context of acute illness    Future/Additional Recommendations:  --monitor advancement of diet  -- once line is able to be placed recommend the following TPN (make adjustments to starting Dextrose)  - TPN  recommendations as follows:   Dosing wt: 68 kg  Volume 1440 mL  Dextrose 140 grams   grams  250 mL Lipids daily (infuse @ 21 mL/hr x ~12 hrs/day or until full 250 mL volume infused).    Dextrose titration:  Monitor lytes and if within acceptable parameters (Mg++ > or = 1.5 mg/dL, K+ is > or = 3 mmol/L, and PO4 > or = 1.9 mg/dL) increase dextrose by 50 g/day to goal of 240 g dextrose    Goal TPN provides: 1716 kcals (25 kcals/kg), 100 grams protein (1.5 g/kg), GIR 2.45, 29% calories from fat     - PPN recommendations as follows:   If POC is to initiate short-term peripheral parenteral nutrition:  -Recommend only using Peripheral PN for short-term nutrition support, ~1-2 week duration  -If anticipated that patient will need PN support for >2 weeks, consider central line placement.     Dosing weight: 68 kg  Access: peripheral line     Initial parameters (per day)  ClinimixE 4.25% AA, 5% dextrose concentration (peripheral line)    Volume: Rate of 83 mL/hr (1992 mL/day)  Dextrose: 100 g (amount per Clinimix at 83 mL/hr)  AA: 85 g (amount per Clinimix at 83 mL/hr)  Lipids: 250 mL 20%, 5 days per week [M-F]     Dextrose titration:   None needed with peripheral Clinimix     Additives: Standard Infuvite and Trace minerals + 100 mg B1/Thiamine x7 days      - PPN will provide: 1992 mL volume, 100g Dex daily (340 kcal, GIR 1.02 mg/kg/min), 85 g AA daily (1.3 gm/kg/day, 340 kcal), and 250 ml 20% IV lipids 5-days per week (M-F) for total provision of 1037 Kcals (15 Kcal/kg/day), 34% Kcals from fat/lipids.          REASON FOR  "ASSESSMENT  Mimi Su is a/an 38 year old female assessed by the dietitian for Admission Nutrition Risk Screen for positive    Per chart review patient with a history of chronic pain syndrome on chronic opioid therapy, borderline personality disorder, colonic dysmotility, and multiple GI surgeries, most recently s/p open total colectomy with end ileostomy     NUTRITION HISTORY  Per chart review TPN discontinued at OSH on 5/19, was advanced to full liquid diet 5/20.  Previous TPN order from OSH - 5% AA / 20% Dextrose at 65ml/hr continuous with 250ml lipids daily which will provide 2060 kcals, 78g protein, and 1560ml fluid daily.     Gavi reports she is only able to take chicken broth. TPN was discontinued at OSH. PICC line was removed today.    CURRENT NUTRITION ORDERS  Diet: Clear Liquid  Intake/Tolerance: minimal intake    LABS  Labs reviewed  (5/23): Na 133 mmol/L (L), K+ 5.1 mmol/L,  BUN 24.3 mg/dL (H), Cr 0.69 mg/dL, Mg++ 3.1 mg/dL (H), phos 5.1 mg/dL (H)    MEDICATIONS  Medications reviewed  Thera-vit-m    ANTHROPOMETRICS  Height: 170.2 cm (5' 7\")  Most Recent Weight: 68.3 kg (150 lb 9.6 oz)    IBW: 61.3 kg  BMI: Normal BMI  Weight History:   Wt Readings from Last 10 Encounters:   05/22/24 68.3 kg (150 lb 9.6 oz)   05/07/24 81.4 kg (179 lb 7.3 oz)   04/29/24 77.6 kg (171 lb)   03/25/24 75.3 kg (166 lb 1.6 oz)   03/14/24 76.2 kg (168 lb)   12/05/23 70.3 kg (155 lb)   10/04/22 69.8 kg (153 lb 14.4 oz)   04/18/22 64.9 kg (143 lb)   04/01/22 65.1 kg (143 lb 8 oz)   12% weight loss in 1 month.   Dosing Weight: 68 kg (admission weight)    ASSESSED NUTRITION NEEDS  Estimated Energy Needs: 5718-7279+ kcals/day (25 - 30+ kcals/kg)  Justification: Maintenance  Estimated Protein Needs:  grams protein/day (1.2 - 1.5 grams of pro/kg)  Justification: Increased needs  Estimated Fluid Needs: (1 mL/kcal)   Justification: Maintenance    PHYSICAL FINDINGS  See malnutrition section below.    MALNUTRITION  % Intake: " </= 50% for >/= 5 days (severe)  % Weight Loss: > 5% in 1 month (severe)  Subcutaneous Fat Loss: None observed  Muscle Loss: None observed  Fluid Accumulation/Edema: None noted  Malnutrition Diagnosis: Severe malnutrition in the context of acute illness    NUTRITION DIAGNOSIS  Altered GI function related to s/p ileostomy placement, + nausea  as evidenced by patient self report, no intake/TPN since 5/19      INTERVENTIONS  Implementation  Nutrition Education: Provided education on RD role in nutrition POC   Collaboration with other providers  Parenteral Nutrition/IV Fluids - Initiate     Goals  Diet advancement v nutrition support 2-3 days .     Monitoring/Evaluation  Progress toward goals will be monitored and evaluated per protocol.  Magdalena Yoon MS/RD/LD/CNSC  Available on [employer]   M-F (7am-3:30pm) - 7A/7B Clinical Dietitian  Weekend/Holiday Dietitian (7am-3:30pm)    ** Clinical Dietitians no longer available on pager

## 2024-05-23 NOTE — PROGRESS NOTES
Antimicrobial Stewardship Team Note    Antimicrobial Stewardship Program - A joint venture between [facility] and  Physicians to optimize antibiotic management.  NOT a formal consult - Restricted Antimicrobial Review     Patient: Mimi Su  MRN: [id number]  Allergies: Metoclopramide, Mirtazapine, Morphine, Penicillins, Prochlorperazine, Promethazine, Sumatriptan, Desvenlafaxine, Lisinopril, Milnacipran, Labetalol, Levofloxacin, Metronidazole, Quetiapine, Sulfa antibiotics, Sulfamethoxazole-trimethoprim, and Trazodone    Brief Summary: Mimi Su is a 38 year old female with PMHx significant for chronic pain syndrome (on chronic opioid therapy), borderline personality disorder, depression, colonic dysmotility, and multiple GI surgeries including cholecystectomy (6/2003), colostomy (1/9/2012 with revision 1/13/2012), hysterectomy (12/2013), two exploratory laparotomies (4/2019 and 9/2019), history of numerous bowel obstructions due to adhesions, and open total colectomy with end ileostomy and bilateral salpingo-oophorectomy (5/6/2024 at [facility]) who admitted to OSH on 5/14 due to abdominal pain, weakness, and nausea/vomiting.    History of Present Illness: Patient presented to OSH with severe abdominal pain with episodes of nausea/vomiting and was diagnosed with ileus.  Upon admission to OSH, patient was afebrile with stable vitals.  CT (5/14) showed dilated loops of small bowel leading to the end ileostomy in the left upper quadrant with trace free air.  On 5/15, a PICC was placed for TPN due to the ileus.  Patient remained in hospital due to continued pain and inability to tolerate oral intake.  On 5/20, an XR was performed which showed distension of the bowel loops.  On 5/21, the patient developed a fever (102.7) and procalcitonin was elevated at 0.93.  Blood cultures were positive for Klebsiella oxytoca (3/3 bottles).  Patient was initiated on empiric therapy with meropenem.  CT showed  resolving ileus and decreased size of intraperitoneal fluid collection.  UA negative for nitrite and leukocyte esterase.  On 5/22, stoma was protruding and red with slit dehiscence on both sides of stoma.  Patient noted to have separation at the mucucutaneous junction of her ileostomy at both the 3 and 9 o'clock positions.    Patient was transferred to [facility] on 5/22.  Upon admission, patient reports purulent drainage at the lateral side, continued abdominal pain, nausea/vomiting, fevers/chills (99.6), as well as difficulty and pain with urinating.  WBC 15.1, BP 97/71, , RR 18, on room air.  Repeat peripheral blood cultures and UA collected (5/22).  BC are still in process.  UA is negative for nitrite and leukocyte esterase and positive for hyaline casts (9), squamous epithelial cells (19), and mucus.         Active Anti-infective Medications   (From admission, onward)                 Start     Stop    05/22/24 2230  meropenem  1 g,   Intravenous,   EVERY 8 HOURS        Postoperative Infection       --                  Assessment: Gram negative bacteremia with possible intra-abdominal source  Patient has been afebrile with stable vitals since initiation of meropenem.  WBC count is still elevated (14.3) indicating potential ongoing infection.  Given the patient's recent surgical history and the findings on CT imaging, an intra-abdominal source is the most likely source for the patient's bacteremia with a possible abscess seen on CT.  Per the outside hospital there was no Enterobacter species on blood culture and only the Klebsiella oxytoca.  Currently, there is low suspicion for ESBLs based on [facility]'s antibiogram with only 5.7% of Klebsiella oxytoca isolates producing ESBL.  The patient also has no history of ESBL infections.  Therefore, ESBL coverage with meropenem is likely not necessary.  Based on [facility]'s antibiogram, Klebsiella oxytoca is 93% susceptible to ceftriaxone, making it a reasonable option for this  patient.  Additionally due to the likelihood of the intra-abdominal infection, anaerobic coverage with metronidazole is also recommended.    Recommendations:  Acknowledge team consulted ID, will defer recommendations to them  Start ceftriaxone 2 g IV daily and metronidazole 500 mg IV Q8H  May switch to oral metronidazole when tolerating oral intake      Discussed with ID Staff: Dr. Anel Deshpande MD; Violette Leyva, PharmD, BCIDP; Travis Quarles, RajeevD  Rajeev ConnorsD Candidate  Phone #: [phone]    Vital Signs/Clinical Features:  Vitals         05/21 0700  05/22 0659 05/22 0700 05/23 0659 05/23 0700 05/23 1326   Most Recent      Temp ( F)   97.5 -  99.6      97.4     97.4 (36.3) 05/23 0812    Pulse     108      78     78 05/23 0812    Resp   16 -  18      16     16 05/23 0812    BP   93/66 -  107/69    88/61 -  98/60     98/60 05/23 0814    SpO2 (%)   95 -  100      100     100 05/23 0812            Labs  Estimated Creatinine Clearance: 118 mL/min (based on SCr of 0.69 mg/dL).  Recent Labs   Lab Test 04/29/24  1333 05/07/24  0500 05/13/24  0804 05/22/24  2319 05/23/24  0857   CR 0.75 0.81 0.65 0.77 0.69       Recent Labs   Lab Test 04/01/22  1124 10/04/22  1010 04/29/24  1333 05/06/24  1729 05/07/24  0500 05/08/24  0610 05/09/24  0514 05/12/24  0559 05/12/24  1809 05/22/24  2319 05/23/24  0857   WBC 4.0 3.3* 5.3  --   --   --   --   --   --  15.1* 14.3*   HGB 11.9 12.1 11.2*  --  8.1* 7.8*  --   --  7.8* 10.3* 9.4*   HCT 34.2* 34.4* 31.5*  --   --   --   --   --   --  29.0* 26.6*    99 98  --   --   --   --   --   --  95 96    147* 143*   < >  --   --  115* 124*  --  112* 141*    < > = values in this interval not displayed.       Recent Labs   Lab Test 04/29/24  1333 05/22/24  2319 05/23/24  0857   BILITOTAL 0.5 0.3 0.2   ALKPHOS 47 242* 231*   ALBUMIN 4.3 3.7 3.7   AST 24 30 25   ALT 21 15 17       Recent Labs   Lab Test 05/22/24  2319   LACT 1.3             Culture Results:  7-Day Micro  Results       Procedure Component Value Units Date/Time    Blood Culture Line, venous     Order Status: Sent Lab Status: No result     Specimen: Blood from Line, venous     Blood Culture Arm, Left [[id number]]  (Normal) Collected: 05/22/24 2319    Order Status: Completed Lab Status: Preliminary result Updated: 05/23/24 1216    Specimen: Blood from Arm, Left      Culture No growth after 12 hours    Blood Culture Arm, Right [[id number]]  (Normal) Collected: 05/22/24 2319    Order Status: Completed Lab Status: Preliminary result Updated: 05/23/24 1216    Specimen: Blood from Arm, Right      Culture No growth after 12 hours            Recent Labs   Lab Test 04/18/22  1436 05/23/24  1149   URINEPH 6.0 5.5   NITRITE Negative Negative   LEUKEST Negative Negative   WBCU 0 2                         Imaging: CT Outside Read    Result Date: 5/23/2024  EXAMINATION: CT OUTSIDE READ, 5/23/2024 12:00 AM TECHNIQUE: Outside films dated 5/21/2024 were submitted for interpretation. CT images extending from lung bases through pubic symphysis were performed with IV contrast. COMPARISON: Outside CT 5/14/2024. PREVIOUS REPORT: The original interpretation was available for review at the time of this dictation. HISTORY: CT abdomen/pelvis from 5/21. Small bowel obstruction, fever, multiple recurrent complex intra-abdominal surgery. FINDINGS:   This report is designed to answer a focused clinical question and should be interpreted in conjunction with the original report. Minimal right basal atelectasis. No pleural effusions. The liver, kidneys, adrenals, spleen and pancreas are within normal limits. Cholecystectomy. Slight prominence of central hepatic ducts and common bile duct consistent with post cholecystectomy reservoir effect. Patient is status post subtotal colectomy with left lower quadrant ileostomy and rectal stump noted. Improved distention of small bowel loops noted relative to 5/14/2024 study with loops measuring up to 4.1 cm  in the right mid abdomen. Loops are difficult to follow to assess for discrete transition point and there are collapsed loops of bowel noted centrally and within the pelvis. Angulation of loops in the left lower quadrant with clumped appearance may represent some adhesions. No single discrete transition point noted however there are a few areas with change in caliber of loops in the mid to lower abdomen present. Small bowel extending to the ileostomy is collapsed. There is some expected postsurgical dilatation of a small bowel anastomotic site in the central pelvis. The ascites has improved from prior CT. A small rim-enhancing collection in the anterior abdomen along the ventral incision deep to the abdominal wall is demonstrated measuring 1.3 x 2.2 cm, series 2 image 103. Craniocaudad this is approximately 5 cm in length, series 602 image 76. Previously this was measured at 3.7 x 1.4 x 10.1 cm. This does not contain any internal foci of gas. Significantly improved postoperative pneumoperitoneum. Midline vessels are normal in caliber. No lymphadenopathy. Post surgical changes in the ventral abdominal wall. No drainable collection in the subcutaneous fat separate to the described intra-abdominal collection. Nodular areas in the subcutaneous fat in the right lower quadrant presumably related to injections in this region. No acute osseous abnormalities.     IMPRESSION: Status post subtotal colectomy with left lower quadrant ileostomy and rectal stump. Improved small bowel dilatation compared to 5/14/2024 study. Suspected adhesions noted with clumped appearance to loops in the left lower abdomen/pelvis. As described previously, differential considerations would include postoperative ileus however partial small bowel obstruction with a few areas of change in caliber of bowel noted given more distal collapsed loops could also have this appearance. 2.  Improved ascites and postoperative pneumoperitoneum. 3.  Postoperative  collection deep to the incision and abdominal wall along the anterior peritoneal space present, decreased in size from prior study however appears more well organized with rim-enhancing wall. No internal foci of air. Differential considerations would include postoperative seroma/liquefied hematoma. Superimposed infection given provided history could also be considered, not excluded on imaging. DESHAWN DELGADO MD   SYSTEM ID:  [id number]    CT Abdomen Pelvis w Contrast    Result Date: 5/21/2024  *CT ABDOMEN PELVIS w CONTRAST EXAM: CT ABDOMEN PELVIS w CONTRAST LOCATION: [facility] DATE: 5/21/2024 INDICATION: Small bowel obstruction, fever, multiple recurrent complex intra-abdominal surgery. COMPARISON: CT abdomen/pelvis 5/14/2024 TECHNIQUE: CT scan of the abdomen and pelvis was performed following injection of IV contrast. Multiplanar reformats were obtained. Dose reduction techniques were used. CONTRAST: 80 cc Omnipaque [id number] FINDINGS: LOWER CHEST: Unremarkable.   HEPATOBILIARY: Liver appears normal. Status post cholecystectomy. Mild common duct dilatation and central intrahepatic biliary dilatation, likely postcholecystectomy reservoir effect. PANCREAS: Normal. SPLEEN: Normal. ADRENAL GLANDS: Normal. KIDNEYS/BLADDER: Normal. BOWEL: Status post subtotal colectomy. Left lower quadrant ileostomy. Dilated small bowel loops in the right hemiabdomen measuring up to 4.1 cm, containing fecalized contents. There is also mild small bowel dilatation of a few bowel loops in the pelvis. Overall, degree of bowel dilatation is less than prior exam. No discrete transition point. No bowel wall thickening. No pneumatosis. PERITONEUM/RETROPERITONEUM: Decreased intraperitoneal fluid collection subjacent to the ventral abdominal wall surgical incision, measuring 2.2 x 1.2 x 5.0 cm, previously 3.7 x 1.4 x 10.1 cm. However, there is increased wall thickening. Few foci of extraluminal gas in the right lower quadrant, markedly  decreased from prior, likely postsurgical. No significant free fluid. LYMPH NODES: No lymphadenopathy. VASCULATURE: Unremarkable. PELVIC ORGANS: Status post hysterectomy. MUSCULOSKELETAL: Mild multilevel degenerative changes of the spine. IMPRESSION: 1.  Decreased small bowel dilatation, with residual dilated small bowel in the right hemiabdomen and pelvis. Fecalized small bowel contents, suggesting delayed small bowel transit. Findings either represent resolving ileus or partial small bowel obstruction. 2.  Decreased size of 5.0 cm intraperitoneal fluid collection subjacent to the surgical incision, although increased wall thickening. Although this may represent loculated postsurgical seroma, abscess cannot be excluded by imaging.    XR Abdomen 2 Views    Result Date: 5/19/2024  *XR ABDOMEN SUPINE  UPRIGHT EXAM: XR ABDOMEN SUPINE  UPRIGHT LOCATION: [facility] DATE: 5/19/2024 INDICATION: Abdominal pain. COMPARISON: CT abdomen/pelvis 5/14/2024. IMPRESSION: No intraperitoneal free air. Persistently dilated and gas/fluid-filled loops of small bowel in the right hemiabdomen, which may be attributable to ileus or small bowel obstruction. Cholecystectomy. Thoracic dextroscoliosis.

## 2024-05-23 NOTE — PHARMACY-ADMISSION MEDICATION HISTORY
Pharmacist Admission Medication History    Admission medication history is complete. The information provided in this note is only as accurate as the sources available at the time of the update.    Information Source(s):  PharmD Admission Med Hx note on 5/6, discharge summary 5/13, MAR from OSH  via N/A    Pertinent Information:   - Patient was admitted 5/6-5/13 and then from 5/14 to transfer. Updated med list based on hospital records.  - Of note, patient has been taking fluoxetine 30 mg daily (rather than 20 mg as documented in 5/6 note)    Changes made to PTA medication list:  Added: None  Deleted: None  Changed: fluoxetine dose updated from 20 mg to 30 mg daily per fill hx and OSH records    Allergies reviewed with patient and updates made in EHR: no    Medication History Completed By: Judy Schilling [facility] 5/23/2024 8:38 AM  Prior to Admission medications    Medication Sig Last Dose Taking? Auth Provider Long Term End Date   acetaminophen (TYLENOL) 325 MG tablet Take 3 tablets (975 mg) by mouth every 6 hours  Yes Meli Borges PA-C No    calcium carbonate (TUMS) 500 MG chewable tablet Take 1-2 tablets (500-1,000 mg) by mouth 3 times daily as needed for heartburn  at PRN Yes Meli Borges PA-C     clonazePAM (KLONOPIN) 0.5 MG tablet Take 0.25-0.5 mg by mouth 3 times daily as needed for anxiety  at PRN Yes Reported, Patient Yes    diphenhydrAMINE (BENADRYL) 25 MG capsule Take 1 capsule (25 mg) by mouth every 6 hours as needed for itching  at PRN Yes Gordon Venegas MD     famotidine (PEPCID) 10 MG tablet Take 1 tablet (10 mg) by mouth 2 times daily  Yes Meli Borges PA-C     FLUoxetine (PROZAC) 10 MG capsule Take 10 mg by mouth daily Take with 20 mg capsule for a total daily dose of 30 mg.  Yes Unknown, Entered By History Yes    FLUoxetine (PROZAC) 20 MG capsule Take 20 mg by mouth every morning Take with 10 mg capsule for a total daily dose of 30 mg.  Yes Reported, Patient  Yes    hydrOXYzine HCl (ATARAX) 25 MG tablet Take 1 tablet (25 mg) by mouth every 8 hours as needed for other or anxiety (adjuvant pain)  at PRN Yes Gordon Venegas MD     ibuprofen (ADVIL/MOTRIN) 600 MG tablet Take 1 tablet (600 mg) by mouth every 8 hours  at PRN Yes Meli Borges PA-C No    Lidocaine (LIDOCARE) 4 % Patch Place 2 patches onto the skin every 24 hours To prevent lidocaine toxicity, patient should be patch free for 12 hrs daily.  Yes Meli Borges PA-C     loperamide (IMODIUM) 2 MG capsule Take 1 capsule (2 mg) by mouth 3 times daily  Yes Mahi Ruth MD     melatonin 3 MG CAPS Take 1 capsule by mouth nightly as needed  Yes Reported, Patient     methocarbamol (ROBAXIN) 750 MG tablet Take 1 tablet (750 mg) by mouth 3 times daily  Yes Mahi Ruth MD     miconazole (MICATIN) 2 % external powder Apply topically 2 times daily Apply to skin around ileostomy site  Yes Gordon Venegas MD     multivitamin w/minerals (THERA-VIT-M) tablet Take 1 tablet by mouth every morning  Yes Reported, Patient     oxyCODONE IR (ROXICODONE) 10 MG tablet Take 0.5-1 tablets (5-10 mg) by mouth every 4 hours as needed for severe pain  at PRN Yes Marcelino Carmona MD     pantoprazole (PROTONIX) 40 MG EC tablet Take 2 tablets by mouth in the morning and 1 tablet by mouth in the evening.  Yes Reported, Patient     pregabalin (LYRICA) 100 MG capsule Take 150 mg by mouth 2 times daily  Yes Reported, Patient Yes    simethicone (MYLICON) 80 MG chewable tablet Take 1-2 tablets ( mg) by mouth every 6 hours as needed for cramping  at PRN Yes Meli Borges PA-C     sucralfate (CARAFATE) 1 GM tablet Take 1 g by mouth 4 times daily as needed  at PRN Yes Reported, Patient     tiZANidine (ZANAFLEX) 2 MG tablet Take 1 tablet by mouth at bedtime  Yes Reported, Patient  4/24/25

## 2024-05-23 NOTE — PLAN OF CARE
"Goal Outcome Evaluation:      Plan of Care Reviewed With: patient    Overall Patient Progress: no change    Outcome Evaluation: No acute changes this shift; VSS, RA. Pain controlled with current regiment. Patient ambulating in room independently. WOC consulted for patient - will see tomorrow. IVF started. Poor PO intake. IV abx per orders.    BP 98/60 (BP Location: Left arm)   Pulse 78   Temp 97.4  F (36.3  C) (Oral)   Resp 16   Ht 1.702 m (5' 7\")   Wt 68.3 kg (150 lb 9.6 oz)   LMP  (LMP Unknown)   SpO2 100%   BMI 23.59 kg/m          "

## 2024-05-23 NOTE — PROVIDER NOTIFICATION
Mimi Su 7B [id number] pt has poor PO intake, do you want to give her any IV fluids? Currently not running anything. Danyel .[phone]    Above message sent to [facility] resident - Kita Borges. Awaiting return communication.     Plan of care ongoing.

## 2024-05-23 NOTE — PLAN OF CARE
Goal Outcome Evaluation:      Plan of Care Reviewed With: patient    Overall Patient Progress: no change    Pt arrived from OSH via EMS. A&Ox4, makes needs known. Abdominal pain managed with scheduled meds and IV prn dilaudid x2 and prn oxycodone x1 with some relief. NPO ex meds. Denies N/V this shift. L PICC DL, purple infusing TPN from OSH (ok to finish infusion per MD), red lumen occluded (MD notified alteplase ordered, after second dose blood returned noted). R PIV placed for electrolyte replacement IV mag & IV phos. Oral K replaced. Ileostomy pouch changed sides packed with silver dressing with MD before bedtime, output 75 ml with some gas. Up ambulating independently, mother is at bedside. Pt is voiding spontaneously (still need UA sample -pt is aware to save next time she voids). No acute changes noted. Continue POC.     Admitted/transferred from: OSH  2 RN full skin assessment completed by Sarmad Bryant, JAMES and Josias Maier RN.  Skin assessment finding: Left PICC DL & LLQ ileostomy placed OSH. Lower abdomen bruising.  Interventions/actions: Ileostomy pouch changed with MD. Pt educated on pressure injury prevention.    Bedside Emergency Equipment Present:  Suction Regulator: Yes  Suction Canister: Yes  Tubing between Regulator and Canister: Yes  O2 Regulator with Tree: Yes  Ambu Bag: Yes

## 2024-05-24 ENCOUNTER — APPOINTMENT (OUTPATIENT)
Dept: GENERAL RADIOLOGY | Facility: CLINIC | Age: 39
DRG: 388 | End: 2024-05-24
Attending: PHYSICIAN ASSISTANT
Payer: COMMERCIAL

## 2024-05-24 LAB
ALBUMIN SERPL BCG-MCNC: 3.7 G/DL (ref 3.5–5.2)
ALP SERPL-CCNC: 201 U/L (ref 40–150)
ALT SERPL W P-5'-P-CCNC: 19 U/L (ref 0–50)
ANION GAP SERPL CALCULATED.3IONS-SCNC: 16 MMOL/L (ref 7–15)
AST SERPL W P-5'-P-CCNC: 27 U/L (ref 0–45)
BILIRUB SERPL-MCNC: 0.2 MG/DL
BUN SERPL-MCNC: 23.2 MG/DL (ref 6–20)
CALCIUM SERPL-MCNC: 8.6 MG/DL (ref 8.6–10)
CHLORIDE SERPL-SCNC: 100 MMOL/L (ref 98–107)
CREAT SERPL-MCNC: 1 MG/DL (ref 0.51–0.95)
DEPRECATED HCO3 PLAS-SCNC: 18 MMOL/L (ref 22–29)
EGFRCR SERPLBLD CKD-EPI 2021: 73 ML/MIN/1.73M2
ERYTHROCYTE [DISTWIDTH] IN BLOOD BY AUTOMATED COUNT: 12.6 % (ref 10–15)
GLUCOSE BLDC GLUCOMTR-MCNC: 121 MG/DL (ref 70–99)
GLUCOSE BLDC GLUCOMTR-MCNC: 129 MG/DL (ref 70–99)
GLUCOSE BLDC GLUCOMTR-MCNC: 71 MG/DL (ref 70–99)
GLUCOSE BLDC GLUCOMTR-MCNC: 76 MG/DL (ref 70–99)
GLUCOSE BLDC GLUCOMTR-MCNC: 94 MG/DL (ref 70–99)
GLUCOSE SERPL-MCNC: 132 MG/DL (ref 70–99)
HCT VFR BLD AUTO: 27 % (ref 35–47)
HGB BLD-MCNC: 9.2 G/DL (ref 11.7–15.7)
MAGNESIUM SERPL-MCNC: 2.3 MG/DL (ref 1.7–2.3)
MCH RBC QN AUTO: 34.2 PG (ref 26.5–33)
MCHC RBC AUTO-ENTMCNC: 34.1 G/DL (ref 31.5–36.5)
MCV RBC AUTO: 100 FL (ref 78–100)
PHOSPHATE SERPL-MCNC: 3.6 MG/DL (ref 2.5–4.5)
PLATELET # BLD AUTO: 161 10E3/UL (ref 150–450)
POTASSIUM SERPL-SCNC: 3.2 MMOL/L (ref 3.4–5.3)
POTASSIUM SERPL-SCNC: 3.8 MMOL/L (ref 3.4–5.3)
PROT SERPL-MCNC: 7.2 G/DL (ref 6.4–8.3)
RBC # BLD AUTO: 2.69 10E6/UL (ref 3.8–5.2)
SODIUM SERPL-SCNC: 134 MMOL/L (ref 135–145)
WBC # BLD AUTO: 8.7 10E3/UL (ref 4–11)

## 2024-05-24 PROCEDURE — 85014 HEMATOCRIT: CPT

## 2024-05-24 PROCEDURE — 250N000011 HC RX IP 250 OP 636: Mod: JZ | Performed by: PHYSICIAN ASSISTANT

## 2024-05-24 PROCEDURE — 84100 ASSAY OF PHOSPHORUS: CPT

## 2024-05-24 PROCEDURE — 99232 SBSQ HOSP IP/OBS MODERATE 35: CPT | Mod: 24 | Performed by: INTERNAL MEDICINE

## 2024-05-24 PROCEDURE — 36415 COLL VENOUS BLD VENIPUNCTURE: CPT

## 2024-05-24 PROCEDURE — 258N000003 HC RX IP 258 OP 636: Performed by: PHYSICIAN ASSISTANT

## 2024-05-24 PROCEDURE — 83735 ASSAY OF MAGNESIUM: CPT

## 2024-05-24 PROCEDURE — G0463 HOSPITAL OUTPT CLINIC VISIT: HCPCS

## 2024-05-24 PROCEDURE — 36415 COLL VENOUS BLD VENIPUNCTURE: CPT | Performed by: COLON & RECTAL SURGERY

## 2024-05-24 PROCEDURE — 84132 ASSAY OF SERUM POTASSIUM: CPT | Performed by: COLON & RECTAL SURGERY

## 2024-05-24 PROCEDURE — 250N000011 HC RX IP 250 OP 636

## 2024-05-24 PROCEDURE — 250N000013 HC RX MED GY IP 250 OP 250 PS 637

## 2024-05-24 PROCEDURE — 74018 RADEX ABDOMEN 1 VIEW: CPT | Mod: 26 | Performed by: RADIOLOGY

## 2024-05-24 PROCEDURE — 250N000011 HC RX IP 250 OP 636: Performed by: COLON & RECTAL SURGERY

## 2024-05-24 PROCEDURE — 74018 RADEX ABDOMEN 1 VIEW: CPT

## 2024-05-24 PROCEDURE — 82040 ASSAY OF SERUM ALBUMIN: CPT

## 2024-05-24 PROCEDURE — 83735 ASSAY OF MAGNESIUM: CPT | Performed by: COLON & RECTAL SURGERY

## 2024-05-24 PROCEDURE — 120N000002 HC R&B MED SURG/OB UMMC

## 2024-05-24 RX ORDER — POTASSIUM CHLORIDE 7.45 MG/ML
10 INJECTION INTRAVENOUS
Status: COMPLETED | OUTPATIENT
Start: 2024-05-24 | End: 2024-05-24

## 2024-05-24 RX ORDER — POTASSIUM CHLORIDE 750 MG/1
40 TABLET, EXTENDED RELEASE ORAL ONCE
Status: DISCONTINUED | OUTPATIENT
Start: 2024-05-24 | End: 2024-05-24

## 2024-05-24 RX ORDER — SIMETHICONE 80 MG
80-160 TABLET,CHEWABLE ORAL EVERY 6 HOURS PRN
Status: DISCONTINUED | OUTPATIENT
Start: 2024-05-24 | End: 2024-05-24

## 2024-05-24 RX ORDER — DEXTROSE MONOHYDRATE AND SODIUM CHLORIDE 5; .9 G/100ML; G/100ML
INJECTION, SOLUTION INTRAVENOUS CONTINUOUS
Status: DISCONTINUED | OUTPATIENT
Start: 2024-05-24 | End: 2024-05-27

## 2024-05-24 RX ADMIN — ONDANSETRON 4 MG: 2 INJECTION INTRAMUSCULAR; INTRAVENOUS at 20:23

## 2024-05-24 RX ADMIN — Medication 5 MG: at 23:59

## 2024-05-24 RX ADMIN — FAMOTIDINE 10 MG: 10 TABLET, FILM COATED ORAL at 20:31

## 2024-05-24 RX ADMIN — POTASSIUM CHLORIDE 10 MEQ: 7.46 INJECTION, SOLUTION INTRAVENOUS at 14:05

## 2024-05-24 RX ADMIN — ACETAMINOPHEN 975 MG: 325 TABLET, FILM COATED ORAL at 10:55

## 2024-05-24 RX ADMIN — METHOCARBAMOL TABLETS 750 MG: 750 TABLET, COATED ORAL at 14:05

## 2024-05-24 RX ADMIN — ACETAMINOPHEN 975 MG: 325 TABLET, FILM COATED ORAL at 23:50

## 2024-05-24 RX ADMIN — METHOCARBAMOL TABLETS 750 MG: 750 TABLET, COATED ORAL at 07:59

## 2024-05-24 RX ADMIN — PREGABALIN 150 MG: 75 CAPSULE ORAL at 07:59

## 2024-05-24 RX ADMIN — ACETAMINOPHEN 975 MG: 325 TABLET, FILM COATED ORAL at 18:36

## 2024-05-24 RX ADMIN — OXYCODONE HYDROCHLORIDE 10 MG: 5 TABLET ORAL at 19:10

## 2024-05-24 RX ADMIN — TIZANIDINE 2 MG: 2 TABLET ORAL at 23:50

## 2024-05-24 RX ADMIN — DEXTROSE AND SODIUM CHLORIDE: 5; 900 INJECTION, SOLUTION INTRAVENOUS at 18:37

## 2024-05-24 RX ADMIN — SODIUM CHLORIDE, POTASSIUM CHLORIDE, SODIUM LACTATE AND CALCIUM CHLORIDE 500 ML: 600; 310; 30; 20 INJECTION, SOLUTION INTRAVENOUS at 11:48

## 2024-05-24 RX ADMIN — ONDANSETRON 4 MG: 2 INJECTION INTRAMUSCULAR; INTRAVENOUS at 10:54

## 2024-05-24 RX ADMIN — POTASSIUM CHLORIDE 10 MEQ: 7.46 INJECTION, SOLUTION INTRAVENOUS at 16:26

## 2024-05-24 RX ADMIN — HYDROMORPHONE HYDROCHLORIDE 1 MG: 1 INJECTION, SOLUTION INTRAMUSCULAR; INTRAVENOUS; SUBCUTANEOUS at 14:05

## 2024-05-24 RX ADMIN — FAMOTIDINE 10 MG: 10 TABLET, FILM COATED ORAL at 07:59

## 2024-05-24 RX ADMIN — METRONIDAZOLE 500 MG: 500 INJECTION, SOLUTION INTRAVENOUS at 00:55

## 2024-05-24 RX ADMIN — HYDROMORPHONE HYDROCHLORIDE 1 MG: 1 INJECTION, SOLUTION INTRAMUSCULAR; INTRAVENOUS; SUBCUTANEOUS at 04:22

## 2024-05-24 RX ADMIN — HYDROMORPHONE HYDROCHLORIDE 1 MG: 1 INJECTION, SOLUTION INTRAMUSCULAR; INTRAVENOUS; SUBCUTANEOUS at 01:28

## 2024-05-24 RX ADMIN — HYDROMORPHONE HYDROCHLORIDE 1 MG: 1 INJECTION, SOLUTION INTRAMUSCULAR; INTRAVENOUS; SUBCUTANEOUS at 10:55

## 2024-05-24 RX ADMIN — SIMETHICONE 160 MG: 80 TABLET, CHEWABLE ORAL at 11:49

## 2024-05-24 RX ADMIN — Medication 1 TABLET: at 07:59

## 2024-05-24 RX ADMIN — HYDROMORPHONE HYDROCHLORIDE 1 MG: 1 INJECTION, SOLUTION INTRAMUSCULAR; INTRAVENOUS; SUBCUTANEOUS at 07:59

## 2024-05-24 RX ADMIN — PANTOPRAZOLE SODIUM 40 MG: 40 TABLET, DELAYED RELEASE ORAL at 20:31

## 2024-05-24 RX ADMIN — OXYCODONE HYDROCHLORIDE 10 MG: 5 TABLET ORAL at 15:10

## 2024-05-24 RX ADMIN — POTASSIUM CHLORIDE 10 MEQ: 7.46 INJECTION, SOLUTION INTRAVENOUS at 15:10

## 2024-05-24 RX ADMIN — METRONIDAZOLE 500 MG: 500 INJECTION, SOLUTION INTRAVENOUS at 17:05

## 2024-05-24 RX ADMIN — METHOCARBAMOL TABLETS 750 MG: 750 TABLET, COATED ORAL at 20:31

## 2024-05-24 RX ADMIN — HYDROMORPHONE HYDROCHLORIDE 1 MG: 1 INJECTION, SOLUTION INTRAMUSCULAR; INTRAVENOUS; SUBCUTANEOUS at 23:32

## 2024-05-24 RX ADMIN — PREGABALIN 150 MG: 75 CAPSULE ORAL at 20:31

## 2024-05-24 RX ADMIN — IBUPROFEN 600 MG: 600 TABLET, FILM COATED ORAL at 14:05

## 2024-05-24 RX ADMIN — HYDROMORPHONE HYDROCHLORIDE 1 MG: 1 INJECTION, SOLUTION INTRAMUSCULAR; INTRAVENOUS; SUBCUTANEOUS at 17:05

## 2024-05-24 RX ADMIN — CEFTRIAXONE SODIUM 2 G: 2 INJECTION, POWDER, FOR SOLUTION INTRAMUSCULAR; INTRAVENOUS at 18:37

## 2024-05-24 RX ADMIN — PANTOPRAZOLE SODIUM 80 MG: 40 TABLET, DELAYED RELEASE ORAL at 07:59

## 2024-05-24 RX ADMIN — HYDROMORPHONE HYDROCHLORIDE 1 MG: 1 INJECTION, SOLUTION INTRAMUSCULAR; INTRAVENOUS; SUBCUTANEOUS at 20:28

## 2024-05-24 RX ADMIN — OXYCODONE HYDROCHLORIDE 10 MG: 5 TABLET ORAL at 02:57

## 2024-05-24 RX ADMIN — OXYCODONE HYDROCHLORIDE 10 MG: 5 TABLET ORAL at 09:14

## 2024-05-24 RX ADMIN — METRONIDAZOLE 500 MG: 500 INJECTION, SOLUTION INTRAVENOUS at 07:59

## 2024-05-24 RX ADMIN — IBUPROFEN 600 MG: 600 TABLET, FILM COATED ORAL at 07:59

## 2024-05-24 RX ADMIN — Medication 0.5 MG: at 23:52

## 2024-05-24 RX ADMIN — FLUOXETINE 20 MG: 20 CAPSULE ORAL at 07:59

## 2024-05-24 RX ADMIN — POTASSIUM CHLORIDE 10 MEQ: 7.46 INJECTION, SOLUTION INTRAVENOUS at 17:35

## 2024-05-24 RX ADMIN — DIATRIZOATE MEGLUMINE AND DIATRIZOATE SODIUM 120 ML: 660; 100 SOLUTION ORAL; RECTAL at 11:00

## 2024-05-24 ASSESSMENT — ACTIVITIES OF DAILY LIVING (ADL)
ADLS_ACUITY_SCORE: 20
ADLS_ACUITY_SCORE: 21
ADLS_ACUITY_SCORE: 20
ADLS_ACUITY_SCORE: 21
ADLS_ACUITY_SCORE: 21
ADLS_ACUITY_SCORE: 20
ADLS_ACUITY_SCORE: 21
ADLS_ACUITY_SCORE: 20
ADLS_ACUITY_SCORE: 21
ADLS_ACUITY_SCORE: 20
ADLS_ACUITY_SCORE: 21
ADLS_ACUITY_SCORE: 20
ADLS_ACUITY_SCORE: 20
ADLS_ACUITY_SCORE: 21
ADLS_ACUITY_SCORE: 20
ADLS_ACUITY_SCORE: 21
ADLS_ACUITY_SCORE: 20

## 2024-05-24 NOTE — CONSULTS
"      [facility] Consult Service Note  Interventional Radiology  05/24/24   7:16 AM    Consult Requested: \"Aspiration of intraabdominal fluid collection\"    Recommendations/Plan:    IR does not recommend aspiration of this small intraabdominal fluid collection as the collection is improving, small size, and is likely to drain through skin.     Case and imaging discussed with IR attending Dr. Juan C Mullen MD   Recommendations were reviewed with Elli Hu, medical student with [employer].     History of Present Illness:  Mimi Su is a 38-year-old female with PMH of chronic pain syndrome, chronic opoid use, colonic dysmotility, numerous SBO with an extensive abdominal surgery history who was admitted to [facility] on 5/14/2024 for an SBO complicated by Klebsiella Oxytoca bacteremia prompting transfer to [facility] on 5/22/2024. Ms. Su recently underwent a BSO + total colectomy and end ileostomy on 5/6/24 here at [facility].    Patient had a CT scan on 5/21/24 at OSH. She had evidence of a small rim enhancing collection in the anterior abdomen along vventral incision deep to abdominal wall measuring 1.3 x 2.2 cm, series 2 image 103. Craniocaudad this is approximately 5 cm in length, series 602 image 76. Previously this was measured at 3.7 x 1.4 x 10.1 cm. Request is for IR to aspirate intra-abdominal fluid collection. Patient is currently afebrile, she had soft pressures this morning and yesterday, but it appears to be improving. WBC is 14.3.     Patient is currently on ceftriaxone and metronidazole.     Patient's surgical history includes: loop colostomy with revision to end colostomy in 2012, exploratory lap with lysis of adhesions and appendectomy 04/2019, small bowel resection on 9/2019 and most recently a BSO + total colectomy with end ileostomy on 5/6/2024 with [facility] colorectal surgery.       Pertinent Imaging Reviewed:   CT outside read from 5/21/24:  IMPRESSION: Status post subtotal " "colectomy with left lower quadrant  ileostomy and rectal stump. Improved small bowel dilatation compared  to 5/14/2024 study. Suspected adhesions noted with clumped appearance  to loops in the left lower abdomen/pelvis. As described previously,  differential considerations would include postoperative ileus however  partial small bowel obstruction with a few areas of change in caliber  of bowel noted given more distal collapsed loops could also have this  appearance.     2.  Improved ascites and postoperative pneumoperitoneum.     3.  Postoperative collection deep to the incision and abdominal wall  along the anterior peritoneal space present, decreased in size from  prior study however appears more well organized with rim-enhancing  wall. No internal foci of air. Differential considerations would  include postoperative seroma/liquefied hematoma. Superimposed  infection given provided history could also be considered, not  excluded on imaging.        Expected date of discharge:  05/28/2024    Vitals:   BP (!) 83/57 (BP Location: Right arm)   Pulse 72   Temp 97.6  F (36.4  C) (Oral)   Resp 18   Ht 1.702 m (5' 7\")   Wt 68.3 kg (150 lb 9.6 oz)   LMP  (LMP Unknown)   SpO2 100%   BMI 23.59 kg/m      Pertinent Labs Reviewed:  CBC:  Lab Results   Component Value Date    WBC 14.3 (H) 05/23/2024    WBC 15.1 (H) 05/22/2024    WBC 5.3 04/29/2024     Lab Results   Component Value Date    HGB 9.4 05/23/2024    HGB 10.3 05/22/2024    HGB 7.8 05/12/2024     Lab Results   Component Value Date     05/23/2024     05/22/2024     05/12/2024    INR:  No results found for: \"INR\", \"PTT\"       COVID Results:  COVID-19 Antibody Results, Testing for Immunity           No data to display              COVID-19 PCR Results          11/26/2022    12:02 8/5/2023    10:28 9/10/2023    11:47   COVID-19 PCR Results   COVID-19 Virus by PCR (External Result) Negative     Negative     Negative          Details          This " result is from an external source.            Potassium   Date Value Ref Range Status   05/23/2024 5.1 3.4 - 5.3 mmol/L Final   05/23/2024 5.1 3.4 - 5.3 mmol/L Final          Aga Sandoval PA-C  Interventional Radiology  Pager: [phone]

## 2024-05-24 NOTE — PLAN OF CARE
"Goal Outcome Evaluation:      Plan of Care Reviewed With: patient      /71   Pulse 88   Temp 98.4  F (36.9  C) (Oral)   Resp 15   Ht 1.702 m (5' 7\")   Wt 68.3 kg (150 lb 9.6 oz)   LMP  (LMP Unknown)   SpO2 100%   BMI 23.59 kg/m          Patient is alert and oriented X 4, calls appropriately, up ad inna, ambulated in halls X 2, PIV infusing NS @ 75 ml/hr, on RA, Clear liquid diet, NPO at midnight for procedure tomorrow, dilaudid increased to 1 mg every 3 hours, ileostomy with AOP, continue with current POC    "

## 2024-05-24 NOTE — CONSULTS
"[facility] Nurse Inpatient Assessment     5/24 Summary:  current pouching not protecting the dehisced surgical wound packing from the output which then erodes the seal.  Starting soft convexity to improve the seal. If this is ineffective, will need to pouch around the wounds.    Patient History (according to provider note(s):      38 year old female with history of chronic pain syndrome on chronic opioid therapy, borderline personality disorder, colonic dysmotility, and multiple GI surgeries, most recently s/p open total colectomy with end ileostomy and BSO on 5/6 with Dr. Ayala & Dr. Pan. She presents as a transfer from OSH for fever, abdominal pain, and gram negative bacteremia.     Assessment:      Assessment of established end Ileostomy:  Diagnosis Pertinent to Stoma: Slow transit constipation                                        Surgery Date: 5/7/24  Surgeon:Mali Ayala                                                           Hospital: [facility]  Pouching system in place on assessment today: Sebastian two piece, cut to fit, and flat with Exufiber in peristomal wounds  Pouch barrier status: melted over the wounds  Pouch last changed/wear time: less than 2 hours   Reason for pouch change today:  assessment    Effectiveness of current pouching/ supply plan: Attempting new system, will re-evaluate next assessment  Change made with ostomy management today: Yes  Pouching system placed today: Roseanne two piece, cut to fit, flat, barrier ring, and small convex oval ring   Supplies: ordered soft convex barriers, high output pouches      5/6 (last admission) 5/24    Last photo: 5/24/24  Stoma location: LLQ  Stoma size: 1\" x 1 1/4 inches,   Stoma appearance: normal-appearing, beefy red, oval, moist, and protruberant  Mucocutaneous junction:  intact except where the sutures have dehisced from the original tightening of the skin around the new stoma   Peristomal " "complication(s): none  fungal overgrowth from 5/6 has resolved  Output: liquid, brown, and green  Output volume emptied during visit: 100ml (left in bathroom)  Abdominal assessment: Firm  Surgical site(s): open to air  NG still in place? No  Pain: Sharp in RLQ    Ostomy education assessment:  Participant of teaching session today: patient  and parent  Education completed today: Pouching system assessment , Evaluate leakage issue, and Adjustment of pouching plan  Educational materials/methods: Demonstration  Learning Comprehension:   Psychosocial assessment: very upset about multiple issues-see RN notes.    Patient readiness for education today: distracted  Following today's visit: patient  is able to demonstrate;                How to change their pouch? Experienced          Preparation for discharge completed: Placed prescription recommendations in discharge navigator for MD to sign  Preparation for discharge still needed: none  Pt support system on discharge: mother  WOC recommend home care? No  Face to face time: 25 minutes    Treatment Plan:   LLQ Ileostomy pouching plan:   Pouching system: ostomy supplies pouches: [place] 57 FECAL ([id number]) ostomy supplies barrier: Roseanne 57mm SOFT CONVEX ([id number])  Accessories used: [facility] ostomy accessories: 2\" Sanjeev Ring ([id number])   Cleanse wounds with wound cleanser and q tips  Pack wounds with Exufiber Ag or Aquacell Ag.  Cover with additional sanjeev ring      Frequency of pouch changes: Every other day  WO follow up plan: Daily Monday-Friday (as able)  Bedside RN interventions: Change pouch PRN if leaking using the supplies above, Empty pouch when 1/3 to 1/2 full, ensure to clean pouch outlet after emptying to prevent odor, Notify WOC for ongoing pouch leakage, and Document stoma appearance and output volume, color, and consistency every shift   DATA:     Current support surface: Standard  Standard Isoflex gel  BMI: Body mass index is 23.59 kg/m .   Active diet order: " Orders Placed This Encounter      Clear Liquid Diet     Output: I/O last 3 completed shifts:  In: 810 [P.O.:460; I.V.:350]  Out: 100 [Stool:100]     Labs:   Recent Labs   Lab 05/24/24  0743   ALBUMIN 3.7   HGB 9.2*   WBC 8.7     Pressure injury risk assessment:   Sensory Perception: 4-->no impairment  Moisture: 4-->rarely moist  Activity: 3-->walks occasionally  Mobility: 4-->no limitation  Nutrition: 3-->adequate  Friction and Shear: 3-->no apparent problem  Trell Score: 21    Pager no longer is use, please contact through [email or web address]   [place] group: [facility] Nurse [place]  Dept. Office Number: [phone]

## 2024-05-24 NOTE — PLAN OF CARE
Problem: Pain Acute  Goal: Optimal Pain Control and Function  Intervention: Prevent or Manage Pain  Recent Flowsheet Documentation  Taken 5/24/2024 0000 by Lena Pittman RN  Bowel Elimination Promotion: adequate fluid intake promoted  B/P: 83/57, T: 97.6, P: 72, R: 18 C/O pain 8 out of 10. Taking IV dilaudid and PO oxycodone for pain, Ileostomy with minimal output. NPO for draining of abscess ib abdomen. Appeared to sleep in between cares. Continue to monitor and notify MD with any significant changes.

## 2024-05-24 NOTE — DISCHARGE INSTRUCTIONS
[facility]     Name: Mimi Su  Date: 5/24/24    To order your ostomy supplies    The ostomy Supplier needs this supply list  to process your order. You will need to fax/deliver this list, along with your Insurance information. Your home care nurse can assist with this process.    List of Ostomy Distributors      [employer]  Ph. [phone] Fax # [phone]  [employer]   Ph. [phone]  [facility]   Ph. [phone]  [facility]   Ph. [phone] [phone]  Or Call your insurance provider for their preferred supplier    Your Medical Supplier will need your surgeon's name, phone and fax number    Clinic:                     Phone                            Fax  Colorectal Surgery:    [phone]    Verbal Order for ostomy supplies for 1 Month per:    Stephany Olmstead RN, CWOCN                                                                                                    Authorizing MD: Dr. Ayala    Quantity of pouches:   20  /mo.    Request the following supplies:      Roseanne    2 piece soft convex wafer 57mm #[id number]     Fecal pouch 57mm- # [id number]  Accessories  2  Shelby ring #[id number]    Adapt powder #7906    No sting film barrier # 3345                         Exufiber Ag two 4x4 per month    Cleanse wounds with wound cleanser.    Pack wounds with dry Exufiber Ag  Cover the Exufiber Ag with small pieces of Shelby barrier ring  Apply the rest of the Shelby ring around the stoma  Apply prepared soft convex barrier and attach pouch                  Change your pouch three times a week, more often if leaking.   If you are cutting a hole in the wafer of your pouch, recheck stoma size and adjust pouch opening as needed every week    . Call the Ostomy Nurse at [facility] [address] [place] : [phone]   Schedule a follow-up visit in 2 to 4 weeks after your  surgery, sooner if having problems Bring a complete set of pouch-changing supplies to this visit    [facility] outpatient [facility] department  [address], [place] [address]   number- [phone]     Problems you should Report  - The stoma turns blue or darker in color.  - Cuts or sores around the stoma.  - Red, raw or painful skin around the stoma.  - Any bulging of the skin around the stoma.  - A pouch that leaks every day.  - Problems making the right size hole in the pouch wafer.   Please call with any questions or concerns.

## 2024-05-24 NOTE — PROGRESS NOTES
[facility] ID Service: Follow Up Note      Patient:  Mimi Su, Date of birth 1985, Medical record number [id number]  Date of Visit:  May 24, 2024         Assessment and Recommendations:     Mimi is a 38-year-old female with an extensive abdominal surgery history who was admitted to [facility] on 5/14/2024 for an SBO complicated by Klebsiella Oxytoca bacteremia prompting transfer to [facility] on 5/22/2024.  Etiology of Klebsiella likely secondary to suspected infected intra-abdominal fluid collection.  Klebsiella oxytoca resistant only to Ampicillin and given patient's allergies to penicillin, levofloxacin and bactrim would recommend to continue Ceftriaxone IV 2g for 1 week followed by oral Cefadroxil for total therapy of 3 weeks. Since no clear anaerobic infection, would discontinue metronidazole.     ID Problem List:  Klebsiella Oxytoca bloodstream infection   Intra-abdominal fluid collection, unclear if infected  History of penicillin allergies-hives  History of allergies to levofloxacin-?AMS  History of allergies to Bactrim-rash    Recommendations:  Stop Metronidazole  Continue IV ceftriaxone 2g daily while inpatient, switch to oral cefadroxil 1g twice a day when discharging and SBO is resolved. Total therapy will end .   Infectious disease will be signing off. Please feel free to ask any questions.    Dr Fortunato Gonzalez will be on service for the weekend and Dr ching will take over service from 5/28/24.    ORALIA Whatley MD    MDM: >3 notes and tests reviewed, discussed with prmary team, life threatening illness.          Interval History:   Patient reviewed bedside along with mom. Able to tolerate pain with pain medications. Adequate output from stoma bag. No symptoms of fever, nausea, abdominal pain. Poor oral intake.          Current Antimicrobials   Current:IV Ceftriaxone 2 g daily IV 5/23/2024-  Metronidazole 500 mg every 8 hours IV 5/23/2024-5/24/24      Prior:  Meropenem 1 g every 8 hours IV 5/21/2024 - 5/23/2024        Physical Exam:   Ranges for vital signs:  Temp:  [97.6  F (36.4  C)-98.4  F (36.9  C)] 97.7  F (36.5  C)  Pulse:  [72-89] 89  Resp:  [15-18] 16  BP: ()/(50-71) 107/65  SpO2:  [98 %-100 %] 98 %    Intake/Output Summary (Last 24 hours) at 5/24/2024 0827  Last data filed at 5/23/2024 1901  Gross per 24 hour   Intake 560 ml   Output 100 ml   Net 460 ml     Exam:  GENERAL:  well-developed, well-nourished, sitting in bed in no acute distress.   ENT:  Head is normocephalic, atraumatic. Oropharynx is moist without exudates or ulcers.  EYES:  Eyes have anicteric sclerae.    NECK:  Supple.  LUNGS:  Clear to auscultation.  CARDIOVASCULAR:  Regular rate and rhythm with no murmurs, gallops or rubs.  ABDOMEN:  Normal bowel sounds, soft, tender in right upper and lower quadrants.  EXT: Extremities warm and without edema.  SKIN:  No acute rashes.  Line is in place without any surrounding erythema.  NEUROLOGIC:  Grossly nonfocal.         Laboratory Data:   Reviewed.  Pertinent for:  WBC 8.7    Culture data:  Blood cultures 5/21/2024: 3/3 bottles positive for Klebsiella Oxytoca resistant only for   Blood cultures 5/22/2024: NGTD  Blood culture 5/23/2024:No growth after 12 hours       Imaging:   CT OUTSIDE 5/23/24  IMPRESSION: Status post subtotal colectomy with left lower quadrant  ileostomy and rectal stump. Improved small bowel dilatation compared  to 5/14/2024 study. Suspected adhesions noted with clumped appearance  to loops in the left lower abdomen/pelvis. As described previously,  differential considerations would include postoperative ileus however  partial small bowel obstruction with a few areas of change in caliber  of bowel noted given more distal collapsed loops could also have this  appearance.     2.  Improved ascites and postoperative pneumoperitoneum.     3.  Postoperative collection deep to the incision and abdominal wall  along the anterior  peritoneal space present, decreased in size from  prior study however appears more well organized with rim-enhancing  wall. No internal foci of air. Differential considerations would  include postoperative seroma/liquefied hematoma. Superimposed  infection given provided history could also be considered, not  excluded on imaging.

## 2024-05-24 NOTE — PROGRESS NOTES
Colorectal Surgery Progress Note  [facility]    Subjective:  Pain somewhat controlled, but reports gas building up in abdomen causing a consistent, sharp right lower abdominal pain.  Notes that she would like her magnesium and potassium checked. Ostomy with output and gas.     Vitals:  Vitals:    05/23/24 2345 05/23/24 2350 05/24/24 0253 05/24/24 0741   BP: (!) 82/50 (!) 84/52 (!) 83/57 107/65   BP Location: Right arm Right arm Right arm Right arm   Patient Position:       Cuff Size:       Pulse: 85  72 89   Resp: 18  18 16   Temp: 97.8  F (36.6  C)  97.6  F (36.4  C) 97.7  F (36.5  C)   TempSrc: Oral  Oral Oral   SpO2: 100%  100% 98%   Weight:       Height:         I/O:  I/O last 3 completed shifts:  In: 810 [P.O.:460; I.V.:350]  Out: 100 [Stool:100]    Physical Exam:  Gen: AAOx3, NAD  Pulm: Non-labored breathing  Abd: Soft, non-distended, mildly tender, no guarding/rebound   Incision C/D/I - no signs of infection   Stoma pink and viable with liquid stool and gas in bag  Ext:  Warm and well-perfused    BMP  Recent Labs   Lab 05/23/24  1319 05/23/24  0857 05/23/24  0202 05/22/24  2319   NA  --  133*  --  129*   POTASSIUM  --  5.1  5.1  --  2.9*   CHLORIDE  --  97*  --  92*   CO2  --  21*  --  21*   BUN  --  24.3*  --  21.7*   CR  --  0.69  --  0.77   GLC  --  135* 252* 157*   MAG  --  3.1*  --  1.4*   PHOS 4.5 5.1*  --  1.1*     CBC  Recent Labs   Lab 05/23/24  0857 05/22/24  2319   WBC 14.3* 15.1*   HGB 9.4* 10.3*   HCT 26.6* 29.0*   * 112*         ASSESSMENT: This is a 38 year old female PMH chronic pain syndrome on chronic opiates, borderline personality disorder, multiple prior GI surgeries yrn 2003, s/p colostomy 2012 at [place], colostomy revision 2012, hysterectomy 2013, Ex Lap x2 4/2019 & 9/2019, h/o recurrent bowel obstructions who has chronic colonic dysmotility with severe constipation.     Thus underwent elective Open total abdominal colectomy with end ileostomy,  remaining rectal stump, and BSO on 5/6 and discharged on 5/13/24.  Readmitted to OSH on 5/14 nausea, vomiting, acute on chronic abdominal pain with ileostomy output, found to have an ileus vs SBO.  Per chart while at OSH, NGT placed on 5/15. PICC placed 5/15 & TPN initiated.  NGT removed 5/16 due to discomfort.  TPN stopped on 5/19 but PICC line remained in place. The TPN was reinitiated on 5/21 and had febrile episode 2 hours after re-initiation of TPN on the evening of 5/21 and started on barbara due to multiple allergies.  5/22 required high capacity ileostomy bag for large liquid outputs.  Notes regarding PICC red lumen with difficulties flushing. 5/22 OSH blood cultures with Gram neg bacilli.  Due to prolonged outside hospitalization and concern for intra-abd abscess and bacteremia, pt was transferred back to [facility] on 5/22. Plans for gastrografin challenge today, no IR drainage of fluid collection. Encourage OOB and walking.      Neuro/Pain:   - oxycodone 5-10mg q4H prn, increased IV dilauidid to 1mg every 3 hours PRN to get through transient discomfort  -- Dr. Ayala discussed with pt that we will return to IV dilaudid 0.5mg q3H prn beginning Saturday 5/25 AM   CV: no acute concerns at this time, monitor.   PULM: encourage IS.  GI/FEN:   - recommend gastrografin challenge today with serial radiographs at 3 hours and 8 hours  - CLD today   - WOCN to assess MCJ separation as able  - strict in and outs  - replete electrolytes  - simethicone prn for gas  : no acute concerns at this time  Heme: Hgb 9-10  ID: OSH gram neg bacteremia.  Started on meropenem 2/2 multiple allergies.  WBC is 14.3.  Per Care Everywhere blood culture grew Klebsiella oxytoca and Enterobacteriacae group.    - CT pushed from OSH - intra abd collection is 1 x 2 x 5cm long   -- IR does not recommend aspiration of small fluid collection, as it is small and improving  - f/u with OSH cultures  - f/u repeat cultures (5/23) - NGTD  - removed PICC  line  - greatly appreciate ID consult for GN bacteremia - ceftriaxone and flagyl duration TBD.    Endocrine:   no acute concerns at this time    Activity: as tolerated.  Ppx: lovenox ppx  Dispo: pending          Elli Hu, MS4  [employer]      Addendum:  Pt was seen and examined independent of the medical student.     - agree with note above.     A/P: modifications made above prn.  F/u gastrograffin.  Will continue to discuss antibiotics with ID.     Meli Borges PA-C  Colon and Rectal Surgery     Seen and discussed with Dr. Lorenzo    The above plan of care was performed and communicated to me by Dr. Shin    I spent 45 minute face-to-face or coordinating care of Mimi Su. Over 50% of our time on the unit was spent counseling the patient and/or coordinating care with specialists and nursing and team as documented in the assessment and plan.     [id number] post op hospital visit

## 2024-05-24 NOTE — CONSULTS
WOC consult for established ileostomy with MCJ separation.  Pt seen by WOC at [facility], [employer] packing started.    Pt just changed the barrier, asking that it not be changed again today unless leaking.    Plan:  will assess jane

## 2024-05-24 NOTE — PROGRESS NOTES
CLINICAL NUTRITION SERVICES - BRIEF NOTE     Nutrition Prescription    RECOMMENDATIONS FOR MDs/PROVIDERS TO ORDER:  Monitor PO intake/tolerance.  Resume TPN when appropriate, if oral intake remains minimal.      Recommendations already ordered by Registered Dietitian (RD):  Ensure Clear at 2 pm daily       EVALUATION OF THE PROGRESS TOWARD GOALS   Diet: Clears     Intake: Has chicken broth at bedside.      NEW FINDINGS   Minimal PO intake x ~8 days following procedure on 5/6/24 per MD note, ileus.    TPN started 5/15-5/19.  Stopped for unknown reasons.  Resumed 5/21 with poor PO tolerance.   TPN discontinued 5/22 here d/t bacteremia.      INTERVENTIONS  Discussed with primary team - team will hold off on PPN currently. Plan to resume TPN pending PO tolerance/once bacteremia cleared.  Discussed Ensure Clear with patient, patient willing to try.  Also interested in noodles whenever team feels it is appropriate.     Monitoring/Evaluation  Progress toward goals will be monitored and evaluated per protocol.     Ricarda Mora, MS, RD, LD, CCTD, [facility]  7A + 7B (beds [id number])  Available on Vocera [7A or 7B Clinical Dietitian]   Weekend/Holiday: Vocera [Weekend Clinical Dietitian]

## 2024-05-25 ENCOUNTER — APPOINTMENT (OUTPATIENT)
Dept: GENERAL RADIOLOGY | Facility: CLINIC | Age: 39
DRG: 388 | End: 2024-05-25
Attending: PHYSICIAN ASSISTANT
Payer: COMMERCIAL

## 2024-05-25 LAB
ALBUMIN SERPL BCG-MCNC: 3 G/DL (ref 3.5–5.2)
ALP SERPL-CCNC: 136 U/L (ref 40–150)
ALT SERPL W P-5'-P-CCNC: 17 U/L (ref 0–50)
ANION GAP SERPL CALCULATED.3IONS-SCNC: 8 MMOL/L (ref 7–15)
AST SERPL W P-5'-P-CCNC: 19 U/L (ref 0–45)
BILIRUB SERPL-MCNC: <0.2 MG/DL
BUN SERPL-MCNC: 12.8 MG/DL (ref 6–20)
CALCIUM SERPL-MCNC: 8.5 MG/DL (ref 8.6–10)
CHLORIDE SERPL-SCNC: 108 MMOL/L (ref 98–107)
CREAT SERPL-MCNC: 0.74 MG/DL (ref 0.51–0.95)
DEPRECATED HCO3 PLAS-SCNC: 21 MMOL/L (ref 22–29)
EGFRCR SERPLBLD CKD-EPI 2021: >90 ML/MIN/1.73M2
ERYTHROCYTE [DISTWIDTH] IN BLOOD BY AUTOMATED COUNT: 12.6 % (ref 10–15)
GLUCOSE SERPL-MCNC: 105 MG/DL (ref 70–99)
HCT VFR BLD AUTO: 22.6 % (ref 35–47)
HGB BLD-MCNC: 7.4 G/DL (ref 11.7–15.7)
MAGNESIUM SERPL-MCNC: 2 MG/DL (ref 1.7–2.3)
MCH RBC QN AUTO: 33.2 PG (ref 26.5–33)
MCHC RBC AUTO-ENTMCNC: 32.7 G/DL (ref 31.5–36.5)
MCV RBC AUTO: 101 FL (ref 78–100)
PHOSPHATE SERPL-MCNC: 4.1 MG/DL (ref 2.5–4.5)
PLATELET # BLD AUTO: 148 10E3/UL (ref 150–450)
POTASSIUM SERPL-SCNC: 4.2 MMOL/L (ref 3.4–5.3)
PROT SERPL-MCNC: 5.6 G/DL (ref 6.4–8.3)
RBC # BLD AUTO: 2.23 10E6/UL (ref 3.8–5.2)
SODIUM SERPL-SCNC: 137 MMOL/L (ref 135–145)
WBC # BLD AUTO: 4.3 10E3/UL (ref 4–11)

## 2024-05-25 PROCEDURE — 83735 ASSAY OF MAGNESIUM: CPT

## 2024-05-25 PROCEDURE — 999N000128 HC STATISTIC PERIPHERAL IV START W/O US GUIDANCE

## 2024-05-25 PROCEDURE — 74018 RADEX ABDOMEN 1 VIEW: CPT | Mod: 26 | Performed by: STUDENT IN AN ORGANIZED HEALTH CARE EDUCATION/TRAINING PROGRAM

## 2024-05-25 PROCEDURE — 258N000003 HC RX IP 258 OP 636: Performed by: SURGERY

## 2024-05-25 PROCEDURE — 80053 COMPREHEN METABOLIC PANEL: CPT

## 2024-05-25 PROCEDURE — 250N000011 HC RX IP 250 OP 636: Performed by: SURGERY

## 2024-05-25 PROCEDURE — 258N000003 HC RX IP 258 OP 636: Performed by: PHYSICIAN ASSISTANT

## 2024-05-25 PROCEDURE — 250N000013 HC RX MED GY IP 250 OP 250 PS 637: Performed by: SURGERY

## 2024-05-25 PROCEDURE — 250N000011 HC RX IP 250 OP 636: Performed by: PHYSICIAN ASSISTANT

## 2024-05-25 PROCEDURE — 250N000013 HC RX MED GY IP 250 OP 250 PS 637

## 2024-05-25 PROCEDURE — 84100 ASSAY OF PHOSPHORUS: CPT

## 2024-05-25 PROCEDURE — 250N000011 HC RX IP 250 OP 636: Performed by: COLON & RECTAL SURGERY

## 2024-05-25 PROCEDURE — 120N000002 HC R&B MED SURG/OB UMMC

## 2024-05-25 PROCEDURE — 74018 RADEX ABDOMEN 1 VIEW: CPT

## 2024-05-25 PROCEDURE — 36415 COLL VENOUS BLD VENIPUNCTURE: CPT

## 2024-05-25 PROCEDURE — 250N000011 HC RX IP 250 OP 636

## 2024-05-25 PROCEDURE — 85049 AUTOMATED PLATELET COUNT: CPT

## 2024-05-25 RX ORDER — OXYCODONE HYDROCHLORIDE 10 MG/1
10 TABLET ORAL EVERY 4 HOURS PRN
Status: DISCONTINUED | OUTPATIENT
Start: 2024-05-25 | End: 2024-05-27

## 2024-05-25 RX ORDER — HYDROMORPHONE HYDROCHLORIDE 1 MG/ML
0.5 INJECTION, SOLUTION INTRAMUSCULAR; INTRAVENOUS; SUBCUTANEOUS
Status: DISCONTINUED | OUTPATIENT
Start: 2024-05-25 | End: 2024-05-27

## 2024-05-25 RX ADMIN — ONDANSETRON 4 MG: 2 INJECTION INTRAMUSCULAR; INTRAVENOUS at 08:24

## 2024-05-25 RX ADMIN — TIZANIDINE 2 MG: 2 TABLET ORAL at 22:02

## 2024-05-25 RX ADMIN — HYDROMORPHONE HYDROCHLORIDE 0.5 MG: 1 INJECTION, SOLUTION INTRAMUSCULAR; INTRAVENOUS; SUBCUTANEOUS at 08:24

## 2024-05-25 RX ADMIN — CEFTRIAXONE SODIUM 2 G: 2 INJECTION, POWDER, FOR SOLUTION INTRAMUSCULAR; INTRAVENOUS at 18:03

## 2024-05-25 RX ADMIN — OXYCODONE HYDROCHLORIDE 10 MG: 10 TABLET ORAL at 18:09

## 2024-05-25 RX ADMIN — PANTOPRAZOLE SODIUM 80 MG: 40 TABLET, DELAYED RELEASE ORAL at 08:14

## 2024-05-25 RX ADMIN — SIMETHICONE 160 MG: 80 TABLET, CHEWABLE ORAL at 11:56

## 2024-05-25 RX ADMIN — METRONIDAZOLE 500 MG: 500 INJECTION, SOLUTION INTRAVENOUS at 00:34

## 2024-05-25 RX ADMIN — IBUPROFEN 600 MG: 600 TABLET, FILM COATED ORAL at 22:03

## 2024-05-25 RX ADMIN — ACETAMINOPHEN 975 MG: 325 TABLET, FILM COATED ORAL at 18:04

## 2024-05-25 RX ADMIN — IBUPROFEN 600 MG: 600 TABLET, FILM COATED ORAL at 07:02

## 2024-05-25 RX ADMIN — FAMOTIDINE 10 MG: 10 TABLET, FILM COATED ORAL at 08:15

## 2024-05-25 RX ADMIN — DEXTROSE AND SODIUM CHLORIDE: 5; 900 INJECTION, SOLUTION INTRAVENOUS at 06:50

## 2024-05-25 RX ADMIN — OXYCODONE HYDROCHLORIDE 10 MG: 10 TABLET ORAL at 13:01

## 2024-05-25 RX ADMIN — FLUOXETINE 20 MG: 20 CAPSULE ORAL at 08:14

## 2024-05-25 RX ADMIN — PREGABALIN 150 MG: 75 CAPSULE ORAL at 08:15

## 2024-05-25 RX ADMIN — OXYCODONE HYDROCHLORIDE 10 MG: 10 TABLET ORAL at 22:46

## 2024-05-25 RX ADMIN — Medication 0.5 MG: at 22:47

## 2024-05-25 RX ADMIN — METRONIDAZOLE 500 MG: 500 INJECTION, SOLUTION INTRAVENOUS at 08:14

## 2024-05-25 RX ADMIN — METHOCARBAMOL TABLETS 750 MG: 750 TABLET, COATED ORAL at 19:46

## 2024-05-25 RX ADMIN — HYDROMORPHONE HYDROCHLORIDE 0.5 MG: 1 INJECTION, SOLUTION INTRAMUSCULAR; INTRAVENOUS; SUBCUTANEOUS at 22:09

## 2024-05-25 RX ADMIN — Medication 5 MG: at 22:02

## 2024-05-25 RX ADMIN — PREGABALIN 150 MG: 75 CAPSULE ORAL at 19:46

## 2024-05-25 RX ADMIN — FAMOTIDINE 10 MG: 10 TABLET, FILM COATED ORAL at 19:46

## 2024-05-25 RX ADMIN — HYDROMORPHONE HYDROCHLORIDE 0.5 MG: 1 INJECTION, SOLUTION INTRAMUSCULAR; INTRAVENOUS; SUBCUTANEOUS at 11:52

## 2024-05-25 RX ADMIN — HYDROMORPHONE HYDROCHLORIDE 0.5 MG: 1 INJECTION, SOLUTION INTRAMUSCULAR; INTRAVENOUS; SUBCUTANEOUS at 19:21

## 2024-05-25 RX ADMIN — OXYCODONE HYDROCHLORIDE 10 MG: 5 TABLET ORAL at 07:02

## 2024-05-25 RX ADMIN — METHOCARBAMOL TABLETS 750 MG: 750 TABLET, COATED ORAL at 08:14

## 2024-05-25 RX ADMIN — ACETAMINOPHEN 975 MG: 325 TABLET, FILM COATED ORAL at 11:46

## 2024-05-25 RX ADMIN — HYDROMORPHONE HYDROCHLORIDE 0.5 MG: 1 INJECTION, SOLUTION INTRAMUSCULAR; INTRAVENOUS; SUBCUTANEOUS at 15:31

## 2024-05-25 RX ADMIN — ACETAMINOPHEN 975 MG: 325 TABLET, FILM COATED ORAL at 04:43

## 2024-05-25 RX ADMIN — SIMETHICONE 160 MG: 80 TABLET, CHEWABLE ORAL at 18:10

## 2024-05-25 RX ADMIN — PANTOPRAZOLE SODIUM 40 MG: 40 TABLET, DELAYED RELEASE ORAL at 19:46

## 2024-05-25 RX ADMIN — DEXTROSE AND SODIUM CHLORIDE: 5; 900 INJECTION, SOLUTION INTRAVENOUS at 19:46

## 2024-05-25 RX ADMIN — HYDROMORPHONE HYDROCHLORIDE 1 MG: 1 INJECTION, SOLUTION INTRAMUSCULAR; INTRAVENOUS; SUBCUTANEOUS at 04:43

## 2024-05-25 ASSESSMENT — ACTIVITIES OF DAILY LIVING (ADL)
ADLS_ACUITY_SCORE: 21

## 2024-05-25 NOTE — PLAN OF CARE
Goal Outcome Evaluation:                    [id number] Shift  VSS, on room air,  C/O pain 8 out of 10. Taking IV dilaudid prn brings pain down to 5/10, Zofran given x 1 with relief.  Ileostomy patent, patient empties. Voiding good volumes. Up in room independently. Continue to monitor and notify MD with any significant changes.

## 2024-05-25 NOTE — PROGRESS NOTES
Colorectal Surgery Progress Note  [facility]    Subjective:  Pain better controlled, copious ostomy output after GGE. Overall improving.     Vitals:  Vitals:    05/24/24 1600 05/24/24 2057 05/24/24 2333 05/25/24 0730   BP: 108/73 94/58 104/62 96/51   BP Location:  Left arm Left arm Left arm   Pulse:  75 84 78   Resp:  16 17 18   Temp:  98.1  F (36.7  C) 97.8  F (36.6  C) 98.5  F (36.9  C)   TempSrc:  Oral Oral Oral   SpO2: 100%  95% 98%   Weight:       Height:         I/O:  I/O last 3 completed shifts:  In: 120 [P.O.:120]  Out: 3550 [Urine:900; Stool:2650]    Physical Exam:  Gen: AAOx3, NAD  Pulm: Non-labored breathing  Abd: Soft, non-distended, mildly tender, no guarding/rebound   Incision C/D/I - no signs of infection   Stoma pink and viable with liquid stool and gas in bag  Ext:  Warm and well-perfused    BMP  Recent Labs   Lab 05/24/24  2329 05/24/24  2228 05/24/24  1831 05/24/24  1555 05/24/24  1157 05/24/24  0745 05/24/24  0743 05/23/24  1319 05/23/24  0857 05/23/24  0202 05/22/24  2319   NA  --   --   --   --   --   --  134*  --  133*  --  129*   POTASSIUM  --  3.8  --   --   --   --  3.2*  --  5.1  5.1  --  2.9*   CHLORIDE  --   --   --   --   --   --  100  --  97*  --  92*   CO2  --   --   --   --   --   --  18*  --  21*  --  21*   BUN  --   --   --   --   --   --  23.2*  --  24.3*  --  21.7*   CR  --   --   --   --   --   --  1.00*  --  0.69  --  0.77   *  --  94 71 76   < > 132*  --  135*   < > 157*   MAG  --   --   --   --   --   --  2.3  --  3.1*  --  1.4*   PHOS  --   --   --   --   --   --  3.6 4.5 5.1*  --  1.1*    < > = values in this interval not displayed.     CBC  Recent Labs   Lab 05/24/24  0743 05/23/24  0857 05/22/24  2319   WBC 8.7 14.3* 15.1*   HGB 9.2* 9.4* 10.3*   HCT 27.0* 26.6* 29.0*    141* 112*         ASSESSMENT: This is a 38 year old female PMH chronic pain syndrome on chronic opiates, borderline personality disorder, multiple prior GI  surgeries yrn 2003, s/p colostomy 2012 at [place], colostomy revision 2012, hysterectomy 2013, Ex Lap x2 4/2019 & 9/2019, h/o recurrent bowel obstructions who has chronic colonic dysmotility with severe constipation.     Thus underwent elective Open total abdominal colectomy with end ileostomy, remaining rectal stump, and BSO on 5/6 and discharged on 5/13/24.  Readmitted to OSH on 5/14 nausea, vomiting, acute on chronic abdominal pain with ileostomy output, found to have an ileus vs SBO.  Per chart while at OSH, NGT placed on 5/15. PICC placed 5/15 & TPN initiated.  NGT removed 5/16 due to discomfort.  TPN stopped on 5/19 but PICC line remained in place. The TPN was reinitiated on 5/21 and had febrile episode 2 hours after re-initiation of TPN on the evening of 5/21 and started on barbara due to multiple allergies.  5/22 required high capacity ileostomy bag for large liquid outputs.  Notes regarding PICC red lumen with difficulties flushing. 5/22 OSH blood cultures with Gram neg bacilli.  Due to prolonged outside hospitalization and concern for intra-abd abscess and bacteremia, pt was transferred back to [facility] on 5/22. Plans for gastrografin challenge today, no IR drainage of fluid collection. Encourage OOB and walking.      Neuro/Pain:   - oxycodone 10mg q4H prn, increased IV dilauidid to 1mg every 4 hours PRN to get through transient discomfort  -- Dr. Ayala discussed with pt that we will return to IV dilaudid 0.5mg q3H prn beginning Saturday 5/25 AM - orders placed  CV: no acute concerns at this time, monitor.   PULM: encourage IS.  GI/FEN:   - Advance to LFD today   - WOCN to assess MCJ separation as able  - strict in and outs  - replete electrolytes  - simethicone prn for gas  : no acute concerns at this time  Heme: Hgb 9-10  ID: OSH gram neg bacteremia.  Started on meropenem 2/2 multiple allergies.  WBC is 14.3.  Per Care Everywhere blood culture grew Klebsiella oxytoca and Enterobacteriacae group.    - CT  pushed from OSH - intra abd collection is 1 x 2 x 5cm long   -- IR does not recommend aspiration of small fluid collection, as it is small and improving  - f/u with OSH cultures  - f/u repeat cultures (5/23) - NGTD  - removed PICC line  - greatly appreciate ID consult for GN bacteremia - ceftriaxone and flagyl duration TBD.    Endocrine:   no acute concerns at this time    Activity: as tolerated.  Ppx: lovenox ppx  Dispo: pending      Denton Kilpatrick MD    Division of Colon & Rectal Surgery  Department of Surgery  [facility]  [phone]

## 2024-05-25 NOTE — PLAN OF CARE
Goal Outcome Evaluation:         Cares [id number]    Status: adx: 5/22 OSH ABD pain, fever and Gram negative bacteremia  Vitals: ***  Neuros: Axox4, Follows commands  IV/drains: R PIV-infusing 5% dextrose 2 100ml/hr  Endocrine: none  Labs/Electrolytes: reviewed  Resp/trach: WDl no SOB/MULLER reported  Diet: CLD tolerating  GI/: LBM: 5/24, AUOP  Skin: WDL no issues observed  Pain/nausea: x1 dilaudid and scheduled tylenol given for ABD pain  Activity:UAL  Plan: continue POC  Updates this shift: no acute changes this shift

## 2024-05-25 NOTE — PLAN OF CARE
"Goal Outcome Evaluation:    Plan of Care Reviewed With: patient  Overall Patient Progress: no changeOverall Patient Progress: no change  /70 (BP Location: Left arm)   Pulse 82   Temp 98.1  F (36.7  C) (Oral)   Resp 18   Ht 1.702 m (5' 7\")   Wt 72 kg (158 lb 12.3 oz)   LMP  (LMP Unknown)   SpO2 100%   BMI 24.87 kg/m     AVSS.  C/o nausea early is shift - Zofran x1.  Diet advanced to low fiber. Ate x 1 - fair intake.   Ileostomy with greenish liquid out put.   Voiding not saving.   Using oxy and iv hydromorphone for pain.   OOB walking on unit independently.   No new issues.  Continue with POC.       "

## 2024-05-26 LAB
ALBUMIN SERPL BCG-MCNC: 3.1 G/DL (ref 3.5–5.2)
ALP SERPL-CCNC: 128 U/L (ref 40–150)
ALT SERPL W P-5'-P-CCNC: 12 U/L (ref 0–50)
ANION GAP SERPL CALCULATED.3IONS-SCNC: 9 MMOL/L (ref 7–15)
AST SERPL W P-5'-P-CCNC: 19 U/L (ref 0–45)
BILIRUB SERPL-MCNC: <0.2 MG/DL
BUN SERPL-MCNC: 7.5 MG/DL (ref 6–20)
CALCIUM SERPL-MCNC: 8.8 MG/DL (ref 8.6–10)
CHLORIDE SERPL-SCNC: 113 MMOL/L (ref 98–107)
CREAT SERPL-MCNC: 0.64 MG/DL (ref 0.51–0.95)
DEPRECATED HCO3 PLAS-SCNC: 19 MMOL/L (ref 22–29)
EGFRCR SERPLBLD CKD-EPI 2021: >90 ML/MIN/1.73M2
ERYTHROCYTE [DISTWIDTH] IN BLOOD BY AUTOMATED COUNT: 12.5 % (ref 10–15)
GLUCOSE SERPL-MCNC: 90 MG/DL (ref 70–99)
HCT VFR BLD AUTO: 25.6 % (ref 35–47)
HGB BLD-MCNC: 8.7 G/DL (ref 11.7–15.7)
MAGNESIUM SERPL-MCNC: 1.7 MG/DL (ref 1.7–2.3)
MAGNESIUM SERPL-MCNC: 2 MG/DL (ref 1.7–2.3)
MCH RBC QN AUTO: 34.3 PG (ref 26.5–33)
MCHC RBC AUTO-ENTMCNC: 34 G/DL (ref 31.5–36.5)
MCV RBC AUTO: 101 FL (ref 78–100)
PHOSPHATE SERPL-MCNC: 4.2 MG/DL (ref 2.5–4.5)
PLATELET # BLD AUTO: 167 10E3/UL (ref 150–450)
POTASSIUM SERPL-SCNC: 4 MMOL/L (ref 3.4–5.3)
PROT SERPL-MCNC: 5.9 G/DL (ref 6.4–8.3)
RBC # BLD AUTO: 2.54 10E6/UL (ref 3.8–5.2)
SODIUM SERPL-SCNC: 141 MMOL/L (ref 135–145)
WBC # BLD AUTO: 4 10E3/UL (ref 4–11)

## 2024-05-26 PROCEDURE — 250N000011 HC RX IP 250 OP 636

## 2024-05-26 PROCEDURE — 250N000011 HC RX IP 250 OP 636: Performed by: SURGERY

## 2024-05-26 PROCEDURE — 80053 COMPREHEN METABOLIC PANEL: CPT

## 2024-05-26 PROCEDURE — 250N000013 HC RX MED GY IP 250 OP 250 PS 637

## 2024-05-26 PROCEDURE — 120N000002 HC R&B MED SURG/OB UMMC

## 2024-05-26 PROCEDURE — 250N000011 HC RX IP 250 OP 636: Performed by: PHYSICIAN ASSISTANT

## 2024-05-26 PROCEDURE — 36415 COLL VENOUS BLD VENIPUNCTURE: CPT

## 2024-05-26 PROCEDURE — 250N000013 HC RX MED GY IP 250 OP 250 PS 637: Performed by: SURGERY

## 2024-05-26 PROCEDURE — 258N000003 HC RX IP 258 OP 636: Performed by: SURGERY

## 2024-05-26 PROCEDURE — 84100 ASSAY OF PHOSPHORUS: CPT

## 2024-05-26 PROCEDURE — 85027 COMPLETE CBC AUTOMATED: CPT

## 2024-05-26 PROCEDURE — 83735 ASSAY OF MAGNESIUM: CPT

## 2024-05-26 RX ADMIN — ACETAMINOPHEN 975 MG: 325 TABLET, FILM COATED ORAL at 08:17

## 2024-05-26 RX ADMIN — DEXTROSE AND SODIUM CHLORIDE: 5; 900 INJECTION, SOLUTION INTRAVENOUS at 08:17

## 2024-05-26 RX ADMIN — FAMOTIDINE 10 MG: 10 TABLET, FILM COATED ORAL at 19:28

## 2024-05-26 RX ADMIN — ONDANSETRON 4 MG: 2 INJECTION INTRAMUSCULAR; INTRAVENOUS at 19:28

## 2024-05-26 RX ADMIN — ACETAMINOPHEN 975 MG: 325 TABLET, FILM COATED ORAL at 19:27

## 2024-05-26 RX ADMIN — PREGABALIN 150 MG: 75 CAPSULE ORAL at 19:28

## 2024-05-26 RX ADMIN — HYDROMORPHONE HYDROCHLORIDE 0.5 MG: 1 INJECTION, SOLUTION INTRAMUSCULAR; INTRAVENOUS; SUBCUTANEOUS at 11:13

## 2024-05-26 RX ADMIN — HYDROMORPHONE HYDROCHLORIDE 0.5 MG: 1 INJECTION, SOLUTION INTRAMUSCULAR; INTRAVENOUS; SUBCUTANEOUS at 08:12

## 2024-05-26 RX ADMIN — PANTOPRAZOLE SODIUM 80 MG: 40 TABLET, DELAYED RELEASE ORAL at 08:17

## 2024-05-26 RX ADMIN — Medication 0.5 MG: at 21:45

## 2024-05-26 RX ADMIN — OXYCODONE HYDROCHLORIDE 10 MG: 10 TABLET ORAL at 13:53

## 2024-05-26 RX ADMIN — CEFTRIAXONE SODIUM 2 G: 2 INJECTION, POWDER, FOR SOLUTION INTRAMUSCULAR; INTRAVENOUS at 17:57

## 2024-05-26 RX ADMIN — METHOCARBAMOL TABLETS 750 MG: 750 TABLET, COATED ORAL at 13:54

## 2024-05-26 RX ADMIN — OXYCODONE HYDROCHLORIDE 10 MG: 10 TABLET ORAL at 09:55

## 2024-05-26 RX ADMIN — ONDANSETRON 4 MG: 2 INJECTION INTRAMUSCULAR; INTRAVENOUS at 08:31

## 2024-05-26 RX ADMIN — ACETAMINOPHEN 975 MG: 325 TABLET, FILM COATED ORAL at 13:54

## 2024-05-26 RX ADMIN — OXYCODONE HYDROCHLORIDE 10 MG: 10 TABLET ORAL at 19:28

## 2024-05-26 RX ADMIN — METHOCARBAMOL TABLETS 750 MG: 750 TABLET, COATED ORAL at 19:28

## 2024-05-26 RX ADMIN — HYDROMORPHONE HYDROCHLORIDE 0.5 MG: 1 INJECTION, SOLUTION INTRAMUSCULAR; INTRAVENOUS; SUBCUTANEOUS at 17:56

## 2024-05-26 RX ADMIN — OXYCODONE HYDROCHLORIDE 10 MG: 10 TABLET ORAL at 23:47

## 2024-05-26 RX ADMIN — FLUOXETINE 20 MG: 20 CAPSULE ORAL at 08:17

## 2024-05-26 RX ADMIN — PANTOPRAZOLE SODIUM 40 MG: 40 TABLET, DELAYED RELEASE ORAL at 19:28

## 2024-05-26 RX ADMIN — HYDROMORPHONE HYDROCHLORIDE 0.5 MG: 1 INJECTION, SOLUTION INTRAMUSCULAR; INTRAVENOUS; SUBCUTANEOUS at 14:58

## 2024-05-26 RX ADMIN — SIMETHICONE 160 MG: 80 TABLET, CHEWABLE ORAL at 08:31

## 2024-05-26 RX ADMIN — FAMOTIDINE 10 MG: 10 TABLET, FILM COATED ORAL at 08:17

## 2024-05-26 RX ADMIN — SIMETHICONE 160 MG: 80 TABLET, CHEWABLE ORAL at 17:57

## 2024-05-26 RX ADMIN — Medication 1 TABLET: at 08:17

## 2024-05-26 RX ADMIN — PREGABALIN 150 MG: 75 CAPSULE ORAL at 08:17

## 2024-05-26 RX ADMIN — TIZANIDINE 2 MG: 2 TABLET ORAL at 21:45

## 2024-05-26 RX ADMIN — IBUPROFEN 600 MG: 600 TABLET, FILM COATED ORAL at 21:46

## 2024-05-26 RX ADMIN — IBUPROFEN 600 MG: 600 TABLET, FILM COATED ORAL at 13:54

## 2024-05-26 RX ADMIN — HYDROMORPHONE HYDROCHLORIDE 0.5 MG: 1 INJECTION, SOLUTION INTRAMUSCULAR; INTRAVENOUS; SUBCUTANEOUS at 21:41

## 2024-05-26 RX ADMIN — METHOCARBAMOL TABLETS 750 MG: 750 TABLET, COATED ORAL at 08:17

## 2024-05-26 ASSESSMENT — ACTIVITIES OF DAILY LIVING (ADL)
ADLS_ACUITY_SCORE: 21

## 2024-05-26 NOTE — DISCHARGE SUMMARY
[facility]  Discharge Summary  Colon and Rectal Surgery     Mimi Su MRN# [id number]   YOB: 1985 Age: 38 year old     Date of Admission:  5/22/2024  Date of Discharge::  5/29/2024 12:17 PM  Admitting Physician:  Mali Ayala MD  Discharge Physician:  Mali Ayala MD  Primary Care Physician:         Mat Fernandez          Admission Diagnoses:   Chronic pain syndrome  Colonic dysmotility s/p colostomy and now s/p completion colectomy with end ileostomy 5/6/24  Fever  Abdominal pain  Post-op Ileus (H)  Gram negative bacteremia          Discharge Diagnosis:   Post-operative SBO  Post-operative ileus  Gram negative bacteremia  Intra-abdominal fluid collection, most likely postoperative seroma or hematoma  Hypomagnesemia, repleted  Severe Protein-Calorie Malnutrition          Procedures:   None          Consultations:   WOUND OSTOMY CONTINENCE NURSE  IP CONSULT  NURSING TO CONSULT FOR VASCULAR ACCESS CARE IP CONSULT  INFECTIOUS DISEASE GENERAL ADULT IP CONSULT  INTERVENTIONAL RADIOLOGY ADULT/PEDS IP CONSULT  NURSING TO CONSULT FOR VASCULAR ACCESS CARE IP CONSULT         Imaging Studies:     Results for orders placed or performed during the hospital encounter of 05/22/24   CT Outside Read    Narrative    EXAMINATION: CT OUTSIDE READ, 5/23/2024 12:00 AM    TECHNIQUE: Outside films dated 5/21/2024 were submitted for  interpretation. CT images extending from lung bases through pubic  symphysis were performed with IV contrast.    COMPARISON: Outside CT 5/14/2024.    PREVIOUS REPORT: The original interpretation was available for review  at the time of this dictation.     HISTORY: CT abdomen/pelvis from 5/21. Small bowel obstruction, fever,  multiple recurrent complex intra-abdominal surgery.    FINDINGS:   This report is designed to answer a focused clinical  question and should be interpreted in conjunction with the original  report.      Minimal right basal atelectasis. No pleural effusions.    The liver, kidneys, adrenals, spleen and pancreas are within normal  limits. Cholecystectomy. Slight prominence of central hepatic ducts  and common bile duct consistent with post cholecystectomy reservoir  effect.    Patient is status post subtotal colectomy with left lower quadrant  ileostomy and rectal stump noted. Improved distention of small bowel  loops noted relative to 5/14/2024 study with loops measuring up to 4.1  cm in the right mid abdomen. Loops are difficult to follow to assess  for discrete transition point and there are collapsed loops of bowel  noted centrally and within the pelvis. Angulation of loops in the left  lower quadrant with clumped appearance may represent some adhesions.  No single discrete transition point noted however there are a few  areas with change in caliber of loops in the mid to lower abdomen  present. Small bowel extending to the ileostomy is collapsed. There is  some expected postsurgical dilatation of a small bowel anastomotic  site in the central pelvis. The ascites has improved from prior CT. A  small rim-enhancing collection in the anterior abdomen along the  ventral incision deep to the abdominal wall is demonstrated measuring  1.3 x 2.2 cm, series 2 image 103. Craniocaudad this is approximately 5  cm in length, series 602 image 76. Previously this was measured at 3.7  x 1.4 x 10.1 cm. This does not contain any internal foci of gas.  Significantly improved postoperative pneumoperitoneum.    Midline vessels are normal in caliber. No lymphadenopathy.    Post surgical changes in the ventral abdominal wall. No drainable  collection in the subcutaneous fat separate to the described  intra-abdominal collection. Nodular areas in the subcutaneous fat in  the right lower quadrant presumably related to injections in this  region. No acute osseous abnormalities.      Impression    IMPRESSION: Status post subtotal  colectomy with left lower quadrant  ileostomy and rectal stump. Improved small bowel dilatation compared  to 5/14/2024 study. Suspected adhesions noted with clumped appearance  to loops in the left lower abdomen/pelvis. As described previously,  differential considerations would include postoperative ileus however  partial small bowel obstruction with a few areas of change in caliber  of bowel noted given more distal collapsed loops could also have this  appearance.    2.  Improved ascites and postoperative pneumoperitoneum.    3.  Postoperative collection deep to the incision and abdominal wall  along the anterior peritoneal space present, decreased in size from  prior study however appears more well organized with rim-enhancing  wall. No internal foci of air. Differential considerations would  include postoperative seroma/liquefied hematoma. Superimposed  infection given provided history could also be considered, not  excluded on imaging.    DESHAWN DELGADO MD         SYSTEM ID:  [id number]   XR Abdomen Port 1 View    Narrative    Exam: TEMPORARY, 5/24/2024 7:35 PM    Indication: Small bowel obstruction    Comparison: CT abdomen and pelvis 3/4/2024    Findings:   AP supine views of the abdomen were obtained at 3 hours and 8 hours  after the administration of Gastrografin. Patient is reportedly status  post subtotal colectomy with left lower quadrant ileostomy and rectal  stump. At 3 hours dilated Gastrografin is observed within the  distended bowel. Patient's ostomy bag appears fairly dense on the 3  hour radiograph suggesting possible contrast within the bag. At 8  hours Gastrografin is observed within the low pelvis within the distal  bowel loops. The contrast present in the stomach and more proximal  small bowel loops has cleared. Cholecystectomy clips.       Impression    Impression:   At 8 hours contrast is present within the distal bowel. There are  significantly distended small bowel loops still present,  mainly in the  right abdomen as seen on CT. There may be some passage of Gastrografin  into the patient's ostomy bag noted, best appreciated on the 3 hour  radiograph, suggesting that this is not a complete obstruction.    I have personally reviewed the examination and initial interpretation  and I agree with the findings.    DESHAWN DELGADO MD         SYSTEM ID:  [id number]   XR Abdomen Port 1 View    Narrative    EXAMINATION:  XR ABDOMEN PORT 1 VIEW 5/25/2024     COMPARISON: Radiograph 5/24/2024    HISTORY: f/u gastrograffin challenge..    TECHNIQUE: One frontal supine view of the abdomen.    FINDINGS: ] Mucosa. No remaining contrast within the bowel. Gas-filled  large and small bowel, slightly decreased from 5/24/2024. The lung  bases are unremarkable. No acute osseous abnormality.      Impression    IMPRESSION:   Slightly decreased gaseous distention of bowel. Findings may indicate  persistent ileus or low-grade obstruction.    I have personally reviewed the examination and initial interpretation  and I agree with the findings.    DANELLE HE DO         SYSTEM ID:  [id number]              Medications Prior to Admission:     No medications prior to admission.              Discharge Medications:     Discharge Medication List as of 5/29/2024 10:51 AM        START taking these medications    Details   Cefadroxil (DURICEF) 1 g tablet Take 1 tablet (1 g) by mouth 2 times daily, Disp-28 tablet, R-0, E-Prescribe      oxyCODONE-acetaminophen (PERCOCET) 5-325 MG tablet Take 1 tablet by mouth every 6 hours as needed for severe pain, Disp-20 tablet, R-0, E-Prescribe           CONTINUE these medications which have CHANGED    Details   loperamide (IMODIUM) 2 MG capsule Take 1 capsule (2 mg) by mouth 3 times daily as needed for other (high output ileostomy), Historical           CONTINUE these medications which have NOT CHANGED    Details   acetaminophen (TYLENOL) 325 MG tablet Take 3 tablets (975 mg) by mouth every 6 hours,  Disp-75 tablet, R-0, E-Prescribe      calcium carbonate (TUMS) 500 MG chewable tablet Take 1-2 tablets (500-1,000 mg) by mouth 3 times daily as needed for heartburn, Disp-30 tablet, R-0, E-Prescribe      clonazePAM (KLONOPIN) 0.5 MG tablet Take 0.25-0.5 mg by mouth 3 times daily as needed for anxiety, Historical      diphenhydrAMINE (BENADRYL) 25 MG capsule Take 1 capsule (25 mg) by mouth every 6 hours as needed for itching, Disp-20 capsule, R-0, E-Prescribe      famotidine (PEPCID) 10 MG tablet Take 1 tablet (10 mg) by mouth 2 times daily, Disp-30 tablet, R-0, E-Prescribe      !! FLUoxetine (PROZAC) 10 MG capsule Take 10 mg by mouth daily Take with 20 mg capsule for a total daily dose of 30 mg., Historical      !! FLUoxetine (PROZAC) 20 MG capsule Take 20 mg by mouth every morning Take with 10 mg capsule for a total daily dose of 30 mg., Historical      hydrOXYzine HCl (ATARAX) 25 MG tablet Take 1 tablet (25 mg) by mouth every 8 hours as needed for other or anxiety (adjuvant pain), Disp-25 tablet, R-0, E-Prescribe      ibuprofen (ADVIL/MOTRIN) 600 MG tablet Take 1 tablet (600 mg) by mouth every 8 hours, Disp-32 tablet, R-0, E-Prescribe      Lidocaine (LIDOCARE) 4 % Patch Place 2 patches onto the skin every 24 hours To prevent lidocaine toxicity, patient should be patch free for 12 hrs daily.Disp-10 patch, [id number]      melatonin 3 MG CAPS Take 1 capsule by mouth nightly as needed, Historical      methocarbamol (ROBAXIN) 750 MG tablet Take 1 tablet (750 mg) by mouth 3 times daily, Disp-25 tablet, R-0, E-Prescribe      miconazole (MICATIN) 2 % external powder Apply topically 2 times daily Apply to skin around ileostomy siteDisp-10 g, [id number]      multivitamin w/minerals (THERA-VIT-M) tablet Take 1 tablet by mouth every morning, Historical      oxyCODONE IR (ROXICODONE) 10 MG tablet Take 0.5-1 tablets (5-10 mg) by mouth every 4 hours as needed for severe pain, Disp-30 tablet, R-0, Local Print       pantoprazole (PROTONIX) 40 MG EC tablet Take 2 tablets by mouth in the morning and 1 tablet by mouth in the evening., Historical      pregabalin (LYRICA) 100 MG capsule Take 150 mg by mouth 2 times daily, Historical      simethicone (MYLICON) 80 MG chewable tablet Take 1-2 tablets ( mg) by mouth every 6 hours as needed for cramping, Disp-40 tablet, R-0, E-Prescribe      sucralfate (CARAFATE) 1 GM tablet Take 1 g by mouth 4 times daily as needed, Historical      tiZANidine (ZANAFLEX) 2 MG tablet Take 1 tablet by mouth at bedtime, Historical       !! - Potential duplicate medications found. Please discuss with provider.                   Brief History of Illness:   Mimi Su is a 38 year old female with PMH of chronic pain syndrome on chronic opiates, borderline personality disorder, multiple prior GI surgeries, and  h/o recurrent bowel obstructions who has chronic colonic dysmotility with severe constipation. S/p elective Open total abdominal colectomy with end ileostomy, remaining rectal stump, and BSO on 5/6 and discharged on 5/13/24. Readmitted to OSH on 5/14 for nausea, vomiting, acute on chronic abdominal pain with ileostomy output, found to have an ileus vs SBO complicated by Klebsiella bacteremia. Pt was transferred back to [facility] on 5/22.           Hospital Course:     Pt was started on IV ceftriaxone for gram negative bacteremia, which was thought to be potentially due to infected intra-abdominal fluid collection though source is still unclear. IR recommended against drainage of fluid collection. CT overread felt that the fluid collection was likely a seroma or liquified hematoma. Repeat blood cultures on admission here were negative. Infectious Disease was consulted and recommended ceftriaxone x 1 week followed by PO cefadroxil x 2 weeks. Pain improved and pt passed a gastrografin challenge on 5/24 and continued to have appropriate ileostomy output. She eventually tolerated a low fiber diet and had  "stoma output.     Pt was seen by [facility] and received support for ostomy changing. Pt was deemed appropriate for discharge home. Post-operative pain was controlled with scheduled tylenol, ibuprofen, gabapentin, robaxin and prn oxycodone/dilaudid.          Day of Discharge Physical Exam:   Blood pressure 117/73, pulse 69, temperature 98.5  F (36.9  C), temperature source Oral, resp. rate 16, height 1.702 m (5' 7\"), weight 72.7 kg (160 lb 4.8 oz), SpO2 97%.    Gen: AAOx3, NAD  Pulm: Non-labored breathing  Abd: Soft, appropriately tender, no guarding   Incision C/D/I   Stoma pink and viable with stool and gas in bag  Ext:  Warm and well-perfused         Final Pathology Result:   No pathology submitted           Discharge Instructions and Follow-Up:     Discharge Procedure Orders   Reason for your hospital stay   Order Comments: You were transferred from an Outside Hospital on 5/22/2024 for acute on chronic abdominal pain, nausea, TPN, and a blood stream infection     Activity   Order Comments: Your activity upon discharge:   ACTIVITY  -No lifting, pushing, pulling greater than 10 lbs and no strenuous exercise for 6 weeks   -No driving while on narcotic analgesics (i.e. Percocet, oxycodone, Vicodin)  -No driving until you are able to fully twist to both sides or slam on brakes quickly and without any pain  -We encourage walking at least 4-5 times per day     Order Specific Question Answer Comments   Is discharge order? Yes      Adult [facility] Follow-up and recommended labs and tests   Order Comments: WOUND CARE  -Inspect your wounds daily for signs of infection (increased redness, drainage, pain)  -Keep your wound clean and dry  -You may shower, but do not soak in tub or pool    NOTIFY  Please contact Eimly Fontenot RN or Minnie Delcid RN at [phone] for problems after discharge such as:  -Temperature > 101F, chills, rigors, dizziness  -Redness around or purulent drainage from wound  -Inability to tolerate diet, nausea or " vomiting  -You stop passing gas (or your stoma stops functioning), develop significant bloating, abdominal pain  -You have dark and low volume urine  -Have blood in stools/vomit  -Have severe diarrhea/constipation  -Any other questions or concerns.  - At nights (after 4:30pm), on weekends, or if urgent, call [phone] and ask the  to speak with the on-call Colorectal Surgery resident or fellow    -Measure your total daily ileostomy output and record.  If you have LESS than 500mL or GREATER than 1000mL of ileostomy output within a 24 hour period, please notify the Colorectal Nurse.  If you have greater than 1000-1200mL in a 24 hour period or greater than 500cc for three consecutive 8 hour shifts, take Imodium 2mg once, stay hydrated especially with Pedialyte and call the Colorectal Clinic to further discuss.      Medication Instructions  Some of your medications may have changed. Please take only prescribed and resumed medications     FOLLOW-UP  1.  You will need to follow-up in the Colon and Rectal Surgery clinic with Dr. Ayala in 2 weeks.  Please contact our Nurses (phone # [phone]) if you have not heard from our clinic in 3 business days afer discharge to schedule a follow-up appointment.  Please bring your log of daily ileostomy outputs to your follow up clinic appointment.     2.  Follow up with your primary care provider in 1-2 weeks after discharge from the hospital to review this hospitalization.           ORAL REHYDRATION RECIPE for Home Use  With your new ileostomy, you are at increased risk for dehydration,  Especially if you have high ileostomy outputs, or low appetites, you can help prevent it by drinking this mixture as directed.  Some examples of commercially prepared Oral Rehydration Solutions are: Pedialyte (solution, powder packet, Freeze Pops), or Drip Drop.     Electrolyte Water   -4 cups of water (equal to 1 quart or 32 ounces)   -  teaspoon salt   -6 teaspoons sugar   -Crystal  "Light if desired (or other choice Nutrasweet or Splenda flavoring - SUGAR-FREE) to taste (recommend lemonade or orange-pineapple flavors, but any flavor will work)    Add two cups of tap water to a large container. With measuring spoons (not table silverware), add the dry ingredients and stir or shake well. Add two additional cups of water. This will equal one quart of Electrolyte Water. Add sugar-free flavoring if desired. Best if chilled. Sip solution throughout the day. Discard after 24 hours.            Appointments on [place] and/or [facility] (with [facility] or [facility] provider or service). Call [phone] if you haven't heard regarding these appointments within 7 days of discharge.     Miscellaneous DME Order   Order Comments: Your Medical Supplier will need your surgeon's name, phone and fax number    Clinic:                     Phone                            Fax  Colorectal Surgery:    [phone]                                                                                                    Authorizing MD: Dr. Ayala    Quantity of pouches:   20  /mo.    Request the following supplies:      Milmine    2 piece soft convex wafer 57mm #[id number]     Fecal pouch 57mm- # [id number]  Accessories  2\" Shelby ring #[id number]    Adapt powder #[id number]    No sting film barrier # [id number]                         Exufiber Ag two 4x4 per month    Cleanse wounds with wound cleanser.    Pack wounds with dry Exufiber Ag  Cover the Exufiber Ag with small pieces of Shelby barrier ring  Apply the rest of the Shelby ring around the stoma  Apply prepared soft convex barrier and attach pouch            Change your pouch three times a week, more often if leaking.        DME Documentation:   Describe the reason for need to support medical necessity: s/p ileostomy    I, the undersigned, certify that the above prescribed supplies are medically necessary for this patient and is both reasonable and necessary in reference to " accepted standards of medical and necessary in reference to accepted standards of medical practice in the treatment of this patient's condition and is not prescribed as a convenience.     Order Specific Question Answer Comments   PATIENT INSTRUCTIONS: Please contact your preferred vendor or insurance provider for assistance in obtaining the ordered medical equipment item.    Start Date: 5/28/2024    DME Item Needed: ileostomy supplies    Length of Need: 12 months    DME Item Quantity: 20 / month      Diet   Order Comments: Follow this diet upon discharge:   DIET  -Low Residue Diet for at least 4-6 weeks unless cleared by Colorectal surgery.  No raw vegetables, fruit skins, fibrous foods that require a lot of chewing, nuts, seeds, corn, popcorn.   -We recommend eating slowly, chewing thoroughly, eating small frequent meals throughout the day  -Stay well hydrated.    -Follow the food recommendations given by the Nutritionist and the Wound Ostomy Nurse.     Order Specific Question Answer Comments   Is discharge order? Yes             Home Health Care:     Not needed           Discharge Disposition:     Discharged to home      Condition at discharge: Satisfactory      YESY Herrera  [facility] [employer]    ------------------------------------------------------------------------  Patient seen today, 05/29/24, with YESY Herrera.  I agree with the dictation and have made any necessary changes.     This plan of care was communicated to me by Dr. Ayala.    Tammy Farias PA-C  Colon and Rectal Surgery  [facility]     I spent 30 minute face-to-face or coordinating care of Mimi Su. Over 50% of our time on the unit was spent counseling the patient and/or coordinating care as documented in the assessment and plan.      [id number] post op hospital visit

## 2024-05-26 NOTE — PLAN OF CARE
"Goal Outcome Evaluation:      Plan of Care Reviewed With: patient    Overall Patient Progress: improvingOverall Patient Progress: improving     1930-0730    /74 (BP Location: Right arm)   Pulse 88   Temp 98.1  F (36.7  C) (Oral)   Resp 16   Ht 1.702 m (5' 7\")   Wt 72 kg (158 lb 12.3 oz)   LMP  (LMP Unknown)   SpO2 98%   BMI 24.87 kg/m        Activity: up ad inna in room   Neuros: alert and orientated x 4, calm and cooperative  Cardiac: WNL   Respiratory: O2 sats mid 90's on RA, unlabored    GI/: abdomen soft/tender.  Positive stool in ileostomy.  Pt self manages ostomy   Diet: low fiber.  Pt ate 50% on dinner  Lines: PIV   Incisions/Drains: midline abdominal inc CDI/MARIANELA.  Ileostomy appliance intact    Labs: reviewed   Pain/nausea: \"partial\" pain control taking prn oxycodone and IV dilaudid.  Denied need for nausea meds   New changes this shift: none    Plan: continue POC          "

## 2024-05-26 NOTE — PROGRESS NOTES
Colorectal Surgery Progress Note  [facility]    Subjective: Feels tired after a lot of walking yesterday. Pain is pretty well controlled, pt requested no changes to pain regimen today. Eating ok, having bites of food for every meal.    Vitals:  Vitals:    05/26/24 0500 05/26/24 0600 05/26/24 0800 05/26/24 0802   BP:    96/71   BP Location:    Right arm   Pulse:    78   Resp:    16   Temp:    98.3  F (36.8  C)   TempSrc:    Oral   SpO2: 98% 99% 100% 100%   Weight:       Height:         I/O:  I/O last 3 completed shifts:  In: 3046.67 [P.O.:930; I.V.:2116.67]  Out: 750 [Stool:750]    Physical Exam:  Gen: AAOx3, NAD  Pulm: Non-labored breathing  Abd: Soft, non-distended, mildly tender, no guarding/rebound   Incision C/D/I - no signs of infection   Stoma pink and viable with moderate amount of liquid stool and gas in bag  Ext:  Warm and well-perfused    BMP  Recent Labs   Lab 05/26/24  0711 05/25/24  0722 05/24/24  2329 05/24/24  2228 05/24/24  1831 05/24/24  0745 05/24/24  0743 05/23/24  1319 05/23/24  0857    137  --   --   --   --  134*  --  133*   POTASSIUM 4.0 4.2  --  3.8  --   --  3.2*  --  5.1  5.1   CHLORIDE 113* 108*  --   --   --   --  100  --  97*   CO2 19* 21*  --   --   --   --  18*  --  21*   BUN 7.5 12.8  --   --   --   --  23.2*  --  24.3*   CR 0.64 0.74  --   --   --   --  1.00*  --  0.69   GLC 90 105* 121*  --  94   < > 132*  --  135*   MAG 1.7 2.0  --  2.0  --   --  2.3  --  3.1*   PHOS 4.2 4.1  --   --   --   --  3.6 4.5 5.1*    < > = values in this interval not displayed.     CBC  Recent Labs   Lab 05/26/24  0711 05/25/24  0722 05/24/24  0743 05/23/24  0857   WBC 4.0 4.3 8.7 14.3*   HGB 8.7* 7.4* 9.2* 9.4*   HCT 25.6* 22.6* 27.0* 26.6*    148* 161 141*         ASSESSMENT: This is a 38 year old female PMH chronic pain syndrome on chronic opiates, borderline personality disorder, multiple prior GI surgeries yrn 2003, s/p colostomy 2012 at [place], colostomy  revision 2012, hysterectomy 2013, Ex Lap x2 4/2019 & 9/2019, h/o recurrent bowel obstructions who has chronic colonic dysmotility with severe constipation.     Thus underwent elective Open total abdominal colectomy with end ileostomy, remaining rectal stump, and BSO on 5/6 and discharged on 5/13/24.  Readmitted to OSH on 5/14 nausea, vomiting, acute on chronic abdominal pain with ileostomy output, found to have an ileus vs SBO.  Per chart while at OSH, NGT placed on 5/15. PICC placed 5/15 & TPN initiated.  NGT removed 5/16 due to discomfort.  TPN stopped on 5/19 but PICC line remained in place. The TPN was reinitiated on 5/21 and had febrile episode 2 hours after re-initiation of TPN on the evening of 5/21 and started on barbara due to multiple allergies.  5/22 required high capacity ileostomy bag for large liquid outputs.  Notes regarding PICC red lumen with difficulties flushing. 5/22 OSH blood cultures with Gram neg bacilli.  Due to prolonged outside hospitalization and concern for intra-abd abscess and bacteremia, pt was transferred back to [facility] on 5/22. No IR drainage of fluid collection. GGC on 5/24, having good ileostomy output since. Encourage OOB and walking.      Neuro/Pain:   - PRN oxycodone 10 mg q4h, IV dilaudid 0.5 mg q3h  - scheduled robaxin, tylenol, ibuprofen  CV: no acute concerns at this time, monitor.   PULM: encourage IS.  GI/FEN:   - continue LFD  - continue fluids @ 75  - WOCN to assess MCJ separation as able  - strict in and outs  - replete electrolytes  - simethicone prn for gas  : no acute concerns at this time  Heme: Hgb 9-10  ID:   - CT pushed from OSH - intra abd collection is 1 x 2 x 5cm long   -- IR does not recommend aspiration of small fluid collection  - OSH gram neg bacteremia. Per Care Everywhere blood culture grew Klebsiella oxytoca and Enterobacteriacae group.    - f/u repeat cultures  from 5/23  - 5/25: NGTD  - greatly appreciate ID consult for GN bacteremia  -- discontinued  flagyl  -- continue Ceftriaxone IV 2g for 1 week followed by oral Cefadroxil for total therapy of 3 weeks, per ID    Endocrine:   no acute concerns at this time    Activity: as tolerated.  Ppx: lovenox ppx - holding  Dispo: 5/27?    The patient's care was discussed with the fellow, Dr. Carmona, who discussed with the attending.      Elli Hu, MS4  [facility] [employer]      Doing well, looks very comfortable in bed this morning, eating, good ileostomy output. Approaching readiness for discharge.    Marcelino Carmona MD, MS  Fellow, Colon & Rectal Surgery  [facility]  05/26/2024

## 2024-05-26 NOTE — PLAN OF CARE
"Goal Outcome Evaluation:    Plan of Care Reviewed With: patient  Overall Patient Progress: no changeOverall Patient Progress: no change  BP 96/71 (BP Location: Right arm)   Pulse 78   Temp 98.3  F (36.8  C) (Oral)   Resp 16   Ht 1.702 m (5' 7\")   Wt 72 kg (158 lb 12.3 oz)   LMP  (LMP Unknown)   SpO2 100%   BMI 24.87 kg/m     AVSS, sating high 90s at RA.  OOB independently, showered.  Zofran x 1 this am. Fair po intake.   Ileostomy with good out put - still liquid/green.  Voiding not saving.   Using scheduled Ibuprofen, Robaxin and tylenol.  Rates pain between 7-8/10. Oxycodone 10mg given x2. IV hydromorphone x 2   No new issues. Continue with current POC.      "

## 2024-05-27 LAB
ALBUMIN SERPL BCG-MCNC: 2.9 G/DL (ref 3.5–5.2)
ALP SERPL-CCNC: 109 U/L (ref 40–150)
ALT SERPL W P-5'-P-CCNC: 13 U/L (ref 0–50)
ANION GAP SERPL CALCULATED.3IONS-SCNC: 10 MMOL/L (ref 7–15)
AST SERPL W P-5'-P-CCNC: 18 U/L (ref 0–45)
BILIRUB SERPL-MCNC: <0.2 MG/DL
BUN SERPL-MCNC: 10.6 MG/DL (ref 6–20)
CALCIUM SERPL-MCNC: 8.6 MG/DL (ref 8.6–10)
CHLORIDE SERPL-SCNC: 111 MMOL/L (ref 98–107)
CREAT SERPL-MCNC: 0.69 MG/DL (ref 0.51–0.95)
DEPRECATED HCO3 PLAS-SCNC: 20 MMOL/L (ref 22–29)
EGFRCR SERPLBLD CKD-EPI 2021: >90 ML/MIN/1.73M2
ERYTHROCYTE [DISTWIDTH] IN BLOOD BY AUTOMATED COUNT: 12.6 % (ref 10–15)
GLUCOSE SERPL-MCNC: 90 MG/DL (ref 70–99)
HCT VFR BLD AUTO: 21.8 % (ref 35–47)
HGB BLD-MCNC: 7.3 G/DL (ref 11.7–15.7)
MAGNESIUM SERPL-MCNC: 1.5 MG/DL (ref 1.7–2.3)
MCH RBC QN AUTO: 33.6 PG (ref 26.5–33)
MCHC RBC AUTO-ENTMCNC: 33.5 G/DL (ref 31.5–36.5)
MCV RBC AUTO: 101 FL (ref 78–100)
PHOSPHATE SERPL-MCNC: 4.5 MG/DL (ref 2.5–4.5)
PLATELET # BLD AUTO: 172 10E3/UL (ref 150–450)
POTASSIUM SERPL-SCNC: 3.8 MMOL/L (ref 3.4–5.3)
PROT SERPL-MCNC: 5.4 G/DL (ref 6.4–8.3)
RBC # BLD AUTO: 2.17 10E6/UL (ref 3.8–5.2)
SODIUM SERPL-SCNC: 141 MMOL/L (ref 135–145)
WBC # BLD AUTO: 4.5 10E3/UL (ref 4–11)

## 2024-05-27 PROCEDURE — 250N000011 HC RX IP 250 OP 636: Performed by: STUDENT IN AN ORGANIZED HEALTH CARE EDUCATION/TRAINING PROGRAM

## 2024-05-27 PROCEDURE — 36415 COLL VENOUS BLD VENIPUNCTURE: CPT

## 2024-05-27 PROCEDURE — 250N000013 HC RX MED GY IP 250 OP 250 PS 637: Performed by: STUDENT IN AN ORGANIZED HEALTH CARE EDUCATION/TRAINING PROGRAM

## 2024-05-27 PROCEDURE — 84100 ASSAY OF PHOSPHORUS: CPT

## 2024-05-27 PROCEDURE — 85027 COMPLETE CBC AUTOMATED: CPT

## 2024-05-27 PROCEDURE — 250N000013 HC RX MED GY IP 250 OP 250 PS 637

## 2024-05-27 PROCEDURE — 250N000011 HC RX IP 250 OP 636

## 2024-05-27 PROCEDURE — 80053 COMPREHEN METABOLIC PANEL: CPT

## 2024-05-27 PROCEDURE — 250N000011 HC RX IP 250 OP 636: Performed by: SURGERY

## 2024-05-27 PROCEDURE — 120N000002 HC R&B MED SURG/OB UMMC

## 2024-05-27 PROCEDURE — 83735 ASSAY OF MAGNESIUM: CPT

## 2024-05-27 PROCEDURE — 250N000011 HC RX IP 250 OP 636: Performed by: PHYSICIAN ASSISTANT

## 2024-05-27 RX ORDER — MAGNESIUM SULFATE HEPTAHYDRATE 40 MG/ML
2 INJECTION, SOLUTION INTRAVENOUS ONCE
Status: COMPLETED | OUTPATIENT
Start: 2024-05-27 | End: 2024-05-27

## 2024-05-27 RX ORDER — OXYCODONE HYDROCHLORIDE 10 MG/1
10 TABLET ORAL
Status: DISCONTINUED | OUTPATIENT
Start: 2024-05-27 | End: 2024-05-29 | Stop reason: HOSPADM

## 2024-05-27 RX ORDER — HYDROMORPHONE HYDROCHLORIDE 1 MG/ML
0.5 INJECTION, SOLUTION INTRAMUSCULAR; INTRAVENOUS; SUBCUTANEOUS EVERY 6 HOURS PRN
Status: DISCONTINUED | OUTPATIENT
Start: 2024-05-27 | End: 2024-05-28

## 2024-05-27 RX ORDER — IBUPROFEN 600 MG/1
600 TABLET, FILM COATED ORAL EVERY 6 HOURS
Status: DISCONTINUED | OUTPATIENT
Start: 2024-05-27 | End: 2024-05-29 | Stop reason: HOSPADM

## 2024-05-27 RX ADMIN — SIMETHICONE 160 MG: 80 TABLET, CHEWABLE ORAL at 13:22

## 2024-05-27 RX ADMIN — FAMOTIDINE 10 MG: 10 TABLET, FILM COATED ORAL at 08:09

## 2024-05-27 RX ADMIN — ACETAMINOPHEN 975 MG: 325 TABLET, FILM COATED ORAL at 08:08

## 2024-05-27 RX ADMIN — IBUPROFEN 600 MG: 600 TABLET, FILM COATED ORAL at 21:19

## 2024-05-27 RX ADMIN — OXYCODONE HYDROCHLORIDE 10 MG: 10 TABLET ORAL at 14:03

## 2024-05-27 RX ADMIN — METHOCARBAMOL TABLETS 750 MG: 750 TABLET, COATED ORAL at 19:36

## 2024-05-27 RX ADMIN — PREGABALIN 150 MG: 75 CAPSULE ORAL at 19:36

## 2024-05-27 RX ADMIN — IBUPROFEN 600 MG: 600 TABLET, FILM COATED ORAL at 08:09

## 2024-05-27 RX ADMIN — OXYCODONE HYDROCHLORIDE 10 MG: 10 TABLET ORAL at 16:59

## 2024-05-27 RX ADMIN — Medication 0.5 MG: at 21:25

## 2024-05-27 RX ADMIN — HYDROMORPHONE HYDROCHLORIDE 0.5 MG: 1 INJECTION, SOLUTION INTRAMUSCULAR; INTRAVENOUS; SUBCUTANEOUS at 07:29

## 2024-05-27 RX ADMIN — LIDOCAINE 2 PATCH: 4 PATCH TOPICAL at 21:19

## 2024-05-27 RX ADMIN — Medication 1 TABLET: at 08:07

## 2024-05-27 RX ADMIN — OXYCODONE HYDROCHLORIDE 10 MG: 10 TABLET ORAL at 09:30

## 2024-05-27 RX ADMIN — TIZANIDINE 2 MG: 2 TABLET ORAL at 21:19

## 2024-05-27 RX ADMIN — FLUOXETINE 20 MG: 20 CAPSULE ORAL at 08:08

## 2024-05-27 RX ADMIN — PREGABALIN 150 MG: 75 CAPSULE ORAL at 08:07

## 2024-05-27 RX ADMIN — PANTOPRAZOLE SODIUM 40 MG: 40 TABLET, DELAYED RELEASE ORAL at 19:36

## 2024-05-27 RX ADMIN — ONDANSETRON 4 MG: 4 TABLET, ORALLY DISINTEGRATING ORAL at 08:22

## 2024-05-27 RX ADMIN — ACETAMINOPHEN 975 MG: 325 TABLET, FILM COATED ORAL at 19:36

## 2024-05-27 RX ADMIN — OXYCODONE HYDROCHLORIDE 10 MG: 10 TABLET ORAL at 21:25

## 2024-05-27 RX ADMIN — MAGNESIUM SULFATE HEPTAHYDRATE 2 G: 2 INJECTION, SOLUTION INTRAVENOUS at 11:14

## 2024-05-27 RX ADMIN — METHOCARBAMOL TABLETS 750 MG: 750 TABLET, COATED ORAL at 08:08

## 2024-05-27 RX ADMIN — ACETAMINOPHEN 975 MG: 325 TABLET, FILM COATED ORAL at 14:03

## 2024-05-27 RX ADMIN — HYDROMORPHONE HYDROCHLORIDE 0.5 MG: 1 INJECTION, SOLUTION INTRAMUSCULAR; INTRAVENOUS; SUBCUTANEOUS at 13:21

## 2024-05-27 RX ADMIN — PANTOPRAZOLE SODIUM 80 MG: 40 TABLET, DELAYED RELEASE ORAL at 08:08

## 2024-05-27 RX ADMIN — HYDROMORPHONE HYDROCHLORIDE 0.5 MG: 1 INJECTION, SOLUTION INTRAMUSCULAR; INTRAVENOUS; SUBCUTANEOUS at 19:36

## 2024-05-27 RX ADMIN — CEFTRIAXONE SODIUM 2 G: 2 INJECTION, POWDER, FOR SOLUTION INTRAMUSCULAR; INTRAVENOUS at 18:22

## 2024-05-27 RX ADMIN — METHOCARBAMOL TABLETS 750 MG: 750 TABLET, COATED ORAL at 14:03

## 2024-05-27 RX ADMIN — IBUPROFEN 600 MG: 600 TABLET, FILM COATED ORAL at 15:54

## 2024-05-27 RX ADMIN — FAMOTIDINE 10 MG: 10 TABLET, FILM COATED ORAL at 19:36

## 2024-05-27 RX ADMIN — Medication 0.25 MG: at 08:13

## 2024-05-27 ASSESSMENT — ACTIVITIES OF DAILY LIVING (ADL)
ADLS_ACUITY_SCORE: 21

## 2024-05-27 NOTE — PROVIDER NOTIFICATION
"Time of notification: 1:03 AM  Provider notified: Nelida Caro MD  Patient status:\"Just an FYI, pt doesn't want to be hooked up to her continuous fluids anymore. I don't know if you want to discontinue the order for the fluid?\"  \"Pt has been refusing to save her urine, and in her chart there is not specific amount, but she says she has been peeing. Her reasoning behind not having the fluids is that she said she feels like she is drinking enough now that she doesn't want them.\"  Orders received:  MD asked about urine out put and MD discontinued order for continuous fluids.  "

## 2024-05-27 NOTE — PLAN OF CARE
Goal Outcome Evaluation:      Plan of Care Reviewed With: patient    Overall Patient Progress: improvingOverall Patient Progress: improving    Vitals: Stable on RA  Neuro: A&Ox4  Mobility: Pt moves independently.  Pain/Nausea: Pain & Nausea controlled PRN dilaudid, oxy, Zofran   Diet & GI: Low Fiber diet. Voiding appropriately to ileostomy  Labs: Monitored  LDAs: L PIV SL - Pt refusing continuous IV.  Skin/incisions: midline incision MARIANELA  Respiratory: No acute changes this shift.  Cardiac: No acute changes this shift.  : Voiding appropriately and spontaneously; not saving.     NEW CHANGES: No acute changes this shift.    Continue to implement Care Plan.    Fortunato Simpson RN

## 2024-05-27 NOTE — PLAN OF CARE
Goal Outcome Evaluation:Patient reports ongoing mid abdominal pain,air releif with Oxycodone/Dilaudid.Ambulated halls,gait steady.Repoorts little appetite.Ostomy bag reported little output,positive air noted.Continue to monitor

## 2024-05-27 NOTE — PLAN OF CARE
"Shift: 7837-5140    Neuro: A&Ox4, calls appropriately, pleasant and cooperative with cares.  VS: VSS  Respiratory: RA  Cardiac: Apical pulse regular, no complaints of chest pain, afebrile.  Pain: Pt having abdominal/chronic pain, PRN oxycodone given x1  GI/: Ileostomy, pt independently empties. Per pt report pt adequately voiding, pt refusing to save urine. Pt still having intermittent nausea, but per pt report \"not bad over night\". Pt declined any PRN antiemetics  Diet: Low fiber diet  IV/Drips: Left PIV SL  Activity: Up independent  Skin/Drains: Midline incision, MARIANELA    P: Report Changes to treatment team Colorectal surgery.     "

## 2024-05-27 NOTE — PROGRESS NOTES
Colorectal Surgery Progress Note  [facility]    Subjective: No acute events overnight, pain moderately well controlled, states mild nausea overnight, otherwise tolerating low fiber diet with 890 mL PO intake recorded, having ileostomy output.     Vitals:  Vitals:    05/26/24 0800 05/26/24 0802 05/26/24 2219 05/27/24 0819   BP:  96/71 108/68 116/78   BP Location:  Right arm Right arm    Patient Position:   Semi-Wilson's    Cuff Size:   Adult Small    Pulse:  78 74    Resp:  16 16 16   Temp:  98.3  F (36.8  C) 98.1  F (36.7  C) 98.1  F (36.7  C)   TempSrc:  Oral Oral Axillary   SpO2: 100% 100% 96% 96%   Weight:       Height:         I/O:  I/O last 3 completed shifts:  In: 1705 [P.O.:890; I.V.:815]  Out: 850 [Stool:850]    Physical Exam:  Gen: AAOx3, NAD  Pulm: Non-labored breathing on room air  Abd: Soft, non-distended, mildly tender, no guarding/rebound   Incision C/D/I - no signs of infection   Stoma pink and viable with minimal amount of liquid stool and gas in bag  Ext:  Warm and well-perfused    BMP  Recent Labs   Lab 05/27/24  0737 05/26/24  0711 05/25/24  0722 05/24/24  2329 05/24/24  2228 05/24/24  0745 05/24/24  0743    141 137  --   --   --  134*   POTASSIUM 3.8 4.0 4.2  --  3.8  --  3.2*   CHLORIDE 111* 113* 108*  --   --   --  100   CO2 20* 19* 21*  --   --   --  18*   BUN 10.6 7.5 12.8  --   --   --  23.2*   CR 0.69 0.64 0.74  --   --   --  1.00*   GLC 90 90 105* 121*  --    < > 132*   MAG 1.5* 1.7 2.0  --  2.0  --  2.3   PHOS 4.5 4.2 4.1  --   --   --  3.6    < > = values in this interval not displayed.     CBC  Recent Labs   Lab 05/27/24  0737 05/26/24  0711 05/25/24  0722 05/24/24  0743   WBC 4.5 4.0 4.3 8.7   HGB 7.3* 8.7* 7.4* 9.2*   HCT 21.8* 25.6* 22.6* 27.0*    167 148* 161       ASSESSMENT: This is a 38 year old female PMH chronic pain syndrome on chronic opiates, borderline personality disorder, multiple prior GI surgeries yrn 2003, s/p colostomy  2012 at [place], colostomy revision 2012, hysterectomy 2013, Ex Lap x2 4/2019 & 9/2019, h/o recurrent bowel obstructions who has chronic colonic dysmotility with severe constipation.     Thus underwent elective Open total abdominal colectomy with end ileostomy, remaining rectal stump, and BSO on 5/6 and discharged on 5/13/24.  Readmitted to OSH on 5/14 nausea, vomiting, acute on chronic abdominal pain with ileostomy output, found to have an ileus vs SBO.  Per chart while at OSH, NGT placed on 5/15. PICC placed 5/15 & TPN initiated.  NGT removed 5/16 due to discomfort.  TPN stopped on 5/19 but PICC line remained in place. The TPN was reinitiated on 5/21 and had febrile episode 2 hours after re-initiation of TPN on the evening of 5/21 and started on barbara due to multiple allergies.  5/22 required high capacity ileostomy bag for large liquid outputs.  Notes regarding PICC red lumen with difficulties flushing. 5/22 OSH blood cultures with Gram neg bacilli.  Due to prolonged outside hospitalization and concern for intra-abd abscess and bacteremia, pt was transferred back to [facility] on 5/22. No IR drainage of fluid collection. GGC on 5/24, having good ileostomy output since. Encourage OOB and walking. Anticipating discharge pending pain management, will transition to PO antibiotics at that time.       Neuro/Pain:   - PRN oxycodone 10 mg q3h, IV dilaudid 0.5 mg q6h  - scheduled, tylenol, ibuprofen, Robaxin 750 mg TID, Lyrica 150 mg BID, Tizanidine 2 mg at bedtime, x2 Lidocaine patches  CV: no acute concerns at this time, monitor.   PULM: encourage IS.  GI/FEN:   - continue LFD  - Discontinue mIVF 5/27  - WOCN to assess MCJ separation as able  - strict in and outs  - replete electrolytes  - simethicone prn for gas  : no acute concerns at this time  Heme: Hgb stable, continue to monitor  - transfuse for Hb < 7.0   ID:   - CT pushed from OSH - intra abd collection is 1 x 2 x 5cm long   -- IR does not recommend aspiration of  small fluid collection  - OSH gram neg bacteremia. Per Care Everywhere blood culture grew Klebsiella oxytoca and Enterobacteriacae group.    - f/u repeat cultures  from 5/23  - 5/25: NGTD  - greatly appreciate ID consult for GN bacteremia  -- discontinued flagyl  -- continue Ceftriaxone IV 2g for 1 week followed by oral Cefadroxil for total therapy of 3 weeks, per ID     -- Will transition to PO antibiotics at time of discharge  Endocrine: no acute concerns at this time    Activity: as tolerated.  Ppx: lovenox ppx    The patient's care was discussed with the fellow, Dr. Carmona, who discussed with the attending.    Gordon Venegas MD  PGY-1 Surgery  05/27/2024

## 2024-05-27 NOTE — PROGRESS NOTES
Brief Colorectal Surgery Progress Note    Paged by nursing regarding patient experiencing 8/10 patient and requesting dilaudid. Went to evaluate the patient. She states that she just finished going for a walk outside and exerted herself causing increasing pain. This pain has since improved. She is sitting comfortably in a chair. Abdomen is soft, non-distended, and non-tender to palpation. Ostomy is pink and viable with stool and gas in the collection bag. Plan to continue with current pain medication regimen.    Layla Gray, DO  General Surgery, PGY-1

## 2024-05-28 LAB
BACTERIA BLD CULT: NO GROWTH
CREAT SERPL-MCNC: 0.68 MG/DL (ref 0.51–0.95)
EGFRCR SERPLBLD CKD-EPI 2021: >90 ML/MIN/1.73M2
MAGNESIUM SERPL-MCNC: 1.9 MG/DL (ref 1.7–2.3)
PHOSPHATE SERPL-MCNC: 4.3 MG/DL (ref 2.5–4.5)
PLATELET # BLD AUTO: 175 10E3/UL (ref 150–450)
POTASSIUM SERPL-SCNC: 4.1 MMOL/L (ref 3.4–5.3)

## 2024-05-28 PROCEDURE — G0463 HOSPITAL OUTPT CLINIC VISIT: HCPCS

## 2024-05-28 PROCEDURE — 84100 ASSAY OF PHOSPHORUS: CPT | Performed by: COLON & RECTAL SURGERY

## 2024-05-28 PROCEDURE — 250N000011 HC RX IP 250 OP 636: Performed by: PHYSICIAN ASSISTANT

## 2024-05-28 PROCEDURE — 250N000011 HC RX IP 250 OP 636

## 2024-05-28 PROCEDURE — 120N000002 HC R&B MED SURG/OB UMMC

## 2024-05-28 PROCEDURE — 250N000013 HC RX MED GY IP 250 OP 250 PS 637: Performed by: STUDENT IN AN ORGANIZED HEALTH CARE EDUCATION/TRAINING PROGRAM

## 2024-05-28 PROCEDURE — 250N000011 HC RX IP 250 OP 636: Performed by: STUDENT IN AN ORGANIZED HEALTH CARE EDUCATION/TRAINING PROGRAM

## 2024-05-28 PROCEDURE — 36415 COLL VENOUS BLD VENIPUNCTURE: CPT

## 2024-05-28 PROCEDURE — 250N000013 HC RX MED GY IP 250 OP 250 PS 637

## 2024-05-28 PROCEDURE — 85049 AUTOMATED PLATELET COUNT: CPT

## 2024-05-28 PROCEDURE — 83735 ASSAY OF MAGNESIUM: CPT | Performed by: COLON & RECTAL SURGERY

## 2024-05-28 PROCEDURE — 82565 ASSAY OF CREATININE: CPT

## 2024-05-28 PROCEDURE — 84132 ASSAY OF SERUM POTASSIUM: CPT | Performed by: COLON & RECTAL SURGERY

## 2024-05-28 RX ORDER — HYDROMORPHONE HYDROCHLORIDE 1 MG/ML
0.5 INJECTION, SOLUTION INTRAMUSCULAR; INTRAVENOUS; SUBCUTANEOUS EVERY 12 HOURS PRN
Status: DISCONTINUED | OUTPATIENT
Start: 2024-05-28 | End: 2024-05-29 | Stop reason: HOSPADM

## 2024-05-28 RX ADMIN — ONDANSETRON 4 MG: 4 TABLET, ORALLY DISINTEGRATING ORAL at 07:48

## 2024-05-28 RX ADMIN — ACETAMINOPHEN 975 MG: 325 TABLET, FILM COATED ORAL at 20:00

## 2024-05-28 RX ADMIN — ENOXAPARIN SODIUM 40 MG: 40 INJECTION SUBCUTANEOUS at 07:44

## 2024-05-28 RX ADMIN — Medication 1 TABLET: at 07:43

## 2024-05-28 RX ADMIN — OXYCODONE HYDROCHLORIDE 10 MG: 10 TABLET ORAL at 20:00

## 2024-05-28 RX ADMIN — Medication 5 MG: at 21:44

## 2024-05-28 RX ADMIN — CEFTRIAXONE SODIUM 2 G: 2 INJECTION, POWDER, FOR SOLUTION INTRAMUSCULAR; INTRAVENOUS at 17:54

## 2024-05-28 RX ADMIN — OXYCODONE HYDROCHLORIDE 10 MG: 10 TABLET ORAL at 08:49

## 2024-05-28 RX ADMIN — IBUPROFEN 600 MG: 600 TABLET, FILM COATED ORAL at 11:20

## 2024-05-28 RX ADMIN — PANTOPRAZOLE SODIUM 80 MG: 40 TABLET, DELAYED RELEASE ORAL at 07:43

## 2024-05-28 RX ADMIN — OXYCODONE HYDROCHLORIDE 10 MG: 10 TABLET ORAL at 00:16

## 2024-05-28 RX ADMIN — ACETAMINOPHEN 975 MG: 325 TABLET, FILM COATED ORAL at 14:48

## 2024-05-28 RX ADMIN — OXYCODONE HYDROCHLORIDE 10 MG: 10 TABLET ORAL at 14:56

## 2024-05-28 RX ADMIN — PANTOPRAZOLE SODIUM 40 MG: 40 TABLET, DELAYED RELEASE ORAL at 20:00

## 2024-05-28 RX ADMIN — HYDROMORPHONE HYDROCHLORIDE 0.5 MG: 1 INJECTION, SOLUTION INTRAMUSCULAR; INTRAVENOUS; SUBCUTANEOUS at 17:46

## 2024-05-28 RX ADMIN — Medication 0.5 MG: at 04:14

## 2024-05-28 RX ADMIN — IBUPROFEN 600 MG: 600 TABLET, FILM COATED ORAL at 04:12

## 2024-05-28 RX ADMIN — OXYCODONE HYDROCHLORIDE 10 MG: 10 TABLET ORAL at 04:12

## 2024-05-28 RX ADMIN — HYDROMORPHONE HYDROCHLORIDE 0.5 MG: 1 INJECTION, SOLUTION INTRAMUSCULAR; INTRAVENOUS; SUBCUTANEOUS at 07:37

## 2024-05-28 RX ADMIN — Medication 0.5 MG: at 21:44

## 2024-05-28 RX ADMIN — METHOCARBAMOL TABLETS 750 MG: 750 TABLET, COATED ORAL at 07:43

## 2024-05-28 RX ADMIN — FAMOTIDINE 10 MG: 10 TABLET, FILM COATED ORAL at 20:00

## 2024-05-28 RX ADMIN — TIZANIDINE 2 MG: 2 TABLET ORAL at 21:44

## 2024-05-28 RX ADMIN — PREGABALIN 150 MG: 75 CAPSULE ORAL at 07:43

## 2024-05-28 RX ADMIN — ACETAMINOPHEN 975 MG: 325 TABLET, FILM COATED ORAL at 01:31

## 2024-05-28 RX ADMIN — ACETAMINOPHEN 975 MG: 325 TABLET, FILM COATED ORAL at 07:43

## 2024-05-28 RX ADMIN — FLUOXETINE 20 MG: 20 CAPSULE ORAL at 07:44

## 2024-05-28 RX ADMIN — IBUPROFEN 600 MG: 600 TABLET, FILM COATED ORAL at 21:43

## 2024-05-28 RX ADMIN — IBUPROFEN 600 MG: 600 TABLET, FILM COATED ORAL at 17:52

## 2024-05-28 RX ADMIN — PREGABALIN 150 MG: 75 CAPSULE ORAL at 20:00

## 2024-05-28 RX ADMIN — ONDANSETRON 4 MG: 4 TABLET, ORALLY DISINTEGRATING ORAL at 17:52

## 2024-05-28 RX ADMIN — METHOCARBAMOL TABLETS 750 MG: 750 TABLET, COATED ORAL at 14:48

## 2024-05-28 RX ADMIN — OXYCODONE HYDROCHLORIDE 10 MG: 10 TABLET ORAL at 11:56

## 2024-05-28 RX ADMIN — METHOCARBAMOL TABLETS 750 MG: 750 TABLET, COATED ORAL at 20:00

## 2024-05-28 RX ADMIN — FAMOTIDINE 10 MG: 10 TABLET, FILM COATED ORAL at 07:43

## 2024-05-28 RX ADMIN — HYDROMORPHONE HYDROCHLORIDE 0.5 MG: 1 INJECTION, SOLUTION INTRAMUSCULAR; INTRAVENOUS; SUBCUTANEOUS at 01:31

## 2024-05-28 RX ADMIN — OXYCODONE HYDROCHLORIDE 10 MG: 10 TABLET ORAL at 23:12

## 2024-05-28 ASSESSMENT — ACTIVITIES OF DAILY LIVING (ADL)
ADLS_ACUITY_SCORE: 22
ADLS_ACUITY_SCORE: 23
ADLS_ACUITY_SCORE: 22
ADLS_ACUITY_SCORE: 23
ADLS_ACUITY_SCORE: 22
ADLS_ACUITY_SCORE: 22
ADLS_ACUITY_SCORE: 23
ADLS_ACUITY_SCORE: 21
ADLS_ACUITY_SCORE: 23
ADLS_ACUITY_SCORE: 22
ADLS_ACUITY_SCORE: 23

## 2024-05-28 NOTE — PROGRESS NOTES
CLINICAL NUTRITION SERVICES - BRIEF NOTE     Nutrition Prescription    RECOMMENDATIONS FOR MDs/PROVIDERS TO ORDER:  If patient unable to consume at least 1100 calories and 53 grams protein (~65% of assessed needs), consider resuming nutrition support.      Recommendations already ordered by Registered Dietitian (RD):  Stop Ensure Clear.  Start Special K protein bar with dinner.     Calorie counts 5/28-5/30       EVALUATION OF THE PROGRESS TOWARD GOALS   Diet: Low Fiber + Special K protein bar with meals  Nutrition Support: Received TPN at OSH 5/15-5/19, resumed 5/21-5/22. Stopped d/t bacteremia.     Intake: She is taking bites of each of her meals.  Said she was able to consume a whole turkey burger yesterday.  She likes the Ensure Clear flavor but disliked how much sugar it contained.  She notes she is concerned about the weight gain (10#) she has had since her surgery and this admission.       INTERVENTIONS  Reviewed the importance of nutrition to help avoid excessive fluid retention and get back to a better quality of life.  Discussed plan for calorie counts. Pt not willing to try other liquid oral supplements, but willing to try special K protein bar.     Monitoring/Evaluation  Progress toward goals will be monitored and evaluated per protocol.     Ricarda Mora, MS, RD, LD, CCTD, [facility]  7A + 7B (beds 1701-8438)  Available on Vocera [7A or 7B Clinical Dietitian]   Weekend/Holiday: Vocera [Weekend Clinical Dietitian]

## 2024-05-28 NOTE — PLAN OF CARE
"[id number]  Vitals:  Blood pressure 119/70, pulse 64, temperature 98.1  F (36.7  C), temperature source Oral, resp. rate 16, height 1.702 m (5' 7\"), weight 72.7 kg (160 lb 4.8 oz), SpO2 97%.  Neuro: A/Ox4, calls appropriately   Cardiac: VSS  Respiratory: sating >92 on RA, no SOB  Diet/appetite: low fiber diet, well tolerated   GI/:  ileostomy  wnl, voids ad inna  Activity:  up ind in room  Pain: 7/10 consistent abd pain, prn oxy and dilaudid given  Skin: no new deficits  Lines: L PIV wnl, ileostomy wnl    Plan: cont to monitor pt condition, notify team of changes per POC.    Problem: Adult Inpatient Plan of Care  Goal: Absence of Hospital-Acquired Illness or Injury  Outcome: Progressing  Intervention: Identify and Manage Fall Risk  Recent Flowsheet Documentation  Taken 5/28/2024 0016 by Marie Garcia RN  Safety Promotion/Fall Prevention:   activity supervised   room door open   safety round/check completed  Intervention: Prevent Skin Injury  Recent Flowsheet Documentation  Taken 5/28/2024 0016 by Marie Garcia RN  Body Position: position changed independently  Skin Protection: adhesive use limited  Device Skin Pressure Protection: tubing/devices free from skin contact  Intervention: Prevent and Manage VTE (Venous Thromboembolism) Risk  Recent Flowsheet Documentation  Taken 5/28/2024 0016 by Marie Garcia RN  VTE Prevention/Management: SCDs (sequential compression devices) off  Intervention: Prevent Infection  Recent Flowsheet Documentation  Taken 5/28/2024 0016 by Marie Garcia RN  Infection Prevention:   cohorting utilized   environmental surveillance performed   equipment surfaces disinfected   hand hygiene promoted   personal protective equipment utilized   rest/sleep promoted   single patient room provided     Problem: Adult Inpatient Plan of Care  Goal: Optimal Comfort and Wellbeing  Outcome: Progressing  Intervention: Monitor Pain and Promote Comfort  Recent Flowsheet Documentation  Taken 5/28/2024 0016 by " Jose, Marie, RN  Pain Management Interventions: medication (see MAR)     Problem: Nausea and Vomiting  Goal: Nausea and Vomiting Relief  Outcome: Progressing  Intervention: Prevent and Manage Nausea and Vomiting  Recent Flowsheet Documentation  Taken 5/28/2024 0016 by Marie Garcia RN  Fluid/Electrolyte Management: electrolyte supplement adjusted  Oral Care: oral rinse provided  Environmental Support:   calm environment promoted   environmental consistency promoted   rest periods encouraged     Problem: Pain Acute  Goal: Optimal Pain Control and Function  Outcome: Progressing  Intervention: Develop Pain Management Plan  Recent Flowsheet Documentation  Taken 5/28/2024 0016 by Marie Garcia RN  Pain Management Interventions: medication (see MAR)  Intervention: Prevent or Manage Pain  Recent Flowsheet Documentation  Taken 5/28/2024 0016 by Marie Garcia RN  Sensory Stimulation Regulation:   care clustered   lighting decreased  Sleep/Rest Enhancement:   awakenings minimized   consistent schedule promoted  Bowel Elimination Promotion: adequate fluid intake promoted  Medication Review/Management: medications reviewed  Intervention: Optimize Psychosocial Wellbeing  Recent Flowsheet Documentation  Taken 5/28/2024 0016 by Marie Garcia RN  Supportive Measures:   active listening utilized   decision-making supported

## 2024-05-28 NOTE — PROGRESS NOTES
Pt admitted for ongoing bowel obstruction. Followed by Colorectal surgery.    Neuro: A&Ox4     GI/: Ileostomy, collected/managed by patient and WOC, monitoring output. Voids spontaneously, not collecting.     Respiratory: WDL.    Diet: Low fiber regular diet. Additional Special K protein bar as ordered by provider.    Incisions/Drains: Ileostomy with 300 ml output and Left peripheral IV saline locked.     IV Access: Left peripheral IV saline locked    Labs: K, Mg, Ph replacement. Hgb: 7.3.     Activity: Independent. Ambulated in halls.    Pain: Managed with IV Dilaudid and po oxycodone 10mg. Zofran po and IV Dilaudid given at 0800, Oxycodone given x3 at 0900, 1200, and 1500.    New changes this shift: Calorie count and additional Special K protein bar as ordered by provider. IV dilaudid. IV Dilaudid changed to BID. Wound care changed dressing/pouch. Plan for discharge.

## 2024-05-28 NOTE — PLAN OF CARE
"Goal Outcome Evaluation:      Plan of Care Reviewed With: patient    Overall Patient Progress: no changeOverall Patient Progress: no change  /66 (BP Location: Right arm)   Pulse 81   Temp 98.3  F (36.8  C) (Oral)   Resp 15   Ht 1.702 m (5' 7\")   Wt 72 kg (158 lb 12.3 oz)   LMP  (LMP Unknown)   SpO2 98%   BMI 24.87 kg/m     AVSS.  Alert and oriented x4.  C/o pain at start of shift and upset regarding new iv hydromorphone orders q6hrs  Team updated and pt was seen. No changes to current PRN iv meds.   Scheduled meds of tylenol, robaxin and Advil given. Offered atarax - declined.   Ileostomy with about 350 of loose stool, + gas. Voiding not saving.   Fair po intake. No c/o nausea.   Up in chair and walking x1 - independent with ADLS.  No new issues. Continue with current POC.     "

## 2024-05-28 NOTE — PROGRESS NOTES
"[facility] Nurse Inpatient Assessment     5/24 Summary:  current pouching not protecting the dehisced surgical wound packing from the output which then erodes the seal.  Starting soft convexity to improve the seal. If this is ineffective, will need to pouch around the wounds.    5/28:  wounds improving with soft convexity  Patient History (according to provider note(s):      38 year old female with history of chronic pain syndrome on chronic opioid therapy, borderline personality disorder, colonic dysmotility, and multiple GI surgeries, most recently s/p open total colectomy with end ileostomy and BSO on 5/6 with Dr. Ayala & Dr. Pan. She presents as a transfer from OSH for fever, abdominal pain, and gram negative bacteremia.     Assessment:      Assessment of established end Ileostomy:  Diagnosis Pertinent to Stoma: Slow transit constipation                                        Surgery Date: 5/7/24  Surgeon:Mali Ayala                                                           [facility]ospital[facility]  Pouching system in place on assessment today: Roseanne two piece, cut to fit, and flat with Exufiber in peristomal wounds  Pouch barrier status: melted over the wounds  Pouch last changed/wear time: less than 2 hours   Reason for pouch change today:  assessment    Effectiveness of current pouching/ supply plan: Attempting new system, will re-evaluate next assessment  Change made with ostomy management today: Yes  Pouching system placed today: Roseanne two piece, cut to fit, flat, barrier ring, and small convex oval ring   Supplies: ordered soft convex barriers, high output pouches      5/24, 5/28    Last photo: 5/24/24  Stoma location: LLQ  Stoma size: 1\"    Stoma appearance: normal-appearing, beefy red, round, moist, and protruberant  Mucocutaneous junction:  intact except where the sutures have dehisced from the original tightening of the skin around the new stoma " "  Peristomal complication(s): none    Output: liquid, brown, and green  Output volume emptied during visit: 5 ml   Abdominal assessment: Soft  Surgical site(s): open to air  NG still in place? No  Pain: Sharp in RLQ    Ostomy education assessment:  Participant of teaching session today: patient   Education completed today: Pouching system assessment , Evaluate leakage issue, and Adjustment of pouching plan  Educational materials/methods: Demonstration  Learning Comprehension:   Psychosocial assessment: experienced with pouching   Patient readiness for education today: attentive  Following today's visit: patient  is able to demonstrate;                How to change their pouch? Experienced          Preparation for discharge completed: Placed prescription recommendations in discharge navigator for MD to sign, samples ordered  Preparation for discharge still needed: none  Pt support system on discharge: mother  WOC recommend home care? No  Face to face time: 25 minutes    Treatment Plan:   LLQ Ileostomy pouching plan:   Pouching system: ostomy supplies pouches: Roseanne 57 FECAL ([id number]) ostomy supplies barrier: [place] 57mm SOFT CONVEX ([id number])  Accessories used: WOC ostomy accessories: 2\" Sanjeev Ring ([id number])   Cleanse wounds with wound cleanser and q tips  Pack wounds with Exufiber Ag or Aquacell Ag.  Cover with additional sanjeev ring    Resume previous flat 2 piece pouching system when parastomal wounds have resolved.       Frequency of pouch changes: Every other day  WOC follow up plan: Daily Monday-Friday (as able)  Bedside RN interventions: Change pouch PRN if leaking using the supplies above, Empty pouch when 1/3 to 1/2 full, ensure to clean pouch outlet after emptying to prevent odor, Notify WOC for ongoing pouch leakage, and Document stoma appearance and output volume, color, and consistency every shift   DATA:     Current support surface: Standard  Standard Isoflex gel  BMI: Body mass index is 25.11 kg/m . "   Active diet order: Orders Placed This Encounter      Low Fiber Diet      Diet     Output: I/O last 3 completed shifts:  In: 690 [P.O.:590; I.V.:100]  Out: 390 [Stool:390]     Labs:   Recent Labs   Lab 05/27/24  0737   ALBUMIN 2.9*   HGB 7.3*   WBC 4.5     Pressure injury risk assessment:   Sensory Perception: 4-->no impairment  Moisture: 4-->rarely moist  Activity: 3-->walks occasionally  Mobility: 4-->no limitation  Nutrition: 3-->adequate  Friction and Shear: 3-->no apparent problem  Trell Score: 21    Pager no longer is use, please contact through [facility] group: [facility] Nurse Panna Maria  Dept. Office Number: [phone]

## 2024-05-28 NOTE — PROGRESS NOTES
Colorectal Surgery Progress Note  [facility]    Subjective: No acute events overnight.  Walked a lot yesterday outside and is sore from that.  Taking small bites of food.  Ostomy output seems to have decreased since starting solids.  Has questions for WOCN and to reassess peristomal wounds.  Will further decrease IV dilaudid today.     Vitals:  Vitals:    05/27/24 1541 05/27/24 2128 05/27/24 2318 05/28/24 0752   BP: 112/66  119/70 105/59   BP Location: Right arm  Right arm Right arm   Pulse: 81  64 71   Resp: 15  16 18   Temp: 98.3  F (36.8  C)  98.1  F (36.7  C) 98.5  F (36.9  C)   TempSrc: Oral  Oral Oral   SpO2: 98%  97% 95%   Weight:  72.7 kg (160 lb 4.8 oz)     Height:         I/O:  I/O last 3 completed shifts:  In: 690 [P.O.:590; I.V.:100]  Out: 390 [Stool:390]    Physical Exam:  Gen: AAOx3, NAD  Pulm: Non-labored breathing on room air  Abd: Soft, non-distended, mildly tender, no guarding/rebound   Incision C/D/I - no signs of infection   Stoma pink and viable with small liquid-thickish stool in bag  Ext:  Warm and well-perfused    BMP  Recent Labs   Lab 05/28/24  0726 05/27/24  0737 05/26/24  0711 05/25/24  0722 05/24/24  2329 05/24/24  2228 05/24/24  0745 05/24/24  0743   NA  --  141 141 137  --   --   --  134*   POTASSIUM 4.1 3.8 4.0 4.2  --  3.8  --  3.2*   CHLORIDE  --  111* 113* 108*  --   --   --  100   CO2  --  20* 19* 21*  --   --   --  18*   BUN  --  10.6 7.5 12.8  --   --   --  23.2*   CR 0.68 0.69 0.64 0.74  --   --   --  1.00*   GLC  --  90 90 105* 121*  --    < > 132*   MAG  --  1.5* 1.7 2.0  --  2.0  --  2.3   PHOS 4.3 4.5 4.2 4.1  --   --   --  3.6    < > = values in this interval not displayed.     CBC  Recent Labs   Lab 05/28/24  0726 05/27/24  0737 05/26/24  0711 05/25/24  0722 05/24/24  0743   WBC  --  4.5 4.0 4.3 8.7   HGB  --  7.3* 8.7* 7.4* 9.2*   HCT  --  21.8* 25.6* 22.6* 27.0*    172 167 148* 161       ASSESSMENT: This is a 38 year old female [facility]  chronic pain syndrome on chronic opiates, borderline personality disorder, multiple prior GI surgeries yrn 2003, s/p colostomy 2012 at [place], colostomy revision 2012, hysterectomy 2013, Ex Lap x2 4/2019 & 9/2019, h/o recurrent bowel obstructions who has chronic colonic dysmotility with severe constipation.     Thus underwent elective Open total abdominal colectomy with end ileostomy, remaining rectal stump, and BSO on 5/6 and discharged on 5/13/24.  Readmitted to OSH on 5/14 nausea, vomiting, acute on chronic abdominal pain with ileostomy output, found to have an ileus vs SBO.  Per chart while at OSH, NGT placed on 5/15. PICC placed 5/15 & TPN initiated.  NGT removed 5/16 due to discomfort.  TPN stopped on 5/19 but PICC line remained in place. The TPN was reinitiated on 5/21 and had febrile episode 2 hours after re-initiation of TPN on the evening of 5/21 and started on barbara due to multiple allergies.  5/22 required high capacity ileostomy bag for large liquid outputs.  Notes regarding PICC red lumen with difficulties flushing. 5/22 OSH blood cultures with Gram neg bacilli.  Due to prolonged outside hospitalization and concern for intra-abd abscess and bacteremia, pt was transferred back to [facility] on 5/22.     No IR drainage of fluid collection. GGC on 5/24, having good ileostomy output since. Encourage OOB and walking. Anticipating discharge pending pain management, will transition to PO antibiotics at that time.     Neuro/Pain:   - PRN oxycodone 10 mg q3h, IV dilaudid 0.5 mg q12h prn break thru pain.   - scheduled, tylenol, ibuprofen, Robaxin 750 mg TID, Lyrica 150 mg BID, Tizanidine 2 mg at bedtime, x2 Lidocaine patches  CV: no acute concerns at this time, monitor.   PULM: encourage IS.  GI/FEN:   - continue Low Fiber Diet  - WOCN to assess MCJ separation as able  - strict in and outs  - replete electrolytes  - simethicone prn for gas  : no acute concerns at this time  Heme: Hgb stable, continue to  monitor  - transfuse for Hb < 7.0   ID:   - CT pushed from OSH - intra abd collection is 1 x 2 x 5cm long   -- IR does not recommend aspiration of small fluid collection  - OSH gram neg bacteremia. Per Care Everywhere blood culture grew Klebsiella oxytoca and Enterobacteriacae group.    - f/u repeat cultures  from 5/23  - 5/25: NGTD  - greatly appreciate ID consult for GN bacteremia  -- discontinued flagyl  -- continue Ceftriaxone IV 2g for 1 week followed by oral Cefadroxil for total therapy of 3 weeks, per ID     -- Will transition to PO antibiotics at time of discharge  Endocrine: no acute concerns at this time    Activity: as tolerated.  Ppx: lovenox ppx    Meli Borges PA-C ..................5/28/2024   8:41 AM  Colon and Rectal Surgery    Patient was seen and discussed with Dr. Lorenzo    The above plan of care was performed and communicated to me by Dr. Ayala    I spent 20 minute face-to-face or coordinating care of Mimi Su. Over 50% of our time on the unit was spent counseling the patient and/or coordinating care as documented in the assessment and plan.     [id number] post op hospital visit

## 2024-05-29 VITALS
RESPIRATION RATE: 16 BRPM | HEART RATE: 69 BPM | SYSTOLIC BLOOD PRESSURE: 117 MMHG | WEIGHT: 160.3 LBS | BODY MASS INDEX: 25.16 KG/M2 | DIASTOLIC BLOOD PRESSURE: 73 MMHG | HEIGHT: 67 IN | TEMPERATURE: 98.5 F | OXYGEN SATURATION: 97 %

## 2024-05-29 LAB
HOLD SPECIMEN: NORMAL
MAGNESIUM SERPL-MCNC: 1.6 MG/DL (ref 1.7–2.3)
PHOSPHATE SERPL-MCNC: 4.4 MG/DL (ref 2.5–4.5)
POTASSIUM SERPL-SCNC: 3.9 MMOL/L (ref 3.4–5.3)

## 2024-05-29 PROCEDURE — 84132 ASSAY OF SERUM POTASSIUM: CPT | Performed by: COLON & RECTAL SURGERY

## 2024-05-29 PROCEDURE — 36415 COLL VENOUS BLD VENIPUNCTURE: CPT | Performed by: COLON & RECTAL SURGERY

## 2024-05-29 PROCEDURE — 84100 ASSAY OF PHOSPHORUS: CPT | Performed by: COLON & RECTAL SURGERY

## 2024-05-29 PROCEDURE — 250N000011 HC RX IP 250 OP 636: Performed by: PHYSICIAN ASSISTANT

## 2024-05-29 PROCEDURE — 250N000013 HC RX MED GY IP 250 OP 250 PS 637: Performed by: STUDENT IN AN ORGANIZED HEALTH CARE EDUCATION/TRAINING PROGRAM

## 2024-05-29 PROCEDURE — 83735 ASSAY OF MAGNESIUM: CPT | Performed by: COLON & RECTAL SURGERY

## 2024-05-29 PROCEDURE — 258N000003 HC RX IP 258 OP 636

## 2024-05-29 PROCEDURE — 250N000011 HC RX IP 250 OP 636: Performed by: STUDENT IN AN ORGANIZED HEALTH CARE EDUCATION/TRAINING PROGRAM

## 2024-05-29 PROCEDURE — 250N000011 HC RX IP 250 OP 636

## 2024-05-29 PROCEDURE — 250N000013 HC RX MED GY IP 250 OP 250 PS 637

## 2024-05-29 RX ORDER — LOPERAMIDE HCL 2 MG
2 CAPSULE ORAL 3 TIMES DAILY PRN
COMMUNITY
Start: 2024-05-29

## 2024-05-29 RX ORDER — CEFADROXIL 1000 MG/1
1 TABLET ORAL 2 TIMES DAILY
Qty: 28 TABLET | Refills: 0 | Status: SHIPPED | OUTPATIENT
Start: 2024-05-29 | End: 2024-08-08

## 2024-05-29 RX ORDER — OXYCODONE AND ACETAMINOPHEN 5; 325 MG/1; MG/1
1 TABLET ORAL EVERY 6 HOURS PRN
Qty: 20 TABLET | Refills: 0 | Status: SHIPPED | OUTPATIENT
Start: 2024-05-29 | End: 2024-06-11

## 2024-05-29 RX ADMIN — PREGABALIN 150 MG: 75 CAPSULE ORAL at 08:12

## 2024-05-29 RX ADMIN — ONDANSETRON 4 MG: 4 TABLET, ORALLY DISINTEGRATING ORAL at 08:26

## 2024-05-29 RX ADMIN — FAMOTIDINE 10 MG: 10 TABLET, FILM COATED ORAL at 08:12

## 2024-05-29 RX ADMIN — ENOXAPARIN SODIUM 40 MG: 40 INJECTION SUBCUTANEOUS at 08:12

## 2024-05-29 RX ADMIN — SODIUM CHLORIDE, POTASSIUM CHLORIDE, SODIUM LACTATE AND CALCIUM CHLORIDE 500 ML: 600; 310; 30; 20 INJECTION, SOLUTION INTRAVENOUS at 03:56

## 2024-05-29 RX ADMIN — HYDROMORPHONE HYDROCHLORIDE 0.5 MG: 1 INJECTION, SOLUTION INTRAMUSCULAR; INTRAVENOUS; SUBCUTANEOUS at 07:06

## 2024-05-29 RX ADMIN — FLUOXETINE 20 MG: 20 CAPSULE ORAL at 08:12

## 2024-05-29 RX ADMIN — ACETAMINOPHEN 975 MG: 325 TABLET, FILM COATED ORAL at 08:12

## 2024-05-29 RX ADMIN — METHOCARBAMOL TABLETS 750 MG: 750 TABLET, COATED ORAL at 08:12

## 2024-05-29 RX ADMIN — OXYCODONE HYDROCHLORIDE 10 MG: 10 TABLET ORAL at 05:47

## 2024-05-29 RX ADMIN — OXYCODONE HYDROCHLORIDE 10 MG: 10 TABLET ORAL at 08:42

## 2024-05-29 RX ADMIN — Medication 1 TABLET: at 08:12

## 2024-05-29 RX ADMIN — Medication 0.25 MG: at 08:26

## 2024-05-29 RX ADMIN — ACETAMINOPHEN 975 MG: 325 TABLET, FILM COATED ORAL at 02:33

## 2024-05-29 RX ADMIN — PANTOPRAZOLE SODIUM 80 MG: 40 TABLET, DELAYED RELEASE ORAL at 08:12

## 2024-05-29 ASSESSMENT — ACTIVITIES OF DAILY LIVING (ADL)
ADLS_ACUITY_SCORE: 22

## 2024-05-29 NOTE — PLAN OF CARE
Goal Outcome Evaluation:      Plan of Care Reviewed With: patient, parent    Overall Patient Progress: improvingOverall Patient Progress: improving    Outcome Evaluation: VSS. reports pain is well controlled. Given PRN zofran premed before meal per pt's reuqest. abd midline incision MARIANELA, CDI.  ileostomy pouch intact with good output.  AVS reviewed with patient, pt calin dietrichtanding discharge educaion. ostomy supplies provided to patient. pt left with all her belongings.

## 2024-05-29 NOTE — PLAN OF CARE
Goal Outcome Evaluation:      Plan of Care Reviewed With: patient    Overall Patient Progress: improvingOverall Patient Progress: improving    Temp: 98.5  F (36.9  C) Temp src: Oral BP: 95/56 Pulse: 95   Resp: 18 SpO2: 100 % O2 Device: None (Room air)       Time: 1500 - 2300  Reason for admission:  5/22 presents as a transfer from OSH for fever, abdominal pain, and gram negative bacteremia with diagnosed ileus  Vitals:  Stable on RA  Diet:  Low Fiber  Activity/Mobility:  Independent  Neuro:  A&Ox4  Pain/Nausea:  Pain controlled with PRN Dilaudid & Oxy. Nausea managed with PRN Zofran.   Respiratory: WDL on RA  Cardiac: WDL  GI:  Adequate output via ileostomy  :  Voiding spontaneously  Lines:  L PIV SL  Labs:  Monitored.  Incisions/Drains/Skin:  midline incision MARIANELA CDI    NEW CHANGES:  No acute changes this shift.    Continue to implement Care Plan.    Fortunato Simpson RN

## 2024-05-29 NOTE — PROVIDER NOTIFICATION
7B ; Mimi W.    ~0300: BP low; SBP 87.    ~0308: BP recheck still low; 83/46.    Florentino, RN  [phone]

## 2024-05-29 NOTE — PROGRESS NOTES
Calorie Count  Intake recorded for: 05/28  Total Kcals: 927 Total Protein: 43g  Kcals from Hospital Food: 927   Protein: 43g  Kcals from Outside Food (average): 0  Protein: 0g  # Meals Ordered from Kitchen: 3 meals  # Meals Recorded: 3 meals (First- 50% omelet with ham and cheddar)      (Second- 100% grilled cheese on white bread with cheddar cheese, 8 oz apple juice)      (Third- 100% taco on flour tortilla with beef, cheddar, diced tomato, white rice, sour cream)  # Supplements Recorded: No intake recorded

## 2024-05-29 NOTE — PLAN OF CARE
"/73 (BP Location: Right arm)   Pulse 69   Temp 98.5  F (36.9  C) (Oral)   Resp 16   Ht 1.702 m (5' 7\")   Wt 72.7 kg (160 lb 4.8 oz)   LMP  (LMP Unknown)   SpO2 97%   BMI 25.11 kg/m      Goal Outcome Evaluation:      Plan of Care Reviewed With: patient    Overall Patient Progress: no changeOverall Patient Progress: no change    Outcome Evaluation: A/O x4 able to make needs known. Low SBP of 87 and 83 upon recheck; Paged Dr. Krause; new order for 1x bolus of 500 ml LR. BP recheck post-bolus was 94. MD aware. Not cooperative with assessments; pt stated \"can you just let me sleep? You disturb me too much.\" Pain mgd with sched tylenol x1, 1x oxy and 1x dilaudid. Pt possible dc today. Continue to follow POC.      "

## 2024-05-30 ENCOUNTER — PATIENT OUTREACH (OUTPATIENT)
Dept: CARE COORDINATION | Facility: CLINIC | Age: 39
End: 2024-05-30
Payer: COMMERCIAL

## 2024-05-30 NOTE — PROGRESS NOTES
[facility]: Transitions of Care Outreach  Chief Complaint   Patient presents with    Clinic Care Coordination - Post Hospital       Most Recent Admission Date: 5/22/2024   Most Recent Admission Diagnosis:      Most Recent Discharge Date: 5/29/2024   Most Recent Discharge Diagnosis: S/P ileostomy (H) - Z93.2  Acute post-operative pain - G89.18  Bacteremia - R78.81     Transitions of Care Assessment    Discharge Assessment  How are you doing now that you are home?: I am doing good.  How are your symptoms? (Red Flag symptoms escalate to triage hotline per guidelines): Improved  Do you know how to contact your clinic care team if you have future questions or changes to your health status? : Yes  Does the patient have their discharge instructions? : Yes  Does the patient have questions regarding their discharge instructions? : No  Were you started on any new medications or were there changes to any of your previous medications? : Yes  Does the patient have all of their medications?: Yes  Do you have questions regarding any of your medications? : No  Do you have all of your needed medical supplies or equipment (DME)?  (i.e. oxygen tank, CPAP, cane, etc.): Yes                  Follow up Plan     Discharge Follow-Up  Discharge follow up appointment scheduled in alignment with recommended follow up timeframe or Transitions of Risk Category? (Low = within 30 days; Moderate= within 14 days; High= within 7 days): Yes  Discharge Follow Up Appointment Date: 06/13/24  Discharge Follow Up Appointment Scheduled with?: Specialty Care Provider    Future Appointments   Date Time Provider Department Center   6/13/2024  1:00 PM Mali Ayala MD [facility]   12/3/2024 12:00 PM Herson Kamara MD [facility]       Outpatient Plan as outlined on AVS reviewed with patient.    For any urgent concerns, please contact our 24 hour nurse triage line: [phone] ([phone])       [place]  [facility]  [phone]  [facility]

## 2024-05-31 ENCOUNTER — PATIENT OUTREACH (OUTPATIENT)
Dept: SURGERY | Facility: CLINIC | Age: 39
End: 2024-05-31
Payer: COMMERCIAL

## 2024-05-31 NOTE — PROGRESS NOTES
Post Op Note     Called to check on patient postoperatively after hospital discharge.       Patient is s/p completion colectomy with conversion of colostomy to end ileostomy, readmitted for ileus and bacteremia.     Pain is well controlled with tylenol, ibuprofen, lidocaine patches, robaxin, and oxycodone   She reports that she has been having internal rectal pain from hemorrhoids.    Patient is not eating and drinking normally. Patient is on a low fiber diet.  Encouraged patient to drink 8-10 glasses of water a day.   Patient's colostomy has had stool output.    Denies taking medication for bowel function.  Patient denies pouching difficulties  If any pouching difficulties reported, please contact [facility] nurse  to schedule appointment.   Patient is voiding normally and urine is light in color.  Patient is not set up with home care.   Patient Reports nausea that is resolved with zofran and denies vomiting.  Patient denies any fevers or chills.  Patient's incision is C/D/I. Patient reminded NOT to remove any dressings over their incisions.   Patient is on a activity restriction. Lifting 10 pounds for 6 weeks.   Patient Denies needing any forms completed.   Follow up is set up with Dr. Mali Ayala on 6/13   Encouraged the patient to contact the clinic in the meantime with any fevers, chills, nausea, vomiting, increased colostomy output, no colostomy output, dizziness, lightheadedness, uncontrolled pain, changes to the incisions, or with any questions or concerns.    Patient's questions were answered to their stated satisfaction and they are in agreement with this plan.    JAMES Diaz [phone]  Colon & Rectal Surgery Clinic  [facility]

## 2024-06-05 DIAGNOSIS — G89.18 ACUTE POST-OPERATIVE PAIN: ICD-10-CM

## 2024-06-05 NOTE — TELEPHONE ENCOUNTER
[facility] Call Center    Phone Message    May a detailed message be left on voicemail: yes     Reason for Call: Medication Refill Request    Has the patient contacted the pharmacy for the refill? Yes   Name of medication being requested: methocarbamol (ROBAXIN) 750 MG tablet    Provider who prescribed the medication: Tammy Farias    Pharmacy: [facility] #[id number] [place] [address] [address]       Date medication is needed: ASAP - pt is out       Action Taken: Message routed to:  [facility] ([facility]): [facility] MED REFILLS TEAM    Travel Screening: Not Applicable     Date of Service:

## 2024-06-05 NOTE — TELEPHONE ENCOUNTER
methocarbamol (ROBAXIN) 750 MG tablet 25 tablet 0 5/12/2024 -- No   Sig - Route: Take 1 tablet (750 mg) by mouth 3 times daily - Oral     ----------------------  Last Office Visit : 3/14/24 - Jamie/Sara  Future Office visit:  6/13/24  ----------------------      Routing refill request to provider for review/approval because:  Refill request via Patient Calls. Not on protocol, Routing to care team for review.

## 2024-06-06 ENCOUNTER — TELEPHONE (OUTPATIENT)
Dept: WOUND CARE | Facility: CLINIC | Age: 39
End: 2024-06-06
Payer: COMMERCIAL

## 2024-06-06 RX ORDER — METHOCARBAMOL 750 MG/1
750 TABLET, FILM COATED ORAL 3 TIMES DAILY
Qty: 25 TABLET | Refills: 0 | Status: SHIPPED | OUTPATIENT
Start: 2024-06-06 | End: 2024-07-23

## 2024-06-06 NOTE — PROGRESS NOTES
Colon and Rectal Surgery Clinic Note    RE: Mimi Su.  : 1985.  GENEVA: 2024.    Reason for visit: Postop. S/p completion colectomy with conversion of colostomy to end ileostomy , readmitted for ileus and bacteremia    HPI: Mimi Su is a 38 year old female with PMH of chronic pain syndrome on chronic opiates, borderline personality disorder, multiple prior GI surgeries, and  h/o recurrent bowel obstructions who has chronic colonic dysmotility with severe constipation. S/p elective Open total abdominal colectomy with end ileostomy, remaining rectal stump, and BSO on  and discharged on 24. Readmitted to OSH on  for nausea, vomiting, acute on chronic abdominal pain with ileostomy output, found to have an ileus vs SBO complicated by Klebsiella bacteremia. Pt was transferred back to [facility] on .     Patient was started on IV ceftriaxone. CT overread felt that the fluid collection was likely a seroma or liquified hematoma. Repeat blood cultures on admission at [facility] were negative. ID was consulted who recommended ceftriaxone for 1 week followed by cefadroxil PO for two weeks. Patient passed a gastrografin challenge on  and had adequate ileostomy output. Patient was discharged 24.    Today, Mimi is feeling better.  She has improvement in her pain control, and her provider for her chronic pain is back to help manage this.  Her stoma can have high to no output.  She is working with wound ostomy to get the site to completely heal and to get an appropriate seal on her ileostomy.  Mimi is interested in advancing her diet, exercising, and riding her horse.    Medical history:  Past Medical History:   Diagnosis Date    Bipolar disorder (H)     Colonic dysmotility     Colostomy in place (H)        Surgical history:  Past Surgical History:   Procedure Laterality Date    COLECTOMY WITHOUT COLOSTOMY N/A 2024    Procedure: OPEN total abdominal colectomy, end ileostomy placement,  lysis of adhesions;  Surgeon: Mali Ayala MD;  Location: [facility] OR    HYSTERECTOMY      2013 after chidlbirth    IR GASTRO JEJUNOSTOMY TUBE PLACEMENT  05/18/2022    IR NG TUBE PLACEMENT REQ RAD & FLUORO  05/18/2022    IR PICC PLACEMENT > 5 YRS OF AGE  5/15/2024    LAPAROTOMY EXPLORATORY      multiple    SALPINGO-OOPHORECTOMY BILATERAL N/A 5/6/2024    Procedure: Open bilateral Salpingo-oophorectomy;  Surgeon: Josias Pan MD;  Location: [facility] OR       Family history:  Family History   Problem Relation Age of Onset    Lymphoma Maternal Grandmother         Burkitt's lymphoma    Lung Cancer Paternal Uncle         Multiple uncles, all heavy smokers       Medications:  Current Outpatient Medications   Medication Sig Dispense Refill    acetaminophen (TYLENOL) 325 MG tablet Take 3 tablets (975 mg) by mouth every 6 hours 75 tablet 0    calcium carbonate (TUMS) 500 MG chewable tablet Take 1-2 tablets (500-1,000 mg) by mouth 3 times daily as needed for heartburn 30 tablet 0    Cefadroxil (DURICEF) 1 g tablet Take 1 tablet (1 g) by mouth 2 times daily 28 tablet 0    clonazePAM (KLONOPIN) 0.5 MG tablet Take 0.25-0.5 mg by mouth 3 times daily as needed for anxiety      diphenhydrAMINE (BENADRYL) 25 MG capsule Take 1 capsule (25 mg) by mouth every 6 hours as needed for itching 20 capsule 0    famotidine (PEPCID) 10 MG tablet Take 1 tablet (10 mg) by mouth 2 times daily 30 tablet 0    FLUoxetine (PROZAC) 10 MG capsule Take 10 mg by mouth daily Take with 20 mg capsule for a total daily dose of 30 mg.      FLUoxetine (PROZAC) 20 MG capsule Take 20 mg by mouth every morning Take with 10 mg capsule for a total daily dose of 30 mg.      hydrOXYzine HCl (ATARAX) 25 MG tablet Take 1 tablet (25 mg) by mouth every 8 hours as needed for other or anxiety (adjuvant pain) 25 tablet 0    ibuprofen (ADVIL/MOTRIN) 600 MG tablet Take 1 tablet (600 mg) by mouth every 8 hours 32 tablet 0    Lidocaine (LIDOCARE) 4 % Patch Place 2 patches onto the  skin every 24 hours To prevent lidocaine toxicity, patient should be patch free for 12 hrs daily. 10 patch 0    loperamide (IMODIUM) 2 MG capsule Take 1 capsule (2 mg) by mouth 3 times daily as needed for other (high output ileostomy)      melatonin 3 MG CAPS Take 1 capsule by mouth nightly as needed      methocarbamol (ROBAXIN) 750 MG tablet Take 1 tablet (750 mg) by mouth 3 times daily 25 tablet 0    miconazole (MICATIN) 2 % external powder Apply topically 2 times daily Apply to skin around ileostomy site 10 g 0    multivitamin w/minerals (THERA-VIT-M) tablet Take 1 tablet by mouth every morning      oxyCODONE IR (ROXICODONE) 10 MG tablet Take 0.5-1 tablets (5-10 mg) by mouth every 4 hours as needed for severe pain 30 tablet 0    oxyCODONE-acetaminophen (PERCOCET) 5-325 MG tablet Take 1 tablet by mouth every 6 hours as needed for severe pain 20 tablet 0    pantoprazole (PROTONIX) 40 MG EC tablet Take 2 tablets by mouth in the morning and 1 tablet by mouth in the evening.      pregabalin (LYRICA) 100 MG capsule Take 150 mg by mouth 2 times daily      simethicone (MYLICON) 80 MG chewable tablet Take 1-2 tablets ( mg) by mouth every 6 hours as needed for cramping 40 tablet 0    sucralfate (CARAFATE) 1 GM tablet Take 1 g by mouth 4 times daily as needed      tiZANidine (ZANAFLEX) 2 MG tablet Take 1 tablet by mouth at bedtime         Allergies:  Allergies   Allergen Reactions    Metoclopramide Anaphylaxis and Nausea and Vomiting     start of anaphylaxis, was treated quickl      Mirtazapine Hives    Morphine Hives, Itching and Rash    Penicillins Hives    Prochlorperazine Anaphylaxis     Other reaction(s): Other (see comments)  sweating      Promethazine Anaphylaxis     Other reaction(s): Angioedema  angioedema      Sumatriptan Anaphylaxis     Tongue and throat sensation of swelling and chest tightness.       Desvenlafaxine      Other reaction(s): Intolerance    Lisinopril      Other reaction(s): Unknown Reaction     Milnacipran      Other reaction(s): Intolerance    Labetalol Rash     Other reaction(s): Tachycardia    Levofloxacin      Other reaction(s): Mental Status Change, Other (see comments)  Anxiety (severe), euphoria, facial numbness  Anxiety (severe), euphoria, facial numbness      Metronidazole Nausea and Vomiting    Quetiapine Rash    Sulfa Antibiotics Rash    Sulfamethoxazole-Trimethoprim      Other reaction(s): Unknown Reaction    Trazodone Hives and Rash       Social history:   Social History     Tobacco Use    Smoking status: Former     Current packs/day: 0.00     Types: Cigarettes     Quit date: 2020     Years since quittin.4     Passive exposure: Past    Smokeless tobacco: Never   Substance Use Topics    Alcohol use: Yes     Comment: 5 drinks once a week     Marital status: single.    Physical Examination:  LMP  (LMP Unknown)   General: Well hydrated. No acute distress.  Abdomen: Soft, NT, Incision healed.  Appliance on    ASSESSMENT  Improving.  We reviewed how to slowly advance diet given her history of obstructions and ileus.  We reviewed the stoma should improve with time, and if there are any concerns to have WOC forward pictures.  We will refill robaxin.  Avoid horseback riding and exercise until 24, and start slowly at that time.    15 minutes spent on the date of encounter performing chart review, history and exam, documentation and further activities as noted above.     Mali Ayala MD     Division of Colon & Rectal Surgery  [facility]         Referring Provider:  No referring provider defined for this encounter.     Primary Care Provider:  Mat Fernandez

## 2024-06-06 NOTE — TELEPHONE ENCOUNTER
Pt left message requesting convex barriers for her stoma.   LM with pt to order the supplies from the supplies list that was given to her at discharge or send me My chart message.

## 2024-06-10 ENCOUNTER — TELEPHONE (OUTPATIENT)
Dept: SURGERY | Facility: CLINIC | Age: 39
End: 2024-06-10
Payer: COMMERCIAL

## 2024-06-10 NOTE — TELEPHONE ENCOUNTER
[facility] Call Center    Phone Message    May a detailed message be left on voicemail: yes     Reason for Call: Medication Refill Request    Has the patient contacted the pharmacy for the refill? Yes   Name of medication being requested: oxyCODONE-acetaminophen (PERCOCET) 5-325 MG tablet   Provider who prescribed the medication: Tammy Farias  Pharmacy:    [facility] #[id number] [facility] [address]   Date medication is needed: ASAP       Action Taken: Message routed to:  [facility] ([facility]): Colon Rectal    Travel Screening: Not Applicable

## 2024-06-11 RX ORDER — OXYCODONE AND ACETAMINOPHEN 5; 325 MG/1; MG/1
1 TABLET ORAL EVERY 6 HOURS PRN
Qty: 16 TABLET | Refills: 0 | OUTPATIENT
Start: 2024-06-11 | End: 2024-06-14

## 2024-06-13 ENCOUNTER — OFFICE VISIT (OUTPATIENT)
Dept: SURGERY | Facility: CLINIC | Age: 39
End: 2024-06-13
Payer: COMMERCIAL

## 2024-06-13 VITALS
OXYGEN SATURATION: 100 % | TEMPERATURE: 97.8 F | BODY MASS INDEX: 23.9 KG/M2 | DIASTOLIC BLOOD PRESSURE: 73 MMHG | HEART RATE: 57 BPM | SYSTOLIC BLOOD PRESSURE: 113 MMHG | WEIGHT: 152.6 LBS

## 2024-06-13 DIAGNOSIS — G89.18 ACUTE POST-OPERATIVE PAIN: Primary | ICD-10-CM

## 2024-06-13 PROCEDURE — 99024 POSTOP FOLLOW-UP VISIT: CPT | Performed by: COLON & RECTAL SURGERY

## 2024-06-13 RX ORDER — METHOCARBAMOL 750 MG/1
750 TABLET, FILM COATED ORAL 3 TIMES DAILY PRN
Qty: 25 TABLET | Refills: 0 | Status: SHIPPED | OUTPATIENT
Start: 2024-06-13

## 2024-06-13 ASSESSMENT — PAIN SCALES - GENERAL: PAINLEVEL: MODERATE PAIN (5)

## 2024-06-13 NOTE — NURSING NOTE
Chief Complaint   Patient presents with    Post-op Visit       Vitals:    06/13/24 1258   BP: 113/73   BP Location: Left arm   Patient Position: Sitting   Cuff Size: Adult Regular   Pulse: 57   Temp: 97.8  F (36.6  C)   TempSrc: Oral   SpO2: 100%   Weight: 69.2 kg (152 lb 9.6 oz)       Body mass index is 23.9 kg/m .      Anthony Forrest MA

## 2024-06-13 NOTE — LETTER
2024      Mimi Su  [address]  [facility] [address]      Dear Colleague,    Thank you for referring your patient, Mimi Su, to the [facility] [place]. Please see a copy of my visit note below.    Colon and Rectal Surgery Clinic Note    RE: Mimi Su.  : 1985.  GENEVA: 2024.    Reason for visit: Postop. S/p completion colectomy with conversion of colostomy to end ileostomy , readmitted for ileus and bacteremia    HPI: Mimi Su is a 38 year old female with PMH of chronic pain syndrome on chronic opiates, borderline personality disorder, multiple prior GI surgeries, and  h/o recurrent bowel obstructions who has chronic colonic dysmotility with severe constipation. S/p elective Open total abdominal colectomy with end ileostomy, remaining rectal stump, and BSO on  and discharged on 24. Readmitted to OSH on  for nausea, vomiting, acute on chronic abdominal pain with ileostomy output, found to have an ileus vs SBO complicated by Klebsiella bacteremia. Pt was transferred back to [facility] on .     Patient was started on IV ceftriaxone. CT overread felt that the fluid collection was likely a seroma or liquified hematoma. Repeat blood cultures on admission at [facility] were negative. ID was consulted who recommended ceftriaxone for 1 week followed by cefadroxil PO for two weeks. Patient passed a gastrografin challenge on  and had adequate ileostomy output. Patient was discharged 24.    Today, Mimi is feeling better.  She has improvement in her pain control, and her provider for her chronic pain is back to help manage this.  Her stoma can have high to no output.  She is working with wound ostomy to get the site to completely heal and to get an appropriate seal on her ileostomy.  Mimi is interested in advancing her diet, exercising, and riding her horse.    Medical history:  Past Medical History:   Diagnosis Date     Bipolar disorder (H)       Colonic dysmotility      Colostomy in place (H)        Surgical history:  Past Surgical History:   Procedure Laterality Date     COLECTOMY WITHOUT COLOSTOMY N/A 5/6/2024    Procedure: OPEN total abdominal colectomy, end ileostomy placement, lysis of adhesions;  Surgeon: Mali Ayala MD;  Location: [facility] OR     HYSTERECTOMY      2013 after chidlbirth     IR GASTRO JEJUNOSTOMY TUBE PLACEMENT  05/18/2022     IR NG TUBE PLACEMENT REQ RAD & FLUORO  05/18/2022     IR PICC PLACEMENT > 5 YRS OF AGE  5/15/2024     LAPAROTOMY EXPLORATORY      multiple     SALPINGO-OOPHORECTOMY BILATERAL N/A 5/6/2024    Procedure: Open bilateral Salpingo-oophorectomy;  Surgeon: Josias Pan MD;  Location: [facility] OR       Family history:  Family History   Problem Relation Age of Onset     Lymphoma Maternal Grandmother         Burkitt's lymphoma     Lung Cancer Paternal Uncle         Multiple uncles, all heavy smokers       Medications:  Current Outpatient Medications   Medication Sig Dispense Refill     acetaminophen (TYLENOL) 325 MG tablet Take 3 tablets (975 mg) by mouth every 6 hours 75 tablet 0     calcium carbonate (TUMS) 500 MG chewable tablet Take 1-2 tablets (500-1,000 mg) by mouth 3 times daily as needed for heartburn 30 tablet 0     Cefadroxil (DURICEF) 1 g tablet Take 1 tablet (1 g) by mouth 2 times daily 28 tablet 0     clonazePAM (KLONOPIN) 0.5 MG tablet Take 0.25-0.5 mg by mouth 3 times daily as needed for anxiety       diphenhydrAMINE (BENADRYL) 25 MG capsule Take 1 capsule (25 mg) by mouth every 6 hours as needed for itching 20 capsule 0     famotidine (PEPCID) 10 MG tablet Take 1 tablet (10 mg) by mouth 2 times daily 30 tablet 0     FLUoxetine (PROZAC) 10 MG capsule Take 10 mg by mouth daily Take with 20 mg capsule for a total daily dose of 30 mg.       FLUoxetine (PROZAC) 20 MG capsule Take 20 mg by mouth every morning Take with 10 mg capsule for a total daily dose of 30 mg.       hydrOXYzine HCl (ATARAX) 25 MG tablet  Take 1 tablet (25 mg) by mouth every 8 hours as needed for other or anxiety (adjuvant pain) 25 tablet 0     ibuprofen (ADVIL/MOTRIN) 600 MG tablet Take 1 tablet (600 mg) by mouth every 8 hours 32 tablet 0     Lidocaine (LIDOCARE) 4 % Patch Place 2 patches onto the skin every 24 hours To prevent lidocaine toxicity, patient should be patch free for 12 hrs daily. 10 patch 0     loperamide (IMODIUM) 2 MG capsule Take 1 capsule (2 mg) by mouth 3 times daily as needed for other (high output ileostomy)       melatonin 3 MG CAPS Take 1 capsule by mouth nightly as needed       methocarbamol (ROBAXIN) 750 MG tablet Take 1 tablet (750 mg) by mouth 3 times daily 25 tablet 0     miconazole (MICATIN) 2 % external powder Apply topically 2 times daily Apply to skin around ileostomy site 10 g 0     multivitamin w/minerals (THERA-VIT-M) tablet Take 1 tablet by mouth every morning       oxyCODONE IR (ROXICODONE) 10 MG tablet Take 0.5-1 tablets (5-10 mg) by mouth every 4 hours as needed for severe pain 30 tablet 0     oxyCODONE-acetaminophen (PERCOCET) 5-325 MG tablet Take 1 tablet by mouth every 6 hours as needed for severe pain 20 tablet 0     pantoprazole (PROTONIX) 40 MG EC tablet Take 2 tablets by mouth in the morning and 1 tablet by mouth in the evening.       pregabalin (LYRICA) 100 MG capsule Take 150 mg by mouth 2 times daily       simethicone (MYLICON) 80 MG chewable tablet Take 1-2 tablets ( mg) by mouth every 6 hours as needed for cramping 40 tablet 0     sucralfate (CARAFATE) 1 GM tablet Take 1 g by mouth 4 times daily as needed       tiZANidine (ZANAFLEX) 2 MG tablet Take 1 tablet by mouth at bedtime         Allergies:  Allergies   Allergen Reactions     Metoclopramide Anaphylaxis and Nausea and Vomiting     start of anaphylaxis, was treated quickl       Mirtazapine Hives     Morphine Hives, Itching and Rash     Penicillins Hives     Prochlorperazine Anaphylaxis     Other reaction(s): Other (see  comments)  sweating       Promethazine Anaphylaxis     Other reaction(s): Angioedema  angioedema       Sumatriptan Anaphylaxis     Tongue and throat sensation of swelling and chest tightness.        Desvenlafaxine      Other reaction(s): Intolerance     Lisinopril      Other reaction(s): Unknown Reaction     Milnacipran      Other reaction(s): Intolerance     Labetalol Rash     Other reaction(s): Tachycardia     Levofloxacin      Other reaction(s): Mental Status Change, Other (see comments)  Anxiety (severe), euphoria, facial numbness  Anxiety (severe), euphoria, facial numbness       Metronidazole Nausea and Vomiting     Quetiapine Rash     Sulfa Antibiotics Rash     Sulfamethoxazole-Trimethoprim      Other reaction(s): Unknown Reaction     Trazodone Hives and Rash       Social history:   Social History     Tobacco Use     Smoking status: Former     Current packs/day: 0.00     Types: Cigarettes     Quit date: 2020     Years since quittin.4     Passive exposure: Past     Smokeless tobacco: Never   Substance Use Topics     Alcohol use: Yes     Comment: 5 drinks once a week     Marital status: single.    Physical Examination:  LMP  (LMP Unknown)   General: Well hydrated. No acute distress.  Abdomen: Soft, NT, Incision healed.  Appliance on    ASSESSMENT  Improving.  We reviewed how to slowly advance diet given her history of obstructions and ileus.  We reviewed the stoma should improve with time, and if there are any concerns to have WOC forward pictures.  We will refill robaxin.  Avoid horseback riding and exercise until 24, and start slowly at that time.    15 minutes spent on the date of encounter performing chart review, history and exam, documentation and further activities as noted above.     Mali Ayala MD     Division of Colon & Rectal Surgery  [facility]         Referring Provider:  No referring provider defined for this encounter.     Primary Care  Provider:  Mat Fernandez      Again, thank you for allowing me to participate in the care of your patient.        Sincerely,        Mali Ayala MD

## 2024-06-19 ENCOUNTER — TELEPHONE (OUTPATIENT)
Dept: SURGERY | Facility: CLINIC | Age: 39
End: 2024-06-19
Payer: COMMERCIAL

## 2024-06-19 NOTE — TELEPHONE ENCOUNTER
Patient called regarding dark ileostomy output and severe abdominal pain while urinating. She calls for these two seemingly separate issues. She has been having intermittent, very dark/black output from her ileostomy. When she has the dark output, the volume is larger. She has discussed with Dr. Ayala in her recent visit. We discussed that this may represent an occult GI bleed. She is not on NSAIDs and denies dizziness with standing. Her primary concern is the feeling of lots of pressure on her bladder when she voids. She is able to start a stream, but does not feel like she empties fully. Urinating 3-4 times a day, color is slightly darker. Reports drinking lots of water. We discussed that she may be having some urinary retention issues, although I do not know why that would be the case. If she is having urinary retention, she would need to go to the ER to get it checked out. Mimi will think on it and probably go to her local ER in the morning. Since she is still passing some urine, I think this will be okay. If she goes to ER, she can also get CBC checked at the same time for any worsening anemia.      Micki Lorenzo MD Fellow  Colon and Rectal Surgery

## 2024-07-23 DIAGNOSIS — G89.18 ACUTE POST-OPERATIVE PAIN: ICD-10-CM

## 2024-07-23 RX ORDER — METHOCARBAMOL 750 MG/1
750 TABLET, FILM COATED ORAL 3 TIMES DAILY
Qty: 25 TABLET | Refills: 0 | Status: SHIPPED | OUTPATIENT
Start: 2024-07-23 | End: 2024-08-08

## 2024-07-23 NOTE — TELEPHONE ENCOUNTER
Requested Prescriptions   Pending Prescriptions Disp Refills    methocarbamol (ROBAXIN) 750 MG tablet 25 tablet 0     Sig: Take 1 tablet (750 mg) by mouth 3 times daily       There is no refill protocol information for this order            Last Written Prescription Date:  6/13/2024  Last Fill Quantity: 25 tablet,  # refills: 0   Last office visit: 6/13/2024 ; last virtual visit: Visit date not found with prescribing provider:  Mali Ayala MD    Future Office Visit:  None

## 2024-08-03 ENCOUNTER — APPOINTMENT (OUTPATIENT)
Dept: MRI IMAGING | Facility: CLINIC | Age: 39
End: 2024-08-03
Attending: EMERGENCY MEDICINE
Payer: COMMERCIAL

## 2024-08-03 ENCOUNTER — HOSPITAL ENCOUNTER (EMERGENCY)
Facility: CLINIC | Age: 39
Discharge: HOME | End: 2024-08-04
Attending: EMERGENCY MEDICINE | Admitting: EMERGENCY MEDICINE
Payer: COMMERCIAL

## 2024-08-03 DIAGNOSIS — R10.2 PELVIC PAIN: ICD-10-CM

## 2024-08-03 LAB
ALBUMIN SERPL BCG-MCNC: 4.1 G/DL (ref 3.5–5.2)
ALP SERPL-CCNC: 57 U/L (ref 40–150)
ALT SERPL W P-5'-P-CCNC: 18 U/L (ref 0–50)
ANION GAP SERPL CALCULATED.3IONS-SCNC: 9 MMOL/L (ref 7–15)
AST SERPL W P-5'-P-CCNC: 24 U/L (ref 0–45)
BASOPHILS # BLD AUTO: 0 10E3/UL (ref 0–0.2)
BASOPHILS NFR BLD AUTO: 0 %
BILIRUB SERPL-MCNC: <0.2 MG/DL
BUN SERPL-MCNC: 10 MG/DL (ref 6–20)
CALCIUM SERPL-MCNC: 8.9 MG/DL (ref 8.8–10.4)
CHLORIDE SERPL-SCNC: 107 MMOL/L (ref 98–107)
CLUE CELLS: ABNORMAL
CREAT SERPL-MCNC: 0.67 MG/DL (ref 0.51–0.95)
CRP SERPL-MCNC: <3 MG/L
EGFRCR SERPLBLD CKD-EPI 2021: >90 ML/MIN/1.73M2
EOSINOPHIL # BLD AUTO: 0 10E3/UL (ref 0–0.7)
EOSINOPHIL NFR BLD AUTO: 1 %
ERYTHROCYTE [DISTWIDTH] IN BLOOD BY AUTOMATED COUNT: 13.7 % (ref 10–15)
GLUCOSE SERPL-MCNC: 84 MG/DL (ref 70–99)
HCO3 SERPL-SCNC: 23 MMOL/L (ref 22–29)
HCT VFR BLD AUTO: 31 % (ref 35–47)
HGB BLD-MCNC: 10.3 G/DL (ref 11.7–15.7)
IMM GRANULOCYTES # BLD: 0 10E3/UL
IMM GRANULOCYTES NFR BLD: 0 %
LYMPHOCYTES # BLD AUTO: 1.5 10E3/UL (ref 0.8–5.3)
LYMPHOCYTES NFR BLD AUTO: 28 %
MCH RBC QN AUTO: 33.4 PG (ref 26.5–33)
MCHC RBC AUTO-ENTMCNC: 33.2 G/DL (ref 31.5–36.5)
MCV RBC AUTO: 101 FL (ref 78–100)
MONOCYTES # BLD AUTO: 0.4 10E3/UL (ref 0–1.3)
MONOCYTES NFR BLD AUTO: 8 %
NEUTROPHILS # BLD AUTO: 3.3 10E3/UL (ref 1.6–8.3)
NEUTROPHILS NFR BLD AUTO: 63 %
NRBC # BLD AUTO: 0 10E3/UL
NRBC BLD AUTO-RTO: 0 /100
PLATELET # BLD AUTO: 112 10E3/UL (ref 150–450)
POTASSIUM SERPL-SCNC: 4.1 MMOL/L (ref 3.4–5.3)
PROT SERPL-MCNC: 6.7 G/DL (ref 6.4–8.3)
RBC # BLD AUTO: 3.08 10E6/UL (ref 3.8–5.2)
SODIUM SERPL-SCNC: 139 MMOL/L (ref 135–145)
TRICHOMONAS, WET PREP: ABNORMAL
WBC # BLD AUTO: 5.3 10E3/UL (ref 4–11)
WBC'S/HIGH POWER FIELD, WET PREP: ABNORMAL
YEAST, WET PREP: ABNORMAL

## 2024-08-03 PROCEDURE — 85025 COMPLETE CBC W/AUTO DIFF WBC: CPT | Performed by: EMERGENCY MEDICINE

## 2024-08-03 PROCEDURE — 80053 COMPREHEN METABOLIC PANEL: CPT | Performed by: EMERGENCY MEDICINE

## 2024-08-03 PROCEDURE — 87491 CHLMYD TRACH DNA AMP PROBE: CPT | Performed by: EMERGENCY MEDICINE

## 2024-08-03 PROCEDURE — 99285 EMERGENCY DEPT VISIT HI MDM: CPT | Mod: 25 | Performed by: EMERGENCY MEDICINE

## 2024-08-03 PROCEDURE — 36415 COLL VENOUS BLD VENIPUNCTURE: CPT | Performed by: EMERGENCY MEDICINE

## 2024-08-03 PROCEDURE — 99285 EMERGENCY DEPT VISIT HI MDM: CPT | Performed by: EMERGENCY MEDICINE

## 2024-08-03 PROCEDURE — 87210 SMEAR WET MOUNT SALINE/INK: CPT | Performed by: EMERGENCY MEDICINE

## 2024-08-03 PROCEDURE — 87591 N.GONORRHOEAE DNA AMP PROB: CPT | Performed by: EMERGENCY MEDICINE

## 2024-08-03 PROCEDURE — 96376 TX/PRO/DX INJ SAME DRUG ADON: CPT | Performed by: EMERGENCY MEDICINE

## 2024-08-03 PROCEDURE — 96374 THER/PROPH/DIAG INJ IV PUSH: CPT | Mod: 59 | Performed by: EMERGENCY MEDICINE

## 2024-08-03 PROCEDURE — 86140 C-REACTIVE PROTEIN: CPT | Performed by: EMERGENCY MEDICINE

## 2024-08-03 PROCEDURE — 250N000013 HC RX MED GY IP 250 OP 250 PS 637: Performed by: EMERGENCY MEDICINE

## 2024-08-03 PROCEDURE — 96375 TX/PRO/DX INJ NEW DRUG ADDON: CPT | Performed by: EMERGENCY MEDICINE

## 2024-08-03 PROCEDURE — 72197 MRI PELVIS W/O & W/DYE: CPT | Mod: 26 | Performed by: RADIOLOGY

## 2024-08-03 PROCEDURE — 72197 MRI PELVIS W/O & W/DYE: CPT

## 2024-08-03 PROCEDURE — 250N000011 HC RX IP 250 OP 636: Performed by: EMERGENCY MEDICINE

## 2024-08-03 RX ORDER — HYDROMORPHONE HYDROCHLORIDE 1 MG/ML
0.5 INJECTION, SOLUTION INTRAMUSCULAR; INTRAVENOUS; SUBCUTANEOUS
Status: COMPLETED | OUTPATIENT
Start: 2024-08-03 | End: 2024-08-03

## 2024-08-03 RX ORDER — LORAZEPAM 0.5 MG/1
0.5 TABLET ORAL ONCE
Status: COMPLETED | OUTPATIENT
Start: 2024-08-03 | End: 2024-08-03

## 2024-08-03 RX ORDER — ONDANSETRON 2 MG/ML
4 INJECTION INTRAMUSCULAR; INTRAVENOUS EVERY 30 MIN PRN
Status: DISCONTINUED | OUTPATIENT
Start: 2024-08-03 | End: 2024-08-04 | Stop reason: HOSPADM

## 2024-08-03 RX ADMIN — ONDANSETRON 4 MG: 2 INJECTION INTRAMUSCULAR; INTRAVENOUS at 19:33

## 2024-08-03 RX ADMIN — LORAZEPAM 0.5 MG: 0.5 TABLET ORAL at 23:20

## 2024-08-03 RX ADMIN — ONDANSETRON 4 MG: 2 INJECTION INTRAMUSCULAR; INTRAVENOUS at 16:37

## 2024-08-03 RX ADMIN — HYDROMORPHONE HYDROCHLORIDE 0.5 MG: 1 INJECTION, SOLUTION INTRAMUSCULAR; INTRAVENOUS; SUBCUTANEOUS at 16:38

## 2024-08-03 RX ADMIN — HYDROMORPHONE HYDROCHLORIDE 0.5 MG: 1 INJECTION, SOLUTION INTRAMUSCULAR; INTRAVENOUS; SUBCUTANEOUS at 17:18

## 2024-08-03 RX ADMIN — HYDROMORPHONE HYDROCHLORIDE 0.5 MG: 1 INJECTION, SOLUTION INTRAMUSCULAR; INTRAVENOUS; SUBCUTANEOUS at 21:04

## 2024-08-03 ASSESSMENT — ACTIVITIES OF DAILY LIVING (ADL)
ADLS_ACUITY_SCORE: 37

## 2024-08-03 ASSESSMENT — COLUMBIA-SUICIDE SEVERITY RATING SCALE - C-SSRS
6. HAVE YOU EVER DONE ANYTHING, STARTED TO DO ANYTHING, OR PREPARED TO DO ANYTHING TO END YOUR LIFE?: NO
1. IN THE PAST MONTH, HAVE YOU WISHED YOU WERE DEAD OR WISHED YOU COULD GO TO SLEEP AND NOT WAKE UP?: NO
2. HAVE YOU ACTUALLY HAD ANY THOUGHTS OF KILLING YOURSELF IN THE PAST MONTH?: NO

## 2024-08-03 NOTE — CONSULTS
"Gynecology Oncology Consult Note    Name: Mimi Su    MRN: [id number]   YOB: 1985         We were asked to see Mimi Su at the request of Dr. Lizarraga for evaluation and treatment of pelvic pain.        Chief Complaint:   Pelvic pain, painful urination    History is obtained from the patient.           History of Present Illness:   Mimi Su is a 39 year old  female who is being seen for evaluation of acute on chronic pelvic pain for the last week.     She has a complex past medical and surgical history including distant history of hysterectomy in 2013 most recently notable for XL, BRYAN, total colectomy and end ileostomy in addition to a bilateral salpingo-oophorectomy in May 2024. Postoperative course has been complicated by multiple readmissions for SBO and ileus.     Mimi reports that she developed a lot of pelvic and bladder pain postoperatively which has not improved. She describes dysuria but denies burning pain, just \"internal pain\" when she voids since she had her surgery. Approximately a week ago, she developed worsening pain to the point where she felt limited in her ability to walk which was what prompted her to present for further evaluation. She was seen at an outside ED on  where she underwent CT imaging that revealed a 4.1 cm pelvic fluid collection which was adjacent to the vaginal cuff. Given her symptoms, she was discharged on clindamycin.     Mimi reports that since being seen in the ED her pain has progressively worsened and now she is very limited in her ability to walk. She denies overt fevers but reports hot flashes that started yesterday. States that her medications help with her pain in addition to rest, and that voiding and strenuous activity makes her pain worse. She is eating and drinking normally without issues. Denies any changes to her ileostomy output. Reports some \"spotting\" of blood on Thursday that required her to change her underwear " and has had spotting since then, although she is unsure where the bleeding is coming from. She reports no other concerns.          Past Medical History:     Past Medical History:   Diagnosis Date    Bipolar disorder (H)     Colonic dysmotility     Colostomy in place (H)             Past Surgical History:     Past Surgical History:   Procedure Laterality Date    COLECTOMY WITHOUT COLOSTOMY N/A 2024    Procedure: OPEN total abdominal colectomy, end ileostomy placement, lysis of adhesions;  Surgeon: Mali Ayala MD;  Location: [facility] OR    HYSTERECTOMY      2013 after chidlbirth    IR GASTRO JEJUNOSTOMY TUBE PLACEMENT  2022    IR NG TUBE PLACEMENT REQ RAD & FLUORO  2022    IR PICC PLACEMENT > 5 YRS OF AGE  5/15/2024    LAPAROTOMY EXPLORATORY      multiple    SALPINGO-OOPHORECTOMY BILATERAL N/A 2024    Procedure: Open bilateral Salpingo-oophorectomy;  Surgeon: Josias Pan MD;  Location: [facility] OR            Social History:     Social History     Tobacco Use    Smoking status: Former     Current packs/day: 0.00     Types: Cigarettes     Quit date: 2020     Years since quittin.5     Passive exposure: Past    Smokeless tobacco: Never   Substance Use Topics    Alcohol use: Yes     Comment: 5 drinks once a week            Family History:     Family History   Problem Relation Age of Onset    Lymphoma Maternal Grandmother         Burkitt's lymphoma    Lung Cancer Paternal Uncle         Multiple uncles, all heavy smokers          Allergies:     Allergies   Allergen Reactions    Metoclopramide Anaphylaxis and Nausea and Vomiting     start of anaphylaxis, was treated quickl      Mirtazapine Hives    Morphine Hives, Itching and Rash    Penicillins Hives    Prochlorperazine Anaphylaxis     Other reaction(s): Other (see comments)  sweating      Promethazine Anaphylaxis     Other reaction(s): Angioedema  angioedema      Sumatriptan Anaphylaxis     Tongue and throat sensation of swelling and chest  tightness.       Desvenlafaxine      Other reaction(s): Intolerance    Lisinopril      Other reaction(s): Unknown Reaction    Milnacipran      Other reaction(s): Intolerance    Labetalol Rash     Other reaction(s): Tachycardia    Levofloxacin      Other reaction(s): Mental Status Change, Other (see comments)  Anxiety (severe), euphoria, facial numbness  Anxiety (severe), euphoria, facial numbness      Metronidazole Nausea and Vomiting    Quetiapine Rash    Sulfa Antibiotics Rash    Sulfamethoxazole-Trimethoprim      Other reaction(s): Unknown Reaction    Trazodone Hives and Rash            Medications:     Current Facility-Administered Medications   Medication Dose Route Frequency Provider Last Rate Last Admin    ondansetron (ZOFRAN) injection 4 mg  4 mg Intravenous Q30 Min PRN Stella Lizarraga MD   4 mg at 08/03/24 1933     Current Outpatient Medications   Medication Sig Dispense Refill    acetaminophen (TYLENOL) 325 MG tablet Take 3 tablets (975 mg) by mouth every 6 hours 75 tablet 0    calcium carbonate (TUMS) 500 MG chewable tablet Take 1-2 tablets (500-1,000 mg) by mouth 3 times daily as needed for heartburn 30 tablet 0    Cefadroxil (DURICEF) 1 g tablet Take 1 tablet (1 g) by mouth 2 times daily 28 tablet 0    clonazePAM (KLONOPIN) 0.5 MG tablet Take 0.25-0.5 mg by mouth 3 times daily as needed for anxiety      diphenhydrAMINE (BENADRYL) 25 MG capsule Take 1 capsule (25 mg) by mouth every 6 hours as needed for itching 20 capsule 0    famotidine (PEPCID) 10 MG tablet Take 1 tablet (10 mg) by mouth 2 times daily 30 tablet 0    FLUoxetine (PROZAC) 10 MG capsule Take 10 mg by mouth daily Take with 20 mg capsule for a total daily dose of 30 mg.      FLUoxetine (PROZAC) 20 MG capsule Take 20 mg by mouth every morning Take with 10 mg capsule for a total daily dose of 30 mg.      hydrOXYzine HCl (ATARAX) 25 MG tablet Take 1 tablet (25 mg) by mouth every 8 hours as needed for other or anxiety (adjuvant pain) 25 tablet 0     ibuprofen (ADVIL/MOTRIN) 600 MG tablet Take 1 tablet (600 mg) by mouth every 8 hours 32 tablet 0    Lidocaine (LIDOCARE) 4 % Patch Place 2 patches onto the skin every 24 hours To prevent lidocaine toxicity, patient should be patch free for 12 hrs daily. 10 patch 0    loperamide (IMODIUM) 2 MG capsule Take 1 capsule (2 mg) by mouth 3 times daily as needed for other (high output ileostomy)      melatonin 3 MG CAPS Take 1 capsule by mouth nightly as needed      methocarbamol (ROBAXIN) 750 MG tablet Take 1 tablet (750 mg) by mouth 3 times daily 25 tablet 0    methocarbamol (ROBAXIN) 750 MG tablet Take 1 tablet (750 mg) by mouth 3 times daily as needed for muscle spasms 25 tablet 0    miconazole (MICATIN) 2 % external powder Apply topically 2 times daily Apply to skin around ileostomy site 10 g 0    multivitamin w/minerals (THERA-VIT-M) tablet Take 1 tablet by mouth every morning      oxyCODONE IR (ROXICODONE) 10 MG tablet Take 0.5-1 tablets (5-10 mg) by mouth every 4 hours as needed for severe pain 30 tablet 0    oxyCODONE-acetaminophen (PERCOCET) 5-325 MG tablet Take 1 tablet by mouth every 6 hours as needed for severe pain 10 tablet 0    pantoprazole (PROTONIX) 40 MG EC tablet Take 2 tablets by mouth in the morning and 1 tablet by mouth in the evening.      pregabalin (LYRICA) 100 MG capsule Take 150 mg by mouth 2 times daily      simethicone (MYLICON) 80 MG chewable tablet Take 1-2 tablets ( mg) by mouth every 6 hours as needed for cramping 40 tablet 0    sucralfate (CARAFATE) 1 GM tablet Take 1 g by mouth 4 times daily as needed      tiZANidine (ZANAFLEX) 2 MG tablet Take 1 tablet by mouth at bedtime               Review of Systems:      ROS: Negative except as stated above.          Physical Exam:     Vitals:    08/03/24 2130 08/03/24 2145 08/03/24 2230 08/04/24 0131   BP:   109/72 90/60   Pulse:       Resp:       Temp:    98.1  F (36.7  C)   TempSrc:    Oral   SpO2: 99% 97%       General: NAD, resting  comfortably in exam bed   Resp: no respiratory distress, CTAB with no wheezes, rubs or rhonchi  CV: heart rate regular, S1, S2. No murmurs noted.   Abdomen: soft, non distended, non tender. Ileostomy pink and patent with brown stool output.   : normal appearing external genitalia. Speculum exam with small amount of white discharge. No blood in the vaginal vault. Vaginal cuff appears visually intact. Bimanual exam reveals intact vaginal cuff, no defects and no adnexal masses. Generalized pelvic discomfort noted with bimanual exam with no localizing signs.    Ext: Warm, well perfused  Skin: No rashes or ecchymoses noted.      Labs/Imaging     Results for orders placed or performed during the hospital encounter of 08/03/24 (from the past 24 hour(s))   CBC with platelets differential    Narrative    The following orders were created for panel order CBC with platelets differential.  Procedure                               Abnormality         Status                     ---------                               -----------         ------                     CBC with platelets and d...[[id number]]  Abnormal            Final result                 Please view results for these tests on the individual orders.   Comprehensive metabolic panel   Result Value Ref Range    Sodium 139 135 - 145 mmol/L    Potassium 4.1 3.4 - 5.3 mmol/L    Carbon Dioxide (CO2) 23 22 - 29 mmol/L    Anion Gap 9 7 - 15 mmol/L    Urea Nitrogen 10.0 6.0 - 20.0 mg/dL    Creatinine 0.67 0.51 - 0.95 mg/dL    GFR Estimate >90 >60 mL/min/1.73m2    Calcium 8.9 8.8 - 10.4 mg/dL    Chloride 107 98 - 107 mmol/L    Glucose 84 70 - 99 mg/dL    Alkaline Phosphatase 57 40 - 150 U/L    AST 24 0 - 45 U/L    ALT 18 0 - 50 U/L    Protein Total 6.7 6.4 - 8.3 g/dL    Albumin 4.1 3.5 - 5.2 g/dL    Bilirubin Total <0.2 <=1.2 mg/dL   CRP inflammation   Result Value Ref Range    CRP Inflammation <3.00 <5.00 mg/L   CBC with platelets and differential   Result Value Ref Range     WBC Count 5.3 4.0 - 11.0 10e3/uL    RBC Count 3.08 (L) 3.80 - 5.20 10e6/uL    Hemoglobin 10.3 (L) 11.7 - 15.7 g/dL    Hematocrit 31.0 (L) 35.0 - 47.0 %     (H) 78 - 100 fL    MCH 33.4 (H) 26.5 - 33.0 pg    MCHC 33.2 31.5 - 36.5 g/dL    RDW 13.7 10.0 - 15.0 %    Platelet Count 112 (L) 150 - 450 10e3/uL    % Neutrophils 63 %    % Lymphocytes 28 %    % Monocytes 8 %    % Eosinophils 1 %    % Basophils 0 %    % Immature Granulocytes 0 %    NRBCs per 100 WBC 0 <1 /100    Absolute Neutrophils 3.3 1.6 - 8.3 10e3/uL    Absolute Lymphocytes 1.5 0.8 - 5.3 10e3/uL    Absolute Monocytes 0.4 0.0 - 1.3 10e3/uL    Absolute Eosinophils 0.0 0.0 - 0.7 10e3/uL    Absolute Basophils 0.0 0.0 - 0.2 10e3/uL    Absolute Immature Granulocytes 0.0 <=0.4 10e3/uL    Absolute NRBCs 0.0 10e3/uL   Wet prep    Specimen: Vagina; Swab   Result Value Ref Range    Trichomonas Absent Absent    Yeast Absent Absent    Clue Cells Absent Absent    WBCs/high power field 3+ (A) None   MR Pelvis (GYN) wo & w Contrast    Narrative    EXAM: MR PELVIS (GYN) W/O and W CONTRAST  LOCATION: [facility]  DATE: 8/4/2024    INDICATION: Pelvic fluid collection; concern for fistula, further evaluate finding that was discovered on CT abdomen pelvis  COMPARISON: CT of the abdomen and pelvis 8/1/2024 and 7/1/2024.  TECHNIQUE: Routine MRI female pelvis protocol including T1 in/out phase, diffusion, thin section high resolution multiplane T2, and post contrast T1.  CONTRAST: 7mL GADAVIST    FINDINGS:     Postoperative changes of hysterectomy, bilateral salpingo-oophorectomy, cholectomy, and left lower quadrant ileostomy formation.    In the central pelvis immediately superior to the vaginal cuff and posterior to the urinary bladder is a sharply circumscribed cystic structure or fluid collection which measures 5.2 x 4.6 x 4.1 cm. This structure has fluid signal intensity on T1   weighted and T2-weighted images. There is  enhancement of the peripheral rim and thin internal septations of this structure. No solid nodular components are detected. No inflammatory stranding or fluid in the pelvis near this structure. This structure   contacts the Rg's pouch and urinary bladder but no fistula to either is visualized. The vaginal cuff is otherwise unremarkable.      Impression    IMPRESSION:  5.2 cm sharply circumscribed cystic structure/fluid collection associated with the vaginal cuff is unchanged from CT 8/1/2024 and new from CT of 7/1/2024. Seroma is favored. This structure does not appear to be inflamed which is evidence against abscess.   No fistula to bowel or bladder visualized. Clinical correlation and follow-up suggested.     Narrative & Impression   EXAM: MR PELVIS (GYN) W/O and W CONTRAST  LOCATION: [facility]  DATE: 8/4/2024     INDICATION: Pelvic fluid collection; concern for fistula, further evaluate finding that was discovered on CT abdomen pelvis  COMPARISON: CT of the abdomen and pelvis 8/1/2024 and 7/1/2024.  TECHNIQUE: Routine MRI female pelvis protocol including T1 in/out phase, diffusion, thin section high resolution multiplane T2, and post contrast T1.  CONTRAST: 7mL GADAVIST     FINDINGS:      Postoperative changes of hysterectomy, bilateral salpingo-oophorectomy, cholectomy, and left lower quadrant ileostomy formation.     In the central pelvis immediately superior to the vaginal cuff and posterior to the urinary bladder is a sharply circumscribed cystic structure or fluid collection which measures 5.2 x 4.6 x 4.1 cm. This structure has fluid signal intensity on T1   weighted and T2-weighted images. There is enhancement of the peripheral rim and thin internal septations of this structure. No solid nodular components are detected. No inflammatory stranding or fluid in the pelvis near this structure. This structure   contacts the Rg's pouch and urinary bladder  but no fistula to either is visualized. The vaginal cuff is otherwise unremarkable.                                                                      IMPRESSION:  5.2 cm sharply circumscribed cystic structure/fluid collection associated with the vaginal cuff is unchanged from CT 2024 and new from CT of 2024. Seroma is favored. This structure does not appear to be inflamed which is evidence against abscess.   No fistula to bowel or bladder visualized. Clinical correlation and follow-up suggested.              Impression and Plan:   Impression:     Mimi Su is a 39 year old  female with a complex past medical and surgical history who is presenting to the ED with acute on chronic pelvic pain in the setting of a new pelvic fluid collection.       Plan:     # Acute on chronic pelvic pain  # Pelvic fluid collection   # s/p total colectomy, ileostomy formation, BSO  - Afebrile, VS within normal limits in the ED, exam as above without evidence of vaginal cuff dehiscence or localizing findings to explain pain  - Collected wet prep, GC/CT given vaginal discharge but suspect physiologic in nature. Wet prep is negative for trich, yeast, clue-cells. GC/CT pending.  - CBC notable for normal WBC count   - Discussed images from  with our radiology team who reported agreement with OSH radiology that this fluid collection looks like it's arising from the vaginal cuff. Radiology reports seeing a small cyst on a May CT, which, in patients with hysterectomies could rarely be a Nabothian cyst, but very low suspicion. Low suspicion for anastomotic leak given the location. No thickened walls or signs of inflammation to suggest abscess. A pelvic MRI would be reasonable and inform if there are any fistulas, and may suggest where that fluid collection is arising from.    - Recommended colorectal input given surgical history, please see note.  - Difficult to determine etiology of pelvic fluid collection given remote  hx of hysterectomy, however MRI imaging reveals a cystic structure/fluid collection associated with the vaginal cuff without fistula to bowel or bladder favoring a seroma. A seroma will likely resolve on its own, and is unlikely to be causing this patient's acute pelvic pain.  - Outpatient follow-up will be arranged prn     Thank you for allowing us to be involved in the care of your patient. Please do not hesitate to give us a call with further questions.     Patient seen and care plan discussed under supervision of Dr. Lopez.    Anupam Echols MD  Obstetrics and Gynecology, PGY-2  08/04/2024 2:37 AM

## 2024-08-03 NOTE — ED TRIAGE NOTES
Patient had abd surgery here in May where they removed some bowel, changed her colostomy to an ileostomy and removed her ovaries but she has been having complications since.  On Thursday she began having increased low abd pain, fevers and chills, had CT done which revealed an abscess near her bladder.  Patient spoke to her provider and was told to come in.     Triage Assessment (Adult)       Row Name 08/03/24 9736          Triage Assessment    Airway WDL WDL        Respiratory WDL    Respiratory WDL WDL        Skin Circulation/Temperature WDL    Skin Circulation/Temperature WDL WDL        Cardiac WDL    Cardiac WDL WDL        Peripheral/Neurovascular WDL    Peripheral Neurovascular WDL WDL        Cognitive/Neuro/Behavioral WDL    Cognitive/Neuro/Behavioral WDL WDL

## 2024-08-03 NOTE — ED PROVIDER NOTES
[place] EMERGENCY DEPARTMENT ([facility])    8/03/24       ED PROVIDER NOTE   History     Chief Complaint   Patient presents with    Post-op Problem     The history is provided by the patient and medical records.     Mimi Su is a 39 year old female with a history of abdominal bilateral salpingo - oophorectomy in conjunction with total colectomy, end ileostomy, exploratory laparotomy, adhesiolysis (5/6/2024), cholecystectomy, hysterectomy, sigmoid colostomy for pelvic floor dysfunction, multiple SBO's, incidental appendectomy and small bowel resection, malnutrition requiring PICC line/TPN/GJ tubes (since removed) who presents to the emergency department for abscess and pelvic pain. Patient reports she underwent a recent CT of her abdomen and pelvis on 8/01 which was notable for a golf ball size abscess in her pelvis. She was started on oral antibiotics following this finding. Additionally, she has had pelvis pain, pain when walking, sweats, chills, and nausea. She rates the pain when walking a 7/10. She has has been bleeding but is unsure whether it is vaginal or rectal bleeding. She states she was referred to the ED due to a concern of infection and evaluation of possible abscess drainage.    Per chart review, patient seen 8/1/2024 at [facility] emergency department due to abdominal pain.     EXAM: CT ABDOMEN PELVIS w CONTRAST   LOCATION: [facility]   DATE: 8/1/2024   IMPRESSION:   1.  New 4.1 cm mildly complex pelvic fluid collection which appears   to arise from the vaginal cuff and abuts the posterior margin of the   bladder. The differential includes a postoperative seroma, abscess,   or hematoma. Given lack of contiguity with the bowel sutures, an   anastomotic leak is not favored.   2.  Subtotal colectomy with left lower quadrant end ileostomy and   Rg's pouch.     Past Medical History  Past Medical History:   Diagnosis Date    Bipolar disorder (H)     Colonic  dysmotility     Colostomy in place (H)      Past Surgical History:   Procedure Laterality Date    COLECTOMY WITHOUT COLOSTOMY N/A 5/6/2024    Procedure: OPEN total abdominal colectomy, end ileostomy placement, lysis of adhesions;  Surgeon: Mali Ayala MD;  Location: [facility] OR    HYSTERECTOMY      2013 after chidlbirth    IR GASTRO JEJUNOSTOMY TUBE PLACEMENT  05/18/2022    IR NG TUBE PLACEMENT REQ RAD & FLUORO  05/18/2022    IR PICC PLACEMENT > 5 YRS OF AGE  5/15/2024    LAPAROTOMY EXPLORATORY      multiple    SALPINGO-OOPHORECTOMY BILATERAL N/A 5/6/2024    Procedure: Open bilateral Salpingo-oophorectomy;  Surgeon: Josias Pan MD;  Location: [facility] OR     acetaminophen (TYLENOL) 325 MG tablet  calcium carbonate (TUMS) 500 MG chewable tablet  Cefadroxil (DURICEF) 1 g tablet  clonazePAM (KLONOPIN) 0.5 MG tablet  diphenhydrAMINE (BENADRYL) 25 MG capsule  famotidine (PEPCID) 10 MG tablet  FLUoxetine (PROZAC) 10 MG capsule  FLUoxetine (PROZAC) 20 MG capsule  hydrOXYzine HCl (ATARAX) 25 MG tablet  ibuprofen (ADVIL/MOTRIN) 600 MG tablet  Lidocaine (LIDOCARE) 4 % Patch  loperamide (IMODIUM) 2 MG capsule  melatonin 3 MG CAPS  methocarbamol (ROBAXIN) 750 MG tablet  methocarbamol (ROBAXIN) 750 MG tablet  miconazole (MICATIN) 2 % external powder  multivitamin w/minerals (THERA-VIT-M) tablet  oxyCODONE IR (ROXICODONE) 10 MG tablet  oxyCODONE-acetaminophen (PERCOCET) 5-325 MG tablet  pantoprazole (PROTONIX) 40 MG EC tablet  pregabalin (LYRICA) 100 MG capsule  simethicone (MYLICON) 80 MG chewable tablet  sucralfate (CARAFATE) 1 GM tablet  tiZANidine (ZANAFLEX) 2 MG tablet      Allergies   Allergen Reactions    Metoclopramide Anaphylaxis and Nausea and Vomiting     start of anaphylaxis, was treated quickl      Mirtazapine Hives    Morphine Hives, Itching and Rash    Penicillins Hives    Prochlorperazine Anaphylaxis     Other reaction(s): Other (see comments)  sweating      Promethazine Anaphylaxis     Other reaction(s):  Angioedema  angioedema      Sumatriptan Anaphylaxis     Tongue and throat sensation of swelling and chest tightness.       Desvenlafaxine      Other reaction(s): Intolerance    Lisinopril      Other reaction(s): Unknown Reaction    Milnacipran      Other reaction(s): Intolerance    Labetalol Rash     Other reaction(s): Tachycardia    Levofloxacin      Other reaction(s): Mental Status Change, Other (see comments)  Anxiety (severe), euphoria, facial numbness  Anxiety (severe), euphoria, facial numbness      Metronidazole Nausea and Vomiting    Quetiapine Rash    Sulfa Antibiotics Rash    Sulfamethoxazole-Trimethoprim      Other reaction(s): Unknown Reaction    Trazodone Hives and Rash     Family History  Family History   Problem Relation Age of Onset    Lymphoma Maternal Grandmother         Burkitt's lymphoma    Lung Cancer Paternal Uncle         Multiple uncles, all heavy smokers     Social History   Social History     Tobacco Use    Smoking status: Former     Current packs/day: 0.00     Types: Cigarettes     Quit date: 2020     Years since quittin.5     Passive exposure: Past    Smokeless tobacco: Never   Vaping Use    Vaping status: Every Day   Substance Use Topics    Alcohol use: Yes     Comment: 5 drinks once a week    Drug use: Never      Past medical history, past surgical history, medications, allergies, family history, and social history were reviewed with the patient. No additional pertinent items.   A medically appropriate review of systems was performed with pertinent positives and negatives noted in the HPI, and all other systems negative.    Physical Exam   BP: 113/79  Pulse: 86  Temp: 98.2  F (36.8  C)  Resp: 18  SpO2: 98 %  Physical Exam  Vitals and nursing note reviewed.   Constitutional:       General: She is not in acute distress.     Appearance: Normal appearance. She is not ill-appearing, toxic-appearing or diaphoretic.   HENT:      Head: Normocephalic and atraumatic.   Eyes:       Extraocular Movements: Extraocular movements intact.      Conjunctiva/sclera: Conjunctivae normal.   Cardiovascular:      Rate and Rhythm: Normal rate.      Heart sounds: Normal heart sounds.   Pulmonary:      Effort: Pulmonary effort is normal. No respiratory distress.      Breath sounds: Normal breath sounds.   Abdominal:      General: Abdomen is flat.      Palpations: Abdomen is soft.      Tenderness: There is abdominal tenderness (mild, diffuse, pelvic). There is no right CVA tenderness, left CVA tenderness, guarding or rebound. Negative signs include Funez's sign and McBurney's sign.      Comments: Ostomy bag in place with brown liquid in the bag.   Genitourinary:     Comments: Deferred to GYN team.  Musculoskeletal:         General: No tenderness. Normal range of motion.      Cervical back: Normal range of motion and neck supple.   Skin:     General: Skin is warm.      Findings: No rash.   Neurological:      General: No focal deficit present.      Mental Status: She is alert and oriented to person, place, and time.   Psychiatric:         Mood and Affect: Mood normal.         Behavior: Behavior normal.           ED Course, Procedures, & Data      Procedures                Results for orders placed or performed during the hospital encounter of 08/03/24   Comprehensive metabolic panel     Status: Normal   Result Value Ref Range    Sodium 139 135 - 145 mmol/L    Potassium 4.1 3.4 - 5.3 mmol/L    Carbon Dioxide (CO2) 23 22 - 29 mmol/L    Anion Gap 9 7 - 15 mmol/L    Urea Nitrogen 10.0 6.0 - 20.0 mg/dL    Creatinine 0.67 0.51 - 0.95 mg/dL    GFR Estimate >90 >60 mL/min/1.73m2    Calcium 8.9 8.8 - 10.4 mg/dL    Chloride 107 98 - 107 mmol/L    Glucose 84 70 - 99 mg/dL    Alkaline Phosphatase 57 40 - 150 U/L    AST 24 0 - 45 U/L    ALT 18 0 - 50 U/L    Protein Total 6.7 6.4 - 8.3 g/dL    Albumin 4.1 3.5 - 5.2 g/dL    Bilirubin Total <0.2 <=1.2 mg/dL   CRP inflammation     Status: Normal   Result Value Ref Range    CRP  Inflammation <3.00 <5.00 mg/L   CBC with platelets and differential     Status: Abnormal   Result Value Ref Range    WBC Count 5.3 4.0 - 11.0 10e3/uL    RBC Count 3.08 (L) 3.80 - 5.20 10e6/uL    Hemoglobin 10.3 (L) 11.7 - 15.7 g/dL    Hematocrit 31.0 (L) 35.0 - 47.0 %     (H) 78 - 100 fL    MCH 33.4 (H) 26.5 - 33.0 pg    MCHC 33.2 31.5 - 36.5 g/dL    RDW 13.7 10.0 - 15.0 %    Platelet Count 112 (L) 150 - 450 10e3/uL    % Neutrophils 63 %    % Lymphocytes 28 %    % Monocytes 8 %    % Eosinophils 1 %    % Basophils 0 %    % Immature Granulocytes 0 %    NRBCs per 100 WBC 0 <1 /100    Absolute Neutrophils 3.3 1.6 - 8.3 10e3/uL    Absolute Lymphocytes 1.5 0.8 - 5.3 10e3/uL    Absolute Monocytes 0.4 0.0 - 1.3 10e3/uL    Absolute Eosinophils 0.0 0.0 - 0.7 10e3/uL    Absolute Basophils 0.0 0.0 - 0.2 10e3/uL    Absolute Immature Granulocytes 0.0 <=0.4 10e3/uL    Absolute NRBCs 0.0 10e3/uL   Wet prep     Status: Abnormal    Specimen: Vagina; Swab   Result Value Ref Range    Trichomonas Absent Absent    Yeast Absent Absent    Clue Cells Absent Absent    WBCs/high power field 3+ (A) None   CBC with platelets differential     Status: Abnormal    Narrative    The following orders were created for panel order CBC with platelets differential.  Procedure                               Abnormality         Status                     ---------                               -----------         ------                     CBC with platelets and d...[[id number]]  Abnormal            Final result                 Please view results for these tests on the individual orders.     Medications   ondansetron (ZOFRAN) injection 4 mg (4 mg Intravenous $Given 8/3/24 1933)   HYDROmorphone (PF) (DILAUDID) injection 0.5 mg (0.5 mg Intravenous $Given 8/3/24 1638)   HYDROmorphone (PF) (DILAUDID) injection 0.5 mg (0.5 mg Intravenous $Given 8/3/24 0172)   HYDROmorphone (PF) (DILAUDID) injection 0.5 mg (0.5 mg Intravenous $Given 8/3/24 2806)    LORazepam (ATIVAN) tablet 0.5 mg (0.5 mg Oral $Given 8/3/24 5600)     Labs Ordered and Resulted from Time of ED Arrival to Time of ED Departure   CBC WITH PLATELETS AND DIFFERENTIAL - Abnormal       Result Value    WBC Count 5.3      RBC Count 3.08 (*)     Hemoglobin 10.3 (*)     Hematocrit 31.0 (*)      (*)     MCH 33.4 (*)     MCHC 33.2      RDW 13.7      Platelet Count 112 (*)     % Neutrophils 63      % Lymphocytes 28      % Monocytes 8      % Eosinophils 1      % Basophils 0      % Immature Granulocytes 0      NRBCs per 100 WBC 0      Absolute Neutrophils 3.3      Absolute Lymphocytes 1.5      Absolute Monocytes 0.4      Absolute Eosinophils 0.0      Absolute Basophils 0.0      Absolute Immature Granulocytes 0.0      Absolute NRBCs 0.0     WET PREPARATION - Abnormal    Trichomonas Absent      Yeast Absent      Clue Cells Absent      WBCs/high power field 3+ (*)    COMPREHENSIVE METABOLIC PANEL - Normal    Sodium 139      Potassium 4.1      Carbon Dioxide (CO2) 23      Anion Gap 9      Urea Nitrogen 10.0      Creatinine 0.67      GFR Estimate >90      Calcium 8.9      Chloride 107      Glucose 84      Alkaline Phosphatase 57      AST 24      ALT 18      Protein Total 6.7      Albumin 4.1      Bilirubin Total <0.2     CRP INFLAMMATION - Normal    CRP Inflammation <3.00     CHLAMYDIA TRACHOMATIS PCR   NEISSERIA GONORRHOEAE PCR     MR Pelvis (GYN) wo & w Contrast    (Results Pending)          Critical care was not performed.     Medical Decision Making  The patient's presentation was of high complexity (an acute health issue posing potential threat to life or bodily function).    The patient's evaluation involved:  review of external note(s) from 1 sources (see separate area of note for details)  review of 3+ test result(s) ordered prior to this encounter (see separate area of note for details)  strong consideration of a test (see separate area of note for details) that was ultimately deferred  ordering  and/or review of 3+ test(s) in this encounter (see separate area of note for details)  independent interpretation of testing performed by another health professional (see separate area of note for details)  discussion of management or test interpretation with another health professional (see separate area of note for details)    The patient's management necessitated further care after sign-out to my partner at the change of shift (see their note for further management).    Assessment & Plan    This is a 39 year old female with complex medical and surgical history presenting with pelvic pain and recent finding of fluid collection in her pelvic area. Differential diagnosis: Pelvic abscess, postop complication, seroma.    After thorough history and physical examination, patient appears to be in no acute distress. Obtain laboratory studies for further diagnostic evaluation. We did contact [facility] to see if they could push her images into our PACS system. I was able to review her electronic medical records and the communication she has had with GYN/oncology team. I will consult them for further input. I will treat patient's pain with IV Dilaudid and her nausea with IV Zofran.     Patient's laboratory studies returned without any evidence of leukocytosis, WBC is normal at 5300. There is anemia with hemoglobin of 10.3, however, this is higher than patient's prior values. Electrolytes show no evidence of dehydration, creatinine is normal at 0.67. CRP is undetectable. I reviewed her CT abdomen/pelvis that was done 2 days ago at an outside hospital after we were able to obtain the images via electronic medical records.    Patient was seen evaluate by GYN team who recommended colorectal surgery consult and pelvic MRI.  I did order the study and consult to colorectal surgery.    11:52 PM  Patient will be signed out to my partner at the change of shift pending MRI results and completion of consulting services  recommendations, and disposition    I have reviewed the nursing notes. I have reviewed the findings, diagnosis, plan and need for follow up with the patient.    New Prescriptions    No medications on file       Final diagnoses:   None   IKassi, am serving as a trained medical scribe to document services personally performed by Stella Lizarraga MD, MD, based on the provider's statements to me.     Elham CHAUDHRY Nikola, MD, MD, was physically present and have reviewed and verified the accuracy of this note documented by Kassi Childress.     Stella Salcido MD  [facility] EMERGENCY DEPARTMENT  8/3/2024     Stella Lizarraga MD  08/03/24 4751

## 2024-08-04 VITALS
TEMPERATURE: 98.2 F | HEART RATE: 86 BPM | RESPIRATION RATE: 18 BRPM | DIASTOLIC BLOOD PRESSURE: 62 MMHG | SYSTOLIC BLOOD PRESSURE: 117 MMHG | OXYGEN SATURATION: 98 %

## 2024-08-04 LAB
C TRACH DNA SPEC QL NAA+PROBE: NEGATIVE
N GONORRHOEA DNA SPEC QL NAA+PROBE: NEGATIVE

## 2024-08-04 PROCEDURE — 250N000011 HC RX IP 250 OP 636: Performed by: EMERGENCY MEDICINE

## 2024-08-04 PROCEDURE — A9585 GADOBUTROL INJECTION: HCPCS | Performed by: EMERGENCY MEDICINE

## 2024-08-04 PROCEDURE — 255N000002 HC RX 255 OP 636: Performed by: EMERGENCY MEDICINE

## 2024-08-04 PROCEDURE — 96376 TX/PRO/DX INJ SAME DRUG ADON: CPT | Performed by: EMERGENCY MEDICINE

## 2024-08-04 RX ORDER — HYDROMORPHONE HYDROCHLORIDE 1 MG/ML
0.5 INJECTION, SOLUTION INTRAMUSCULAR; INTRAVENOUS; SUBCUTANEOUS
Status: COMPLETED | OUTPATIENT
Start: 2024-08-04 | End: 2024-08-04

## 2024-08-04 RX ORDER — GADOBUTROL 604.72 MG/ML
7.5 INJECTION INTRAVENOUS ONCE
Status: COMPLETED | OUTPATIENT
Start: 2024-08-04 | End: 2024-08-04

## 2024-08-04 RX ADMIN — HYDROMORPHONE HYDROCHLORIDE 0.5 MG: 1 INJECTION, SOLUTION INTRAMUSCULAR; INTRAVENOUS; SUBCUTANEOUS at 01:28

## 2024-08-04 RX ADMIN — GADOBUTROL 7 ML: 604.72 INJECTION INTRAVENOUS at 00:24

## 2024-08-04 ASSESSMENT — ACTIVITIES OF DAILY LIVING (ADL)
ADLS_ACUITY_SCORE: 37
ADLS_ACUITY_SCORE: 37

## 2024-08-04 NOTE — CONSULTS
[facility]    Consult Note - Colorectal Surgery Service  Date of Admission:  8/3/2024  Consult Requested by: ED provider  Reason for Consult: Acute on chronic pelvic pain in setting of new pelvic fluid collection    Assessment & Plan: Surgery     Mimi Su is a 38 year old female with PMH of chronic pain syndrome on chronic opiates, borderline personality disorder, multiple prior GI surgeries, distant history of hysterectomy, and  h/o recurrent bowel obstructions who has chronic colonic dysmotility with severe constipation. S/p elective Open total abdominal colectomy with end ileostomy, remaining rectal stump, and BSO on 5/6 with Dr Ayala/Gyn Onc. Post op course complicated by multiple readmissions and ER visits for ileus, bacteremia, pelvic pain.     Presents to the ED 8/3 here for acute on chronic pelvic pain severe enough preventing her from walking. CTAP 8/1 ([facility] ED) showed 4.1 cm pelvic fluid collection adjacent to the vaginal cuff, unlikely to be bowel leak. Colorectal surgery was consulted per recommends from Gyn Onc  given surgical history with colorectal surgery. Of note, patient's case was dicussed with Dr Ayala on 8/1 by the OSH ED doc and etiology for the fluid collection was determined to be unlikely due from colorectal origin.    Plan:  Given her benign abdominal exam, functional ileostomy output, normal white count, CT and MRI findings and prior discussion with Dr Ayala, these symptoms are unlikely to be explained from colorectal source.   Ok to discharge from colorectal standpoint and follow up wit Dr Ayala's clinic as needed. Patient is in agreement with the plan.   Appreciate eval by Gyn Onc to continue evaluating for the source of the fluid collection and pelvic pain       Drains: PRESENT          - May have additional drains, review Avatar    Clinically Significant Risk Factors Present on Admission                # Thrombocytopenia:  "Lowest platelets = 112 in last 2 days, will monitor for bleeding      # Anemia: based on hgb <11           # Financial/Environmental Concerns:         The patient's care was discussed with the fellow, MD Luann Malcolm MD  [facility]  Non-urgent messages: Securely message with [employer] (more info)  Text page via [facility] Paging/Directory     ______________________________________________________________________    Chief Complaint   Pelvic pain    History is obtained from the patient      History of Present Illness   Mimi Su is a 38 year old female with PMH of chronic pain syndrome on chronic opiates, borderline personality disorder, multiple prior GI surgeries, distant history of hysterectomy, and  h/o recurrent bowel obstructions who has chronic colonic dysmotility with severe constipation. S/p elective Open total abdominal colectomy with end ileostomy, remaining rectal stump, and BSO on 5/6 with Dr Ayala/Gyn Onc. Post op course complicated by multiple readmissions and ER visits for ileus, bacteremia, pelvic pain.     Patient has been having pelvic pain and bladder pain while urinating. She also has feeling of incomplete evacuation and she needs to put manual pressure on abdomen and other maneuvers to be able to urinate properly. These symptoms have only gotten worse and she needed to go to ED 8/1 as the bladder/pevic pain prohibited her from walking where she was found to have a fluid collection near the vaginal cuff.    Patient has been having good ileostomy output, but feels that recently her ileostomy immediately \"dumps\" whatever she eats/drink. She was prescribed Imodium after the surgery but she has stopped taking it recently.        Past Medical History    Past Medical History:   Diagnosis Date    Bipolar disorder (H)     Colonic dysmotility     Colostomy in place (H)        Past Surgical History   Past Surgical History:   Procedure " Laterality Date    COLECTOMY WITHOUT COLOSTOMY N/A 5/6/2024    Procedure: OPEN total abdominal colectomy, end ileostomy placement, lysis of adhesions;  Surgeon: Mali Ayala MD;  Location: [facility] OR    HYSTERECTOMY      2013 after chidlbirth    IR GASTRO JEJUNOSTOMY TUBE PLACEMENT  05/18/2022    IR NG TUBE PLACEMENT REQ RAD & FLUORO  05/18/2022    IR PICC PLACEMENT > 5 YRS OF AGE  5/15/2024    LAPAROTOMY EXPLORATORY      multiple    SALPINGO-OOPHORECTOMY BILATERAL N/A 5/6/2024    Procedure: Open bilateral Salpingo-oophorectomy;  Surgeon: Josias Pan MD;  Location: [facility] OR       Physical Exam   Vital Signs: Temp: 98.2  F (36.8  C) Temp src: Oral BP: 109/72 Pulse: 86   Resp: 18 SpO2: 97 % O2 Device: None (Room air)    Weight: 0 lbs 0 ozNo intake or output data in the 24 hours ending 08/03/24 5273  General Appearance: Alert, oriented, appears stated age  Respiratory: NLB on RA  Cardiovascular: RR by radial pulse  Abdomen- soft, non distended, mild tenderness in suprapubic region, mildly tender in RUQ, ileostomy pink, healthy, thick liquidy stools    Data     I have personally reviewed the following data over the past 24 hrs:    5.3  \   10.3 (L)   / 112 (L)     139 107 10.0 /  84   4.1 23 0.67 \     ALT: 18 AST: 24 AP: 57 TBILI: <0.2   ALB: 4.1 TOT PROTEIN: 6.7 LIPASE: N/A     Procal: N/A CRP: <3.00 Lactic Acid: N/A         Imaging results reviewed over the past 24 hrs:   Recent Results (from the past 24 hour(s))   MR Pelvis (GYN) wo & w Contrast    Narrative    EXAM: MR PELVIS (GYN) W/O and W CONTRAST  LOCATION: [facility]  DATE: 8/4/2024    INDICATION: Pelvic fluid collection; concern for fistula, further evaluate finding that was discovered on CT abdomen pelvis  COMPARISON: CT of the abdomen and pelvis 8/1/2024 and 7/1/2024.  TECHNIQUE: Routine MRI female pelvis protocol including T1 in/out phase, diffusion, thin section high resolution multiplane T2, and post  contrast T1.  CONTRAST: 7mL GADAVIST    FINDINGS:     Postoperative changes of hysterectomy, bilateral salpingo-oophorectomy, cholectomy, and left lower quadrant ileostomy formation.    In the central pelvis immediately superior to the vaginal cuff and posterior to the urinary bladder is a sharply circumscribed cystic structure or fluid collection which measures 5.2 x 4.6 x 4.1 cm. This structure has fluid signal intensity on T1   weighted and T2-weighted images. There is enhancement of the peripheral rim and thin internal septations of this structure. No solid nodular components are detected. No inflammatory stranding or fluid in the pelvis near this structure. This structure   contacts the Rg's pouch and urinary bladder but no fistula to either is visualized. The vaginal cuff is otherwise unremarkable.      Impression    IMPRESSION:  5.2 cm sharply circumscribed cystic structure/fluid collection associated with the vaginal cuff is unchanged from CT 8/1/2024 and new from CT of 7/1/2024. Seroma is favored. This structure does not appear to be inflamed which is evidence against abscess.   No fistula to bowel or bladder visualized. Clinical correlation and follow-up suggested.      8/1/2024 CTAP with iv contrast ([facility])  IMPRESSION:   1.  New 4.1 cm mildly complex pelvic fluid collection which appears   to arise from the vaginal cuff and abuts the posterior margin of the   bladder. The differential includes a postoperative seroma, abscess,   or hematoma. Given lack of contiguity with the bowel sutures, an   anastomotic leak is not favored.   2.  Subtotal colectomy with left lower quadrant end ileostomy and   Rg's pouch.

## 2024-08-04 NOTE — DISCHARGE INSTRUCTIONS
Your MRI shows persistent fluid collection (seroma) in the pelvic area.  It does not appear acutely infected, but do recommend that you continue antibiotics as previously prescribed.  You were seen by the colorectal surgery and gynecology/oncology teams.  They are recommending discharge with outpatient follow-up with your usual providers.  Return to the ED with any new or worsening symptoms.

## 2024-08-05 ENCOUNTER — MYC MEDICAL ADVICE (OUTPATIENT)
Dept: ONCOLOGY | Facility: CLINIC | Age: 39
End: 2024-08-05
Payer: COMMERCIAL

## 2024-08-05 ENCOUNTER — TELEPHONE (OUTPATIENT)
Dept: SURGERY | Facility: CLINIC | Age: 39
End: 2024-08-05
Payer: COMMERCIAL

## 2024-08-05 NOTE — TELEPHONE ENCOUNTER
Called Mimi to follow-up ED visit and plan. She is having pelvic pain and fullness likely secondary to vaginal cuff seroma. Even though there is no evidence of infection on imaging, given her symptoms and recurrent ED visits, I would recommend aspiration. If not infected on aspiration, ideally would not leave a drain in place. She lives in [place], so [facility] would be most convenient. She also has a partner who lives in [place], so [facility] could be an option. Will explore scheduling options and let her know what the options are and get orders sent, etc.    Josias Pan MD

## 2024-08-07 ENCOUNTER — VIRTUAL VISIT (OUTPATIENT)
Dept: ONCOLOGY | Facility: CLINIC | Age: 39
DRG: 920 | End: 2024-08-07
Attending: OBSTETRICS & GYNECOLOGY
Payer: COMMERCIAL

## 2024-08-07 ENCOUNTER — HOSPITAL ENCOUNTER (INPATIENT)
Facility: CLINIC | Age: 39
LOS: 2 days | Discharge: HOME | DRG: 920 | End: 2024-08-09
Attending: STUDENT IN AN ORGANIZED HEALTH CARE EDUCATION/TRAINING PROGRAM | Admitting: OBSTETRICS & GYNECOLOGY
Payer: COMMERCIAL

## 2024-08-07 VITALS — WEIGHT: 155 LBS | HEIGHT: 67 IN | BODY MASS INDEX: 24.33 KG/M2

## 2024-08-07 DIAGNOSIS — G89.29 OTHER CHRONIC PAIN: ICD-10-CM

## 2024-08-07 DIAGNOSIS — R10.2 PELVIC PAIN IN FEMALE: ICD-10-CM

## 2024-08-07 DIAGNOSIS — R18.8 PELVIC FLUID COLLECTION: Primary | ICD-10-CM

## 2024-08-07 DIAGNOSIS — G89.18 ACUTE POST-OPERATIVE PAIN: ICD-10-CM

## 2024-08-07 DIAGNOSIS — E89.40 PREMATURE SURGICAL MENOPAUSE: Primary | ICD-10-CM

## 2024-08-07 DIAGNOSIS — R10.2 VAGINAL PAIN: ICD-10-CM

## 2024-08-07 PROCEDURE — 258N000003 HC RX IP 258 OP 636

## 2024-08-07 PROCEDURE — 99213 OFFICE O/P EST LOW 20 MIN: CPT | Mod: 95 | Performed by: OBSTETRICS & GYNECOLOGY

## 2024-08-07 PROCEDURE — 250N000011 HC RX IP 250 OP 636: Performed by: STUDENT IN AN ORGANIZED HEALTH CARE EDUCATION/TRAINING PROGRAM

## 2024-08-07 PROCEDURE — 250N000011 HC RX IP 250 OP 636

## 2024-08-07 PROCEDURE — 250N000013 HC RX MED GY IP 250 OP 250 PS 637

## 2024-08-07 PROCEDURE — 99285 EMERGENCY DEPT VISIT HI MDM: CPT | Performed by: STUDENT IN AN ORGANIZED HEALTH CARE EDUCATION/TRAINING PROGRAM

## 2024-08-07 PROCEDURE — 120N000005 HC R&B MS OVERFLOW UMMC

## 2024-08-07 PROCEDURE — 99285 EMERGENCY DEPT VISIT HI MDM: CPT | Mod: 25 | Performed by: STUDENT IN AN ORGANIZED HEALTH CARE EDUCATION/TRAINING PROGRAM

## 2024-08-07 PROCEDURE — 258N000003 HC RX IP 258 OP 636: Performed by: STUDENT IN AN ORGANIZED HEALTH CARE EDUCATION/TRAINING PROGRAM

## 2024-08-07 RX ORDER — ONDANSETRON 4 MG/1
4 TABLET, ORALLY DISINTEGRATING ORAL EVERY 6 HOURS PRN
Status: DISCONTINUED | OUTPATIENT
Start: 2024-08-07 | End: 2024-08-09 | Stop reason: HOSPADM

## 2024-08-07 RX ORDER — OXYCODONE HYDROCHLORIDE 10 MG/1
10 TABLET ORAL
Status: SHIPPED
Start: 2024-08-07 | End: 2024-08-08

## 2024-08-07 RX ORDER — SUCRALFATE 1 G/1
1 TABLET ORAL 4 TIMES DAILY PRN
Status: DISCONTINUED | OUTPATIENT
Start: 2024-08-07 | End: 2024-08-09 | Stop reason: HOSPADM

## 2024-08-07 RX ORDER — OXYCODONE HYDROCHLORIDE 5 MG/1
5 TABLET ORAL
Qty: 50 TABLET | Refills: 0 | Status: SHIPPED | OUTPATIENT
Start: 2024-08-07 | End: 2024-08-08 | Stop reason: ALTCHOICE

## 2024-08-07 RX ORDER — OXYCODONE HYDROCHLORIDE 5 MG/1
5 TABLET ORAL EVERY 4 HOURS PRN
Status: DISCONTINUED | OUTPATIENT
Start: 2024-08-07 | End: 2024-08-09 | Stop reason: HOSPADM

## 2024-08-07 RX ORDER — SODIUM CHLORIDE, SODIUM LACTATE, POTASSIUM CHLORIDE, CALCIUM CHLORIDE 600; 310; 30; 20 MG/100ML; MG/100ML; MG/100ML; MG/100ML
INJECTION, SOLUTION INTRAVENOUS CONTINUOUS
Status: DISCONTINUED | OUTPATIENT
Start: 2024-08-07 | End: 2024-08-08

## 2024-08-07 RX ORDER — LANOLIN ALCOHOL/MO/W.PET/CERES
3 CREAM (GRAM) TOPICAL
Status: DISCONTINUED | OUTPATIENT
Start: 2024-08-07 | End: 2024-08-09 | Stop reason: HOSPADM

## 2024-08-07 RX ORDER — IBUPROFEN 600 MG/1
600 TABLET, FILM COATED ORAL EVERY 6 HOURS
Status: DISCONTINUED | OUTPATIENT
Start: 2024-08-07 | End: 2024-08-09 | Stop reason: HOSPADM

## 2024-08-07 RX ORDER — OXYCODONE HYDROCHLORIDE 10 MG/1
10 TABLET ORAL EVERY 4 HOURS PRN
Status: DISCONTINUED | OUTPATIENT
Start: 2024-08-07 | End: 2024-08-09 | Stop reason: HOSPADM

## 2024-08-07 RX ORDER — LIDOCAINE 40 MG/G
CREAM TOPICAL
Status: DISCONTINUED | OUTPATIENT
Start: 2024-08-07 | End: 2024-08-09 | Stop reason: HOSPADM

## 2024-08-07 RX ORDER — HYDROXYZINE HYDROCHLORIDE 25 MG/1
25 TABLET, FILM COATED ORAL EVERY 8 HOURS PRN
Status: DISCONTINUED | OUTPATIENT
Start: 2024-08-07 | End: 2024-08-09 | Stop reason: HOSPADM

## 2024-08-07 RX ORDER — OXYCODONE AND ACETAMINOPHEN 10; 325 MG/1; MG/1
1 TABLET ORAL
COMMUNITY
Start: 2024-08-07

## 2024-08-07 RX ORDER — SODIUM CHLORIDE, SODIUM LACTATE, POTASSIUM CHLORIDE, CALCIUM CHLORIDE 600; 310; 30; 20 MG/100ML; MG/100ML; MG/100ML; MG/100ML
INJECTION, SOLUTION INTRAVENOUS CONTINUOUS
Status: DISCONTINUED | OUTPATIENT
Start: 2024-08-08 | End: 2024-08-07

## 2024-08-07 RX ORDER — AMOXICILLIN 250 MG
1 CAPSULE ORAL 2 TIMES DAILY
Status: CANCELLED | OUTPATIENT
Start: 2024-08-07

## 2024-08-07 RX ORDER — CLONAZEPAM 0.5 MG/1
.25-.5 TABLET ORAL 3 TIMES DAILY PRN
Status: DISCONTINUED | OUTPATIENT
Start: 2024-08-07 | End: 2024-08-09 | Stop reason: HOSPADM

## 2024-08-07 RX ORDER — KETOROLAC TROMETHAMINE 15 MG/ML
15 INJECTION, SOLUTION INTRAMUSCULAR; INTRAVENOUS EVERY 6 HOURS
Status: DISCONTINUED | OUTPATIENT
Start: 2024-08-07 | End: 2024-08-09 | Stop reason: HOSPADM

## 2024-08-07 RX ORDER — TIZANIDINE 2 MG/1
2 TABLET ORAL AT BEDTIME
Status: DISCONTINUED | OUTPATIENT
Start: 2024-08-07 | End: 2024-08-09 | Stop reason: HOSPADM

## 2024-08-07 RX ORDER — SIMETHICONE 80 MG
80-160 TABLET,CHEWABLE ORAL EVERY 6 HOURS PRN
Status: DISCONTINUED | OUTPATIENT
Start: 2024-08-07 | End: 2024-08-09 | Stop reason: HOSPADM

## 2024-08-07 RX ORDER — ONDANSETRON 2 MG/ML
4 INJECTION INTRAMUSCULAR; INTRAVENOUS EVERY 30 MIN PRN
Status: DISCONTINUED | OUTPATIENT
Start: 2024-08-07 | End: 2024-08-07

## 2024-08-07 RX ORDER — PANTOPRAZOLE SODIUM 40 MG/1
40 TABLET, DELAYED RELEASE ORAL
Status: DISCONTINUED | OUTPATIENT
Start: 2024-08-08 | End: 2024-08-09 | Stop reason: HOSPADM

## 2024-08-07 RX ORDER — POLYETHYLENE GLYCOL 3350 17 G/17G
17 POWDER, FOR SOLUTION ORAL 2 TIMES DAILY PRN
Status: DISCONTINUED | OUTPATIENT
Start: 2024-08-07 | End: 2024-08-09 | Stop reason: HOSPADM

## 2024-08-07 RX ORDER — LOPERAMIDE HCL 2 MG
2 CAPSULE ORAL 3 TIMES DAILY PRN
Status: DISCONTINUED | OUTPATIENT
Start: 2024-08-07 | End: 2024-08-09 | Stop reason: HOSPADM

## 2024-08-07 RX ORDER — FLUOXETINE 10 MG/1
10 CAPSULE ORAL DAILY
Status: DISCONTINUED | OUTPATIENT
Start: 2024-08-08 | End: 2024-08-09 | Stop reason: HOSPADM

## 2024-08-07 RX ORDER — CALCIUM CARBONATE 500 MG/1
500-1000 TABLET, CHEWABLE ORAL 3 TIMES DAILY PRN
Status: DISCONTINUED | OUTPATIENT
Start: 2024-08-07 | End: 2024-08-09 | Stop reason: HOSPADM

## 2024-08-07 RX ORDER — AMOXICILLIN 250 MG
2 CAPSULE ORAL 2 TIMES DAILY
Status: CANCELLED | OUTPATIENT
Start: 2024-08-07

## 2024-08-07 RX ORDER — ONDANSETRON 2 MG/ML
4 INJECTION INTRAMUSCULAR; INTRAVENOUS EVERY 6 HOURS PRN
Status: DISCONTINUED | OUTPATIENT
Start: 2024-08-07 | End: 2024-08-09 | Stop reason: HOSPADM

## 2024-08-07 RX ORDER — ACETAMINOPHEN 325 MG/1
975 TABLET ORAL 3 TIMES DAILY
Status: DISCONTINUED | OUTPATIENT
Start: 2024-08-08 | End: 2024-08-09 | Stop reason: HOSPADM

## 2024-08-07 RX ORDER — PREGABALIN 75 MG/1
150 CAPSULE ORAL 2 TIMES DAILY
Status: DISCONTINUED | OUTPATIENT
Start: 2024-08-07 | End: 2024-08-09 | Stop reason: HOSPADM

## 2024-08-07 RX ORDER — HYDROMORPHONE HYDROCHLORIDE 1 MG/ML
0.5 INJECTION, SOLUTION INTRAMUSCULAR; INTRAVENOUS; SUBCUTANEOUS
Status: DISCONTINUED | OUTPATIENT
Start: 2024-08-07 | End: 2024-08-08

## 2024-08-07 RX ADMIN — SODIUM CHLORIDE, POTASSIUM CHLORIDE, SODIUM LACTATE AND CALCIUM CHLORIDE: 600; 310; 30; 20 INJECTION, SOLUTION INTRAVENOUS at 22:36

## 2024-08-07 RX ADMIN — OXYCODONE HYDROCHLORIDE 5 MG: 5 TABLET ORAL at 22:28

## 2024-08-07 RX ADMIN — ONDANSETRON 4 MG: 2 INJECTION INTRAMUSCULAR; INTRAVENOUS at 20:14

## 2024-08-07 RX ADMIN — HYDROMORPHONE HYDROCHLORIDE 0.5 MG: 1 INJECTION, SOLUTION INTRAMUSCULAR; INTRAVENOUS; SUBCUTANEOUS at 22:28

## 2024-08-07 RX ADMIN — IBUPROFEN 600 MG: 600 TABLET, FILM COATED ORAL at 22:28

## 2024-08-07 RX ADMIN — HYDROMORPHONE HYDROCHLORIDE 1 MG: 1 INJECTION, SOLUTION INTRAMUSCULAR; INTRAVENOUS; SUBCUTANEOUS at 20:15

## 2024-08-07 RX ADMIN — ONDANSETRON 4 MG: 2 INJECTION INTRAMUSCULAR; INTRAVENOUS at 22:28

## 2024-08-07 RX ADMIN — SODIUM CHLORIDE 1000 ML: 9 INJECTION, SOLUTION INTRAVENOUS at 20:15

## 2024-08-07 ASSESSMENT — ACTIVITIES OF DAILY LIVING (ADL)
ADLS_ACUITY_SCORE: 37

## 2024-08-07 ASSESSMENT — COLUMBIA-SUICIDE SEVERITY RATING SCALE - C-SSRS
1. IN THE PAST MONTH, HAVE YOU WISHED YOU WERE DEAD OR WISHED YOU COULD GO TO SLEEP AND NOT WAKE UP?: NO
2. HAVE YOU ACTUALLY HAD ANY THOUGHTS OF KILLING YOURSELF IN THE PAST MONTH?: NO

## 2024-08-07 ASSESSMENT — PAIN SCALES - GENERAL: PAINLEVEL: SEVERE PAIN (7)

## 2024-08-07 NOTE — NURSING NOTE
Current patient location: Patient declined to provide     Is the patient currently in the state of [place]? YES    Visit mode:VIDEO    If the visit is dropped, the patient can be reconnected by: VIDEO VISIT: Text to cell phone:   Telephone Information:   Mobile [phone]       Will anyone else be joining the visit? NO  (If patient encounters technical issues they should call [phone]    How would you like to obtain your AVS? MyChart    Are changes needed to the allergy or medication list? No    Are refills needed on medications prescribed by this physician? NO    Rooming Documentation:  Unable to complete questionnaire(s) due to time      Reason for visit: JOANNE CHRISTIANSEN

## 2024-08-07 NOTE — LETTER
8/7/2024      Mimi Su  [address]  [facility] [address]      Dear Colleague,    Thank you for referring your patient, Mimi Su, to the [facility]. Please see a copy of my visit note below.      GYNECOLOGIC ONCOLOGY  [facility]  [address] [facility] [address]  Phone: [phone]  Fax: [phone]     Patient:  Mimi Su  YOB: 1985  Date of Visit: 08/07/2024    Reason for visit: pre-op visit prior to IR procedure, pelvic pain, vaginal cuff seroma    Assessment   Mimi Su is a 39 year old surgically post-menopausal patient on hormone therapy with a complex surgical history including end ileostomy, total hysterectomy, and recent open surgery with ovarian removal with pelvic fluid collection at the level of the vaginal cuff with severe pelvic pain, pressure, and imaging showing likely pelvic seroma with no evidence of fistulization. Also history of chronic pain on opioid therapy.    Plan  - Pelvic fluid collection: imaging showing pelvic fluid collection which on MRI is likely seroma (no evidence of fistula to the bowel, no enhancement to suggest abscess). Likely related to surgical procedure in May 2024. Patient has symptoms that are consistent with location of the fluid collection (pain, pelvic pressure), and given the size I do think it's likely her symptoms are due to this collection. As such, I would recommend drainage to see if that resolves her symptoms. Will defer to IR, but reasonable to aspirate and only leave a drain if the aspirate appears infected.   - If symptoms improve, no need for additional imaging or follow-up.    - Pre-op: Reviewed medications. Ok to continue current medications as prescribed. Has had multiple sedated procedures in the past without issue. Also recent major surgery with no issue. Limited by pain, but otherwise able to performe >4 METS from a cardiovascular  standpoint. No history of CAD, HTN, arrhthmia, no anginal symptoms. Low risk for tamra-operative complications. Ok to proceed with planned procedure    - Pelvic pain: as above. Increase ibuprofen to 600mg every 6 hours. Patient has chronic pain and is on chronic opioid therapy. I spoke with her pain clinic (Pain provider is Karl Montano PA-C). Notified them I will be prescribing pain medicatoins for acute pain episode and following her minor procedure. She takes Percocet 10mg every 4-6 hours (5 doses a day). I wrote a prescription to increase this to oxycodone 15mg up to 5 times daily. Confirmed on PDMP.      Josias Pan MD   Gynecologic Oncology     ~~~~~~~~~~~~~~~~~~~~~~~~~~~~~~~~~~~~~~~~~~~~~~~~~~~~~~~~~~~~~~~~~~~~~~~    History of Present Illness  Severe pelvic pain, comes and goes, worse with walking. Also pelvic pressure. Deep pain, upper vaginal area and pelvis. Using ibuprofen twice daily. Taking chronic pain medications. Uses Robaxan 1-2 times daily. Also with nausea, no emesis. Having issues with dumping syndrome and ostomy. No vaginal bleeding or discharge. Imaging reviewed and shows simple fluid collection at the cuff.     No issues with sedation or anesthesia. No chest pain at rest or with exertion.     Past Medical History:   Diagnosis Date     Bipolar disorder (H)      Colonic dysmotility      Colostomy in place (H)      Past Surgical History:   Procedure Laterality Date     COLECTOMY WITHOUT COLOSTOMY N/A 5/6/2024    Procedure: OPEN total abdominal colectomy, end ileostomy placement, lysis of adhesions;  Surgeon: Mali Ayala MD;  Location: [facility] OR     HYSTERECTOMY      2013 after chidlbirth     IR GASTRO JEJUNOSTOMY TUBE PLACEMENT  05/18/2022     IR NG TUBE PLACEMENT REQ RAD & FLUORO  05/18/2022     IR PICC PLACEMENT > 5 YRS OF AGE  5/15/2024     LAPAROTOMY EXPLORATORY      multiple     SALPINGO-OOPHORECTOMY BILATERAL N/A 5/6/2024    Procedure: Open bilateral Salpingo-oophorectomy;   "Surgeon: Josias Pan MD;  Location:  OR     Family History   Problem Relation Age of Onset     Lymphoma Maternal Grandmother         Burkitt's lymphoma     Lung Cancer Paternal Uncle         Multiple uncles, all heavy smokers     Social History     Tobacco Use     Smoking status: Former     Current packs/day: 0.00     Types: Cigarettes     Quit date: 2020     Years since quittin.6     Passive exposure: Past     Smokeless tobacco: Never   Vaping Use     Vaping status: Every Day   Substance Use Topics     Alcohol use: Yes     Comment: 5 drinks once a week     Drug use: Never       Physical Exam  Ht 1.702 m (5' 7\")   Wt 70.3 kg (155 lb)   LMP  (LMP Unknown)   BMI 24.28 kg/m    Gen: no acute distress  Today's visit is virtual.   Seen 2024 in person  CV: RRR, no murmurs  Lungs: CTAB    Data  Laboratory data and imaging listed below was reviewed prior to this encounter  Labs/Pathology:   Personally reviewed imaging below  Imaging:   Narrative & Impression   EXAM: MR PELVIS (GYN) W/O and W CONTRAST  LOCATION: [facility]  DATE: 2024     INDICATION: Pelvic fluid collection; concern for fistula, further evaluate finding that was discovered on CT abdomen pelvis  COMPARISON: CT of the abdomen and pelvis 2024 and 2024.  TECHNIQUE: Routine MRI female pelvis protocol including T1 in/out phase, diffusion, thin section high resolution multiplane T2, and post contrast T1.  CONTRAST: 7mL GADAVIST     FINDINGS:      Postoperative changes of hysterectomy, bilateral salpingo-oophorectomy, cholectomy, and left lower quadrant ileostomy formation.     In the central pelvis immediately superior to the vaginal cuff and posterior to the urinary bladder is a sharply circumscribed cystic structure or fluid collection which measures 5.2 x 4.6 x 4.1 cm. This structure has fluid signal intensity on T1   weighted and T2-weighted images. There is enhancement of the " peripheral rim and thin internal septations of this structure. No solid nodular components are detected. No inflammatory stranding or fluid in the pelvis near this structure. This structure   contacts the Rg's pouch and urinary bladder but no fistula to either is visualized. The vaginal cuff is otherwise unremarkable.                                                                      IMPRESSION:  5.2 cm sharply circumscribed cystic structure/fluid collection associated with the vaginal cuff is unchanged from CT 8/1/2024 and new from CT of 7/1/2024. Seroma is favored. This structure does not appear to be inflamed which is evidence against abscess.   No fistula to bowel or bladder visualized. Clinical correlation and follow-up suggested.       I spent a total of 20 minutes on the care of Mimi Su on the day of service including face to face time, care coordination, and documentation on the day of service. I spoke with local pain provider. I reviewed images from CT 7/1/2024, 8/1/2024, and MRI 8/3/2024. Reviewed associated labs from ED visit 8/3/2024         Virtual Visit Details    Joined the call at 8/7/2024, 10:04:22 am.  Left the call at 8/7/2024, 10:19:31 am.  You were on the call for 15 minutes 9 seconds    Originating Location (pt. Location): Home    Distant Location (provider location):  On-site  Platform used for Video Visit: Falguni      Again, thank you for allowing me to participate in the care of your patient.        Sincerely,        Josias Pan MD

## 2024-08-07 NOTE — PROGRESS NOTES
GYNECOLOGIC ONCOLOGY  [facility]CER CLINIC  [address] [facility] [address]  Phone: [phone]  Fax: [phone]     Patient:  Mimi Su  YOB: 1985  Date of Visit: 08/07/2024    Reason for visit: pre-op visit prior to IR procedure, pelvic pain, vaginal cuff seroma    Assessment    Mimi Su is a 39 year old surgically post-menopausal patient on hormone therapy with a complex surgical history including end ileostomy, total hysterectomy, and recent open surgery with ovarian removal with pelvic fluid collection at the level of the vaginal cuff with severe pelvic pain, pressure, and imaging showing likely pelvic seroma with no evidence of fistulization. Also history of chronic pain on opioid therapy.    Plan   - Pelvic fluid collection: imaging showing pelvic fluid collection which on MRI is likely seroma (no evidence of fistula to the bowel, no enhancement to suggest abscess). Likely related to surgical procedure in May 2024. Patient has symptoms that are consistent with location of the fluid collection (pain, pelvic pressure), and given the size I do think it's likely her symptoms are due to this collection. As such, I would recommend drainage to see if that resolves her symptoms. Will defer to IR, but reasonable to aspirate and only leave a drain if the aspirate appears infected.   - If symptoms improve, no need for additional imaging or follow-up.    - Pre-op: Reviewed medications. Ok to continue current medications as prescribed. Has had multiple sedated procedures in the past without issue. Also recent major surgery with no issue. Limited by pain, but otherwise able to performe >4 METS from a cardiovascular standpoint. No history of CAD, HTN, arrhthmia, no anginal symptoms. Low risk for tamra-operative complications. Ok to proceed with planned procedure    - Pelvic pain: as above. Increase ibuprofen to 600mg every 6 hours. Patient has chronic pain and  is on chronic opioid therapy. I spoke with her pain clinic (Pain provider is Karl Montano PA-C). Notified them I will be prescribing pain medicatoins for acute pain episode and following her minor procedure. She takes Percocet 10mg every 4-6 hours (5 doses a day). I wrote a prescription to increase this to oxycodone 15mg up to 5 times daily. Confirmed on PDMP.      Josias Pan MD   Gynecologic Oncology     ~~~~~~~~~~~~~~~~~~~~~~~~~~~~~~~~~~~~~~~~~~~~~~~~~~~~~~~~~~~~~~~~~~~~~~~    History of Present Illness   Severe pelvic pain, comes and goes, worse with walking. Also pelvic pressure. Deep pain, upper vaginal area and pelvis. Using ibuprofen twice daily. Taking chronic pain medications. Uses Robaxan 1-2 times daily. Also with nausea, no emesis. Having issues with dumping syndrome and ostomy. No vaginal bleeding or discharge. Imaging reviewed and shows simple fluid collection at the cuff.     No issues with sedation or anesthesia. No chest pain at rest or with exertion.     Past Medical History:   Diagnosis Date    Bipolar disorder (H)     Colonic dysmotility     Colostomy in place (H)      Past Surgical History:   Procedure Laterality Date    COLECTOMY WITHOUT COLOSTOMY N/A 5/6/2024    Procedure: OPEN total abdominal colectomy, end ileostomy placement, lysis of adhesions;  Surgeon: Mali Ayala MD;  Location:  OR    HYSTERECTOMY      2013 after chidlbirth    IR GASTRO JEJUNOSTOMY TUBE PLACEMENT  05/18/2022    IR NG TUBE PLACEMENT REQ RAD & FLUORO  05/18/2022    IR PICC PLACEMENT > 5 YRS OF AGE  5/15/2024    LAPAROTOMY EXPLORATORY      multiple    SALPINGO-OOPHORECTOMY BILATERAL N/A 5/6/2024    Procedure: Open bilateral Salpingo-oophorectomy;  Surgeon: Josias Pan MD;  Location:  OR     Family History   Problem Relation Age of Onset    Lymphoma Maternal Grandmother         Burkitt's lymphoma    Lung Cancer Paternal Uncle         Multiple uncles, all heavy smokers     Social History     Tobacco Use  "   Smoking status: Former     Current packs/day: 0.00     Types: Cigarettes     Quit date: 2020     Years since quittin.6     Passive exposure: Past    Smokeless tobacco: Never   Vaping Use    Vaping status: Every Day   Substance Use Topics    Alcohol use: Yes     Comment: 5 drinks once a week    Drug use: Never       Physical Exam   Ht 1.702 m (5' 7\")   Wt 70.3 kg (155 lb)   LMP  (LMP Unknown)   BMI 24.28 kg/m    Gen: no acute distress  Today's visit is virtual.   Seen 2024 in person  CV: RRR, no murmurs  Lungs: CTAB    Data   Laboratory data and imaging listed below was reviewed prior to this encounter  Labs/Pathology:   Personally reviewed imaging below  Imaging:   Narrative & Impression   EXAM: MR PELVIS (GYN) W/O and W CONTRAST  LOCATION: [facility]  DATE: 2024     INDICATION: Pelvic fluid collection; concern for fistula, further evaluate finding that was discovered on CT abdomen pelvis  COMPARISON: CT of the abdomen and pelvis 2024 and 2024.  TECHNIQUE: Routine MRI female pelvis protocol including T1 in/out phase, diffusion, thin section high resolution multiplane T2, and post contrast T1.  CONTRAST: 7mL GADAVIST     FINDINGS:      Postoperative changes of hysterectomy, bilateral salpingo-oophorectomy, cholectomy, and left lower quadrant ileostomy formation.     In the central pelvis immediately superior to the vaginal cuff and posterior to the urinary bladder is a sharply circumscribed cystic structure or fluid collection which measures 5.2 x 4.6 x 4.1 cm. This structure has fluid signal intensity on T1   weighted and T2-weighted images. There is enhancement of the peripheral rim and thin internal septations of this structure. No solid nodular components are detected. No inflammatory stranding or fluid in the pelvis near this structure. This structure   contacts the Rg's pouch and urinary bladder but no fistula to either is " visualized. The vaginal cuff is otherwise unremarkable.                                                                      IMPRESSION:  5.2 cm sharply circumscribed cystic structure/fluid collection associated with the vaginal cuff is unchanged from CT 8/1/2024 and new from CT of 7/1/2024. Seroma is favored. This structure does not appear to be inflamed which is evidence against abscess.   No fistula to bowel or bladder visualized. Clinical correlation and follow-up suggested.       I spent a total of 20 minutes on the care of Mimi Su on the day of service including face to face time, care coordination, and documentation on the day of service. I spoke with local pain provider. I reviewed images from CT 7/1/2024, 8/1/2024, and MRI 8/3/2024. Reviewed associated labs from ED visit 8/3/2024         Virtual Visit Details    Joined the call at 8/7/2024, 10:04:22 am.  Left the call at 8/7/2024, 10:19:31 am.  You were on the call for 15 minutes 9 seconds    Originating Location (pt. Location): Home    Distant Location (provider location):  On-site  Platform used for Video Visit: Falguni

## 2024-08-07 NOTE — LETTER
2024      Re: Mimi Su  [address]  [facility] [address]  : 1985         To Whom it May Concern:    Mimi Su was previously under my care for a surgical procedure. She subsequently underwent a procedure due to development of a post-operative cyst which resulted in additional post-operative pain and resultant flare of her chronic pain. However, this condition has now stabilized and requires no further management at this time. She does not require ongoing follow-up in our clinic and is able to manage any persistent symptoms if they occur, on her own or with her primary care provider without specialized follow-up.       Sincerely,      Josias Pan MD  Gynecologic Oncology

## 2024-08-07 NOTE — LETTER
October 1, 2024      Re: Mimi Su  [address]  [facility] [address]         To Whom it may concern:    During her time under my care, Mimi Su received pain medication prescriptions related to post-operative procedure. She was compliant with the recommendations regarding pain management and utilization of pain medications during her time under our care. She did receive pain medication prescriptions through our clinic for management of pain related to a post-operative cyst formation, subsequent procedure,and resultant pain secondary to same. Reaccumulation of the cyst resulted in a longer course of pain medication than was originally expected, but she was compliant with the recommendations and prescriptions provided by our clinic regarding its management. Based on communication with her primary pain clinic, she was also provided her chronic pain medications by our clinic during this time with amounts and dosages cross-verified with PDMP. This only spanned the time during which she had additional pain needs through our clinic. She has received no further prescriptions through our clinic since 8/15/2024.    Sincerely,      Josias Pan MD   Gynecologic Oncology

## 2024-08-07 NOTE — TELEPHONE ENCOUNTER
Spoke with pt  Local ER/obgyn saw pt in ER, does not believe it is an in and out procedure.  Thinks will need more than in and out procedure. States you need to be admitted to do procedure and manage pain.  Does not want to go down here be seen and then not be admitted and sent home.    Really wants to know she should do

## 2024-08-07 NOTE — TELEPHONE ENCOUNTER
Spoke with md  Informed of pt situation, per md if pt cannot manage pain on out pt basis until out pt procedure can be scheduled and done the should come in through ER.  Will manage pain over night and potentially due drain tomorrow.      Pt informed on the above, reiterated nothing would be done tonight, would be boarded in the ER to manage pain until procedure can be as floor has no open beds  Once procedure is done will be discharged      Pt reports understanding

## 2024-08-08 ENCOUNTER — APPOINTMENT (OUTPATIENT)
Dept: INTERVENTIONAL RADIOLOGY/VASCULAR | Facility: CLINIC | Age: 39
DRG: 920 | End: 2024-08-08
Attending: NURSE PRACTITIONER
Payer: COMMERCIAL

## 2024-08-08 LAB
ANION GAP SERPL CALCULATED.3IONS-SCNC: 13 MMOL/L (ref 7–15)
ANION GAP SERPL CALCULATED.3IONS-SCNC: 8 MMOL/L (ref 7–15)
BASOPHILS # BLD AUTO: 0 10E3/UL (ref 0–0.2)
BASOPHILS NFR BLD AUTO: 1 %
BUN SERPL-MCNC: 6.4 MG/DL (ref 6–20)
BUN SERPL-MCNC: 9.2 MG/DL (ref 6–20)
CALCIUM SERPL-MCNC: 6.9 MG/DL (ref 8.8–10.4)
CALCIUM SERPL-MCNC: 8.1 MG/DL (ref 8.8–10.4)
CHLORIDE SERPL-SCNC: 108 MMOL/L (ref 98–107)
CHLORIDE SERPL-SCNC: 111 MMOL/L (ref 98–107)
CREAT SERPL-MCNC: 0.37 MG/DL (ref 0.51–0.95)
CREAT SERPL-MCNC: 0.61 MG/DL (ref 0.51–0.95)
EGFRCR SERPLBLD CKD-EPI 2021: >90 ML/MIN/1.73M2
EGFRCR SERPLBLD CKD-EPI 2021: >90 ML/MIN/1.73M2
EOSINOPHIL # BLD AUTO: 0 10E3/UL (ref 0–0.7)
EOSINOPHIL NFR BLD AUTO: 1 %
ERYTHROCYTE [DISTWIDTH] IN BLOOD BY AUTOMATED COUNT: 13.7 % (ref 10–15)
ERYTHROCYTE [DISTWIDTH] IN BLOOD BY AUTOMATED COUNT: 13.8 % (ref 10–15)
GLUCOSE SERPL-MCNC: 57 MG/DL (ref 70–99)
GLUCOSE SERPL-MCNC: 82 MG/DL (ref 70–99)
HCO3 SERPL-SCNC: 15 MMOL/L (ref 22–29)
HCO3 SERPL-SCNC: 23 MMOL/L (ref 22–29)
HCT VFR BLD AUTO: 20.9 % (ref 35–47)
HCT VFR BLD AUTO: 28.9 % (ref 35–47)
HGB BLD-MCNC: 6.6 G/DL (ref 11.7–15.7)
HGB BLD-MCNC: 9.5 G/DL (ref 11.7–15.7)
IMM GRANULOCYTES # BLD: 0 10E3/UL
IMM GRANULOCYTES NFR BLD: 1 %
INR PPP: 0.93 (ref 0.85–1.15)
INR PPP: 2.69 (ref 0.85–1.15)
LYMPHOCYTES # BLD AUTO: 1 10E3/UL (ref 0.8–5.3)
LYMPHOCYTES NFR BLD AUTO: 34 %
MCH RBC QN AUTO: 32.7 PG (ref 26.5–33)
MCH RBC QN AUTO: 33.1 PG (ref 26.5–33)
MCHC RBC AUTO-ENTMCNC: 31.6 G/DL (ref 31.5–36.5)
MCHC RBC AUTO-ENTMCNC: 32.9 G/DL (ref 31.5–36.5)
MCV RBC AUTO: 101 FL (ref 78–100)
MCV RBC AUTO: 104 FL (ref 78–100)
MONOCYTES # BLD AUTO: 0.3 10E3/UL (ref 0–1.3)
MONOCYTES NFR BLD AUTO: 10 %
NEUTROPHILS # BLD AUTO: 1.6 10E3/UL (ref 1.6–8.3)
NEUTROPHILS NFR BLD AUTO: 53 %
NRBC # BLD AUTO: 0 10E3/UL
NRBC BLD AUTO-RTO: 0 /100
PLATELET # BLD AUTO: 106 10E3/UL (ref 150–450)
PLATELET # BLD AUTO: 79 10E3/UL (ref 150–450)
POTASSIUM SERPL-SCNC: 3.6 MMOL/L (ref 3.4–5.3)
POTASSIUM SERPL-SCNC: 3.9 MMOL/L (ref 3.4–5.3)
RBC # BLD AUTO: 2.02 10E6/UL (ref 3.8–5.2)
RBC # BLD AUTO: 2.87 10E6/UL (ref 3.8–5.2)
SODIUM SERPL-SCNC: 136 MMOL/L (ref 135–145)
SODIUM SERPL-SCNC: 142 MMOL/L (ref 135–145)
WBC # BLD AUTO: 2.4 10E3/UL (ref 4–11)
WBC # BLD AUTO: 3 10E3/UL (ref 4–11)

## 2024-08-08 PROCEDURE — 10160 PNXR ASPIR ABSC HMTMA BULLA: CPT | Mod: GC | Performed by: RADIOLOGY

## 2024-08-08 PROCEDURE — 77012 CT SCAN FOR NEEDLE BIOPSY: CPT | Mod: 26 | Performed by: RADIOLOGY

## 2024-08-08 PROCEDURE — 85025 COMPLETE CBC W/AUTO DIFF WBC: CPT

## 2024-08-08 PROCEDURE — 85610 PROTHROMBIN TIME: CPT

## 2024-08-08 PROCEDURE — 250N000011 HC RX IP 250 OP 636

## 2024-08-08 PROCEDURE — 85027 COMPLETE CBC AUTOMATED: CPT | Performed by: NURSE PRACTITIONER

## 2024-08-08 PROCEDURE — 80048 BASIC METABOLIC PNL TOTAL CA: CPT

## 2024-08-08 PROCEDURE — 99152 MOD SED SAME PHYS/QHP 5/>YRS: CPT | Mod: GC | Performed by: RADIOLOGY

## 2024-08-08 PROCEDURE — 87075 CULTR BACTERIA EXCEPT BLOOD: CPT

## 2024-08-08 PROCEDURE — 120N000005 HC R&B MS OVERFLOW UMMC

## 2024-08-08 PROCEDURE — 99152 MOD SED SAME PHYS/QHP 5/>YRS: CPT

## 2024-08-08 PROCEDURE — 36415 COLL VENOUS BLD VENIPUNCTURE: CPT

## 2024-08-08 PROCEDURE — 87205 SMEAR GRAM STAIN: CPT

## 2024-08-08 PROCEDURE — 250N000013 HC RX MED GY IP 250 OP 250 PS 637

## 2024-08-08 PROCEDURE — 258N000003 HC RX IP 258 OP 636

## 2024-08-08 PROCEDURE — 77012 CT SCAN FOR NEEDLE BIOPSY: CPT

## 2024-08-08 PROCEDURE — 250N000009 HC RX 250

## 2024-08-08 PROCEDURE — 99233 SBSQ HOSP IP/OBS HIGH 50: CPT | Mod: GC | Performed by: OBSTETRICS & GYNECOLOGY

## 2024-08-08 PROCEDURE — 272N000500 HC NEEDLE CR2

## 2024-08-08 PROCEDURE — 0W9J3ZZ DRAINAGE OF PELVIC CAVITY, PERCUTANEOUS APPROACH: ICD-10-PCS | Performed by: RADIOLOGY

## 2024-08-08 RX ORDER — ESTRADIOL 0.1 MG/D
1 FILM, EXTENDED RELEASE TRANSDERMAL
Qty: 8 PATCH | Refills: 0 | Status: SHIPPED | OUTPATIENT
Start: 2024-08-08

## 2024-08-08 RX ORDER — IBUPROFEN 600 MG/1
600 TABLET, FILM COATED ORAL EVERY 6 HOURS PRN
Qty: 60 TABLET | Refills: 1 | Status: CANCELLED | OUTPATIENT
Start: 2024-08-08

## 2024-08-08 RX ORDER — NALOXONE HYDROCHLORIDE 0.4 MG/ML
0.2 INJECTION, SOLUTION INTRAMUSCULAR; INTRAVENOUS; SUBCUTANEOUS
Status: DISCONTINUED | OUTPATIENT
Start: 2024-08-08 | End: 2024-08-08

## 2024-08-08 RX ORDER — CLONAZEPAM 0.5 MG/1
0.25 TABLET ORAL DAILY PRN
COMMUNITY

## 2024-08-08 RX ORDER — OXYCODONE HYDROCHLORIDE 10 MG/1
10 TABLET ORAL EVERY 6 HOURS PRN
Status: DISCONTINUED | OUTPATIENT
Start: 2024-08-08 | End: 2024-08-08

## 2024-08-08 RX ORDER — ONDANSETRON 4 MG/1
4 TABLET, ORALLY DISINTEGRATING ORAL EVERY 6 HOURS PRN
COMMUNITY
Start: 2024-07-23

## 2024-08-08 RX ORDER — CLONAZEPAM 0.5 MG/1
0.5 TABLET ORAL AT BEDTIME
COMMUNITY

## 2024-08-08 RX ORDER — METHOCARBAMOL 750 MG/1
750 TABLET, FILM COATED ORAL AT BEDTIME
COMMUNITY

## 2024-08-08 RX ORDER — NALOXONE HYDROCHLORIDE 0.4 MG/ML
0.2 INJECTION, SOLUTION INTRAMUSCULAR; INTRAVENOUS; SUBCUTANEOUS
Status: DISCONTINUED | OUTPATIENT
Start: 2024-08-08 | End: 2024-08-09 | Stop reason: HOSPADM

## 2024-08-08 RX ORDER — FLUMAZENIL 0.1 MG/ML
0.2 INJECTION, SOLUTION INTRAVENOUS
Status: DISCONTINUED | OUTPATIENT
Start: 2024-08-08 | End: 2024-08-09 | Stop reason: HOSPADM

## 2024-08-08 RX ORDER — SUCRALFATE 1 G/1
1 TABLET ORAL EVERY MORNING
COMMUNITY

## 2024-08-08 RX ORDER — HYDROMORPHONE HYDROCHLORIDE 1 MG/ML
0.5 INJECTION, SOLUTION INTRAMUSCULAR; INTRAVENOUS; SUBCUTANEOUS
Status: DISCONTINUED | OUTPATIENT
Start: 2024-08-08 | End: 2024-08-09

## 2024-08-08 RX ORDER — NALOXONE HYDROCHLORIDE 0.4 MG/ML
0.4 INJECTION, SOLUTION INTRAMUSCULAR; INTRAVENOUS; SUBCUTANEOUS
Status: DISCONTINUED | OUTPATIENT
Start: 2024-08-08 | End: 2024-08-09 | Stop reason: HOSPADM

## 2024-08-08 RX ORDER — ESTRADIOL 0.05 MG/D
1 PATCH, EXTENDED RELEASE TRANSDERMAL
Status: CANCELLED | OUTPATIENT
Start: 2024-08-08

## 2024-08-08 RX ORDER — SODIUM CHLORIDE, SODIUM LACTATE, POTASSIUM CHLORIDE, CALCIUM CHLORIDE 600; 310; 30; 20 MG/100ML; MG/100ML; MG/100ML; MG/100ML
INJECTION, SOLUTION INTRAVENOUS
Status: DISCONTINUED
Start: 2024-08-08 | End: 2024-08-08 | Stop reason: HOSPADM

## 2024-08-08 RX ORDER — NALOXONE HYDROCHLORIDE 0.4 MG/ML
0.4 INJECTION, SOLUTION INTRAMUSCULAR; INTRAVENOUS; SUBCUTANEOUS
Status: DISCONTINUED | OUTPATIENT
Start: 2024-08-08 | End: 2024-08-08

## 2024-08-08 RX ORDER — HYDROMORPHONE HYDROCHLORIDE 1 MG/ML
0.5 INJECTION, SOLUTION INTRAMUSCULAR; INTRAVENOUS; SUBCUTANEOUS
Status: DISCONTINUED | OUTPATIENT
Start: 2024-08-08 | End: 2024-08-08

## 2024-08-08 RX ORDER — OXYCODONE HYDROCHLORIDE 5 MG/1
5-10 TABLET ORAL
Qty: 30 TABLET | Refills: 0 | Status: SHIPPED | OUTPATIENT
Start: 2024-08-08 | End: 2024-08-09

## 2024-08-08 RX ORDER — ACETAMINOPHEN 325 MG/1
325-650 TABLET ORAL EVERY 6 HOURS PRN
COMMUNITY

## 2024-08-08 RX ORDER — FENTANYL CITRATE 50 UG/ML
25-50 INJECTION, SOLUTION INTRAMUSCULAR; INTRAVENOUS EVERY 5 MIN PRN
Status: DISCONTINUED | OUTPATIENT
Start: 2024-08-08 | End: 2024-08-08

## 2024-08-08 RX ADMIN — FENTANYL CITRATE 50 MCG: 50 INJECTION, SOLUTION INTRAMUSCULAR; INTRAVENOUS at 15:17

## 2024-08-08 RX ADMIN — MIDAZOLAM 1 MG: 1 INJECTION INTRAMUSCULAR; INTRAVENOUS at 15:17

## 2024-08-08 RX ADMIN — FLUOXETINE 10 MG: 10 CAPSULE ORAL at 09:48

## 2024-08-08 RX ADMIN — TIZANIDINE 2 MG: 2 TABLET ORAL at 00:10

## 2024-08-08 RX ADMIN — LIDOCAINE HYDROCHLORIDE 12 ML: 10 INJECTION, SOLUTION EPIDURAL; INFILTRATION; INTRACAUDAL; PERINEURAL at 15:39

## 2024-08-08 RX ADMIN — HYDROMORPHONE HYDROCHLORIDE 0.5 MG: 1 INJECTION, SOLUTION INTRAMUSCULAR; INTRAVENOUS; SUBCUTANEOUS at 19:53

## 2024-08-08 RX ADMIN — ACETAMINOPHEN 975 MG: 325 TABLET, FILM COATED ORAL at 16:22

## 2024-08-08 RX ADMIN — SODIUM CHLORIDE, POTASSIUM CHLORIDE, SODIUM LACTATE AND CALCIUM CHLORIDE: 600; 310; 30; 20 INJECTION, SOLUTION INTRAVENOUS at 08:10

## 2024-08-08 RX ADMIN — ACETAMINOPHEN 975 MG: 325 TABLET, FILM COATED ORAL at 08:23

## 2024-08-08 RX ADMIN — HYDROMORPHONE HYDROCHLORIDE 0.5 MG: 1 INJECTION, SOLUTION INTRAMUSCULAR; INTRAVENOUS; SUBCUTANEOUS at 13:54

## 2024-08-08 RX ADMIN — HYDROMORPHONE HYDROCHLORIDE 0.5 MG: 1 INJECTION, SOLUTION INTRAMUSCULAR; INTRAVENOUS; SUBCUTANEOUS at 00:55

## 2024-08-08 RX ADMIN — HYDROMORPHONE HYDROCHLORIDE 0.5 MG: 1 INJECTION, SOLUTION INTRAMUSCULAR; INTRAVENOUS; SUBCUTANEOUS at 07:59

## 2024-08-08 RX ADMIN — IBUPROFEN 600 MG: 600 TABLET, FILM COATED ORAL at 21:38

## 2024-08-08 RX ADMIN — KETOROLAC TROMETHAMINE 15 MG: 15 INJECTION, SOLUTION INTRAMUSCULAR; INTRAVENOUS at 09:45

## 2024-08-08 RX ADMIN — MIDAZOLAM 0.5 MG: 1 INJECTION INTRAMUSCULAR; INTRAVENOUS at 15:32

## 2024-08-08 RX ADMIN — OXYCODONE HYDROCHLORIDE 10 MG: 10 TABLET ORAL at 18:05

## 2024-08-08 RX ADMIN — PANTOPRAZOLE SODIUM 40 MG: 40 TABLET, DELAYED RELEASE ORAL at 08:23

## 2024-08-08 RX ADMIN — FLUOXETINE HYDROCHLORIDE 20 MG: 20 CAPSULE ORAL at 09:46

## 2024-08-08 RX ADMIN — CLONAZEPAM 0.25 MG: 0.5 TABLET ORAL at 21:36

## 2024-08-08 RX ADMIN — Medication 3 MG: at 22:49

## 2024-08-08 RX ADMIN — MIDAZOLAM 1 MG: 1 INJECTION INTRAMUSCULAR; INTRAVENOUS at 15:24

## 2024-08-08 RX ADMIN — HYDROMORPHONE HYDROCHLORIDE 0.5 MG: 1 INJECTION, SOLUTION INTRAMUSCULAR; INTRAVENOUS; SUBCUTANEOUS at 04:21

## 2024-08-08 RX ADMIN — PREGABALIN 150 MG: 75 CAPSULE ORAL at 00:10

## 2024-08-08 RX ADMIN — ACETAMINOPHEN 975 MG: 325 TABLET, FILM COATED ORAL at 19:52

## 2024-08-08 RX ADMIN — OXYCODONE HYDROCHLORIDE 10 MG: 10 TABLET ORAL at 06:48

## 2024-08-08 RX ADMIN — PREGABALIN 150 MG: 75 CAPSULE ORAL at 21:37

## 2024-08-08 RX ADMIN — IBUPROFEN 600 MG: 600 TABLET, FILM COATED ORAL at 03:43

## 2024-08-08 RX ADMIN — KETOROLAC TROMETHAMINE 15 MG: 15 INJECTION, SOLUTION INTRAMUSCULAR; INTRAVENOUS at 16:22

## 2024-08-08 RX ADMIN — HYDROMORPHONE HYDROCHLORIDE 0.5 MG: 1 INJECTION, SOLUTION INTRAMUSCULAR; INTRAVENOUS; SUBCUTANEOUS at 22:49

## 2024-08-08 RX ADMIN — OXYCODONE HYDROCHLORIDE 5 MG: 5 TABLET ORAL at 21:36

## 2024-08-08 RX ADMIN — FENTANYL CITRATE 25 MCG: 50 INJECTION, SOLUTION INTRAMUSCULAR; INTRAVENOUS at 15:32

## 2024-08-08 RX ADMIN — OXYCODONE HYDROCHLORIDE 5 MG: 5 TABLET ORAL at 02:28

## 2024-08-08 RX ADMIN — FENTANYL CITRATE 50 MCG: 50 INJECTION, SOLUTION INTRAMUSCULAR; INTRAVENOUS at 15:24

## 2024-08-08 RX ADMIN — PREGABALIN 150 MG: 75 CAPSULE ORAL at 08:23

## 2024-08-08 RX ADMIN — HYDROMORPHONE HYDROCHLORIDE 0.5 MG: 1 INJECTION, SOLUTION INTRAMUSCULAR; INTRAVENOUS; SUBCUTANEOUS at 17:10

## 2024-08-08 RX ADMIN — SODIUM CHLORIDE, POTASSIUM CHLORIDE, SODIUM LACTATE AND CALCIUM CHLORIDE: 600; 310; 30; 20 INJECTION, SOLUTION INTRAVENOUS at 17:15

## 2024-08-08 RX ADMIN — HYDROMORPHONE HYDROCHLORIDE 0.5 MG: 1 INJECTION, SOLUTION INTRAMUSCULAR; INTRAVENOUS; SUBCUTANEOUS at 10:33

## 2024-08-08 ASSESSMENT — ACTIVITIES OF DAILY LIVING (ADL)
ADLS_ACUITY_SCORE: 37
ADLS_ACUITY_SCORE: 37
DRESSING/BATHING_DIFFICULTY: NO
ADLS_ACUITY_SCORE: 37
FALL_HISTORY_WITHIN_LAST_SIX_MONTHS: NO
DIFFICULTY_EATING/SWALLOWING: NO
ADLS_ACUITY_SCORE: 37
ADLS_ACUITY_SCORE: 20
WALKING_OR_CLIMBING_STAIRS_DIFFICULTY: NO
WEAR_GLASSES_OR_BLIND: NO
ADLS_ACUITY_SCORE: 20
ADLS_ACUITY_SCORE: 37
ADLS_ACUITY_SCORE: 20
ADLS_ACUITY_SCORE: 37
ADLS_ACUITY_SCORE: 20
DIFFICULTY_COMMUNICATING: NO
HEARING_DIFFICULTY_OR_DEAF: NO
DOING_ERRANDS_INDEPENDENTLY_DIFFICULTY: NO
ADLS_ACUITY_SCORE: 37
CHANGE_IN_FUNCTIONAL_STATUS_SINCE_ONSET_OF_CURRENT_ILLNESS/INJURY: NO
TOILETING_ISSUES: NO
ADLS_ACUITY_SCORE: 20
CONCENTRATING,_REMEMBERING_OR_MAKING_DECISIONS_DIFFICULTY: NO
ADLS_ACUITY_SCORE: 37
ADLS_ACUITY_SCORE: 37

## 2024-08-08 NOTE — ED TRIAGE NOTES
Pt to ED for abd pain/vaginal pain for the last week. Recently dx with a vaginal cuff seroma requiring surgery. Pt reports being referred by her surgeon for pain control/admission.      Triage Assessment (Adult)       Row Name 08/07/24 1931          Triage Assessment    Airway WDL WDL        Respiratory WDL    Respiratory WDL WDL        Skin Circulation/Temperature WDL    Skin Circulation/Temperature WDL WDL        Cardiac WDL    Cardiac WDL WDL        Peripheral/Neurovascular WDL    Peripheral Neurovascular WDL WDL        Cognitive/Neuro/Behavioral WDL    Cognitive/Neuro/Behavioral WDL WDL

## 2024-08-08 NOTE — NURSING NOTE
Send orders for IR drainage of seroma at [facility]  Orders faxed to [phone] on 8/6/24  They will call pt to set up appt  No follow-up in gyn onc  needed  Rx for pain medications sent to pharmacy

## 2024-08-08 NOTE — PROGRESS NOTES
GYN ONC PROGRESS NOTE  08/08/2024    HD#2 acute on chronic pelvic pain, post-op seroma  Disease: Chronic abdominal/pelvic pain    24 hour events:   - admission, NPO for IR procedure    SUBJECTIVE:   Patient sleeping soundly at the time of pre-sounds. When assessed overnight, pain and nausea had improved with medications. NPO for possible IR procedure today.    OBJECTIVE:   Patient Vitals for the past 24 hrs:   BP Temp Temp src Pulse Resp SpO2   08/08/24 0425 -- -- -- 61 18 98 %   08/08/24 0044 113/72 97.7  F (36.5  C) Oral -- 18 99 %   08/07/24 1933 -- 98.1  F (36.7  C) Oral -- -- --   08/07/24 1930 120/77 -- -- 91 16 97 %     Gen- Laying in bed, NAD  CV- Regular rate, well perfused  Pulm- NWOB on room air  Abd- Non-distended; ileostomy site covered by clothing  Extr- No appreciable edema, nontender  Lines/drains- PIV, ileostomy    I/Os  (Yesterday // Since Midnight)   mL // NR  Ileostomy  NR // NR    New Labs/Imaging-   No results found for this or any previous visit (from the past 24 hour(s)).    Pending cultures (date obtained):   - plan analysis/culture of IR aspirate pending amount/appearance        ASSESSMENT:   39 year old female with extensive CRS history including recent joint case with Gynecologic Oncology 5/6/2024 that included open BSO performed in the setting of benign ovarian cysts (prior total hysterectomy in 2013) now presenting with post-operative seroma thought to be potentially contributing to acute exacerbation of chronic pelvic pain. No evidence of fistulization on imaging or clinical concern for infection at this time. Admitted for coordination of aspiration/drainage procedure with IR.     # Acute on chronic pelvic pain  # Post-operative seroma  - Scheduled tylenol, ibuprofen/Toradol; PTA Zanaflex qHS, Lyrica BID, PTA oxy 10 q4h prn; IV Dilaudid prn  - NPO for IR consultation this AM in anticipation of drainage/aspiration procedure, mIVF  - AM pre-procedure labs including CBC, BMP, INR  ordered     # Colonic dysmotility, s/p colectomy  # End ileostomy  # Reported high ileostomy output  - Strict Is&Os  - PTA PRN imodium  - PTA PRN simethicone      # Nausea  - PRN Zofran (documented anaphylactic allergy to metoclopramide)     # GERD  - PTA Protonix, PRN Carafate     # JOSE  - PTA Prozac, Vistaril prn, Klonopin prn  - Sleep: PTA melatonin prn     # Surgical menopause  - recommended initiation of HRT with primary OB/GYN     # Inpatient status  - Bowel regimen: Prn miralax (not scheduled given report of high ileostomy output)  - PPX: SCDs, IS    Azra Dukes MD, PGY-4  6:08 AM  [facility]    Gyn Onc Pager: [phone]      Gynecologic Oncology Attending Attestation  I have seen the patient on rounds with the team. Symptoms persistent, spoke with IR and they will aspirate fluid collection later today. CBC/INR clinically atypical, repeat values as likely erroneous. Transition to PO pain medication after procedure for discharge.  I have discussed the patient and plan of care with the resident as documented in the note above, which I have edited as necessary.    Josias Pan MD    Gynecologic Oncology

## 2024-08-08 NOTE — PROGRESS NOTES
Brief Progress Note    Went to check in on patient. She is much more comfortable now since being moved to a room. Finally able to get some rest. Discussed plan for IR procedure this afternoon with possibility to discharge home afterwards if she is feeling well. Our team will plan to check back in on her after the procedure.     Lucille Simpson MD PGY1  [facility]  Obstetrics, Gynecology, & Women's Health

## 2024-08-08 NOTE — PROCEDURES
[facility]    Procedure: Pelvic fluid collection aspiration    Date/Time: 8/8/2024 3:47 PM    Performed by: Brooklynn Jones MD  Authorized by: Chetan Marie MD      UNIVERSAL PROTOCOL   Site Marked: NA  Prior Images Obtained and Reviewed:  Yes  Required items: Required blood products, implants, devices and special equipment available    Patient identity confirmed:  Verbally with patient, arm band, provided demographic data and hospital-assigned identification number  Patient was reevaluated immediately before administering moderate or deep sedation or anesthesia  Confirmation Checklist:  Patient's identity using two indicators, relevant allergies, procedure was appropriate and matched the consent or emergent situation and correct equipment/implants were available  Time out: Immediately prior to the procedure a time out was called    Universal Protocol: the Joint Commission Universal Protocol was followed    Preparation: Patient was prepped and draped in usual sterile fashion       ANESTHESIA    Anesthesia:  Local infiltration  Local Anesthetic:  Lidocaine 1% without epinephrine      SEDATION  Patient Sedated: Yes    Vital signs: Vital signs monitored during sedation    See dictated procedure note for full details.  Findings: 30ml serous fluid aspirated and sent for labs.    Specimens: fluid and/or tissue for laboratory analysis and culture    Complications: None    Condition: Stable    Plan: Bed rest for 1 hour.      PROCEDURE    Patient Tolerance:  Patient tolerated the procedure well with no immediate complications  Length of time physician/provider present for 1:1 monitoring during sedation: 20

## 2024-08-08 NOTE — PROGRESS NOTES
Brief Gyn Onc Progress Note    Called to patient bedside by RN; she patient requesting to speak with a provider regarding hospital bed situation. Patient was previously in the waiting room, now in an ED hallway bed, wondering when she might be able to be somewhere more private and restful, either in an individual ED or hospital floor room.    On-call Gyn Onc G4 resident contacted patient placement, who confirmed no availability of hospital floor bed at any point overnight. Explained to patient that Gyn Onc team also does not have any influence over patient flow within the ED itself. Apologized that our care team does not have the ability to provide a more comfortable space for her to await her anticipated IR procedure.    She expressed her understanding and confirmed that her pain is under better control than when she first arrived to the ED. Encouraged her to continue to make her needs known overnight.    Azra Dukes MD, PGY-4  2:47 AM  [facility] Obstetrics & Gynecology Residency

## 2024-08-08 NOTE — PATIENT INSTRUCTIONS
It was a pleasure seeing you in clinic today-please reach out if there are any further questions that arise following today's visit.  During business hours, you may reach me at [phone].  For urgent/emergent questions after business hours, you may reach the on-call Gynecologic Oncology Resident through the [facility]  at [phone].    All normal test results are usually communicated via letter or Weembahart message.  Abnormal results (those that require a change in the previously discussed plan of care) are usually communicated via a phone call.    I recommend signing up for [employer] access if you have not already done so.  This allows you online access to your lab results and also helps you communicate efficiently with your clinic should any questions arise in your care.      referral to [facility] for drainage of seroma, scheduling will call you to help make an appointment  No follow-up in gyn oncology needed  Continue regular visits with primary care and gynecology  Please call if you have any questions or concerns related to your recent surgery or recovery      Dr Rosa East RN  Phone:  [phone]  Fax:  [phone]

## 2024-08-08 NOTE — CONSULTS
"    Interventional Radiology  [facility] Consult Service Note  08/08/24   8:32 AM    Consult Requested: \"vaginal cuff seroma on MRI 8/3 - eval for drainage\"    Recommendations/Plan:    Patient is on IR schedule 8/8 for a CT guided deep pelvic fluid collection aspiration.   Labs being repeated currently. Unexplained lab derangements on AM draw.  Orders entered for procedure, NPO status  Consent will be done prior to procedure.     Please contact the IR charge RN at [phone] for estimated time of procedure.     Case and imaging discussed with IR attending, Dr. Marie.  Recommendations were reviewed with gyn/onc.    History of Present Illness:  Mimi Su is a 39 year old surgically post-menopausal patient with a complex surgical history including end ileostomy, total hysterectomy, and recent open surgery with ovarian removal with pelvic fluid collection at the level of the vaginal cuff 5/6/24 as well as chronic pain who presented to OP clinic and subsequently the ED with severe pelvic pain, pressure, and imaging showing likely pelvic seroma with no evidence of fistulization. Gyn/onc was working to coordinate OP IR aspiration at OSH, but given pelvic pain, pt has been admitted and IR at [facility] is consulted for aspiration of the deep pelvic collection given concern this is the cause of pelvic pain.    Diagnostic labs entered by: gyn/onc    Pertinent Imaging Reviewed:         Expected date of discharge:  TBD    Vitals:   /72 (BP Location: Right arm, Patient Position: Semi-Wilson's, Cuff Size: Adult Regular)   Pulse 61   Temp 97.7  F (36.5  C) (Oral)   Resp 18   LMP  (LMP Unknown)   SpO2 98%     Pertinent Labs:   Lab Results   Component Value Date    WBC 2.4 (L) 08/08/2024    WBC 5.3 08/03/2024    WBC 4.5 05/27/2024     Lab Results   Component Value Date    HGB 6.6 08/08/2024    HGB 10.3 08/03/2024    HGB 7.3 05/27/2024     Lab Results   Component Value Date    PLT 79 08/08/2024     " 08/03/2024     05/28/2024     Lab Results   Component Value Date    INR 2.69 (H) 08/08/2024     Lab Results   Component Value Date    POTASSIUM 3.9 08/08/2024        COVID-19 Antibody Results, Testing for Immunity           No data to display              COVID-19 PCR Results          11/26/2022    12:02 8/5/2023    10:28 9/10/2023    11:47   COVID-19 PCR Results   COVID-19 Virus by PCR (External Result) Negative     Negative     Negative          Details          This result is from an external source.               LEIGH ANN Bravo CNP  Interventional Radiology  Pager: [phone]

## 2024-08-08 NOTE — H&P
"[facility] History and Physical    Mimi Su MRN# [id number]   Age: 39 year old YOB: 1985     Date of Admission:  8/7/2024    Primary care provider: Mat Fernandez             Chief Complaint:   Acute on chronic pelvic pain, post-operative seroma         History of Present Illness:   Patient seen in Gyn Onc clinic for a virtual visit with Dr. Pan earlier today. Per that note: \"Mimi Su is a 39 year old surgically post-menopausal patient on hormone therapy [not yet taking - see below] with a complex surgical history including end ileostomy, total hysterectomy, and recent open surgery with ovarian removal with pelvic fluid collection at the level of the vaginal cuff with severe pelvic pain, pressure, and imaging showing likely pelvic seroma with no evidence of fistulization. Also history of chronic pain on opioid therapy.\"    Plan initially made for outpatient IR drainage. Patient presented to an outside facility for this, where it was ultimately recommended that the patient instead present to [facility] (where her surgery took place May 2024) for admission for pain control ahead of that now inpatient procedure.       On Gyn Onc resident evaluation, patient reports that she is feeling ok after receiving IV pain and nausea medication in the ED. The lower abdominal pain and pressure that prompted her to present to the ED remain bothersome. She has also had quite a bit of nausea today, with no episodes of vomiting, and reports that she has been able to eat some. Also endorses subjectively high ileostomy output, although has not quantified this. Denies fever/chills, urinary symptoms, or any vaginal discharge.    Patient also shared that she is starting to have vasomotor symptoms after her BSO in May. Did not note these initially but more recently having some hot flashes. Had made a plan to start HRT with her primary OB/GYN but didn't think she needed to unless she was bothered by symptoms. " "Provided education regarding need to supplement estrogen through the time of the average age of menopause to promote heart and bone health.         Treatment History:   \"About 16 years ago\" (est. 2008): partial colectomy for colonic dysmotility    12/2013: XL, BRYAN, total hysterectomy    5/6-13/2024: Planned CRS admission for surgery for colonic dysmotility, benign ovarian cysts  1. Exploratory laparotomy - Colorectal Surgery  2. Extended lysis of adhesions (80 minutes) - Colorectal Surgery  3. Total abdominal colectomy, end ileostomy - Colorectal Surgery  4. Bilateral salpingoophrectomy ([place]) - Gynecologic Oncology    5/22-29/2024: Readmission to CRS for SBO, managed medically    8/1/2024: ED visit for pain - CT AP  - New 4.1 cm mildly complex pelvic fluid collection which appears   to arise from the vaginal cuff and abuts the posterior margin of the   bladder. The differential includes a postoperative seroma, abscess,   or hematoma. Given lack of contiguity with the bowel sutures, an   anastomotic leak is not favored.     8/3/2024: ED visit for pain - MR Pelvis  - 5.2 cm sharply circumscribed cystic structure/fluid collection associated with the vaginal cuff is unchanged from CT 8/1/2024 and new from CT of 7/1/2024. Seroma is favored. This structure does not appear to be inflamed which is evidence against abscess.  No fistula to bowel or bladder visualized. Clinical correlation and follow-up suggested.         Past Medical History:     Past Medical History:   Diagnosis Date    Bipolar disorder (H)     Chronic pain on chronic opioid therapy     Colonic dysmotility     Ileostomy in place (H)             Past Surgical History:      Past Surgical History:   Procedure Laterality Date    COLECTOMY WITHOUT COLOSTOMY N/A 5/6/2024    Procedure: OPEN total abdominal colectomy, end ileostomy placement, lysis of adhesions;  Surgeon: Mali Ayala MD;  Location: [facility] OR    HYSTERECTOMY      2013 after chidlbirth    IR " GASTRO JEJUNOSTOMY TUBE PLACEMENT  2022    IR NG TUBE PLACEMENT REQ RAD & FLUORO  2022    IR PICC PLACEMENT > 5 YRS OF AGE  5/15/2024    LAPAROTOMY EXPLORATORY      multiple    SALPINGO-OOPHORECTOMY BILATERAL N/A 2024    Procedure: Open bilateral Salpingo-oophorectomy;  Surgeon: Josias Pan MD;  Location:  OR            Social History:     Social History     Tobacco Use    Smoking status: Former     Current packs/day: 0.00     Types: Cigarettes     Quit date: 2020     Years since quittin.6     Passive exposure: Past    Smokeless tobacco: Never   Substance Use Topics    Alcohol use: Yes     Comment: 5 drinks once a week            Family History:     Family History   Problem Relation Age of Onset    Lymphoma Maternal Grandmother         Burkitt's lymphoma    Lung Cancer Paternal Uncle         Multiple uncles, all heavy smokers            Allergies:     Allergies   Allergen Reactions    Metoclopramide Anaphylaxis and Nausea and Vomiting     start of anaphylaxis, was treated quickl      Mirtazapine Hives    Morphine Hives, Itching and Rash    Penicillins Hives    Prochlorperazine Anaphylaxis     Other reaction(s): Other (see comments)  sweating      Promethazine Anaphylaxis     Other reaction(s): Angioedema  angioedema      Sumatriptan Anaphylaxis     Tongue and throat sensation of swelling and chest tightness.       Desvenlafaxine      Other reaction(s): Intolerance    Lisinopril      Other reaction(s): Unknown Reaction    Milnacipran      Other reaction(s): Intolerance    Labetalol Rash     Other reaction(s): Tachycardia    Levofloxacin      Other reaction(s): Mental Status Change, Other (see comments)  Anxiety (severe), euphoria, facial numbness  Anxiety (severe), euphoria, facial numbness      Metronidazole Nausea and Vomiting    Quetiapine Rash    Sulfa Antibiotics Rash    Sulfamethoxazole-Trimethoprim      Other reaction(s): Unknown Reaction    Trazodone Hives and Rash             Medications:     Current Facility-Administered Medications   Medication Dose Route Frequency Provider Last Rate Last Admin    [START ON 8/8/2024] acetaminophen (TYLENOL) tablet 975 mg  975 mg Oral TID Azra Dukes MD        calcium carbonate (TUMS) chewable tablet 500-1,000 mg  500-1,000 mg Oral TID PRN Azra Dukes MD        clonazePAM (klonoPIN) half-tab 0.25-0.5 mg  0.25-0.5 mg Oral TID PRN Azra Dukes MD        [START ON 8/8/2024] FLUoxetine (PROzac) capsule 10 mg  10 mg Oral Daily Azra Dukes MD        [START ON 8/8/2024] FLUoxetine (PROzac) capsule 20 mg  20 mg Oral QAM Azra Dukes MD        HYDROmorphone (PF) (DILAUDID) injection 0.5 mg  0.5 mg Intravenous Q2H PRN Azra Dukes MD   0.5 mg at 08/07/24 2228    hydrOXYzine HCl (ATARAX) tablet 25 mg  25 mg Oral Q8H PRN Azra Dukes MD        ibuprofen (ADVIL/MOTRIN) tablet 600 mg  600 mg Oral Q6H Azra Dukes MD   600 mg at 08/07/24 2228    Or    ketorolac (TORADOL) injection 15 mg  15 mg Intravenous Q6H Azra Dukes MD        lactated ringers infusion   Intravenous Continuous Azra Dukes  mL/hr at 08/07/24 2236 New Bag at 08/07/24 2236    lidocaine (LMX4) cream   Topical Q1H PRN Azra Dukes MD        lidocaine 1 % 0.1-1 mL  0.1-1 mL Other Q1H PRN Azra Dukes MD        loperamide (IMODIUM) capsule 2 mg  2 mg Oral TID PRN Azra Dukes MD        melatonin CAPS 1 capsule  1 capsule Oral At Bedtime PRN Azra Dukes MD        ondansetron (ZOFRAN ODT) ODT tab 4 mg  4 mg Oral Q6H PRN Azra Dukes MD        Or    ondansetron (ZOFRAN) injection 4 mg  4 mg Intravenous Q6H PRN Azra Dukes MD   4 mg at 08/07/24 2228    oxyCODONE (ROXICODONE) tablet 5 mg  5 mg Oral Q4H PRN Azra Dukes MD   5 mg at 08/07/24 2228    oxyCODONE IR (ROXICODONE) tablet 10 mg  10 mg Oral Q4H PRN Azra Dukes MD        [START ON 8/8/2024] pantoprazole (PROTONIX) EC tablet 40 mg  40 mg Oral QAM AC Azra Dukes MD        polyethylene glycol (MIRALAX)  Packet 17 g  17 g Oral BID PRN Azra Dukes MD        pregabalin (LYRICA) capsule 150 mg  150 mg Oral BID Azra Dukes MD        simethicone (MYLICON) chewable tablet  mg   mg Oral Q6H PRN Azra Dukes MD        sodium chloride (PF) 0.9% PF flush 3 mL  3 mL Intracatheter Q8H Azra Dukes MD        sodium chloride (PF) 0.9% PF flush 3 mL  3 mL Intracatheter q1 min prn Azra Dukes MD        sucralfate (CARAFATE) tablet 1 g  1 g Oral 4x Daily PRN Azra Dukes MD        tiZANidine (ZANAFLEX) tablet 2 mg  2 mg Oral At Bedtime Azra Dukes MD         Current Outpatient Medications   Medication Sig Dispense Refill    acetaminophen (TYLENOL) 325 MG tablet Take 3 tablets (975 mg) by mouth every 6 hours 75 tablet 0    calcium carbonate (TUMS) 500 MG chewable tablet Take 1-2 tablets (500-1,000 mg) by mouth 3 times daily as needed for heartburn 30 tablet 0    Cefadroxil (DURICEF) 1 g tablet Take 1 tablet (1 g) by mouth 2 times daily 28 tablet 0    clonazePAM (KLONOPIN) 0.5 MG tablet Take 0.25-0.5 mg by mouth 3 times daily as needed for anxiety      diphenhydrAMINE (BENADRYL) 25 MG capsule Take 1 capsule (25 mg) by mouth every 6 hours as needed for itching 20 capsule 0    famotidine (PEPCID) 10 MG tablet Take 1 tablet (10 mg) by mouth 2 times daily 30 tablet 0    FLUoxetine (PROZAC) 10 MG capsule Take 10 mg by mouth daily Take with 20 mg capsule for a total daily dose of 30 mg.      FLUoxetine (PROZAC) 20 MG capsule Take 20 mg by mouth every morning Take with 10 mg capsule for a total daily dose of 30 mg.      hydrOXYzine HCl (ATARAX) 25 MG tablet Take 1 tablet (25 mg) by mouth every 8 hours as needed for other or anxiety (adjuvant pain) 25 tablet 0    ibuprofen (ADVIL/MOTRIN) 600 MG tablet Take 1 tablet (600 mg) by mouth every 8 hours 32 tablet 0    Lidocaine (LIDOCARE) 4 % Patch Place 2 patches onto the skin every 24 hours To prevent lidocaine toxicity, patient should be patch free for 12 hrs daily.  10 patch 0    loperamide (IMODIUM) 2 MG capsule Take 1 capsule (2 mg) by mouth 3 times daily as needed for other (high output ileostomy)      melatonin 3 MG CAPS Take 1 capsule by mouth nightly as needed      methocarbamol (ROBAXIN) 750 MG tablet Take 1 tablet (750 mg) by mouth 3 times daily 25 tablet 0    methocarbamol (ROBAXIN) 750 MG tablet Take 1 tablet (750 mg) by mouth 3 times daily as needed for muscle spasms 25 tablet 0    miconazole (MICATIN) 2 % external powder Apply topically 2 times daily Apply to skin around ileostomy site 10 g 0    multivitamin w/minerals (THERA-VIT-M) tablet Take 1 tablet by mouth every morning      oxyCODONE (ROXICODONE) 5 MG tablet Take 1 tablet (5 mg) by mouth 5 x daily PRN for severe pain (Add 5mg to chronic opioid dose up to 5 times daily) 50 tablet 0    oxyCODONE IR (ROXICODONE) 10 MG tablet Take 1 tablet (10 mg) by mouth 5 x daily PRN for severe pain      oxyCODONE-acetaminophen (PERCOCET)  MG per tablet Take 1 tablet by mouth 5 x daily PRN for severe pain      pantoprazole (PROTONIX) 40 MG EC tablet Take 2 tablets by mouth in the morning and 1 tablet by mouth in the evening.      pregabalin (LYRICA) 100 MG capsule Take 150 mg by mouth 2 times daily      simethicone (MYLICON) 80 MG chewable tablet Take 1-2 tablets ( mg) by mouth every 6 hours as needed for cramping 40 tablet 0    sucralfate (CARAFATE) 1 GM tablet Take 1 g by mouth 4 times daily as needed      tiZANidine (ZANAFLEX) 2 MG tablet Take 1 tablet by mouth at bedtime              Physical Exam:     Patient Vitals for the past 24 hrs:   BP Temp Temp src Pulse Resp SpO2   08/07/24 1933 -- 98.1  F (36.7  C) Oral -- -- --   08/07/24 1930 120/77 -- -- 91 16 97 %     Initial exam limited with patient in ED Waiting Room  Constitutional: Healthy appearing female, sitting up in chair, no acute distress  Cardiovascular: Regular rate, appears well perfused  Respiratory: Normal work of breathing on room  air  Gastrointestinal: Abdomen appears non-distended  Pelvic Exam: Deferred  Neuro: Moving all extremities  Extremities: No appreciable BLE edema  Psychiatric: mentation appears normal and affect normal/bright  Lines: PIV, end ileostomy         Data:   CBC, BMP, INR ordered    Imaging  EXAM: CT ABDOMEN PELVIS w CONTRAST   LOCATION: [facility]   DATE: 8/1/2024     INDICATION: Lower abd pain. S/p recent total colectomy, end   ileostomy, and bilateral salpingo-oophorectomy.    COMPARISON: 7/1/2024.   TECHNIQUE: CT scan of the abdomen and pelvis was performed following   injection of IV contrast. Multiplanar reformats were obtained. Dose   reduction techniques were used.   CONTRAST: 78mL Omni 350     FINDINGS:   LOWER CHEST: Normal.     HEPATOBILIARY: No suspicious liver lesion. Cholecystectomy with   stable reservoir effect.     PANCREAS: Normal.     SPLEEN: Normal.     ADRENAL GLANDS: Normal.     KIDNEYS/BLADDER: Normal.     BOWEL: Nondistended. Subtotal colectomy with left lower quadrant end   ileostomy and Rg's pouch. No abnormal bowel wall enhancement or   thickening.     LYMPH NODES: No lymphadenopathy.     VASCULATURE: No abdominal aortic aneurysm.     PELVIC ORGANS: Hysterectomy. New 4.1 x 4 x 3.4 cm collection which   appears to have a thin enhancing wall and attenuates slightly higher   than that of simple fluid. The fluid collection appears to arise from   the vaginal cuff and abuts the posterior margin of the bladder. It   lies subjacent to the Rg's pouch but does not appear to have   contiguity with the bowel sutures. No free intraperitoneal air.     MUSCULOSKELETAL: No aggressive osseous lesion.     IMPRESSION:   1.  New 4.1 cm mildly complex pelvic fluid collection which appears   to arise from the vaginal cuff and abuts the posterior margin of the   bladder. The differential includes a postoperative seroma, abscess,   or hematoma. Given lack of contiguity with the bowel sutures, an    anastomotic leak is not favored.   2.  Subtotal colectomy with left lower quadrant end ileostomy and   Rg's pouch.        EXAM: MR PELVIS (GYN) W/O and W CONTRAST  LOCATION: [facility]  DATE: 8/4/2024     INDICATION: Pelvic fluid collection; concern for fistula, further evaluate finding that was discovered on CT abdomen pelvis  COMPARISON: CT of the abdomen and pelvis 8/1/2024 and 7/1/2024.  TECHNIQUE: Routine MRI female pelvis protocol including T1 in/out phase, diffusion, thin section high resolution multiplane T2, and post contrast T1.  CONTRAST: 7mL GADAVIST     FINDINGS:      Postoperative changes of hysterectomy, bilateral salpingo-oophorectomy, cholectomy, and left lower quadrant ileostomy formation.     In the central pelvis immediately superior to the vaginal cuff and posterior to the urinary bladder is a sharply circumscribed cystic structure or fluid collection which measures 5.2 x 4.6 x 4.1 cm. This structure has fluid signal intensity on T1   weighted and T2-weighted images. There is enhancement of the peripheral rim and thin internal septations of this structure. No solid nodular components are detected. No inflammatory stranding or fluid in the pelvis near this structure. This structure   contacts the Rg's pouch and urinary bladder but no fistula to either is visualized. The vaginal cuff is otherwise unremarkable.                                                                      IMPRESSION:  5.2 cm sharply circumscribed cystic structure/fluid collection associated with the vaginal cuff is unchanged from CT 8/1/2024 and new from CT of 7/1/2024. Seroma is favored. This structure does not appear to be inflamed which is evidence against abscess.   No fistula to bowel or bladder visualized. Clinical correlation and follow-up suggested.              Assessment and Plan:   Assessment: 39 year old female with extensive CRS history including recent  joint case that included open BSO performed in the setting of benign ovarian cysts now presenting with post-operative seroma thought to be potentially contributing to acute exacerbation of chronic pelvic pain. No evidence of fistulization on imaging or clinical concern for infection at this time. Admitted for coordination of aspiration/drainage procedure with IR.     # Acute on chronic pelvic pain  # Post-operative seroma  - Scheduled tylenol, ibuprofen/Toradol; PTA Zanaflex qHS, Lyrica BID, PTA oxy 10 q4h prn; IV Dilaudid prn  - NPO for IR consultation in AM in anticipation of drainage/aspiration procedure, mIVF  - AM pre-procedure labs including CBC, BMP, INR ordered    # Colonic dysmotility, s/p colectomy  # End ileostomy  # Reported high ileostomy output  - Strict Is&Os  - PTA PRN imodium  - PTA PRN simethicone     # Nausea  - PRN Zofran (documented anaphylactic allergy to metoclopramide)    # GERD  - PTA Protonix, PRN Carafate.    # JOSE  - PTA Prozac, Vistaril prn, Klonopin prn.   - Sleep: PTA melatonin prn.    # Surgical menopause  - recommended initiation of HRT with primary OB/GYN    # Inpatient status  - Bowel regimen: Prn miralax (not scheduled given report of high ileostomy output)  - PPX: SCDs, IS    Plan discussed with on-call Gynecologic Oncology fellow Dr. Lopez and staff Dr. Pan.    Please contact the primary team via the Gyn Onc Pager: [phone] with any questions or concerns.    Azra Dukes MD, PGY-4  10:53 PM  [facility] Obstetrics & Gynecology Residency        See Progress Note from 08/08/24. I did not see the patient inpatient on 8/7/24.  Josias Pan MD

## 2024-08-08 NOTE — IR NOTE
Patient Name: Mimi Su  Medical Record Number: [id number]  Today's Date: 8/8/2024    Procedure: image guided pelvic fluid aspiration  Proceduralist: Dr KIKO Marie, Dr MANUEL Jones    Sedation start time: 1517  Sedation end time: 1540  Sedation medications administered: 2.5 mg versed, 125 mcg fentanyl  Total sedation time: 23 min  Sedation Notes: none     Procedure start time: 1522  Procedure end time: 1540    Report given to:  RN ED  : none    Other Notes: Pt arrived to IR room 1 from ED. Consent reviewed, pt confirmed. Pt denies any questions or concerns regarding procedure. Pt positioned prone and monitored per protocol. 30 ml serous fluid drained. Labs sent as ordered. Right gluteal site cleansed and dressed per protocol. Pt tolerated procedure without any noted complications. Pt transferred back to ED.

## 2024-08-08 NOTE — MEDICATION SCRIBE - ADMISSION MEDICATION HISTORY
Medication Scribe Admission Medication History    Admission medication history is complete. The information provided in this note is only as accurate as the sources available at the time of the update.    Information Source(s): Patient and CareEverywhere/SureScripts via in-person    Pertinent Information:   -Pt stated that she has not picked up or started either her Oxycodone 5 mg or Oxycodone 10 mg    Changes made to PTA medication list:  Added: Botox injection, Zofran ODT 4 mg, Sucralfate 1 gm (Qam), Methocarbamol 750 mg (HS), Clonazepam 0.5 mg (1 tab HS), Clonazepam 0.5 mg (0.5 tab Qam)  Deleted: Tizanidine 2 mg, Miconazole 2% powder, Lidocaine 4% patch, famotidine 10 mg, Cefedroxil 1g, TUMS 500 mg  Changed: Sucralfate 1 gm (4 times daily PRN) --> Sucralfate 1 gm (BID PRN), Clonazepam 0.5 mg (0.5-1 tab TID PRN) --> Clonazepam 0.5 mg (0.5 tab PRN), Tylenol 325 mg (Q6H) --> Tylenol 325 mg (Q6H PRN)    Allergies reviewed with patient and updates made in EHR: yes    Medication History Completed By: Maya Garcia 8/8/2024 1:16 AM    PTA Med List   Medication Sig Last Dose    acetaminophen (TYLENOL) 325 MG tablet Take 325-650 mg by mouth every 6 hours as needed for mild pain Unknown at unknown    Botulinum Toxin Type A (BOTOX) 200 units injection Inject 155 Units into the muscle once every twelve weeks Past Month at unknown    clonazePAM (KLONOPIN) 0.5 MG tablet Take 0.5 mg by mouth at bedtime 8/6/2024 at pm    clonazePAM (KLONOPIN) 0.5 MG tablet Take 0.25 mg by mouth daily as needed for anxiety Unknown at unknown    clonazePAM (KLONOPIN) 0.5 MG tablet Take 0.25 mg by mouth every morning 8/7/2024 at am    diphenhydrAMINE (BENADRYL) 25 MG capsule Take 1 capsule (25 mg) by mouth every 6 hours as needed for itching 8/6/2024 at pm    FLUoxetine (PROZAC) 10 MG capsule Take 10 mg by mouth daily Take with 20 mg capsule for a total daily dose of 30 mg. 8/7/2024 at am    FLUoxetine (PROZAC) 20 MG capsule Take 20 mg by mouth  every morning Take with 10 mg capsule for a total daily dose of 30 mg. 8/7/2024 at am    hydrOXYzine HCl (ATARAX) 25 MG tablet Take 1 tablet (25 mg) by mouth every 8 hours as needed for other or anxiety (adjuvant pain) Unknown at unknown    ibuprofen (ADVIL/MOTRIN) 600 MG tablet Take 1 tablet (600 mg) by mouth every 8 hours 8/7/2024 at unknown    loperamide (IMODIUM) 2 MG capsule Take 1 capsule (2 mg) by mouth 3 times daily as needed for other (high output ileostomy) Unknown at unknown    melatonin 3 MG CAPS Take 1 capsule by mouth nightly as needed 8/6/2024 at pm    methocarbamol (ROBAXIN) 750 MG tablet Take 750 mg by mouth at bedtime 8/6/2024 at pm    methocarbamol (ROBAXIN) 750 MG tablet Take 1 tablet (750 mg) by mouth 3 times daily as needed for muscle spasms 8/7/2024 at afternoon    multivitamin w/minerals (THERA-VIT-M) tablet Take 1 tablet by mouth every morning 8/7/2024 at am    ondansetron (ZOFRAN ODT) 4 MG ODT tab Take 4 mg by mouth every 6 hours as needed for nausea or vomiting Past Week at unknown    oxyCODONE (ROXICODONE) 5 MG tablet Take 1 tablet (5 mg) by mouth 5 x daily PRN for severe pain (Add 5mg to chronic opioid dose up to 5 times daily)     oxyCODONE IR (ROXICODONE) 10 MG tablet Take 1 tablet (10 mg) by mouth 5 x daily PRN for severe pain     oxyCODONE-acetaminophen (PERCOCET)  MG per tablet Take 1 tablet by mouth 5 x daily PRN for severe pain Past Week at unknown    pantoprazole (PROTONIX) 40 MG EC tablet Take 2 tablets by mouth in the morning and 1 tablet by mouth in the evening. 8/7/2024 at am    pregabalin (LYRICA) 100 MG capsule Take 150 mg by mouth 2 times daily 8/7/2024 at am    simethicone (MYLICON) 80 MG chewable tablet Take 1-2 tablets ( mg) by mouth every 6 hours as needed for cramping Unknown at unknown    sucralfate (CARAFATE) 1 GM tablet Take 1 g by mouth every morning 8/7/2024 at am    sucralfate (CARAFATE) 1 GM tablet Take 1 g by mouth 2 times daily as needed Unknown  at unknown

## 2024-08-08 NOTE — DISCHARGE SUMMARY
Gynecologic Oncology Discharge Summary    Mimi Su  [id number]    Admit Date: 8/7/2024  Discharge Date: 8/9/2024  Admitting Provider: Josias Pan MD  Discharge Provider: Josias Pan MD    Admission Dx:   - Acute on chronic pelvic pain  - Post-operative pelvic seroma  - Chronic pain on chronic opioid therapy  - Colonic dysmotility s/p total colectomy and end ileostomy  - Ovarian cysts s/p open BSO  - Surgical menopause   - Bone marrow hypoplasia  - Chronic anemia  - GERD  - Borderline personality disorder  - JOSE    Discharge Dx:  - Acute on chronic pelvic pain, resolving   - Post-operative pelvic seroma s/p drainage on 8/8/24  - Chronic pain on chronic opioid therapy  - 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 Auth Provider Long Term End Date   acetaminophen (TYLENOL) 325 MG tablet Take 325-650 mg by mouth every 6 hours as needed for mild pain Unknown at unknown Yes Reported, Patient No    Botulinum Toxin Type A (BOTOX) 200 units injection Inject 155 Units into the muscle once every twelve weeks Past Month at unknown Yes Reported, Patient  5/29/25   clonazePAM (KLONOPIN) 0.5 MG tablet Take 0.5 mg by mouth at bedtime 8/6/2024 at pm Yes Reported, Patient Yes    clonazePAM (KLONOPIN) 0.5 MG tablet Take 0.25 mg by mouth daily as needed for anxiety Unknown at unknown Yes Reported, Patient Yes    clonazePAM (KLONOPIN) 0.5 MG tablet Take 0.25 mg by mouth every morning 8/7/2024 at am Yes Reported, Patient Yes    diphenhydrAMINE (BENADRYL) 25 MG capsule Take 1 capsule (25  mg) by mouth every 6 hours as needed for itching 8/6/2024 at pm Yes Gordon Venegas MD     estradiol (VIVELLE-DOT) 0.1 MG/24HR bi-weekly patch Place 1 patch onto the skin twice a week  Yes Josias Pan MD Yes    FLUoxetine (PROZAC) 10 MG capsule Take 10 mg by mouth daily Take with 20 mg capsule for a total daily dose of 30 mg. 8/7/2024 at am Yes Unknown, Entered By History Yes    FLUoxetine (PROZAC) 20 MG capsule Take 20 mg by mouth every morning Take with 10 mg capsule for a total daily dose of 30 mg. 8/7/2024 at am Yes Reported, Patient Yes    hydrOXYzine HCl (ATARAX) 25 MG tablet Take 1 tablet (25 mg) by mouth every 8 hours as needed for other or anxiety (adjuvant pain) Unknown at unknown Yes Gordon Venegas MD     ibuprofen (ADVIL/MOTRIN) 600 MG tablet Take 1 tablet (600 mg) by mouth every 8 hours 8/7/2024 at unknown Yes Meli Borges PA-C No    loperamide (IMODIUM) 2 MG capsule Take 1 capsule (2 mg) by mouth 3 times daily as needed for other (high output ileostomy) Unknown at unknown Yes Tammy Farias PA-C     melatonin 3 MG CAPS Take 1 capsule by mouth nightly as needed 8/6/2024 at pm Yes Reported, Patient     methocarbamol (ROBAXIN) 750 MG tablet Take 750 mg by mouth at bedtime 8/6/2024 at pm Yes Reported, Patient     methocarbamol (ROBAXIN) 750 MG tablet Take 1 tablet (750 mg) by mouth 3 times daily as needed for muscle spasms 8/7/2024 at afternoon Yes Mali Ayala MD     multivitamin w/minerals (THERA-VIT-M) tablet Take 1 tablet by mouth every morning 8/7/2024 at am Yes Reported, Patient     ondansetron (ZOFRAN ODT) 4 MG ODT tab Take 4 mg by mouth every 6 hours as needed for nausea or vomiting Past Week at unknown Yes Reported, Patient No    oxyCODONE (ROXICODONE) 5 MG tablet Take 4 tablets (20 mg) by mouth 5 x daily PRN for severe pain Do not take your Percocet while using this medication  Yes Josias Pan MD No    oxyCODONE-acetaminophen (PERCOCET)  MG per tablet Take 1  tablet by mouth 5 x daily PRN for severe pain Past Week at unknown Yes Josias Pan MD No    pantoprazole (PROTONIX) 40 MG EC tablet Take 2 tablets by mouth in the morning and 1 tablet by mouth in the evening. 8/7/2024 at am Yes Reported, Patient     pregabalin (LYRICA) 100 MG capsule Take 150 mg by mouth 2 times daily 8/7/2024 at am Yes Reported, Patient Yes    simethicone (MYLICON) 80 MG chewable tablet Take 1-2 tablets ( mg) by mouth every 6 hours as needed for cramping Unknown at unknown Yes Meli Borges PA-C     sucralfate (CARAFATE) 1 GM tablet Take 1 g by mouth every morning 8/7/2024 at am Yes Reported, Patient     sucralfate (CARAFATE) 1 GM tablet Take 1 g by mouth 2 times daily as needed Unknown at unknown Yes Reported, Patient       Discharge Medications:     Review of your medicines        START taking        Dose / Directions   estradiol 0.1 MG/24HR bi-weekly patch  Commonly known as: VIVELLE-DOT  Used for: Premature surgical menopause      Dose: 1 patch  Place 1 patch onto the skin twice a week  Quantity: 8 patch  Refills: 0     oxyCODONE 5 MG tablet  Commonly known as: ROXICODONE  Used for: Acute post-operative pain      Dose: 20 mg  Take 4 tablets (20 mg) by mouth 5 x daily PRN for severe pain Do not take your Percocet while using this medication  Quantity: 60 tablet  Refills: 0            CONTINUE these medicines which have NOT CHANGED        Dose / Directions   acetaminophen 325 MG tablet  Commonly known as: TYLENOL      Dose: 325-650 mg  Take 325-650 mg by mouth every 6 hours as needed for mild pain  Refills: 0     Botulinum Toxin Type A 200 units injection  Commonly known as: BOTOX      Dose: 155 Units  Inject 155 Units into the muscle once every twelve weeks  Refills: 0     * clonazePAM 0.5 MG tablet  Commonly known as: klonoPIN      Dose: 0.25 mg  Take 0.25 mg by mouth every morning  Refills: 0     * clonazePAM 0.5 MG tablet  Commonly known as: klonoPIN      Dose: 0.5  mg  Take 0.5 mg by mouth at bedtime  Refills: 0     * clonazePAM 0.5 MG tablet  Commonly known as: klonoPIN      Dose: 0.25 mg  Take 0.25 mg by mouth daily as needed for anxiety  Refills: 0     diphenhydrAMINE 25 MG capsule  Commonly known as: BENADRYL  Used for: Acute post-operative pain      Dose: 25 mg  Take 1 capsule (25 mg) by mouth every 6 hours as needed for itching  Quantity: 20 capsule  Refills: 0     * FLUoxetine 20 MG capsule  Commonly known as: PROzac      Dose: 20 mg  Take 20 mg by mouth every morning Take with 10 mg capsule for a total daily dose of 30 mg.  Refills: 0     * FLUoxetine 10 MG capsule  Commonly known as: PROzac      Dose: 10 mg  Take 10 mg by mouth daily Take with 20 mg capsule for a total daily dose of 30 mg.  Refills: 0     hydrOXYzine HCl 25 MG tablet  Commonly known as: ATARAX  Used for: Acute post-operative pain      Dose: 25 mg  Take 1 tablet (25 mg) by mouth every 8 hours as needed for other or anxiety (adjuvant pain)  Quantity: 25 tablet  Refills: 0     ibuprofen 600 MG tablet  Commonly known as: ADVIL/MOTRIN  Used for: Acute post-operative pain      Dose: 600 mg  Take 1 tablet (600 mg) by mouth every 8 hours  Quantity: 32 tablet  Refills: 0     loperamide 2 MG capsule  Commonly known as: IMODIUM  Used for: Acute post-operative pain      Dose: 2 mg  Take 1 capsule (2 mg) by mouth 3 times daily as needed for other (high output ileostomy)  Refills: 0     melatonin 3 MG Caps      Dose: 1 capsule  Take 1 capsule by mouth nightly as needed  Refills: 0     * methocarbamol 750 MG tablet  Commonly known as: ROBAXIN  Used for: Acute post-operative pain      Dose: 750 mg  Take 1 tablet (750 mg) by mouth 3 times daily as needed for muscle spasms  Quantity: 25 tablet  Refills: 0     * methocarbamol 750 MG tablet  Commonly known as: ROBAXIN      Dose: 750 mg  Take 750 mg by mouth at bedtime  Refills: 0     multivitamin w/minerals tablet      Dose: 1 tablet  Take 1 tablet by mouth every  "morning  Refills: 0     ondansetron 4 MG ODT tab  Commonly known as: ZOFRAN ODT      Dose: 4 mg  Take 4 mg by mouth every 6 hours as needed for nausea or vomiting  Refills: 0     oxyCODONE-acetaminophen  MG per tablet  Commonly known as: PERCOCET  Used for: Other chronic pain      Dose: 1 tablet  Take 1 tablet by mouth 5 x daily PRN for severe pain  Refills: 0     pantoprazole 40 MG EC tablet  Commonly known as: PROTONIX      Take 2 tablets by mouth in the morning and 1 tablet by mouth in the evening.  Refills: 0     pregabalin 100 MG capsule  Commonly known as: LYRICA      Dose: 150 mg  Take 150 mg by mouth 2 times daily  Refills: 0     simethicone 80 MG chewable tablet  Commonly known as: MYLICON  Used for: Acute post-operative pain      Dose:  mg  Take 1-2 tablets ( mg) by mouth every 6 hours as needed for cramping  Quantity: 40 tablet  Refills: 0     * sucralfate 1 GM tablet  Commonly known as: CARAFATE      Dose: 1 g  Take 1 g by mouth 2 times daily as needed  Refills: 0     * sucralfate 1 GM tablet  Commonly known as: CARAFATE      Dose: 1 g  Take 1 g by mouth every morning  Refills: 0           * This list has 9 medication(s) that are the same as other medications prescribed for you. Read the directions carefully, and ask your doctor or other care provider to review them with you.                   Where to get your medicines        These medications were sent to [facility] [address] [address]      Phone: [phone]   estradiol 0.1 MG/24HR bi-weekly patch  oxyCODONE 5 MG tablet       Consultations:  - Interventional Radiology    Brief History of Illness:  From H&P: \"Patient seen in Gyn Onc clinic for a virtual visit with Dr. Pan earlier today. Per that note: \"Mimi Su is a 39 year old surgically post-menopausal patient on hormone therapy [not yet taking - see below] with a complex surgical history " "including end ileostomy, total hysterectomy, and recent open surgery with ovarian removal with pelvic fluid collection at the level of the vaginal cuff with severe pelvic pain, pressure, and imaging showing likely pelvic seroma with no evidence of fistulization. Also history of chronic pain on opioid therapy.\"     Plan initially made for outpatient IR drainage. Patient presented to an outside facility for this, where it was ultimately recommended that the patient instead present to [facility] (where her surgery took place May 2024) for admission for pain control ahead of that now inpatient procedure.     On Gyn Onc resident evaluation, patient reports that she is feeling ok after receiving IV pain and nausea medication in the ED. The lower abdominal pain and pressure that prompted her to present to the ED remain bothersome. She has also had quite a bit of nausea today, with no episodes of vomiting, and reports that she has been able to eat some. Also endorses subjectively high ileostomy output, although has not quantified this. Denies fever/chills, urinary symptoms, or any vaginal discharge.     Patient also shared that she is starting to have vasomotor symptoms after her BSO in May. Did not note these initially but more recently having some hot flashes. Had made a plan to start HRT with her primary OB/GYN but didn't think she needed to unless she was bothered by symptoms. Provided education regarding need to supplement estrogen through the time of the average age of menopause to promote heart and bone health.\"    Hospital Course:  Dz:   - Chronic pelvic pain, benign ovarian cysts s/p open BSO on 5/6/2024. Discussed hormone replacement and she was started on Vivelle dot upon discharge. She will follow up with Dr Pan in 2 weeks. Follow up with primary OBGYN for further hormone management and OBGYN care.  FEN:   - NPO prior to drainage procedure with IR, then transitioned to regular diet after procedure. She was " tolerating regular diet and maintaining her hydration without IVF by discharge.  Pain:   - Patient presented with acute on chronic pelvic pain, which was managed initially with both IV and PO pain medications. Prior to discharge, she was able to transition to PO only regimen with adequate pain control and discharged home on PO pain medication. She has a history of chronic pain. Patient takes opioids at home, has an opioid contract with clinic in [place]. Patient was agreeable to 3 days of 10mg oxycodone in addition to her home opioid regimen for pain management following pelvic seroma drainage procedure.   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 Stable during this admission and no other heme issues while in house. Follow up with provider as scheduled.  GI:   - Complex history of GI surgeries including total colectomy and end ileostomy. Patient initially reported increased ileostomy output, which resolved during this admission. PTA imodium and simethicone prn was available while she was in house.   :    - Complex history of chronic pelvic pain, s/p open BSO and subsequent seroma formation at the vaginal cuff which was drained during this hospitalization on 8/8. No other new  issues while in house.   ID:   - The patient was afebrile during her hospitalization. Cultures of fluid from pelvic seroma drainage were pending at the time of discharge.  ENDO:   - Surgical menopause, not on HRT upon admission. Recommendation made to begin HRT and discharged with Clair Pleitez. She will follow up with OBGYN.  PSYCH/NEURO:   - History of borderline personality disorder and JOSE, PTA Prozac daily, Vistaril prn, and Klonopin prn ordered while in house.  PPX:    - She received SCDs and IS while in house, which were discontinued at the time of discharge.     Discharge Instructions and Follow up:  Ms. Mimi Su was discharged from the hospital with follow up for pelvic pain and  surgical menopause.     Discharge Diet: Low residue  Discharge Activity: Nothing per vagina for 2 weeks  Discharge Follow up: Dr. Pan within 2 weeks. Clinic care coordinator contacted to schedule.    Discharge Disposition:  Discharged to home    Discharge Staff: MD Lucille Norwood MD PGY1  [facility]  Obstetrics, Gynecology, & Women's Health    Gynecologic Oncology Attending Attestation  I have seen the patient day of discharge and agree with plan as documented in the note above.    Josias Pan MD  Gynecologic Oncology

## 2024-08-08 NOTE — PRE-PROCEDURE
GENERAL PRE-PROCEDURE:   Procedure:  Pelvic fluid collection aspiration    Written consent obtained?: Yes    Risks and benefits: Risks, benefits and alternatives were discussed    Consent given by:  Patient  Patient states understanding of procedure being performed: Yes    Patient's understanding of procedure matches consent: Yes    Procedure consent matches procedure scheduled: Yes    Expected level of sedation:  Moderate  Appropriately NPO:  Yes  ASA Class:  2  Mallampati  :  Grade 2- soft palate, base of uvula, tonsillar pillars, and portion of posterior pharyngeal wall visible  Lungs:  Lungs clear with good breath sounds bilaterally  Heart:  Normal heart sounds and rate  History & Physical reviewed:  History and physical reviewed and no updates needed  Statement of review:  I have reviewed the lab findings, diagnostic data, medications, and the plan for sedation

## 2024-08-08 NOTE — ED PROVIDER NOTES
ED Provider Note  [facility]      History   No chief complaint on file.    HPI  Mimi Su is a 39 year old female with a history of abdominal bilateral salpingo - oophorectomy in conjunction with total colectomy, end ileostomy, exploratory laparotomy, adhesiolysis (5/6/2024), cholecystectomy, hysterectomy, sigmoid colostomy for pelvic floor dysfunction, multiple SBO's, incidental appendectomy and small bowel resection, malnutrition requiring PICC line/TPN/GJ tubes (since removed) who presents to the emergency department for pain control and admission with Gynoxin  Reports that she is having pain similar to how she usually does, no fevers, some vaginal spotting which she discussed with her gynecologist  Contact team recommended coming to the emergency room for admission for pain control and procedure              Physical Exam      Physical Exam  Constitutional:       General: She is not in acute distress.     Appearance: Normal appearance. She is not diaphoretic.   HENT:      Head: Atraumatic.      Mouth/Throat:      Mouth: Mucous membranes are moist.   Eyes:      General: No scleral icterus.     Conjunctiva/sclera: Conjunctivae normal.   Cardiovascular:      Rate and Rhythm: Normal rate.      Heart sounds: Normal heart sounds.   Pulmonary:      Effort: No respiratory distress.      Breath sounds: Normal breath sounds.   Abdominal:      General: Abdomen is flat.   Musculoskeletal:      Cervical back: Neck supple.   Skin:     General: Skin is warm.      Findings: No rash.   Neurological:      Mental Status: She is alert.           ED Course, Procedures, & Data      Procedures                No results found for any visits on 08/07/24.  Medications - No data to display  Labs Ordered and Resulted from Time of ED Arrival to Time of ED Departure - No data to display  No orders to display          Critical care was not performed.     Medical Decision Making  The patient's presentation was  of high complexity (an acute health issue posing potential threat to life or bodily function).    The patient's evaluation involved:  an assessment requiring an independent historian (see separate area of note for details)  review of external note(s) from 3+ sources (see separate area of note for details)  discussion of management or test interpretation with another health professional (oncology oncology)    The patient's management necessitated high risk (a decision regarding hospitalization).    Assessment & Plan    Spoke with [facility] team when she arrived and they said admit to their service  Patient requested pain medications as well as nausea medications so ordered Zofran, Dilaudid and normal saline  Patient mated to contact team, attending Dr. Pan    I have reviewed the nursing notes. I have reviewed the findings, diagnosis, plan and need for follow up with the patient.    New Prescriptions    No medications on file       Final diagnoses:   None       Edmundo Perdomo MD on 8/7/2024 at 8:00 PM   [facility] EMERGENCY DEPARTMENT  8/7/2024     Edmundo Perdomo MD  08/07/24 2000

## 2024-08-08 NOTE — PROGRESS NOTES
Patient admitted to:  5419  Admitted from: ED  Arrived by: Stretcher  Reason for admission: Pain control post IR procedure  Patient accompanied by: Family  Belongings: clothes, cell phone  Teaching: Room and unit orientation, call light use  Skin double check completed by: Needs completed

## 2024-08-08 NOTE — PROGRESS NOTES
Report given to Emily RAMIREZ. Patient is going into [facility] room 5419. Patient transport ordered.    /75 (BP Location: Right arm)   Pulse 77   Temp 97.7  F (36.5  C) (Oral)   Resp 16   LMP  (LMP Unknown)   SpO2 100%

## 2024-08-09 VITALS
HEART RATE: 66 BPM | BODY MASS INDEX: 26 KG/M2 | OXYGEN SATURATION: 98 % | SYSTOLIC BLOOD PRESSURE: 116 MMHG | DIASTOLIC BLOOD PRESSURE: 71 MMHG | TEMPERATURE: 98.2 F | RESPIRATION RATE: 16 BRPM | WEIGHT: 166 LBS

## 2024-08-09 PROCEDURE — 250N000013 HC RX MED GY IP 250 OP 250 PS 637

## 2024-08-09 PROCEDURE — 250N000011 HC RX IP 250 OP 636

## 2024-08-09 PROCEDURE — 99238 HOSP IP/OBS DSCHRG MGMT 30/<: CPT | Mod: GC | Performed by: OBSTETRICS & GYNECOLOGY

## 2024-08-09 RX ORDER — OXYCODONE HYDROCHLORIDE 5 MG/1
20 TABLET ORAL
Qty: 60 TABLET | Refills: 0 | Status: SHIPPED | OUTPATIENT
Start: 2024-08-09

## 2024-08-09 RX ORDER — HYDROMORPHONE HYDROCHLORIDE 4 MG/1
2 TABLET ORAL ONCE
Status: COMPLETED | OUTPATIENT
Start: 2024-08-09 | End: 2024-08-09

## 2024-08-09 RX ADMIN — ACETAMINOPHEN 975 MG: 325 TABLET, FILM COATED ORAL at 08:39

## 2024-08-09 RX ADMIN — HYDROMORPHONE HYDROCHLORIDE 0.5 MG: 1 INJECTION, SOLUTION INTRAMUSCULAR; INTRAVENOUS; SUBCUTANEOUS at 04:48

## 2024-08-09 RX ADMIN — IBUPROFEN 600 MG: 600 TABLET, FILM COATED ORAL at 04:48

## 2024-08-09 RX ADMIN — PANTOPRAZOLE SODIUM 40 MG: 40 TABLET, DELAYED RELEASE ORAL at 08:40

## 2024-08-09 RX ADMIN — OXYCODONE HYDROCHLORIDE 10 MG: 10 TABLET ORAL at 11:07

## 2024-08-09 RX ADMIN — PREGABALIN 150 MG: 75 CAPSULE ORAL at 08:39

## 2024-08-09 RX ADMIN — HYDROMORPHONE HYDROCHLORIDE 2 MG: 4 TABLET ORAL at 08:39

## 2024-08-09 RX ADMIN — FLUOXETINE HYDROCHLORIDE 20 MG: 20 CAPSULE ORAL at 08:40

## 2024-08-09 RX ADMIN — FLUOXETINE 10 MG: 10 CAPSULE ORAL at 08:39

## 2024-08-09 RX ADMIN — CLONAZEPAM 0.5 MG: 0.5 TABLET ORAL at 08:52

## 2024-08-09 RX ADMIN — OXYCODONE HYDROCHLORIDE 10 MG: 10 TABLET ORAL at 06:43

## 2024-08-09 RX ADMIN — IBUPROFEN 600 MG: 600 TABLET, FILM COATED ORAL at 10:06

## 2024-08-09 ASSESSMENT — ACTIVITIES OF DAILY LIVING (ADL)
ADLS_ACUITY_SCORE: 20

## 2024-08-09 NOTE — PLAN OF CARE
"BP 98/75 (BP Location: Left arm, Cuff Size: Adult Regular)   Pulse 71   Temp 97.8  F (36.6  C) (Oral)   Resp 16   LMP  (LMP Unknown)   SpO2 98%      Neuro: A&Ox4.   Cardiac: SR. AVSS.   Respiratory: Sating >98% on RA.  GI/: Urine spontaneously. Last BM 8/8  Diet/appetite: Reg diet  Activity:  Independent  Pain: Perineum pain controlled w/ oxy and IV dilaudid throughout the night.  Discontinue IV dilaudid for AM.    Skin: No new deficits noted.  LDA's: R PIV- SL      Plan: Plan to discharge 8/9. Needs stool sample if she's cont to stay. VS q8hr. Continue with POC. Notify primary team with changes.       Problem: Adult Inpatient Plan of Care  Goal: Plan of Care Review  Description: The Plan of Care Review/Shift note should be completed every shift.  The Outcome Evaluation is a brief statement about your assessment that the patient is improving, declining, or no change.  This information will be displayed automatically on your shift  note.  Outcome: Progressing  Flowsheets (Taken 8/9/2024 0109)  Overall Patient Progress: improving  Goal: Patient-Specific Goal (Individualized)  Description: You can add care plan individualizations to a care plan. Examples of Individualization might be:  \"Parent requests to be called daily at 9am for status\", \"I have a hard time hearing out of my right ear\", or \"Do not touch me to wake me up as it startles  me\".  Outcome: Progressing  Goal: Absence of Hospital-Acquired Illness or Injury  Outcome: Progressing  Intervention: Identify and Manage Fall Risk  Recent Flowsheet Documentation  Taken 8/9/2024 0100 by Zhanna Chang, RN  Safety Promotion/Fall Prevention: safety round/check completed  Taken 8/8/2024 2340 by Zhanna Chang, RN  Safety Promotion/Fall Prevention: safety round/check completed  Taken 8/8/2024 2300 by Zhanna Chang, RN  Safety Promotion/Fall Prevention: safety round/check completed  Taken 8/8/2024 2200 by Zhanna Chang, RN  Safety Promotion/Fall Prevention: safety " round/check completed  Taken 8/8/2024 2100 by Zhanna Chang RN  Safety Promotion/Fall Prevention: safety round/check completed  Taken 8/8/2024 1950 by Zhanna Chang RN  Safety Promotion/Fall Prevention: safety round/check completed  Intervention: Prevent Skin Injury  Recent Flowsheet Documentation  Taken 8/8/2024 1950 by Zhanna Chang RN  Body Position: position changed independently  Skin Protection: adhesive use limited  Device Skin Pressure Protection: adhesive use limited  Intervention: Prevent and Manage VTE (Venous Thromboembolism) Risk  Recent Flowsheet Documentation  Taken 8/8/2024 1950 by Zhanna Chang RN  VTE Prevention/Management: SCDs off (sequential compression devices)  Intervention: Prevent Infection  Recent Flowsheet Documentation  Taken 8/8/2024 2340 by Zhanna Chang RN  Infection Prevention:   rest/sleep promoted   hand hygiene promoted   equipment surfaces disinfected   environmental surveillance performed  Taken 8/8/2024 1950 by Zhanna Chang RN  Infection Prevention:   rest/sleep promoted   hand hygiene promoted   equipment surfaces disinfected   environmental surveillance performed  Goal: Optimal Comfort and Wellbeing  Outcome: Progressing  Intervention: Monitor Pain and Promote Comfort  Recent Flowsheet Documentation  Taken 8/8/2024 2340 by Zhanna Chang RN  Pain Management Interventions:   medication (see MAR)   rest   relaxation techniques promoted  Taken 8/8/2024 1950 by Zhanna Chang RN  Pain Management Interventions:   medication (see MAR)   rest   relaxation techniques promoted  Goal: Readiness for Transition of Care  Outcome: Progressing     Problem: Pain Acute  Goal: Optimal Pain Control and Function  Outcome: Progressing  Intervention: Develop Pain Management Plan  Recent Flowsheet Documentation  Taken 8/8/2024 2340 by Zhanna Chang RN  Pain Management Interventions:   medication (see MAR)   rest   relaxation techniques promoted  Taken 8/8/2024 1950 by Zhanna Chang RN  Pain  Management Interventions:   medication (see MAR)   rest   relaxation techniques promoted  Intervention: Prevent or Manage Pain  Recent Flowsheet Documentation  Taken 8/8/2024 2340 by Zhanna Chang, RN  Medication Review/Management:   medications reviewed   high-risk medications identified  Taken 8/8/2024 1950 by Zhanna Chang, RN  Sensory Stimulation Regulation:   care clustered   quiet environment promoted  Medication Review/Management:   medications reviewed   high-risk medications identified  Intervention: Optimize Psychosocial Wellbeing  Recent Flowsheet Documentation  Taken 8/8/2024 1950 by Zhanna Chang, RN  Supportive Measures: active listening utilized

## 2024-08-09 NOTE — PROGRESS NOTES
DISCHARGE                        8/9/2024    11:44 am.  ----------------------------------------------------------------------------  Discharged to: Home  Via: private transportation  Accompanied by: Mother  Discharge Instructions: diet, activity, medications, follow up appointments, when to call the MD, aftercare instructions.  Prescriptions: To be filled by discharge pharmacy; medication list reviewed & sent with pt  Follow Up Appointments: arranged; information given  Belongings: All sent with pt  IV: d/c'd  Telemetry: d/c'd  Pt exhibits understanding of above discharge instructions; all questions answered.    Discharge Paperwork: Signed, copied, and sent home with patient.

## 2024-08-09 NOTE — PROGRESS NOTES
GYN ONC PROGRESS NOTE  08/09/2024    HD#3 acute on chronic pelvic pain, post-op seroma; POD#1 IR aspiration  Disease: Chronic abdominal/pelvic pain    24 hour events:   - IR aspiration performed, removed 30 mL fluid    SUBJECTIVE:   Patient sleeping soundly at the time of pre-rounds. Per discussion with RN, patient got lots of sleep and was intermittently requesting pain medication. Reports that she is eating/drinking normally.    OBJECTIVE:   Patient Vitals for the past 24 hrs:   BP Temp Temp src Pulse Resp SpO2   08/08/24 2116 106/63 98.5  F (36.9  C) Oral 71 16 99 %   08/08/24 1950 103/60 97.8  F (36.6  C) Oral 69 16 100 %   08/08/24 1557 122/75 97.7  F (36.5  C) Oral 77 16 100 %   08/08/24 1545 -- -- -- 68 12 100 %   08/08/24 1540 97/65 -- -- 65 10 100 %   08/08/24 1535 101/70 -- -- 67 (!) 9 99 %   08/08/24 1530 100/71 -- -- 74 (!) 9 100 %   08/08/24 1525 96/65 -- -- 67 15 100 %   08/08/24 1520 95/62 -- -- 64 (!) 8 100 %   08/08/24 1515 104/68 -- -- 65 10 100 %   08/08/24 1510 103/72 -- -- 68 10 100 %   08/08/24 1505 105/68 -- -- 66 10 100 %   08/08/24 1200 (!) 86/61 97.6  F (36.4  C) Oral 60 16 98 %   08/08/24 1059 104/66 -- -- 61 16 97 %   08/08/24 0653 96/61 -- -- -- -- 97 %   08/08/24 0652 96/61 -- -- -- -- 97 %   08/08/24 0425 -- -- -- 61 18 98 %     Gen- Laying in bed, NAD  CV- Regular rate, well perfused  Pulm- NWOB on room air  Abd- Non-distended; ileostomy site covered by clothing  Back- IR aspiration site C/D/I, covered by Primapore bandage with small amount of shadowing  Extr- No appreciable edema, nontender  Lines/drains- PIV, ileostomy    I/Os  Ileostomy - output is not charted    New Labs/Imaging-   Results for orders placed or performed during the hospital encounter of 08/07/24 (from the past 24 hour(s))   Basic metabolic panel   Result Value Ref Range    Sodium 136 135 - 145 mmol/L    Potassium 3.9 3.4 - 5.3 mmol/L    Chloride 108 (H) 98 - 107 mmol/L    Carbon Dioxide (CO2) 15 (L) 22 - 29 mmol/L     Anion Gap 13 7 - 15 mmol/L    Urea Nitrogen 6.4 6.0 - 20.0 mg/dL    Creatinine 0.37 (L) 0.51 - 0.95 mg/dL    GFR Estimate >90 >60 mL/min/1.73m2    Calcium 6.9 (L) 8.8 - 10.4 mg/dL    Glucose 57 (L) 70 - 99 mg/dL   CBC with platelets   Result Value Ref Range    WBC Count 2.4 (L) 4.0 - 11.0 10e3/uL    RBC Count 2.02 (L) 3.80 - 5.20 10e6/uL    Hemoglobin 6.6 (LL) 11.7 - 15.7 g/dL    Hematocrit 20.9 (L) 35.0 - 47.0 %     (H) 78 - 100 fL    MCH 32.7 26.5 - 33.0 pg    MCHC 31.6 31.5 - 36.5 g/dL    RDW 13.8 10.0 - 15.0 %    Platelet Count 79 (L) 150 - 450 10e3/uL   INR   Result Value Ref Range    INR 2.69 (H) 0.85 - 1.15   CBC with Platelets & Differential    Narrative    The following orders were created for panel order CBC with Platelets & Differential.  Procedure                               Abnormality         Status                     ---------                               -----------         ------                     CBC with platelets and d...[[id number]]  Abnormal            Final result                 Please view results for these tests on the individual orders.   INR   Result Value Ref Range    INR 0.93 0.85 - 1.15   Basic metabolic panel   Result Value Ref Range    Sodium 142 135 - 145 mmol/L    Potassium 3.6 3.4 - 5.3 mmol/L    Chloride 111 (H) 98 - 107 mmol/L    Carbon Dioxide (CO2) 23 22 - 29 mmol/L    Anion Gap 8 7 - 15 mmol/L    Urea Nitrogen 9.2 6.0 - 20.0 mg/dL    Creatinine 0.61 0.51 - 0.95 mg/dL    GFR Estimate >90 >60 mL/min/1.73m2    Calcium 8.1 (L) 8.8 - 10.4 mg/dL    Glucose 82 70 - 99 mg/dL   CBC with platelets and differential   Result Value Ref Range    WBC Count 3.0 (L) 4.0 - 11.0 10e3/uL    RBC Count 2.87 (L) 3.80 - 5.20 10e6/uL    Hemoglobin 9.5 (L) 11.7 - 15.7 g/dL    Hematocrit 28.9 (L) 35.0 - 47.0 %     (H) 78 - 100 fL    MCH 33.1 (H) 26.5 - 33.0 pg    MCHC 32.9 31.5 - 36.5 g/dL    RDW 13.7 10.0 - 15.0 %    Platelet Count 106 (L) 150 - 450 10e3/uL    % Neutrophils 53 %     % Lymphocytes 34 %    % Monocytes 10 %    % Eosinophils 1 %    % Basophils 1 %    % Immature Granulocytes 1 %    NRBCs per 100 WBC 0 <1 /100    Absolute Neutrophils 1.6 1.6 - 8.3 10e3/uL    Absolute Lymphocytes 1.0 0.8 - 5.3 10e3/uL    Absolute Monocytes 0.3 0.0 - 1.3 10e3/uL    Absolute Eosinophils 0.0 0.0 - 0.7 10e3/uL    Absolute Basophils 0.0 0.0 - 0.2 10e3/uL    Absolute Immature Granulocytes 0.0 <=0.4 10e3/uL    Absolute NRBCs 0.0 10e3/uL   Aspirate Aerobic Bacterial Culture Routine With Gram Stain    Specimen: Pelvis; Aspirate   Result Value Ref Range    Gram Stain Result No organisms seen     Gram Stain Result 1+ WBC seen     Narrative    Gram Stain quantification of host cells and microbiological organisms was done on a cytocentrifuged preparation.     CT Retroperitoneal Abscess Aspiration    Narrative    PROCEDURE: Fluid collection aspiration    Procedural Personnel  Attending physician(s): CHETAN HERNADEZ I, Dr. Chetan Hernadez, was present for the critical part of the  procedure and immediately available for the entire procedure.    Fellow physician(s): Brooklynn Jones MD  Resident physician(s): None  Advanced practice provider(s): None    Pre-procedure diagnosis: Pelvic fluid collection  Post-procedure diagnosis: Same  Indication: Suspected abscess  Additional clinical history: NA  Complications: No immediate complications.      Impression    IMPRESSION:    Percutaneous aspiration of pelvic fluid collection, yielding 30 mL of  serous fluid. No drainage catheter was left in place.    Plan:     Aspirated fluid sent for labs.  ______________________________________________________________________    PROCEDURE SUMMARY:  - Puncture aspiration of pelvic fluid collection under CT guidance  - Additional procedure(s): None    PROCEDURE DETAILS:    Pre-procedure  Consent: Informed consent for the procedure including risks, benefits  and alternatives was obtained and time-out was performed prior to  the  procedure.  Preparation: The site was prepared and draped using maximal sterile  barrier technique including cutaneous antisepsis.    Anesthesia/sedation  Level of anesthesia/sedation: Moderate sedation (conscious sedation)  Anesthesia/sedation administered by: Independent trained observer  under attending supervision with continuous monitoring of the  patient?s level of consciousness and physiologic status  Total intra-service sedation time (minutes): 20    Fluid collection aspiration  The patient was positioned prone. Initial imaging was performed. Local  anesthesia was administered. The fluid collection was accessed using 5  Prydeinig Yueh catheter. Position within the fluid collection was  confirmed and fluid aspiration was performed. All instruments were  then removed.  Aspiration location: Pelvis  Initial imaging findings: Redemonstration of pelvic fluid collection  Aspiration needle/catheter: 5 Prydeinig Yueh catheter  Contrast injection: No  Final imaging findings: Partial drainage of the fluid collection    Contrast  Contrast agent: None  Contrast volume (mL): 0    Radiation Dose  CT dose length product (mGy-cm): 588     Additional Details  Additional description of procedure: None  Registry event: V/3/f  Device used: None  Equipment details: None  Specimens removed: 30 mL of serous fluid. Aspirated fluid was sent for  analysis.  Estimated blood loss (mL): Less than 10  Standardized report: [email or web address]    Attestation  Signer name: DEANDRE HERNADEZ  I attest that I was present for the entire procedure. I reviewed the  stored images and agree with the report as written.    I have personally reviewed the examination and initial interpretation  and I agree with the findings.    DEANDRE HERNADEZ         SYSTEM ID:  [id number]   Pelvic fluid collection aspiration    Narrative    Brooklynn Jones MD     8/8/2024  3:49 PM  [facility]    Procedure: Pelvic  fluid collection aspiration    Date/Time: 8/8/2024 3:47 PM    Performed by: Brooklynn Jones MD  Authorized by: Chetan Marie MD      UNIVERSAL PROTOCOL   Site Marked: NA  Prior Images Obtained and Reviewed:  Yes  Required items: Required blood products, implants, devices and special   equipment available    Patient identity confirmed:  Verbally with patient, arm band, provided   demographic data and hospital-assigned identification number  Patient was reevaluated immediately before administering moderate or deep   sedation or anesthesia  Confirmation Checklist:  Patient's identity using two indicators, relevant   allergies, procedure was appropriate and matched the consent or emergent   situation and correct equipment/implants were available  Time out: Immediately prior to the procedure a time out was called    Universal Protocol: the Joint Commission Universal Protocol was followed    Preparation: Patient was prepped and draped in usual sterile fashion       ANESTHESIA    Anesthesia:  Local infiltration  Local Anesthetic:  Lidocaine 1% without epinephrine      SEDATION  Patient Sedated: Yes    Vital signs: Vital signs monitored during sedation    See dictated procedure note for full details.  Findings: 30ml serous fluid aspirated and sent for labs.    Specimens: fluid and/or tissue for laboratory analysis and culture    Complications: None    Condition: Stable    Plan: Bed rest for 1 hour.      PROCEDURE    Patient Tolerance:  Patient tolerated the procedure well with no immediate   complications  Length of time physician/provider present for 1:1 monitoring during   sedation: 20       Pending cultures (date obtained):   - IR aspirate aerobic culture (8/8): pending   - Gram stain: no organisms seen, 1+ WBC  - IR aspirate anaerobic culture (8/8): pending        ASSESSMENT:   39 year old female with extensive CRS history including recent joint case with Gynecologic Oncology 5/6/2024 that included open BSO  performed in the setting of benign ovarian cysts (prior total hysterectomy in 2013) now presenting with post-operative seroma thought to be potentially contributing to acute exacerbation of chronic pelvic pain. No evidence of fistulization on imaging or clinical concern for infection at this time. Completed aspiration/drainage procedure with IR. Anticipate discharge home today pending pain control.    # Acute on chronic pelvic pain  # Post-operative seroma  - Scheduled tylenol, ibuprofen/Toradol; PTA Zanaflex qHS, Lyrica BID, PTA oxy 10 q4h prn; s/p IV Dilaudid prn   - Patient transitioned to PO regimen this morning  - S/p IR drainage/aspiration procedure 8/8; aspirate gram stain negative, cultures pending  - Norma-procedure CBC/INR clinically atypical, repeated as values as likely erroneous with more appropriate values     # Colonic dysmotility, s/p colectomy  # End ileostomy  # Reported high ileostomy output (not charted)  - Strict Is&Os  - PTA PRN imodium  - PTA PRN simethicone      # Nausea  - PRN Zofran (documented anaphylactic allergy to metoclopramide)     # GERD  - PTA Protonix, PRN Carafate     # JOSE  - PTA Prozac, Vistaril prn, Klonopin prn  - Sleep: PTA melatonin prn     # Surgical menopause  - Discharge on Vivelle dot 0.1 mg  - recommended continuation of HRT with primary OB/GYN through average age of menopause     # Inpatient status  - Bowel regimen: Prn miralax (not scheduled given report of high ileostomy output)  - PPX: SCDs, IS    Azra Dukes MD, PGY-4  7:03 AM  [facility]etrics [facility] Gyne[facility] Residency    Gyn Onc Pager: [phone]      Gynecologic Oncology Attending Attestation  I have seen the patient on rounds with the team. Doing ok, still with significant pain post-op. Called and spoke with pain clinic, they want us to prescribe both acute AND chronic pain medications. Rx changed. Patient feeling well enough for discharge. Discharge to home with outpatient follow-up.  I have discussed the  patient and plan of care with the resident as documented in the note above, which I have edited as necessary.    Josias Pan MD    Gynecologic Oncology

## 2024-08-09 NOTE — PROGRESS NOTES
Brief Gyn Onc Progress Note    At bedside for evening check-in. Patient shared that the IR procedure itself was really painful, but she feels that she is resting more comfortably now. Understanding of goal to transition to completely PO pain regimen overnight. Feels she is eating/drinking normally this evening, so discussed discontinuing the IV fluids. Hopeful for discharge tomorrow pending pain management on an appropriate discharge regimen. IR aspiration site C/D/I. Patient inquired about aspirate studies; informed her final results can take a few days (no c/f infection on initial gram stain). Plan conveyed to bedside RN via phone.    Please contact the primary team via the Gyn Onc Pager: [phone] with any questions or concerns.     Azra Dukes MD, PGY-4  10:12 PM  [facility] Obstetrics & Gynecology Residency

## 2024-08-13 LAB
BACTERIA ASPIRATE CULT: NO GROWTH
GRAM STAIN RESULT: NORMAL
GRAM STAIN RESULT: NORMAL

## 2024-08-14 ENCOUNTER — DOCUMENTATION ONLY (OUTPATIENT)
Dept: ONCOLOGY | Facility: CLINIC | Age: 39
End: 2024-08-14
Payer: COMMERCIAL

## 2024-08-14 NOTE — PROGRESS NOTES
Gynecologic Oncology Attending Note   See multiple MyChart messages from the patient regarding ongoing symptoms. Since pelvic seroma aspiration procedure, the patient has sent multiple messages regarding a myriad of symptoms which have worsened in the past few days. She was evaluated locally in an ED without fevers or signs of infection. All fluid studies from seroma aspiration confirmed there is no evidence of infection and that this is a seroma with no evidence of abscess. Repeat CT shows reaccumulation of the cyst to the previous size, which can occur with aspiration. However, the symptoms she is describing (severe pain, profound urinary frequency, fevers, chills, nausea, vomiting) would not be caused by a 4cm pelvic seroma. Furthermore, multiple messages indicate that she has had worsening symptoms in the days following aspiration, which support that aspiration or intervention did not help her symptoms. As such, for a non-infected seroma, I would not recommend any further intervention.     I have recommended she see a primary provider for evaluation of other etiologies of her symptoms (fevers, chills, nausea, vomiting, urinary frequency) and work with her pain provider for a flare in her chronic pelvic pain, as I provided her with more than typical pain medications after her procedure, so I do not feel her continued symptoms are related to the minor procedure she underwent. I was hesitant to offer aspiration initially given how unlikely it was that many of her symptoms were due to this cyst, but after presenting to multiple Emergency Departments felt my hand was forced a bit to see if aspiration improved any of her symptoms and to confirm no evidence of infection. We were able to confirm no evidence of abscess, but unfortunately as evidenced by her repeat visits to Emergency Department and multiple MyChart messages, she seems to have had no relief even for a brief time. Even with the reaccumulation of a small  pelvic cyst, there is no indication for ongoing opioid therapy for something of this size. As noted, I would not recommend further drainage of this cyst. I would recommend she seek another opinion locally if she disagrees with my clinical assessment.     Josias Pan MD   Gynecologic Oncology

## 2024-08-15 ENCOUNTER — TELEPHONE (OUTPATIENT)
Dept: ONCOLOGY | Facility: CLINIC | Age: 39
End: 2024-08-15
Payer: COMMERCIAL

## 2024-08-15 DIAGNOSIS — R10.2 PELVIC PAIN IN FEMALE: Primary | ICD-10-CM

## 2024-08-15 LAB — BACTERIA ASPIRATE CULT: NORMAL

## 2024-08-15 RX ORDER — OXYCODONE AND ACETAMINOPHEN 10; 325 MG/1; MG/1
1 TABLET ORAL
Qty: 22 TABLET | Refills: 0 | Status: SHIPPED | OUTPATIENT
Start: 2024-08-15

## 2024-08-15 NOTE — TELEPHONE ENCOUNTER
Gynecologic Oncology Telephone Note  Tried to reach patient by phone. Unfortunately no answer. Let message stating reason for call. Per chart review she is currently participating in mental health care appointments throughout the day, so she may be unavailable due to this, but left reassurance that I would try back later so we can hopefully figure out a best next step for her in terms of management of her symptoms. Ideally we would be able to find a provider for her locally who could help with this to avoid her making multiple trips here which interrupts her other therapy and adds additional stress given her mother provides some of her transportation and has her own medical conditions.     Josias Pan MD

## 2024-08-15 NOTE — TELEPHONE ENCOUNTER
Gynecologic Oncology Telephone Note  I had a lengthy conversation with Mimi about her cyst, symptoms, management options, etc. We discussed the symptoms she is having that may be related to the cyst (pressure, some of her discomfort, some bladder irritation particularly with bladder emptying), and the other symptoms which are likely not related. We discussed that unfortunately drainage at this point would likely require drain placement (another aspiration could be attempted but clearly was not successful for long last time), which may or may not be feasible. My biggest concern with drainage would be with that few IR's provide transvaginal drain placement which would be ideal, and unfortunately instead use a transgluteal approach resulting in a significant amount of pain. My concern is with how much pain she had after this procedure that she would really struggle with pain management after a more invasive procedure. She shares this concern and is not interested in that at this time. She does note bladder symptoms since her procedure, which she plans to work-up with her PCP and possible referral to Urology. We discussed pain management and ideally she would work with her pain provider on a plan for an exacerbation of her pain before returning to baseline. I will call her pain clinic and see if I can speak with her provider to discuss the situation and see if they would work with the patient to manage a flare in her pain symptoms. Her clinic has insisted on our office writing for all pain medications if we provide any pain medication, so I don't feel it is appropriate for us to provide medication for management of this flare in her pain beyond the procedure she had, and the patient is understanding of this situation. She did express challenges with fulling her entire 30 day prescription from them prior to her procedure which is confirmed by PDMP and because of filling her interim prescription from us is having  difficulty filling that medication. I did provide her with another prescription to make up the difference between our prescription, what she already received, and her next appointment with her pain clinic, which she was appreciative of. At this point, we will not schedule additional follow-up.     Josias Pan MD   Gynecologic Oncology

## 2024-08-31 ENCOUNTER — HEALTH MAINTENANCE LETTER (OUTPATIENT)
Age: 39
End: 2024-08-31

## 2024-09-25 ENCOUNTER — MYC MEDICAL ADVICE (OUTPATIENT)
Dept: ONCOLOGY | Facility: CLINIC | Age: 39
End: 2024-09-25
Payer: COMMERCIAL

## 2024-09-30 ENCOUNTER — TRANSFERRED RECORDS (OUTPATIENT)
Dept: HEALTH INFORMATION MANAGEMENT | Facility: CLINIC | Age: 39
End: 2024-09-30
Payer: COMMERCIAL

## 2024-09-30 ENCOUNTER — MEDICAL CORRESPONDENCE (OUTPATIENT)
Dept: HEALTH INFORMATION MANAGEMENT | Facility: CLINIC | Age: 39
End: 2024-09-30
Payer: COMMERCIAL

## 2024-10-04 ENCOUNTER — TRANSCRIBE ORDERS (OUTPATIENT)
Dept: OTHER | Age: 39
End: 2024-10-04

## 2024-10-04 DIAGNOSIS — R10.30 LOWER ABDOMINAL PAIN: ICD-10-CM

## 2024-10-04 DIAGNOSIS — Z93.2 HIGH OUTPUT ILEOSTOMY (H): Primary | ICD-10-CM

## 2024-10-04 DIAGNOSIS — Z93.2 ILEOSTOMY STATUS (H): ICD-10-CM

## 2024-10-04 DIAGNOSIS — R19.8 HIGH OUTPUT ILEOSTOMY (H): Primary | ICD-10-CM

## 2024-11-09 ENCOUNTER — HEALTH MAINTENANCE LETTER (OUTPATIENT)
Age: 39
End: 2024-11-09

## 2024-11-27 ENCOUNTER — VIRTUAL VISIT (OUTPATIENT)
Dept: UROLOGY | Facility: CLINIC | Age: 39
End: 2024-11-27
Attending: INTERNAL MEDICINE
Payer: COMMERCIAL

## 2024-11-27 DIAGNOSIS — Z93.2 ILEOSTOMY STATUS (H): ICD-10-CM

## 2024-11-27 DIAGNOSIS — R10.30 LOWER ABDOMINAL PAIN: ICD-10-CM

## 2024-11-27 DIAGNOSIS — Z93.2 HIGH OUTPUT ILEOSTOMY (H): ICD-10-CM

## 2024-11-27 DIAGNOSIS — R19.8 HIGH OUTPUT ILEOSTOMY (H): ICD-10-CM

## 2024-11-27 PROCEDURE — 99204 OFFICE O/P NEW MOD 45 MIN: CPT | Mod: 95 | Performed by: PHYSICIAN ASSISTANT

## 2024-11-27 ASSESSMENT — PAIN SCALES - GENERAL: PAINLEVEL_OUTOF10: SEVERE PAIN (7)

## 2024-11-27 NOTE — PROGRESS NOTES
"Virtual Visit Details    Type of service:  Video Visit   Video Start Time: {video visit start/end time for provider to select:216052}  Video End Time:{video visit start/end time for provider to select:848074}    Originating Location (pt. Location): {video visit patient location:619805::\"Home\"}  {PROVIDER LOCATION On-site should be selected for visits conducted from your clinic location or adjoining [facility] hospital, academic office, or other nearby [facility] building. Off-site should be selected for all other provider locations, including home:468109}  Distant Location (provider location):  {virtual location provider:073427}  Platform used for Video Visit: {Virtual Visit Platforms:754829::\"[employer]\"}    "
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
## 2024-12-03 ENCOUNTER — VIRTUAL VISIT (OUTPATIENT)
Dept: ONCOLOGY | Facility: CLINIC | Age: 39
End: 2024-12-03
Attending: INTERNAL MEDICINE
Payer: COMMERCIAL

## 2024-12-03 DIAGNOSIS — D64.9 ANEMIA, UNSPECIFIED TYPE: Primary | ICD-10-CM

## 2024-12-03 DIAGNOSIS — D69.6 THROMBOCYTOPENIA (H): ICD-10-CM

## 2024-12-03 PROCEDURE — 99213 OFFICE O/P EST LOW 20 MIN: CPT | Mod: 95 | Performed by: INTERNAL MEDICINE

## 2024-12-03 NOTE — NURSING NOTE
Current patient location: Patient declined to provide     Is the patient currently in the state of [place]? YES    Visit mode:VIDEO    If the visit is dropped, the patient can be reconnected by:VIDEO VISIT: Text to cell phone:   Telephone Information:   Mobile [phone]       Will anyone else be joining the visit? NO  (If patient encounters technical issues they should call [phone])    Are changes needed to the allergy or medication list? Pt stated no changes to allergies and Pt stated no med changes    Are refills needed on medications prescribed by this physician? NO    Rooming Documentation:  Questionnaire(s) completed    Reason for visit: RECHECK    Linnette CHRISTIANSEN

## 2024-12-03 NOTE — PROGRESS NOTES
"Virtual Visit Details    Type of service:  Video Visit   Video Start Time: {video visit start/end time for provider to select:655656}  Video End Time:{video visit start/end time for provider to select:609396}    Originating Location (pt. Location): {video visit patient location:042032::\"Home\"}  {PROVIDER LOCATION On-site should be selected for visits conducted from your clinic location or adjoining [facility] hospital, academic office, or other nearby [facility] building. Off-site should be selected for all other provider locations, including home:673941}  Distant Location (provider location):  {virtual location provider:478806}  Platform used for Video Visit: {Virtual Visit Platforms:822565::\"[employer]\"}    "

## 2024-12-03 NOTE — LETTER
12/3/2024      Mimi Su  [address]  [facility] [address]      Dear Colleague,    Thank you for referring your patient, Mimi Su, to the [facility]. Please see a copy of my visit note below.    Mimi is a 37 year old who is being evaluated via a billable video visit.      Pt states in [place] for visit.     How would you like to obtain your AVS? MyChart  If the video visit is dropped, the invitation should be resent by: Text to cell phone: [phone]  Will anyone else be joining your video visit?  Possibly pts SO, link sent    JUNIE Bell/AN        [facility] Hematology Follow Up Visit    Outpatient Visit Note:    Patient: Mimi Su  MRN: [id number]  : 1985  GENEVA:  Dec 3, 2024      Mimi Su is a 37 year old woman with a history of hypoplastic bone marrow, intermittent pancytopenia and persistent macrocytosis with and without anemia times who returns for routine follow up.       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  We will continue observation and encourage vitamin B12 supplementation     Recommendations:  CBC with differential every 3-4 months  RTC in 1 year.     Patient was seen and evaluated with Dr. Kamara. Attestation to follow.     Jeremiah Day MD  Hematology/Oncology/BMT Fellow PGY4  Pager: [phone]    Physician Attestation  I saw this patient with the resident and agree with the resident s findings and plan of care as documented in the resident s note.      Briefly, Mimi is a 39 year old woman with chronic mild marrow hypoplasia and pancytopenia.  ANC is adequate, anemia stable and platelets are stable and mildly low. Continue observation and annual visits.    20 minutes spent by me on the date of the encounter doing chart review, review of test results,  interpretation of tests, patient visit, and documentation     Herson Kamara MD   of Medicine  [employer]     --------------------------------------------------------  Forward History:  She's had macrocytosis and anemia since her 20s. She was initially seen in 2014 for macrocytosis and was diagnosed with vitamin B12 deficiency. She continues to take B12 injections. She has had ongoing symptoms of intermittent dizziness, lightheadedness, and chronic pain all over her body, diagnosed in her early 20s. She also has symptoms of brain fog often, has word finding difficulty and slurred speech. She has been seeing a hematologist since 2019 for this macrocytosis.     She denies any history of severe viral illness. 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 She has a history of active tobacco use, used to smoke 1/2 ppd until 2019, then switched to e-cigarettes with 3% Juul pods daily. Reports occasional alcohol use 1-2 times a week, usually margaritas. Denies daily use of alcohol. She has no history of autoimmune disease. No history of blood clots.    March 29, 2021: Bone marrow examination indicated hypocellular bone marrow for age (30%) showing normal trilineage hematopoiesis, Adequate numbers of megakaryocytes, and no increase in blasts (1%). Mild macrocytosis. Storage iron present. Chromosome analysis with no clonal abnormality apparent and macrocytosis present. Flow cytometry with normal immunophenotyping results.  No monotypic B-cell population or increase in blasts identified.     Initial consult Aug 2021: recommended viral hepatitis testing, hemolysis workup, exclude PNH, consider telomere testing.  HBV, HCV, HIV all negative 11/9/21  Haptoglobin normal, Retic 1% with normal Hb    Interval History: Patient mentioned that she is doing okay overall but is noted to have increased pain which has not been appropriately  managed. Patient is also seeking care for mental health with psychiatry.      Latest Reference Range & Units 08/08/24 08:33   WBC 4.0 - 11.0 10e3/uL 3.0 (L)   Hemoglobin 11.7 - 15.7 g/dL 9.5 (L)   Hematocrit 35.0 - 47.0 % 28.9 (L)   Platelet Count 150 - 450 10e3/uL 106 (L)   (L): Data is abnormally low     Latest Reference Range & Units 08/08/24 08:33   Absolute Neutrophils 1.6 - 8.3 10e3/uL 1.6         Virtual Visit Details    Type of service:  Video Visit   Originating Location (pt. Location): Home    Distant Location (provider location):  On-site  Platform used for Video Visit: Falguni      Again, thank you for allowing me to participate in the care of your patient.        Sincerely,        Herson Kamara MD

## 2024-12-03 NOTE — PROGRESS NOTES
Mimi is a 37 year old who is being evaluated via a billable video visit.      Pt states in [place] for visit.     How would you like to obtain your AVS? MyChart  If the video visit is dropped, the invitation should be resent by: Text to cell phone: [phone]  Will anyone else be joining your video visit?  Possibly pts SO, link sent    JUNIE Bell/AN        [facility] Hematology Follow Up Visit    Outpatient Visit Note:    Patient: Mimi Su  MRN: [id number]  : 1985  GENEVA:  Dec 3, 2024      Mimi Su is a 37 year old woman with a history of hypoplastic bone marrow, intermittent pancytopenia and persistent macrocytosis with and without anemia times who returns for routine follow up.       Assessment:  In summary, Mimi Su is a 39 year old woman with mild pancytopenia and hypoplastic bone marrow (mild/mod AA), never treated, currently with chronic stable macrocytosis.  We will continue observation and encourage vitamin B12 supplementation     Recommendations:  CBC with differential every 3-4 months  RTC in 1 year.     Patient was seen and evaluated with Dr. Kamara. Attestation to follow.     Jeremiah Day MD  Hematology/Oncology/BMT Fellow PGY4  Pager: [phone]    Physician Attestation   I saw this patient with the resident and agree with the resident s findings and plan of care as documented in the resident s note.      Briefly, Mimi is a 39 year old woman with chronic mild marrow hypoplasia and pancytopenia.  ANC is adequate, anemia stable and platelets are stable and mildly low. Continue observation and annual visits.    20 minutes spent by me on the date of the encounter doing chart review, review of test results, interpretation of tests, patient visit, and documentation     Herson Kamara MD   of Medicine  [employer]     --------------------------------------------------------  Forward History:  She's had  macrocytosis and anemia since her 20s. She was initially seen in 2014 for macrocytosis and was diagnosed with vitamin B12 deficiency. She continues to take B12 injections. She has had ongoing symptoms of intermittent dizziness, lightheadedness, and chronic pain all over her body, diagnosed in her early 20s. She also has symptoms of brain fog often, has word finding difficulty and slurred speech. She has been seeing a hematologist since 2019 for this macrocytosis.     She denies any history of severe viral illness. 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 She has a history of active tobacco use, used to smoke 1/2 ppd until 2019, then switched to e-cigarettes with 3% Juul pods daily. Reports occasional alcohol use 1-2 times a week, usually margaritas. Denies daily use of alcohol. She has no history of autoimmune disease. No history of blood clots.    March 29, 2021: Bone marrow examination indicated hypocellular bone marrow for age (30%) showing normal trilineage hematopoiesis, Adequate numbers of megakaryocytes, and no increase in blasts (1%). Mild macrocytosis. Storage iron present. Chromosome analysis with no clonal abnormality apparent and macrocytosis present. Flow cytometry with normal immunophenotyping results.  No monotypic B-cell population or increase in blasts identified.     Initial consult Aug 2021: recommended viral hepatitis testing, hemolysis workup, exclude PNH, consider telomere testing.  HBV, HCV, HIV all negative 11/9/21  Haptoglobin normal, Retic 1% with normal Hb    Interval History: Patient mentioned that she is doing okay overall but is noted to have increased pain which has not been appropriately managed. Patient is also seeking care for mental health with psychiatry.      Latest Reference Range & Units 08/08/24 08:33   WBC 4.0 - 11.0 10e3/uL 3.0 (L)   Hemoglobin 11.7 - 15.7 g/dL 9.5 (L)   Hematocrit 35.0 - 47.0 % 28.9 (L)   Platelet Count 150  - 450 10e3/uL 106 (L)   (L): Data is abnormally low     Latest Reference Range & Units 08/08/24 08:33   Absolute Neutrophils 1.6 - 8.3 10e3/uL 1.6         Virtual Visit Details    Type of service:  Video Visit   Originating Location (pt. Location): Home    Distant Location (provider location):  On-site  Platform used for Video Visit: Falguni

## 2024-12-07 ENCOUNTER — TELEPHONE (OUTPATIENT)
Dept: UROLOGY | Facility: CLINIC | Age: 39
End: 2024-12-07
Payer: COMMERCIAL

## 2024-12-07 DIAGNOSIS — R10.84 ABDOMINAL PAIN, GENERALIZED: Primary | ICD-10-CM

## 2024-12-07 NOTE — TELEPHONE ENCOUNTER
40 yo F calling the urology office, she says she is experiencing severe abdominal, rectal and low back pain. Clearly I can't evaluate her over the phone. If she is having this severe pain, recommend going back to emergency department    Ryan Sandoval MD   [facility]  [phone] clinic phone

## 2024-12-10 ENCOUNTER — PATIENT OUTREACH (OUTPATIENT)
Dept: CARE COORDINATION | Facility: CLINIC | Age: 39
End: 2024-12-10
Payer: COMMERCIAL

## 2024-12-10 NOTE — PROGRESS NOTES
This patient scored positive on Cancer Distress Screen at the time of their most recent clinic visit. Chart reviewed, and patient does not have a cancer diagnosis. Therefore, oncology social work team will not outreach to patient. Oncology social work will engage if referral received from provider.     ALEKS Schneider, LICSW, OSW-C  Clinical - Adult Oncology  Phone: [phone]  She/Her/Hers  [facility]: Kemi BOUDREAUX  8am-4:30pm  [facility]: RUBIO Pina F 8am-4:30pm   Support Groups at [facility]: Social Work Services for Cancer Patients ([email or web address])

## 2025-03-17 ENCOUNTER — TELEPHONE (OUTPATIENT)
Dept: UROLOGY | Facility: CLINIC | Age: 40
End: 2025-03-17
Payer: COMMERCIAL
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
## 2025-03-17 NOTE — TELEPHONE ENCOUNTER
----- Message from Marycruz Faustin PA-C sent at 3/17/2025  9:16 AM CDT -----  Regarding: FW: UDS  She has had several issues with LUTS for years (pelvic surgeries likely contributing) and UDS is the next step with follow-up with Dr. Abad (failed extensive PFPT). We talked about this at the video visit. I am in the hospital this week and then out of the office until April. Can one of you pls talk to her and see if she will schedule UDS and follow-up with CSF?    Thank yous!      ----- Message -----  From: Kelsi Paulana KANIKA  Sent: 3/13/2025  11:30 AM CDT  To: Marycruz Faustin PA-C  Subject: FW: UDS                                          Hi Marycruz, Yanick pt wants to know why UDS is being ordered for her.  I explained it's the best way to find out how her bladder is functioning but she has more questions.  Pt wanted to set up a VV so she could talk to you more about it.  She is starting a new job and needs a VV before noon.  Do you want to just call her or is there a time before noon she can do a VV?  Thanks :)  ----- Message -----  From: Judy Duncan  Sent: 3/12/2025   3:12 PM CDT  To: Ros Paula  Subject: FW: UDS                                            ----- Message -----  From: Marycruz Faustin PA-C  Sent: 3/12/2025  11:49 AM CDT  To: [facility] - Scheduling Pool  Subject: UDS                                              Is there trouble getting a hold of her to schedule UDS?      ----- Message -----  From: Sarah Abad MD  Sent: 3/5/2025  12:28 PM CDT  To: Marycruz Faustin PA-C  Subject: FW: patient who was supposed to see you          Manuelito Walsh scoped her and said it was normal.  Do you want to help her arrange UDS or to see me first?    Thanks    C  ----- Message -----  From: iNck Walsh MD  Sent: 2/28/2025   3:01 PM CST  To: Sarah Abad MD  Subject: patient who was supposed to see you              I scoped this patient who there was a mix up on.  I think  you were supposed to cysto her but they didn't schedule it properly so I did.    I took a picture of her bladder neck retroflexed, which has some hyperemia that I don't think there is anything to do (picture in media section).  Not sure if they are scheduling a return visit with you or not.   good balance

## 2025-03-18 ENCOUNTER — TRANSCRIBE ORDERS (OUTPATIENT)
Dept: CALL CENTER | Age: 40
End: 2025-03-18
Payer: COMMERCIAL

## 2025-03-18 DIAGNOSIS — R11.10 VOMITING: Primary | ICD-10-CM

## 2025-04-21 ENCOUNTER — TRANSCRIBE ORDERS (OUTPATIENT)
Dept: OTHER | Age: 40
End: 2025-04-21

## 2025-04-21 DIAGNOSIS — G89.4 CHRONIC PAIN SYNDROME: Primary | ICD-10-CM

## 2025-04-28 ENCOUNTER — OFFICE VISIT (OUTPATIENT)
Dept: INTERNAL MEDICINE | Facility: CLINIC | Age: 40
End: 2025-04-28
Payer: COMMERCIAL

## 2025-04-28 VITALS
TEMPERATURE: 98.2 F | HEIGHT: 67 IN | HEART RATE: 85 BPM | SYSTOLIC BLOOD PRESSURE: 104 MMHG | DIASTOLIC BLOOD PRESSURE: 73 MMHG | WEIGHT: 163.9 LBS | BODY MASS INDEX: 25.72 KG/M2 | OXYGEN SATURATION: 99 % | RESPIRATION RATE: 20 BRPM

## 2025-04-28 DIAGNOSIS — Z93.3 COLOSTOMY STATUS (H): Chronic | ICD-10-CM

## 2025-04-28 DIAGNOSIS — Z93.2 STATUS POST ILEOSTOMY (H): ICD-10-CM

## 2025-04-28 DIAGNOSIS — R52 TOTAL BODY PAIN: ICD-10-CM

## 2025-04-28 DIAGNOSIS — J01.10 ACUTE NON-RECURRENT FRONTAL SINUSITIS: Primary | ICD-10-CM

## 2025-04-28 DIAGNOSIS — R19.8 HIGH OUTPUT ILEOSTOMY (H): ICD-10-CM

## 2025-04-28 DIAGNOSIS — D64.9 ANEMIA, UNSPECIFIED TYPE: ICD-10-CM

## 2025-04-28 DIAGNOSIS — F33.1 MDD (MAJOR DEPRESSIVE DISORDER), RECURRENT EPISODE, MODERATE (H): ICD-10-CM

## 2025-04-28 DIAGNOSIS — G43.809 OTHER MIGRAINE WITHOUT STATUS MIGRAINOSUS, NOT INTRACTABLE: ICD-10-CM

## 2025-04-28 DIAGNOSIS — R79.0 LOW MAGNESIUM LEVEL: ICD-10-CM

## 2025-04-28 DIAGNOSIS — D69.6 THROMBOCYTOPENIA: ICD-10-CM

## 2025-04-28 DIAGNOSIS — Z76.89 ENCOUNTER TO ESTABLISH CARE WITH NEW PROVIDER: ICD-10-CM

## 2025-04-28 DIAGNOSIS — Z93.2 ILEOSTOMY IN PLACE (H): ICD-10-CM

## 2025-04-28 DIAGNOSIS — Z93.2 HIGH OUTPUT ILEOSTOMY (H): ICD-10-CM

## 2025-04-28 DIAGNOSIS — E87.6 LOW BLOOD POTASSIUM: ICD-10-CM

## 2025-04-28 DIAGNOSIS — D61.9: ICD-10-CM

## 2025-04-28 DIAGNOSIS — R11.10 INTRACTABLE VOMITING: ICD-10-CM

## 2025-04-28 PROBLEM — R45.851 SUICIDAL IDEATION: Status: ACTIVE | Noted: 2024-09-11

## 2025-04-28 PROBLEM — F10.11 ALCOHOL USE DISORDER, MILD, IN SUSTAINED REMISSION, ABUSE: Status: ACTIVE | Noted: 2020-02-28

## 2025-04-28 PROBLEM — Z87.19 HISTORY OF SMALL BOWEL OBSTRUCTION: Status: RESOLVED | Noted: 2025-01-22 | Resolved: 2025-04-28

## 2025-04-28 PROBLEM — N17.9 AKI (ACUTE KIDNEY INJURY): Status: ACTIVE | Noted: 2025-02-28

## 2025-04-28 PROBLEM — E83.42 HYPOMAGNESEMIA: Status: ACTIVE | Noted: 2025-02-28

## 2025-04-28 PROBLEM — R53.83 FATIGUE: Status: ACTIVE | Noted: 2025-04-28

## 2025-04-28 PROBLEM — R19.7 DIARRHEA: Status: ACTIVE | Noted: 2025-01-22

## 2025-04-28 PROBLEM — F60.3 BORDERLINE PERSONALITY DISORDER (H): Status: ACTIVE | Noted: 2020-04-15

## 2025-04-28 PROBLEM — R11.2 NAUSEA AND VOMITING: Status: RESOLVED | Noted: 2022-09-16 | Resolved: 2025-04-28

## 2025-04-28 PROBLEM — K59.04 CHRONIC IDIOPATHIC CONSTIPATION: Status: ACTIVE | Noted: 2025-01-22

## 2025-04-28 PROBLEM — F45.22 BODY DYSMORPHIC DISORDER: Status: ACTIVE | Noted: 2022-09-16

## 2025-04-28 PROBLEM — D72.819 LEUKOPENIA: Status: ACTIVE | Noted: 2022-01-18

## 2025-04-28 PROBLEM — K94.09 COLOSTOMY PROLAPSE (H): Status: ACTIVE | Noted: 2025-01-22

## 2025-04-28 PROBLEM — N94.9 DISORDER OF PELVIS: Status: ACTIVE | Noted: 2017-10-10

## 2025-04-28 PROBLEM — K56.41 FECAL IMPACTION OF COLON (H): Status: RESOLVED | Noted: 2025-01-22 | Resolved: 2025-04-28

## 2025-04-28 PROBLEM — G51.0 BELL'S PALSY: Status: ACTIVE | Noted: 2022-11-26

## 2025-04-28 PROBLEM — R10.9 ABDOMINAL PAIN: Status: ACTIVE | Noted: 2022-08-07

## 2025-04-28 PROBLEM — M79.7 FIBROMYOSITIS: Status: ACTIVE | Noted: 2024-04-08

## 2025-04-28 PROBLEM — R07.9 CHEST PAIN: Status: ACTIVE | Noted: 2022-11-25

## 2025-04-28 PROBLEM — F41.1 GENERALIZED ANXIETY DISORDER: Status: ACTIVE | Noted: 2025-01-22

## 2025-04-28 PROBLEM — G43.711 INTRACTABLE CHRONIC MIGRAINE WITHOUT AURA AND WITH STATUS MIGRAINOSUS: Status: ACTIVE | Noted: 2021-01-05

## 2025-04-28 PROBLEM — R14.0 ABDOMINAL DISTENSION, GASEOUS: Status: ACTIVE | Noted: 2024-12-05

## 2025-04-28 PROBLEM — I95.9 HYPOTENSION: Status: ACTIVE | Noted: 2025-01-22

## 2025-04-28 PROBLEM — E44.1 MILD PROTEIN-CALORIE MALNUTRITION: Status: ACTIVE | Noted: 2024-05-15

## 2025-04-28 PROBLEM — F11.90 CHRONIC, CONTINUOUS USE OF OPIOIDS: Status: ACTIVE | Noted: 2025-01-22

## 2025-04-28 PROBLEM — K21.9 GASTROESOPHAGEAL REFLUX DISEASE WITHOUT ESOPHAGITIS: Chronic | Status: ACTIVE | Noted: 2017-10-10

## 2025-04-28 PROBLEM — R55 SYNCOPE: Status: ACTIVE | Noted: 2025-01-22

## 2025-04-28 PROBLEM — K62.3 RECTAL PROLAPSE: Status: ACTIVE | Noted: 2017-10-10

## 2025-04-28 LAB
BASOPHILS # BLD AUTO: 0 10E3/UL (ref 0–0.2)
BASOPHILS NFR BLD AUTO: 0 %
EOSINOPHIL # BLD AUTO: 0.1 10E3/UL (ref 0–0.7)
EOSINOPHIL NFR BLD AUTO: 1 %
ERYTHROCYTE [DISTWIDTH] IN BLOOD BY AUTOMATED COUNT: 13.3 % (ref 10–15)
HCT VFR BLD AUTO: 31.2 % (ref 35–47)
HGB BLD-MCNC: 10.7 G/DL (ref 11.7–15.7)
IMM GRANULOCYTES # BLD: 0 10E3/UL
IMM GRANULOCYTES NFR BLD: 0 %
LYMPHOCYTES # BLD AUTO: 1.5 10E3/UL (ref 0.8–5.3)
LYMPHOCYTES NFR BLD AUTO: 31 %
MCH RBC QN AUTO: 34.4 PG (ref 26.5–33)
MCHC RBC AUTO-ENTMCNC: 34.3 G/DL (ref 31.5–36.5)
MCV RBC AUTO: 100 FL (ref 78–100)
MONOCYTES # BLD AUTO: 0.4 10E3/UL (ref 0–1.3)
MONOCYTES NFR BLD AUTO: 7 %
NEUTROPHILS # BLD AUTO: 3 10E3/UL (ref 1.6–8.3)
NEUTROPHILS NFR BLD AUTO: 60 %
PLATELET # BLD AUTO: 132 10E3/UL (ref 150–450)
RBC # BLD AUTO: 3.11 10E6/UL (ref 3.8–5.2)
WBC # BLD AUTO: 5 10E3/UL (ref 4–11)

## 2025-04-28 PROCEDURE — 3078F DIAST BP <80 MM HG: CPT | Performed by: NURSE PRACTITIONER

## 2025-04-28 PROCEDURE — 85025 COMPLETE CBC W/AUTO DIFF WBC: CPT | Performed by: NURSE PRACTITIONER

## 2025-04-28 PROCEDURE — 3074F SYST BP LT 130 MM HG: CPT | Performed by: NURSE PRACTITIONER

## 2025-04-28 PROCEDURE — 99205 OFFICE O/P NEW HI 60 MIN: CPT | Performed by: NURSE PRACTITIONER

## 2025-04-28 PROCEDURE — 80048 BASIC METABOLIC PNL TOTAL CA: CPT | Performed by: NURSE PRACTITIONER

## 2025-04-28 PROCEDURE — 83735 ASSAY OF MAGNESIUM: CPT | Performed by: NURSE PRACTITIONER

## 2025-04-28 PROCEDURE — 36415 COLL VENOUS BLD VENIPUNCTURE: CPT | Performed by: NURSE PRACTITIONER

## 2025-04-28 RX ORDER — AZITHROMYCIN 250 MG/1
TABLET, FILM COATED ORAL
Qty: 6 TABLET | Refills: 0 | Status: SHIPPED | OUTPATIENT
Start: 2025-04-28 | End: 2025-05-03

## 2025-04-28 NOTE — LETTER
April 28, 2025      Mimi Su  [address]   [place] [facility] [id number]        To Whom It May Concern:    Mimi Su  was seen in the clinic today, 04/28/2025.    Sincerely,        Arminda Keating CNP    Electronically signed

## 2025-04-28 NOTE — NURSING NOTE
"/73   Pulse 85   Temp 98.2  F (36.8  C) (Tympanic)   Resp 20   Ht 1.702 m (5' 7\")   Wt 74.3 kg (163 lb 14.4 oz)   LMP  (LMP Unknown)   SpO2 99%   BMI 25.67 kg/m      "
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
## 2025-04-29 LAB
ANION GAP SERPL CALCULATED.3IONS-SCNC: 9 MMOL/L (ref 7–15)
BUN SERPL-MCNC: 16.8 MG/DL (ref 6–20)
CALCIUM SERPL-MCNC: 9.2 MG/DL (ref 8.8–10.4)
CHLORIDE SERPL-SCNC: 103 MMOL/L (ref 98–107)
CREAT SERPL-MCNC: 0.77 MG/DL (ref 0.51–0.95)
EGFRCR SERPLBLD CKD-EPI 2021: >90 ML/MIN/1.73M2
GLUCOSE SERPL-MCNC: 84 MG/DL (ref 70–99)
HCO3 SERPL-SCNC: 26 MMOL/L (ref 22–29)
MAGNESIUM SERPL-MCNC: 1.7 MG/DL (ref 1.7–2.3)
POTASSIUM SERPL-SCNC: 4.5 MMOL/L (ref 3.4–5.3)
SODIUM SERPL-SCNC: 138 MMOL/L (ref 135–145)

## 2025-05-01 ENCOUNTER — PATIENT OUTREACH (OUTPATIENT)
Dept: CARE COORDINATION | Facility: CLINIC | Age: 40
End: 2025-05-01
Payer: COMMERCIAL

## 2025-05-01 ENCOUNTER — MYC MEDICAL ADVICE (OUTPATIENT)
Dept: ONCOLOGY | Facility: CLINIC | Age: 40
End: 2025-05-01
Payer: COMMERCIAL

## 2025-05-01 NOTE — TELEPHONE ENCOUNTER
Pain referral signed by chrissie shen today.    Sent [employer] message to patient.    Thank you,  Reyes, Triage RN Michelle Clermont    4:23 PM 5/1/2025

## 2025-05-01 NOTE — LETTER
[facility] CARE COORDINATION  [address]  [facility] [address]    May 1, 2025    Mimi Su  [address]   [facility] [address]      Dear Mimi,    I am a clinic care coordinator who works with Arminda Keating CNP with the [facility]. I wanted to introduce myself and provide you with my contact information for you to be able to call me with any questions or concerns. I wanted to thank you for spending the time to talk with me.  Below is a description of clinic care coordination and how I can further assist you.       The clinic care coordination team is made up of a registered nurse, , financial resource worker and community health worker who understand the health care system. The goal of clinic care coordination is to help you manage your health and improve access to the health care system. Our team works alongside your provider to assist you in determining your health and social needs. We can help you obtain health care and community resources, providing you with necessary information and education. We can work with you through any barriers and develop a care plan that helps coordinate and strengthen the communication between you and your care team.  Our services are voluntary and are offered without charge to you personally.    Please feel free to contact me with any questions or concerns regarding care coordination and what we can offer.      We are focused on providing you with the highest-quality healthcare experience possible.    Sincerely,     Jyoti Fernandez RN, BSN, CPHN, [facility] Ambulatory Care Management  [facility], and [facility]  [email or web address]  Office: [phone]  Employed by [employer]   On behalf of Deb Sams     [phone]   Tonyn (PH), [place] (ER), [place] (RG)     Additional Information: Additional information on Care Coordination and the [facility] and  providers.     WHAT IS CARE COORDINATION?      [facility] Care Coordination supports patients and families dealing with chronic or complex health conditions, developmental issues, and social service needs. This service is available to patients of all ages, from babies to seniors. When you re facing a difficult decision about caring for yourself or someone you love, we can help you understand your options. We identify and refer you to community resources that help with financial, legal, mental health, transportation, and other issues. We also help with your medical and related education needs.     IS CARE COORDINATION RIGHT FOR ME? Discuss a referral to Care Coordination with your primary care provider or care team member.     HOW CAN I CONNECT WITH CARE COORDINATION?  Contact your clinic.  Speak with your doctor or clinic staff.  Discuss care coordination with hospital staff before discharge.     MEET YOUR CARE COORDINATION TEAM     Registered Nurse Care Coordinator  Provides education on medications, disease management, and new diagnoses.  Addresses concerns about medical conditions and connects you to resources.  Develops patient-centered goals and communicates with patient s care team.     Social Work Care Coordinator  Provides education on emotional wellbeing.  Connects you to a variety of community-based resources.  Communicates with care team and community partners.     Community Health Worker  Identifies health and social barriers and connects you with community resources.  Develops nonclinical patient and family centered goals.  Enhances communication between patients and care teams.     Financial Resource Worker  Assists patients with applying for health insurance ([place], [place], [place]).  Helps patients with applying for county benefits.  Connects patients with [facility] Care.    [facility] [place]  [address] [place], [place], [address]  [phone]    About  the Location  [facility] is a conveniently located community clinic on [facility] that offers comprehensive care for your entire family, through all stages of life. We make everyday care for kids and adults easy, from annual checkups, vaccinations, and sports physicals to treatment for common illnesses, mental health care, women s health care, and more. For extra convenience, we offer onsite lab services for diagnostic testing or health screening. We are also equipped with a range of specialty services, from imaging to pediatric specialty care.    [facility] is dedicated to comprehensive, personalized primary care for the whole person and the whole family. If you or your child ever needs specialty care for a serious illness or complex condition, our primary care team can easily refer you to specialty care experts at our network of nearby [facility] hospitals, our [facility] in [place], or our dedicated children s hospital.    We know that life moves fast for a busy family. That s why we also offer a range of quick and accessible care virtual options, including same-day services like eVisit appointments and helpful resources on the [facility] vishal and ustymet. We re here for your family, where and when you need us.    Family Medicine    Hours  Open Now  Working Hours  monday: 7:00 AM - 6:00 PM  tuesday: 7:00 AM - 6:00 PM  wednesday: 7:00 AM - 6:00 PM  thursday: 7:00 AM - 6:00 PM  friday: 7:00 AM - 5:00 PM    Providers      Peg Cornejo NP  Family Medicine  Accepting New Patients   Online Booking    Age Groups Seen  Infant 0-2Child 2-18Adult 18-65Older Adult >65    Languages  English    Gender: Female    Expertise  Education: [employer]  Professional Education, Nurse Practitioner, 2003    Board Certifications  Family Nurse Practitioner  [employer] (ANC), 2003       MARIBELL Snow  Medicine  Accepting New Patients  Online Booking    Highlights  Age Groups Seen  Infant 0-2Child 2-18Adult 18-65Older Adult >65    Languages  English    Gender  Female    About Yael Gold    My philosophy is that prevention of disease is of the utmost importance. Diet and exercise, as well as other lifestyle modifications, go a long way in maintaining good health.    Pronouns  She, Her    Expertise    Education  [employer]    Professional Education, Physician Assistant, 2000    Board Certifications  Physician Assistant  National Commission on Certification of Physician Assistants, 2000              Alex Thornton MD  Family Medicine  Accepting New Patients  Online Booking    Highlights  Age Groups Seen  Infant 0-2Child 2-18Adult 18-65Older Adult >65    Languages  English    Gender  Male    About Alex Thornton    I enjoy seeing patients of all ages, listening to their concerns, and using a collaborative approach to develop a plan that is right for them. I strive to help patient's understand their health and encourage questions from them and their family.    Pronouns  He, Him    Expertise    Education  [facility]    Residency, Family Medicine, 2019  [employer]  Medical School, MD, 2016    Board Certifications  Family Medicine  American Board of Family Medicine, 2019        Minnie Caro PA-C  Family Medicine  Accepting New Patients  Online Booking    Highlights    Age Groups Seen  Infant 0-2Child 2-18Adult 18-65Older Adult >65    Languages  English    Gender  Female    About Minnie Caro    My philosophy of care is to be a good listener and to care for my patients how I would want to be cared for.    Pronouns  She, Her    Expertise    Education  [employer] [employer] [facility]  Professional Education, Physician Assistant, 2009    Board Certifications  Physician Assistant  National Commission on Certification of  Physician Assistants, 2009                Silvestre Christianson MD  Family Medicine  Accepting New Patients  Online Booking    Highlights    Age Groups Seen  Infant 0-2Child 2-18Adult 18-65Older Adult >65    Languages  English    Gender  Female    About Silvestre Christianson    I respect the cultural beliefs of all of my patients, and strive to learn about those cultural beliefs so that I can develop diagnostic and treatment plans that will help them achieve their health goals.    I respect the [place] beliefs of all of my patients, and strive to learn about those beliefs so that I can develop diagnostic and treatment plans that will help them achieve their health goals.    I am able to provide care in a variety of languages in collaboration with interpreters.    Pronouns  She, Her    Expertise    Education  [facility]  Residency, Family Medicine, 2013  [employer]  Medical School, MD, 2008    Board Certifications  Family Medicine  American Board of Family Medicine, 2013        Allie Victoria PA-C  Accepting New Patients    Highlights    Languages  English    Gender  Female    Expertise    Education  [employer]  Professional Education, Physician Assistant, 1988    Board Certifications  Physician Assistant  National Commission on Certification of Physician Assistants, 1989

## 2025-05-01 NOTE — PROGRESS NOTES
Clinic Care Coordination Contact    Situation: Patient chart reviewed by care coordinator.    Background: RN CC received new referral to assist patient in getting established at a [facility] closer to home. She lives in [place], [place].     Assessment: RN CC contacted patient. She was crying when she answered the call. She stated she is currently in the [facility] and no one will help her. RN CC introduced self and inquired about continuing the call or calling back tomorrow. She stated she needs to establish with a provider closer to home. She stated the [facility] is close to her home. RN CC offered to send her information on the clinic. Inquired if she preferred a female vs male provider. She stated it didn't matter. Discussed enrolling in Care Coordinator with writer then transferring to the RN GEOVANNA that covers the [facility]. She stated she would decide after reviewing the informational letter and clinic information.     Plan/Recommendations: RN CC will send informational letter with information on Care Coordination and the [facility] via her Better[employer] account. Verified she does have access to her Better[employer] account. RN CC will leave the referral open at this time.     Jyoti Fernandez RN, BSN, CPHN, [facility] Ambulatory Care Management  [facility], and [facility]  [email or web address]  Office: [phone]  Employed by [facility]

## 2025-05-06 ENCOUNTER — PATIENT OUTREACH (OUTPATIENT)
Dept: CARE COORDINATION | Facility: CLINIC | Age: 40
End: 2025-05-06
Payer: COMMERCIAL

## 2025-05-07 ENCOUNTER — PATIENT OUTREACH (OUTPATIENT)
Dept: CARE COORDINATION | Facility: CLINIC | Age: 40
End: 2025-05-07
Payer: COMMERCIAL

## 2025-05-08 ENCOUNTER — PATIENT OUTREACH (OUTPATIENT)
Dept: CARE COORDINATION | Facility: CLINIC | Age: 40
End: 2025-05-08
Payer: COMMERCIAL

## 2025-05-09 ENCOUNTER — MYC MEDICAL ADVICE (OUTPATIENT)
Dept: INTERNAL MEDICINE | Facility: CLINIC | Age: 40
End: 2025-05-09
Payer: COMMERCIAL

## 2025-05-12 NOTE — TELEPHONE ENCOUNTER
Patient was at [facility] 05/05/2025 - 5/10/2025.    Please see patient's AnSynhart message.     Please advise, thanks.    Reyes, Triage JAMES Gleason    3:02 PM 5/12/2025

## 2025-06-03 ENCOUNTER — TELEPHONE (OUTPATIENT)
Dept: PALLIATIVE MEDICINE | Facility: CLINIC | Age: 40
End: 2025-06-03
Payer: COMMERCIAL

## 2025-06-03 NOTE — TELEPHONE ENCOUNTER
Left Voicemail (1st Attempt) and Sent PaperVhart (1st Attempt) for the patient to call back and schedule the following:    Appointment type: New Pain Patient   Provider: Dr. Mercedes  Return date: next available  Specialty phone number: [phone]  Additional appointment(s) needed:   Additonal Notes:     Attempted to reschedule the appointment with Dr. Mercedes on 8/11/2025, Provider out of clinic.    Also sent a Codekko message.    Jacklyn PLEITEZ/Complex Procedure    [facility]   Neurology, NeuroSurgery, NeuroPsychology, Pain Management and Cardiology Specialties  Medical/Surgical Adult Specialties

## 2025-07-17 ENCOUNTER — PATIENT OUTREACH (OUTPATIENT)
Dept: CARE COORDINATION | Facility: CLINIC | Age: 40
End: 2025-07-17
Payer: COMMERCIAL

## (undated) DEVICE — WIPE PREMOIST CLEANSING WASHCLOTHS 7988

## (undated) DEVICE — DRSG PRIMAPORE 03 1/8X6" 66000318

## (undated) DEVICE — SU VICRYL 2-0 CT-2 27" J333H

## (undated) DEVICE — SU VICRYL 4-0 PS-2 18" UND J496H

## (undated) DEVICE — GLOVE BIOGEL PI MICRO INDICATOR UNDERGLOVE SZ 7.0 48970

## (undated) DEVICE — DRAPE SHEET MED 44X70" 9355

## (undated) DEVICE — SU VICRYL 0 TIE 54" J608H

## (undated) DEVICE — SU SILK 0 TIE 6X30" A306H

## (undated) DEVICE — WIPES FOLEY CARE SURESTEP PROVON DFC100

## (undated) DEVICE — SU PDS II 0 TP-1 60" Z991G

## (undated) DEVICE — CATH TRAY FOLEY SURESTEP 16FR W/URNE MTR STLK LATEX A303316A

## (undated) DEVICE — PREP DYNA-HEX 4% CHG SCRUB 4OZ BOTTLE MDS098710

## (undated) DEVICE — LINEN TOWEL PACK X6 WHITE 5487

## (undated) DEVICE — SU VICRYL 2-0 CT-1 27" UND J259H

## (undated) DEVICE — ESU LIGASURE IMPACT OPEN SEALER/DVDR CVD LG JAW LF4418

## (undated) DEVICE — LINEN TOWEL PACK X30 5481

## (undated) DEVICE — SOL NACL 0.9% 10ML VIAL 0409-4888-02

## (undated) DEVICE — GLOVE BIOGEL PI MICRO SZ 6.5 48565

## (undated) DEVICE — ESU GROUND PAD ADULT W/CORD E7507

## (undated) DEVICE — SU VICRYL 3-0 SH 27" UND J416H

## (undated) DEVICE — SU VICRYL 3-0 SH 8X18" UND J864D

## (undated) DEVICE — SPONGE LAP 18X18" X8435

## (undated) DEVICE — DRAPE U SPLIT 74X120" 29440

## (undated) DEVICE — SU VICRYL 0 CT-1 36" J346H

## (undated) DEVICE — KIT NEW IMAGE COLOSTOMY/ILEOSTOMY 2 3/4" LF 19354

## (undated) DEVICE — BARRIER SEPRAFILM 3X5" X6 SHEETS 5086-02

## (undated) DEVICE — SU VICRYL 2-0 TIE 54" J615H

## (undated) DEVICE — SU VICRYL 0 CT-2 27" J334H

## (undated) DEVICE — GLOVE PROTEXIS POWDER FREE 6.0 ORTHOPEDIC 2D73ET60

## (undated) DEVICE — DRAPE LEGGINGS CLEAR 8430

## (undated) DEVICE — ADH LIQUID MASTISOL TOPICAL VIAL 2-3ML 0523-48

## (undated) DEVICE — PREP CHLORAPREP 26ML TINTED HI-LITE ORANGE 930815

## (undated) DEVICE — SU SILK 2-0 TIE 12X30" A305H

## (undated) DEVICE — STPL LINEAR CUT 55MM TLC55

## (undated) DEVICE — DRAPE IOBAN INCISE 23X17" 6650EZ

## (undated) DEVICE — SUCTION SLEEVE NEPTUNE 2 165MM 0703-005-165

## (undated) DEVICE — Device

## (undated) DEVICE — SU MONOCRYL 4-0 PS-2 27" UND Y426H

## (undated) DEVICE — SUCTION MANIFOLD NEPTUNE 2 SYS 4 PORT 0702-020-000

## (undated) DEVICE — DRAPE MAYO STAND 23X54 8337

## (undated) DEVICE — SU PDS II 1 CTX 36" Z371T

## (undated) DEVICE — SU SILK 0 SH 30" K834H

## (undated) RX ORDER — ERTAPENEM 1 G/1
INJECTION, POWDER, LYOPHILIZED, FOR SOLUTION INTRAMUSCULAR; INTRAVENOUS
Status: DISPENSED
Start: 2024-05-06

## (undated) RX ORDER — HYDROMORPHONE HCL IN WATER/PF 6 MG/30 ML
PATIENT CONTROLLED ANALGESIA SYRINGE INTRAVENOUS
Status: DISPENSED
Start: 2024-05-06

## (undated) RX ORDER — APREPITANT 40 MG/1
CAPSULE ORAL
Status: DISPENSED
Start: 2024-05-06

## (undated) RX ORDER — FENTANYL CITRATE-0.9 % NACL/PF 10 MCG/ML
PLASTIC BAG, INJECTION (ML) INTRAVENOUS
Status: DISPENSED
Start: 2024-05-06

## (undated) RX ORDER — FENTANYL CITRATE 50 UG/ML
INJECTION, SOLUTION INTRAMUSCULAR; INTRAVENOUS
Status: DISPENSED
Start: 2024-08-08

## (undated) RX ORDER — ACETAMINOPHEN 325 MG/1
TABLET ORAL
Status: DISPENSED
Start: 2024-05-06

## (undated) RX ORDER — SCOLOPAMINE TRANSDERMAL SYSTEM 1 MG/1
PATCH, EXTENDED RELEASE TRANSDERMAL
Status: DISPENSED
Start: 2024-05-06

## (undated) RX ORDER — PROPOFOL 10 MG/ML
INJECTION, EMULSION INTRAVENOUS
Status: DISPENSED
Start: 2024-05-06

## (undated) RX ORDER — DEXAMETHASONE SODIUM PHOSPHATE 4 MG/ML
INJECTION, SOLUTION INTRA-ARTICULAR; INTRALESIONAL; INTRAMUSCULAR; INTRAVENOUS; SOFT TISSUE
Status: DISPENSED
Start: 2024-05-06

## (undated) RX ORDER — FENTANYL CITRATE 50 UG/ML
INJECTION, SOLUTION INTRAMUSCULAR; INTRAVENOUS
Status: DISPENSED
Start: 2024-05-06

## (undated) RX ORDER — IOPAMIDOL 612 MG/ML
INJECTION, SOLUTION INTRATHECAL
Status: DISPENSED
Start: 2024-05-06

## (undated) RX ORDER — GABAPENTIN 300 MG/1
CAPSULE ORAL
Status: DISPENSED
Start: 2024-05-06

## (undated) RX ORDER — HEPARIN SODIUM 5000 [USP'U]/.5ML
INJECTION, SOLUTION INTRAVENOUS; SUBCUTANEOUS
Status: DISPENSED
Start: 2024-05-06

## (undated) RX ORDER — LIDOCAINE HYDROCHLORIDE 10 MG/ML
INJECTION, SOLUTION EPIDURAL; INFILTRATION; INTRACAUDAL; PERINEURAL
Status: DISPENSED
Start: 2024-08-08

## (undated) RX ORDER — OXYCODONE HYDROCHLORIDE 10 MG/1
TABLET ORAL
Status: DISPENSED
Start: 2024-05-06